# Patient Record
Sex: FEMALE | Race: WHITE | NOT HISPANIC OR LATINO | Employment: OTHER | URBAN - METROPOLITAN AREA
[De-identification: names, ages, dates, MRNs, and addresses within clinical notes are randomized per-mention and may not be internally consistent; named-entity substitution may affect disease eponyms.]

---

## 2017-04-18 ENCOUNTER — ALLSCRIPTS OFFICE VISIT (OUTPATIENT)
Dept: OTHER | Facility: OTHER | Age: 82
End: 2017-04-18

## 2017-04-18 DIAGNOSIS — E03.9 HYPOTHYROIDISM: ICD-10-CM

## 2017-04-18 DIAGNOSIS — E78.00 PURE HYPERCHOLESTEROLEMIA: ICD-10-CM

## 2017-04-18 DIAGNOSIS — R53.83 OTHER FATIGUE: ICD-10-CM

## 2017-04-18 DIAGNOSIS — Z12.11 ENCOUNTER FOR SCREENING FOR MALIGNANT NEOPLASM OF COLON: ICD-10-CM

## 2017-05-12 ENCOUNTER — TRANSCRIBE ORDERS (OUTPATIENT)
Dept: ADMINISTRATIVE | Facility: HOSPITAL | Age: 82
End: 2017-05-12

## 2017-05-12 DIAGNOSIS — R14.0 ABDOMINAL DISTENTION: Primary | ICD-10-CM

## 2017-05-16 ENCOUNTER — HOSPITAL ENCOUNTER (OUTPATIENT)
Dept: RADIOLOGY | Age: 82
Discharge: HOME | End: 2017-05-16
Payer: MEDICARE

## 2017-05-16 ENCOUNTER — TRANSCRIBE ORDERS (OUTPATIENT)
Dept: ADMINISTRATIVE | Age: 82
End: 2017-05-16

## 2017-05-16 ENCOUNTER — APPOINTMENT (OUTPATIENT)
Dept: LAB | Age: 82
End: 2017-05-16
Payer: MEDICARE

## 2017-05-16 DIAGNOSIS — E78.00 PURE HYPERCHOLESTEROLEMIA: ICD-10-CM

## 2017-05-16 DIAGNOSIS — R14.0 ABDOMINAL DISTENTION: ICD-10-CM

## 2017-05-16 DIAGNOSIS — R53.83 OTHER FATIGUE: ICD-10-CM

## 2017-05-16 DIAGNOSIS — E03.9 HYPOTHYROIDISM: ICD-10-CM

## 2017-05-16 LAB
ALBUMIN SERPL BCP-MCNC: 3.5 G/DL (ref 3.5–5)
ALP SERPL-CCNC: 56 U/L (ref 46–116)
ALT SERPL W P-5'-P-CCNC: 20 U/L (ref 12–78)
ANION GAP SERPL CALCULATED.3IONS-SCNC: 7 MMOL/L (ref 4–13)
AST SERPL W P-5'-P-CCNC: 25 U/L (ref 5–45)
BILIRUB SERPL-MCNC: 0.45 MG/DL (ref 0.2–1)
BUN SERPL-MCNC: 20 MG/DL (ref 5–25)
CALCIUM SERPL-MCNC: 8.7 MG/DL (ref 8.3–10.1)
CHLORIDE SERPL-SCNC: 105 MMOL/L (ref 100–108)
CHOLEST SERPL-MCNC: 206 MG/DL (ref 50–200)
CO2 SERPL-SCNC: 26 MMOL/L (ref 21–32)
CREAT SERPL-MCNC: 1.07 MG/DL (ref 0.6–1.3)
ERYTHROCYTE [DISTWIDTH] IN BLOOD BY AUTOMATED COUNT: 13 % (ref 11.6–15.1)
GFR SERPL CREATININE-BSD FRML MDRD: 48.6 ML/MIN/1.73SQ M
GLUCOSE P FAST SERPL-MCNC: 81 MG/DL (ref 65–99)
HCT VFR BLD AUTO: 33.5 % (ref 34.8–46.1)
HDLC SERPL-MCNC: 73 MG/DL (ref 40–60)
HGB BLD-MCNC: 11.2 G/DL (ref 11.5–15.4)
LDLC SERPL CALC-MCNC: 111 MG/DL (ref 0–100)
MCH RBC QN AUTO: 34.1 PG (ref 26.8–34.3)
MCHC RBC AUTO-ENTMCNC: 33.4 G/DL (ref 31.4–37.4)
MCV RBC AUTO: 102 FL (ref 82–98)
PLATELET # BLD AUTO: 258 THOUSANDS/UL (ref 149–390)
PMV BLD AUTO: 10.5 FL (ref 8.9–12.7)
POTASSIUM SERPL-SCNC: 4.4 MMOL/L (ref 3.5–5.3)
PROT SERPL-MCNC: 7 G/DL (ref 6.4–8.2)
RBC # BLD AUTO: 3.28 MILLION/UL (ref 3.81–5.12)
SODIUM SERPL-SCNC: 138 MMOL/L (ref 136–145)
TRIGL SERPL-MCNC: 109 MG/DL
TSH SERPL DL<=0.05 MIU/L-ACNC: 1.99 UIU/ML (ref 0.36–3.74)
WBC # BLD AUTO: 4.5 THOUSAND/UL (ref 4.31–10.16)

## 2017-05-16 PROCEDURE — 36415 COLL VENOUS BLD VENIPUNCTURE: CPT

## 2017-05-16 PROCEDURE — 80061 LIPID PANEL: CPT

## 2017-05-16 PROCEDURE — 76830 TRANSVAGINAL US NON-OB: CPT

## 2017-05-16 PROCEDURE — 85027 COMPLETE CBC AUTOMATED: CPT

## 2017-05-16 PROCEDURE — 84443 ASSAY THYROID STIM HORMONE: CPT

## 2017-05-16 PROCEDURE — 76700 US EXAM ABDOM COMPLETE: CPT

## 2017-05-16 PROCEDURE — 80053 COMPREHEN METABOLIC PANEL: CPT

## 2017-05-16 PROCEDURE — 76856 US EXAM PELVIC COMPLETE: CPT

## 2017-05-18 ENCOUNTER — GENERIC CONVERSION - ENCOUNTER (OUTPATIENT)
Dept: OTHER | Facility: OTHER | Age: 82
End: 2017-05-18

## 2017-06-26 ENCOUNTER — ALLSCRIPTS OFFICE VISIT (OUTPATIENT)
Dept: OTHER | Facility: OTHER | Age: 82
End: 2017-06-26

## 2017-07-03 ENCOUNTER — GENERIC CONVERSION - ENCOUNTER (OUTPATIENT)
Dept: OTHER | Facility: OTHER | Age: 82
End: 2017-07-03

## 2017-07-03 ENCOUNTER — ALLSCRIPTS OFFICE VISIT (OUTPATIENT)
Dept: OTHER | Facility: OTHER | Age: 82
End: 2017-07-03

## 2017-07-03 PROCEDURE — 88305 TISSUE EXAM BY PATHOLOGIST: CPT | Performed by: FAMILY MEDICINE

## 2017-07-05 ENCOUNTER — LAB REQUISITION (OUTPATIENT)
Dept: LAB | Facility: HOSPITAL | Age: 82
End: 2017-07-05
Payer: MEDICARE

## 2017-07-05 DIAGNOSIS — D49.2 NEOPLASM OF UNSPECIFIED BEHAVIOR OF BONE, SOFT TISSUE, AND SKIN: ICD-10-CM

## 2017-07-07 ENCOUNTER — ALLSCRIPTS OFFICE VISIT (OUTPATIENT)
Dept: OTHER | Facility: OTHER | Age: 82
End: 2017-07-07

## 2017-07-11 ENCOUNTER — GENERIC CONVERSION - ENCOUNTER (OUTPATIENT)
Dept: OTHER | Facility: OTHER | Age: 82
End: 2017-07-11

## 2017-07-25 ENCOUNTER — GENERIC CONVERSION - ENCOUNTER (OUTPATIENT)
Dept: OTHER | Facility: OTHER | Age: 82
End: 2017-07-25

## 2017-07-25 ENCOUNTER — LAB REQUISITION (OUTPATIENT)
Dept: LAB | Facility: HOSPITAL | Age: 82
End: 2017-07-25
Payer: MEDICARE

## 2017-07-25 DIAGNOSIS — C43.71 MALIGNANT MELANOMA OF RIGHT LOWER EXTREMITY INCLUDING HIP (HCC): ICD-10-CM

## 2017-07-25 PROCEDURE — 88305 TISSUE EXAM BY PATHOLOGIST: CPT | Performed by: SURGERY

## 2017-07-28 ENCOUNTER — ALLSCRIPTS OFFICE VISIT (OUTPATIENT)
Dept: OTHER | Facility: OTHER | Age: 82
End: 2017-07-28

## 2017-08-03 ENCOUNTER — ALLSCRIPTS OFFICE VISIT (OUTPATIENT)
Dept: OTHER | Facility: OTHER | Age: 82
End: 2017-08-03

## 2017-08-03 DIAGNOSIS — H81.10 BENIGN PAROXYSMAL VERTIGO: ICD-10-CM

## 2017-08-03 DIAGNOSIS — R25.2 CRAMP AND SPASM: ICD-10-CM

## 2017-08-03 DIAGNOSIS — R53.83 OTHER FATIGUE: ICD-10-CM

## 2017-08-03 DIAGNOSIS — M62.81 MUSCLE WEAKNESS (GENERALIZED): ICD-10-CM

## 2017-08-03 DIAGNOSIS — R60.9 EDEMA: ICD-10-CM

## 2017-08-08 ENCOUNTER — GENERIC CONVERSION - ENCOUNTER (OUTPATIENT)
Dept: OTHER | Facility: OTHER | Age: 82
End: 2017-08-08

## 2017-08-16 ENCOUNTER — TRANSCRIBE ORDERS (OUTPATIENT)
Dept: ADMINISTRATIVE | Age: 82
End: 2017-08-16

## 2017-08-16 ENCOUNTER — APPOINTMENT (OUTPATIENT)
Dept: LAB | Age: 82
End: 2017-08-16
Payer: MEDICARE

## 2017-08-16 DIAGNOSIS — R25.2 CRAMP AND SPASM: ICD-10-CM

## 2017-08-16 DIAGNOSIS — R60.9 EDEMA: ICD-10-CM

## 2017-08-16 DIAGNOSIS — M62.81 MUSCLE WEAKNESS (GENERALIZED): ICD-10-CM

## 2017-08-16 DIAGNOSIS — R53.83 OTHER FATIGUE: ICD-10-CM

## 2017-08-16 LAB
ALBUMIN SERPL BCP-MCNC: 3.2 G/DL (ref 3.5–5)
ALP SERPL-CCNC: 67 U/L (ref 46–116)
ALT SERPL W P-5'-P-CCNC: 21 U/L (ref 12–78)
ANION GAP SERPL CALCULATED.3IONS-SCNC: 7 MMOL/L (ref 4–13)
AST SERPL W P-5'-P-CCNC: 18 U/L (ref 5–45)
BILIRUB SERPL-MCNC: 0.34 MG/DL (ref 0.2–1)
BILIRUB UR QL STRIP: NEGATIVE
BUN SERPL-MCNC: 13 MG/DL (ref 5–25)
CALCIUM SERPL-MCNC: 9 MG/DL (ref 8.3–10.1)
CHLORIDE SERPL-SCNC: 103 MMOL/L (ref 100–108)
CLARITY UR: CLEAR
CO2 SERPL-SCNC: 27 MMOL/L (ref 21–32)
COLOR UR: YELLOW
CREAT SERPL-MCNC: 1.16 MG/DL (ref 0.6–1.3)
GFR SERPL CREATININE-BSD FRML MDRD: 43 ML/MIN/1.73SQ M
GLUCOSE SERPL-MCNC: 94 MG/DL (ref 65–140)
GLUCOSE UR STRIP-MCNC: NEGATIVE MG/DL
HGB UR QL STRIP.AUTO: NEGATIVE
KETONES UR STRIP-MCNC: NEGATIVE MG/DL
LEUKOCYTE ESTERASE UR QL STRIP: NEGATIVE
NITRITE UR QL STRIP: NEGATIVE
NT-PROBNP SERPL-MCNC: 348 PG/ML
PH UR STRIP.AUTO: 6.5 [PH] (ref 4.5–8)
POTASSIUM SERPL-SCNC: 4.3 MMOL/L (ref 3.5–5.3)
PROT SERPL-MCNC: 6.8 G/DL (ref 6.4–8.2)
PROT UR STRIP-MCNC: NEGATIVE MG/DL
SODIUM SERPL-SCNC: 137 MMOL/L (ref 136–145)
SP GR UR STRIP.AUTO: 1.01 (ref 1–1.03)
UROBILINOGEN UR QL STRIP.AUTO: 0.2 E.U./DL

## 2017-08-16 PROCEDURE — 83880 ASSAY OF NATRIURETIC PEPTIDE: CPT

## 2017-08-16 PROCEDURE — 80053 COMPREHEN METABOLIC PANEL: CPT

## 2017-08-16 PROCEDURE — 36415 COLL VENOUS BLD VENIPUNCTURE: CPT

## 2017-08-16 PROCEDURE — 81003 URINALYSIS AUTO W/O SCOPE: CPT

## 2017-08-18 ENCOUNTER — ALLSCRIPTS OFFICE VISIT (OUTPATIENT)
Dept: OTHER | Facility: OTHER | Age: 82
End: 2017-08-18

## 2017-08-22 ENCOUNTER — GENERIC CONVERSION - ENCOUNTER (OUTPATIENT)
Dept: OTHER | Facility: OTHER | Age: 82
End: 2017-08-22

## 2017-09-05 ENCOUNTER — APPOINTMENT (OUTPATIENT)
Dept: PHYSICAL THERAPY | Age: 82
End: 2017-09-05
Payer: MEDICARE

## 2017-09-05 ENCOUNTER — GENERIC CONVERSION - ENCOUNTER (OUTPATIENT)
Dept: OTHER | Facility: OTHER | Age: 82
End: 2017-09-05

## 2017-09-05 DIAGNOSIS — H81.10 BENIGN PAROXYSMAL VERTIGO: ICD-10-CM

## 2017-09-05 PROCEDURE — G8991 OTHER PT/OT GOAL STATUS: HCPCS

## 2017-09-05 PROCEDURE — 97162 PT EVAL MOD COMPLEX 30 MIN: CPT

## 2017-09-05 PROCEDURE — G8990 OTHER PT/OT CURRENT STATUS: HCPCS

## 2017-09-06 ENCOUNTER — TRANSCRIBE ORDERS (OUTPATIENT)
Dept: ADMINISTRATIVE | Facility: HOSPITAL | Age: 82
End: 2017-09-06

## 2017-09-06 DIAGNOSIS — Z13.820 SCREENING FOR OSTEOPOROSIS: Primary | ICD-10-CM

## 2017-09-07 ENCOUNTER — GENERIC CONVERSION - ENCOUNTER (OUTPATIENT)
Dept: OTHER | Facility: OTHER | Age: 82
End: 2017-09-07

## 2017-09-11 ENCOUNTER — APPOINTMENT (OUTPATIENT)
Dept: PHYSICAL THERAPY | Age: 82
End: 2017-09-11
Payer: MEDICARE

## 2017-09-11 PROCEDURE — 97530 THERAPEUTIC ACTIVITIES: CPT

## 2017-09-11 PROCEDURE — 97750 PHYSICAL PERFORMANCE TEST: CPT

## 2017-09-11 PROCEDURE — 97110 THERAPEUTIC EXERCISES: CPT

## 2017-09-19 ENCOUNTER — APPOINTMENT (OUTPATIENT)
Dept: PHYSICAL THERAPY | Age: 82
End: 2017-09-19
Payer: MEDICARE

## 2017-09-19 PROCEDURE — 97530 THERAPEUTIC ACTIVITIES: CPT

## 2017-09-19 PROCEDURE — 97110 THERAPEUTIC EXERCISES: CPT

## 2017-09-25 ENCOUNTER — APPOINTMENT (OUTPATIENT)
Dept: PHYSICAL THERAPY | Age: 82
End: 2017-09-25
Payer: MEDICARE

## 2017-09-25 PROCEDURE — 97110 THERAPEUTIC EXERCISES: CPT

## 2017-09-25 PROCEDURE — 97530 THERAPEUTIC ACTIVITIES: CPT

## 2017-10-16 ENCOUNTER — HOSPITAL ENCOUNTER (OUTPATIENT)
Dept: RADIOLOGY | Age: 82
Discharge: HOME | End: 2017-10-16
Payer: MEDICARE

## 2017-10-16 ENCOUNTER — APPOINTMENT (OUTPATIENT)
Dept: LAB | Age: 82
End: 2017-10-16
Payer: MEDICARE

## 2017-10-16 ENCOUNTER — TRANSCRIBE ORDERS (OUTPATIENT)
Dept: ADMINISTRATIVE | Age: 82
End: 2017-10-16

## 2017-10-16 DIAGNOSIS — M81.0 SENILE OSTEOPOROSIS: Primary | ICD-10-CM

## 2017-10-16 DIAGNOSIS — M81.0 SENILE OSTEOPOROSIS: ICD-10-CM

## 2017-10-16 DIAGNOSIS — Z13.820 SCREENING FOR OSTEOPOROSIS: ICD-10-CM

## 2017-10-16 LAB
25(OH)D3 SERPL-MCNC: 53.9 NG/ML (ref 30–100)
ANION GAP SERPL CALCULATED.3IONS-SCNC: 8 MMOL/L (ref 4–13)
BUN SERPL-MCNC: 17 MG/DL (ref 5–25)
CALCIUM SERPL-MCNC: 9.1 MG/DL (ref 8.3–10.1)
CHLORIDE SERPL-SCNC: 104 MMOL/L (ref 100–108)
CO2 SERPL-SCNC: 26 MMOL/L (ref 21–32)
CREAT SERPL-MCNC: 1.14 MG/DL (ref 0.6–1.3)
GFR SERPL CREATININE-BSD FRML MDRD: 44 ML/MIN/1.73SQ M
GLUCOSE SERPL-MCNC: 80 MG/DL (ref 65–140)
POTASSIUM SERPL-SCNC: 4.2 MMOL/L (ref 3.5–5.3)
SODIUM SERPL-SCNC: 138 MMOL/L (ref 136–145)

## 2017-10-16 PROCEDURE — 80048 BASIC METABOLIC PNL TOTAL CA: CPT

## 2017-10-16 PROCEDURE — 82306 VITAMIN D 25 HYDROXY: CPT

## 2017-10-16 PROCEDURE — 77080 DXA BONE DENSITY AXIAL: CPT

## 2017-10-16 PROCEDURE — 36415 COLL VENOUS BLD VENIPUNCTURE: CPT

## 2017-10-17 ENCOUNTER — APPOINTMENT (OUTPATIENT)
Dept: PHYSICAL THERAPY | Age: 82
End: 2017-10-17
Payer: MEDICARE

## 2017-10-17 PROCEDURE — G8990 OTHER PT/OT CURRENT STATUS: HCPCS

## 2017-10-17 PROCEDURE — 97162 PT EVAL MOD COMPLEX 30 MIN: CPT

## 2017-10-17 PROCEDURE — G8991 OTHER PT/OT GOAL STATUS: HCPCS

## 2017-10-24 ENCOUNTER — APPOINTMENT (OUTPATIENT)
Dept: PHYSICAL THERAPY | Age: 82
End: 2017-10-24
Payer: MEDICARE

## 2017-10-24 PROCEDURE — 97112 NEUROMUSCULAR REEDUCATION: CPT

## 2017-10-24 PROCEDURE — 97110 THERAPEUTIC EXERCISES: CPT

## 2017-10-31 ENCOUNTER — APPOINTMENT (OUTPATIENT)
Dept: PHYSICAL THERAPY | Age: 82
End: 2017-10-31
Payer: MEDICARE

## 2017-10-31 PROCEDURE — 97110 THERAPEUTIC EXERCISES: CPT

## 2017-10-31 PROCEDURE — 97112 NEUROMUSCULAR REEDUCATION: CPT

## 2017-11-09 ENCOUNTER — APPOINTMENT (OUTPATIENT)
Dept: PHYSICAL THERAPY | Age: 82
End: 2017-11-09
Payer: MEDICARE

## 2017-11-09 PROCEDURE — 97110 THERAPEUTIC EXERCISES: CPT

## 2017-11-09 PROCEDURE — 97112 NEUROMUSCULAR REEDUCATION: CPT

## 2017-11-14 ENCOUNTER — APPOINTMENT (OUTPATIENT)
Dept: PHYSICAL THERAPY | Age: 82
End: 2017-11-14
Payer: MEDICARE

## 2017-11-14 PROCEDURE — 97110 THERAPEUTIC EXERCISES: CPT

## 2017-11-14 PROCEDURE — 97112 NEUROMUSCULAR REEDUCATION: CPT

## 2017-11-28 ENCOUNTER — APPOINTMENT (OUTPATIENT)
Dept: PHYSICAL THERAPY | Age: 82
End: 2017-11-28
Payer: MEDICARE

## 2017-11-28 PROCEDURE — 97110 THERAPEUTIC EXERCISES: CPT

## 2017-11-28 PROCEDURE — G8991 OTHER PT/OT GOAL STATUS: HCPCS

## 2017-11-28 PROCEDURE — G8992 OTHER PT/OT  D/C STATUS: HCPCS

## 2017-11-28 PROCEDURE — G8990 OTHER PT/OT CURRENT STATUS: HCPCS | Performed by: PHYSICAL THERAPIST

## 2017-11-28 PROCEDURE — 97112 NEUROMUSCULAR REEDUCATION: CPT

## 2017-11-28 PROCEDURE — 97164 PT RE-EVAL EST PLAN CARE: CPT

## 2018-01-09 ENCOUNTER — ALLSCRIPTS OFFICE VISIT (OUTPATIENT)
Dept: OTHER | Facility: OTHER | Age: 83
End: 2018-01-09

## 2018-01-09 DIAGNOSIS — E03.9 HYPOTHYROIDISM: ICD-10-CM

## 2018-01-09 DIAGNOSIS — E78.00 PURE HYPERCHOLESTEROLEMIA: ICD-10-CM

## 2018-01-09 DIAGNOSIS — Z85.038 PERSONAL HISTORY OF OTHER MALIGNANT NEOPLASM OF LARGE INTESTINE (CODE): ICD-10-CM

## 2018-01-09 DIAGNOSIS — R53.83 OTHER FATIGUE: ICD-10-CM

## 2018-01-10 NOTE — PROGRESS NOTES
Assessment   1  Right acute otitis media (382 9) (H66 91)   2  Hypothyroidism (244 9) (E03 9)   3  History of colon cancer (V10 05) (Z85 038)   4  Hypercholesteremia (272 0) (E78 00)   5  Fatigue (780 79) (R53 83)   6  Chronic constipation (564 00) (K59 09)    Plan   Acute otitis media    · Ofloxacin 0 3 % Otic Solution; INSTILL 3 DROP Twice daily  Fatigue    · (1) COMPREHENSIVE METABOLIC PANEL; Status:Active; Requested ENP:94FDS0441; History of colon cancer    · (1) CEA; Status:Active; Requested SCY:62EFW6521;   Hypercholesteremia    · (1) LIPID PANEL FASTING W DIRECT LDL REFLEX; Status:Active; Requested    COT:95ZPD5890;   Hypothyroidism    · (1) TSH; Status:Active; Requested SLK:67GWR4029; Discussion/Summary      If patient is not feeling better in 72 hours, they are to call  I will see the patient back in 4 months unless she has problems sooner  Possible side effects of new medications were reviewed with the patient/guardian today  The treatment plan was reviewed with the patient/guardian  The patient/guardian understands and agrees with the treatment plan      Chief Complaint   4 month follow up      History of Present Illness   The patient presents for follow-up of hypercholesterolemia,chronic constipation and hypothyroidism  All are well controlled at this time  She complains of right ear pain the past 1 weeks time  She also complains of progressively worsening fatigue over the past 1 month  Review of Systems        Constitutional: feeling tired, but-- No fever, no chills, feels well, no tiredness, no recent weight gain or weight loss  Eyes: No complaints of eye pain, no red eyes, no eyesight problems, no discharge, no dry eyes, no itching of eyes  ENT: earache, but-- no complaints of earache, no loss of hearing, no nose bleeds, no nasal discharge, no sore throat, no hoarseness        Cardiovascular: No complaints of slow heart rate, no fast heart rate, no chest pain, no palpitations, no leg claudication, no lower extremity edema  Respiratory: No complaints of shortness of breath, no wheezing, no cough, no SOB on exertion, no orthopnea, no PND  Gastrointestinal: No complaints of abdominal pain, no constipation, no nausea or vomiting, no diarrhea, no bloody stools  Genitourinary: No complaints of dysuria, no incontinence, no pelvic pain, no dysmenorrhea, no vaginal discharge or bleeding  Musculoskeletal: No complaints of arthralgias, no myalgias, no joint swelling or stiffness, no limb pain or swelling  Integumentary: No complaints of skin rash or lesions, no itching, no skin wounds, no breast pain or lump  Neurological: No complaints of headache, no confusion, no convulsions, no numbness, no dizziness or fainting, no tingling, no limb weakness, no difficulty walking  Psychiatric: Not suicidal, no sleep disturbance, no anxiety or depression, no change in personality, no emotional problems  Endocrine: No complaints of proptosis, no hot flashes, no muscle weakness, no deepening of the voice, no feelings of weakness  Hematologic/Lymphatic: No complaints of swollen glands, no swollen glands in the neck, does not bleed easily, does not bruise easily  Preventive Quality 65 and Older: Falls Risk: The patient fell 0 times in the past 12 months  The patient is currently experiencing urinary symptoms  Urinary Incontinence Symptoms includes: urinary frequency       Active Problems   1  Abdominal distention (787 3) (R14 0)   2  Actinic keratoses (702 0) (L57 0)   3  Acute otitis media (382 9) (H66 90)   4  Allergic rhinitis (477 9) (J30 9)   5  Benign positional vertigo (386 11) (H81 10)   6  Chronic constipation (564 00) (K59 09)   7  Colon cancer screening (V76 51) (Z12 11)   8  Dysfunction of both eustachian tubes (381 81) (H69 83)   9  Early satiety (780 94) (R68 81)   10  Edema (782 3) (R60 9)   11  Fatigue (780 79) (R53 83)   12   Gastroparesis (536 3) (K31 84)   13  History of colon cancer (V10 05) (Z85 038)   14  Hypercholesteremia (272 0) (E78 00)   15  Hypothyroidism (244 9) (E03 9)   16  IBS (irritable bowel syndrome) (564 1) (K58 9)   17  Infected insect bite (919 5,E906 4) (W57 XXXA)   18  Insomnia (780 52) (G47 00)   19  Left-sided chest wall pain (786 52) (R07 89)   20  Leg cramps, sleep related (327 52) (G47 62)   21  Medicare annual wellness visit, initial (V70 0) (Z00 00)   22  Muscle cramp (729 82) (R25 2)   23  Muscle weakness (728 87) (M62 81)   24  Osteoarthritis of spine (721 90) (M47 9)   25  Osteoporosis (733 00) (M81 0)   26  Peripheral neuropathy (356 9) (G62 9)   27  Screening for genitourinary condition (V81 6) (Z13 89)   28  Screening for neurological condition (V80 09) (Z13 89)   29  Skin neoplasm (239 2) (D49 2)   30  Squamous cell carcinoma (173 92) (C44 92)   31  Thoracic back pain (724 1) (M54 6)   32  Thoracic compression fracture (805 2) (S22 000A)   33  Urinary frequency (788 41) (R35 0)   34  UTI (urinary tract infection) (599 0) (N39 0)   35  Vestibular dysfunction (386 50) (H83 2X9)   36  Visit for wound check (V58 89) (Z51 89)    Past Medical History   1  History of hypertension (V12 59) (Z86 79)   2  History of malignant neoplasm of colon (V10 05) (Z85 038)   3  History of peripheral neuropathy (V12 49) (Z86 69)     The active problems and past medical history were reviewed and updated today  Surgical History   1  History of Appendectomy   2  History of Cholecystectomy   3  History of Partial Colectomy   4  History of Tonsillectomy     The surgical history was reviewed and updated today  Family History   Mother    1  Family history of cardiac disorder (V17 49) (Z82 49)  Sister    2  Family history of Alzheimer's disease (V17 2) (Z82 0)     The family history was reviewed and updated today  Social History    · Former smoker (T15 65) (B14 660)   · No alcohol use  The social history was reviewed and updated today  The social history was reviewed and is unchanged  Current Meds    1  Allergy Relief CAPS; Therapy: (Recorded:78Kdh4539) to Recorded   2  AmLODIPine Besylate 2 5 MG Oral Tablet; Take 1 tablet daily; Therapy: 08GFN1749 to (Evaluate:64Mmh2758)  Requested for: 82FGG6225; Last     Rx:10Oct2017 Ordered   3  Aspirin Low Dose 81 MG TABS; Therapy: (Recorded:97Okr3490) to Recorded   4  Betamethasone Valerate 0 1 % External Ointment; Therapy: 94Bmz8381 to (Evaluate:23Wes7949) Recorded   5  Calcium-Vitamin D 600-200 MG-UNIT Oral Tablet Recorded   6  Famotidine 40 MG Oral Tablet; TAKE 1 TABLET DAILY AS DIRECTED; Therapy: 44Uwt2494 to (Evaluate:09Iic0811)  Requested for: 65FHB5931; Last     Rx:10Oct2017 Ordered   7  HydroCHLOROthiazide 25 MG Oral Tablet; take 1 tablet daily prn; Therapy: 23NMU3606 to (Anastasia Maine)  Requested for: 26Jun2017; Last     YB:06RYN4847 Ordered   8  Levothyroxine Sodium 25 MCG Oral Tablet; TAKE ONE TABLET BY MOUTH ONCE DAILY; Therapy: 68OZT4477 to (Evaluate:79Gdc4269)  Requested for: 88Nur9730; Last     Rx:22Qqj3472 Ordered   9  Linzess 290 MCG Oral Capsule; take 1 capsule daily; Therapy: 66MXZ7029 to ([facility])  Requested for: 04VJJ8918; Last     Rx:10Oct2017 Ordered   10  Meclizine HCl - 12 5 MG Oral Tablet; TAKE 1 TABLET ORALLY EVERY 8 HOURS AS      NEEDED FOR DIZZINESS *RE-ORDER*;      Therapy: 30NMI2894 to (Evaluate:19Jan2017)  Requested for: 35SBD4939; Last      Rx:38Caf6226 Ordered   11  Multivitamins Oral Capsule; TAKE 1 CAPSULE EVERY 4 TO 6 HOURS Recorded   12  Senna CAPS Recorded     The medication list was reviewed and updated today  Allergies   1   Penicillins    Vitals   Vital Signs    Recorded: 73UHQ4267 02:23PM   Temperature 98 5 F   Heart Rate 62   Systolic 461   Diastolic 72   Height 5 ft 3 in   Weight 150 lb    BMI Calculated 26 57   BSA Calculated 1 71     Physical Exam        Constitutional      General appearance: No acute distress, well appearing and well nourished  Eyes      Conjunctiva and lids: No swelling, erythema or discharge  Pupils and irises: Equal, round and reactive to light  Ears, Nose, Mouth, and Throat      External inspection of ears and nose: Normal        Otoscopic examination: Abnormal  -- right TM is erythematous and dull  Nasal mucosa, septum, and turbinates: Normal without edema or erythema  Oropharynx: Normal with no erythema, edema, exudate or lesions  Pulmonary      Respiratory effort: No increased work of breathing or signs of respiratory distress  Auscultation of lungs: Clear to auscultation  Cardiovascular      Auscultation of heart: Normal rate and rhythm, normal S1 and S2, without murmurs  Abdomen      Abdomen: Non-tender, no masses  Liver and spleen: No hepatomegaly or splenomegaly  Lymphatic      Palpation of lymph nodes in neck: Abnormal  -- Positive anterior cervical lymphadenopathy  Musculoskeletal      Digits and nails: Normal without clubbing or cyanosis  Skin      Skin and subcutaneous tissue: Normal without rashes or lesions  Neurologic      Reflexes: 2+ and symmetric  Psychiatric      Orientation to person, place, and time: Normal        Mood and affect: Normal           Results/Data   PHQ-2 Adult Depression Screening 02CQY1329 02:25PM User, s      Test Name Result Flag Reference   PHQ-2 Adult Depression Score 0     Over the last two weeks, how often have you been bothered by any of the following problems?      Little interest or pleasure in doing things: Not at all - 0     Feeling down, depressed, or hopeless: Not at all - 0   PHQ-2 Adult Depression Screening Negative          Future Appointments      Date/Time Provider Specialty Site   05/09/2018 01:45 PM Shameka Valdovinos DO [address]     Signatures    Electronically signed by : Noel Cid DO; Jan 9 2018 6:46PM EST                       (Author)

## 2018-01-11 NOTE — RESULT NOTES
Verified Results  * US PELVIS COMPLETE (TRANSABDOMINAL AND TRANSVAGINAL) 03OPH9864 08:33AM [facility]     Test Name Result Flag Reference   US PELVIS COMPLETE (TRANSABDOMINAL AND TRANSVAGINAL) (Report)     PELVIC ULTRASOUND, COMPLETE     INDICATION: Abdominal distention          COMPARISON: None  TECHNIQUE:  Transabdominal pelvic ultrasound was performed in sagittal and transverse planes with a curvilinear transducer  Additional transvaginal imaging was performed to better evaluate the endometrium and ovaries  Imaging included volumetric    sweeps as well as traditional still imaging technique  FINDINGS:     UTERUS:   The uterus is anteverted in position, measuring 2 2 x 2 7 x 5 0 cm  Contour and echotexture appear normal      The cervix shows no suspicious abnormality  ENDOMETRIUM:    Normal caliber of 3 mm  Homogenous and normal in appearance  There is mild endometrial fluid  OVARIES/ADNEXA:   The ovaries are not visualized and are surgically absent by report appear     No suspicious adnexal mass or loculated collections  There is no free fluid  IMPRESSION:      Mild endometrial fluid with normal glandular thickness  This may nonspecific though could represent cervical stenosis  Workstation performed: [id number]     Signed by:   Rishi Aguilar MD   5/18/17     * US ABDOMEN COMPLETE [id number] 08:25AM [facility]     Test Name Result Flag Reference   US ABDOMEN COMPLETE (Report)     ABDOMEN ULTRASOUND, COMPLETE     INDICATION: Abdominal distention     COMPARISON: 7/12/2016     TECHNIQUE:  Real-time ultrasound of the abdomen was performed with a curvilinear transducer with both volumetric sweeps and still imaging techniques  FINDINGS:     PANCREAS: Visualized portions of the pancreas are within normal limits  AORTA AND IVC: Visualized portions are normal for patient age  LIVER:   Size: Within normal range   The liver measures 16 4 cm in the midclavicular line    Contour: Surface contour is smooth  Parenchyma: Echogenicity and echotexture are within normal limits  No evidence of suspicious mass  Multiple hepatic cysts are seen, the largest measuring 2 0 x 2 3 x 2 9 cm  The main portal vein is patent and hepatopetal       BILIARY:   The patient is status post cholecystectomy  No intrahepatic biliary dilatation  CBD measures 4 mm  No choledocholithiasis  KIDNEY:    Right kidney measures 10 1 x 4 5 cm  Within normal limits  Left kidney measures 9 1 x 3 6 cm  Within normal limits  SPLEEN:    Measures 10 4 cm  Within normal limits  ASCITES: None  IMPRESSION:     Hepatic cysts  Otherwise unremarkable status post cholecystectomy         Workstation performed: [id number]     Signed by:   Ashely Muhammad MD   5/18/17

## 2018-01-12 VITALS
WEIGHT: 148.38 LBS | HEIGHT: 60 IN | HEART RATE: 78 BPM | SYSTOLIC BLOOD PRESSURE: 124 MMHG | DIASTOLIC BLOOD PRESSURE: 80 MMHG | TEMPERATURE: 98 F | BODY MASS INDEX: 29.13 KG/M2

## 2018-01-13 VITALS
TEMPERATURE: 98.1 F | SYSTOLIC BLOOD PRESSURE: 124 MMHG | RESPIRATION RATE: 16 BRPM | HEART RATE: 82 BPM | BODY MASS INDEX: 25.96 KG/M2 | WEIGHT: 146.5 LBS | DIASTOLIC BLOOD PRESSURE: 72 MMHG | HEIGHT: 63 IN

## 2018-01-13 VITALS
TEMPERATURE: 98.6 F | WEIGHT: 150 LBS | HEART RATE: 86 BPM | RESPIRATION RATE: 14 BRPM | DIASTOLIC BLOOD PRESSURE: 72 MMHG | HEIGHT: 60 IN | SYSTOLIC BLOOD PRESSURE: 122 MMHG | BODY MASS INDEX: 29.45 KG/M2

## 2018-01-13 VITALS
BODY MASS INDEX: 28.76 KG/M2 | TEMPERATURE: 98.3 F | WEIGHT: 146.5 LBS | SYSTOLIC BLOOD PRESSURE: 110 MMHG | HEIGHT: 60 IN | DIASTOLIC BLOOD PRESSURE: 74 MMHG | HEART RATE: 74 BPM

## 2018-01-13 VITALS
BODY MASS INDEX: 29.28 KG/M2 | TEMPERATURE: 97.3 F | HEART RATE: 82 BPM | HEIGHT: 60 IN | WEIGHT: 149.13 LBS | SYSTOLIC BLOOD PRESSURE: 112 MMHG | DIASTOLIC BLOOD PRESSURE: 64 MMHG

## 2018-01-14 VITALS
HEIGHT: 60 IN | SYSTOLIC BLOOD PRESSURE: 124 MMHG | DIASTOLIC BLOOD PRESSURE: 70 MMHG | HEART RATE: 80 BPM | BODY MASS INDEX: 29.13 KG/M2 | TEMPERATURE: 98.9 F | WEIGHT: 148.38 LBS

## 2018-01-15 NOTE — RESULT NOTES
Verified Results  (1) TISSUE EXAM 12AKW6392 03:19PM Talisha Dial     Test Name Result Flag Reference   LAB AP CASE REPORT (Report)     Surgical Pathology Report             Case: [id number]                   Authorizing Provider: Juliocesar Thomas,  Collected:      07/03/2017                       MD                                       Pathologist:      Dillon Montes MD        Received:      07/05/2017 0959        Specimen:  Skin, Other, Right Lower Leg   LAB AP FINAL DIAGNOSIS (Report)     A  Skin, Right Lower Leg, shave biopsy:  - Squamous cell carcinoma, extends to the base of the biopsy  Interpretation performed at [facility], [address] [place] [address]        Electronically signed by Dillon Montes MD on 7/10/2017 at 3:19 PM   LAB AP SURGICAL ADDITIONAL INFORMATION (Report)     These tests were developed and their performance characteristics   determined by [facility] or [facility]  They may not be cleared or approved by the [place]  Food and   Drug Administration  The FDA has determined that such clearance or   approval is not necessary  These tests are used for clinical purposes  They should not be regarded as investigational or for research  This   laboratory has been approved by [facility], designated as a high-complexity   laboratory and is qualified to perform these tests  LAB AP GROSS DESCRIPTION (Report)     A  The specimen is received in formalin, labeled with the patient's name   and hospital number, and is designated right lower leg, is a shave   biopsy of skin measuring 0 7 x 0 5 x 0 1 cm  The epidermal surface is   tan-brown, ulcerated, and crusted  The resection margin is inked green and   the tips are inked red  The specimen is bisected revealing tan cut   surfaces  Entirely submitted  One cassette  The fixation time is unknown      RLR

## 2018-01-15 NOTE — RESULT NOTES
Verified Results  NM GASTRIC EMPTYING 32IJL6327 08:30AM Dillon Leslie Order Number: [id number]     Test Name Result Flag Reference   NM GASTRIC EMPTYING (Report)     GASTRIC EMPTYING STUDY     INDICATION: Abdominal distention      COMPARISON: None available     TECHNIQUE:  The study was performed following the oral administration of 1 0 mCi Tc-99m sulfur colloid combined with scrambled eggs, as part of a standard meal  Following the meal, one minute anterior and posterior images were obtained immediately and    at 0 25 hours, 0 5 hour, 1 hour, 1 5 hour, 2 hour, 3 hour and 4 hour intervals from the time of ingestion  Geometric mean analyses were then performed  FINDINGS:     Gastric emptying at 0 5 hours = 13 %    Gastric emptying at 1 hour = 26 % (N = 30 - 90%)   Gastric emptying at 2 hours = 52 % (N = > 60%)   Gastric emptying at 3 hours = 78 % (N = > 70%)   Gastric emptying at 4 hours = 100 % (N = >90%)     Linear T-1/2 = 115 minutes       IMPRESSION:     There is a mildly delayed rate of gastric emptying of solids through at least the 1st 2 hours of the examination         Workstation performed: [id number]     Signed by:   Jimmie Franklin MD   3/4/16

## 2018-01-16 NOTE — PROGRESS NOTES
Assessment    1  Dysfunction of both Eustachian tubes (381 81) (H69 83)   2  Acute otitis media (382 9) (H66 90)   3  Vestibular dysfunction (386 50) (H83 2X9)   4  Screening for genitourinary condition (V81 6) (Z13 89)    Plan  Acute otitis media, Dysfunction of both Eustachian tubes    · Azithromycin 250 MG Oral Tablet; TAKE 2 TABLETS ON DAY 1 THEN TAKE 1  TABLET A DAY FOR 4 DAYS  Dysfunction of both Eustachian tubes    · PredniSONE 10 MG Oral Tablet; Take 3 po bid for 2 days , then 2 po bid for 2  days, then 1 po bid for 2 days, then 1 qd for 2 days and stop  Screening for genitourinary condition    · *VB-Urinary Incontinence Screen (Dx V81 6 Screen for UI); Status:Complete;   Done:  14RVE9497 12:00AM   · *VB-Urinary Incontinence Screen (Dx V81 6 Screen for UI); Status:Complete;   Done:  32IFS8028 12:00AM  Screening for neurological condition    · *VB - Fall Risk Assessment  (Dx V80 09 Screen for Neurologic Disorder); Status:Active; Requested for:84Hjg0453;    · *VB - Fall Risk Assessment  (Dx V80 09 Screen for Neurologic Disorder);  Status:Complete;   Done: 04PMO0801 12:00AM   · *VB - Fall Risk Assessment  (Dx V80 09 Screen for Neurologic Disorder);  Status:Complete;   Done: 74RAX0598 12:00AM  Vestibular dysfunction    · *1 - [facility] Physical Therapy  Consult  Status: Active  Requested for:  94ZDJ0995  Care Summary provided  : Yes    Discussion/Summary    If the patient is not feeling better in 72 hours, she is to call  Will await her evaluation and vestibular therapy  Possible side effects of new medications were reviewed with the patient/guardian today  The treatment plan was reviewed with the patient/guardian  The patient/guardian understands and agrees with the treatment plan      Chief Complaint    1  Dizziness    History of Present Illness  HPI: 1 week of intermittent dizziness  + triggered with positional changes and a hx of vertigo in past  Meclizine offers a little relief   patient has a is currently experiencing urinary symptoms  Urinary Incontinence Symptoms includes: urinary incontinence       Active Problems    1  Chronic constipation (564 00) (K59 00)   2  History of hypertension (V12 59) (Z86 79)   3  Hypothyroidism (244 9) (E03 9)   4  IBS (irritable bowel syndrome) (564 1) (K58 9)   5  Osteoarthritis of spine (721 90) (M47 9)   6  Osteoporosis (733 00) (M81 0)   7  Thoracic compression fracture (805 2) (S22 000A)    Past Medical History    1  History of malignant neoplasm of colon (V10 05) (W94 668)  Active Problems And Past Medical History Reviewed: The active problems and past medical history were reviewed and updated today  Family History    1  Family history of cardiac disorder (V17 49) (Z82 49)    2  Family history of Alzheimer's disease (V17 2) (Z82 0)  Family History Reviewed: The family history was reviewed and updated today  Social History    · Former smoker (U91 80) (K42 816)   · No alcohol use  The social history was reviewed and updated today  The social history was reviewed and is unchanged  Surgical History    1  History of Appendectomy   2  History of Cholecystectomy   3  History of Partial Colectomy   4  History of Tonsillectomy  Surgical History Reviewed: The surgical history was reviewed and updated today  Current Meds   1  AmLODIPine Besylate 2 5 MG Oral Tablet; 1 TAB QD;   Therapy: 36DMZ8523 to (Renew: 47ZBR0203)  Requested for: 22Rxu9207; Last   Rx:32Huj6353 Ordered   2  Aspirin Low Dose 81 MG Oral Tablet; Therapy: (Recorded:09Dwa2147) to Recorded   3  Calcium-Vitamin D 600-200 MG-UNIT Oral Tablet Recorded   4  Levothyroxine Sodium 25 MCG Oral Tablet; take 1 tablet by mouth every day    Requested for: 66OOE2149; Last FX:29SYO5667 Ordered   5  Linzess 290 MCG Oral Capsule; take 1 capsule daily; Therapy: 97NYE6605 to (Evaluate:19Nov2015); Last Rx:20Oct2015 Ordered   6  Meclizine HCl - 12 5 MG Oral Tablet;  Take 1 tablet 3 times daily as needed Recorded   7  Senna CAPS Recorded    The medication list was reviewed and updated today  Allergies    1  Penicillins    Vitals   Recorded: 02Feb2016 01:52PM   Temperature 98 3 F   Heart Rate 64   Systolic 114   Diastolic 74   Height 5 ft    Weight 145 lb    BMI Calculated 28 32   BSA Calculated 1 62     Physical Exam    Constitutional   General appearance: No acute distress, well appearing and well nourished  Eyes   Conjunctiva and lids: No swelling, erythema or discharge  Pupils and irises: Equal, round and reactive to light  Ears, Nose, Mouth, and Throat   External inspection of ears and nose: Normal     Otoscopic examination: Abnormal   TMs are erythematous and dull bilaterally with effusions bilaterally left greater than right  Nasal mucosa, septum, and turbinates: Normal without edema or erythema  Oropharynx: Normal with no erythema, edema, exudate or lesions  Pulmonary   Respiratory effort: No increased work of breathing or signs of respiratory distress  Auscultation of lungs: Clear to auscultation  Cardiovascular   Auscultation of heart: Normal rate and rhythm, normal S1 and S2, without murmurs  Examination of extremities for edema and/or varicosities: Normal     Abdomen   Abdomen: Non-tender, no masses  Liver and spleen: No hepatomegaly or splenomegaly  Lymphatic   Palpation of lymph nodes in neck: Abnormal   positive anterior cervical lymphadenopathy  Musculoskeletal   Digits and nails: Normal without clubbing or cyanosis  Skin   Skin and subcutaneous tissue: Normal without rashes or lesions  Neurologic   Reflexes: 2+ and symmetric      Psychiatric   Orientation to person, place, and time: Normal     Mood and affect: Normal          Results/Data  PHQ-2 Adult Depression Screening 02Feb2016 01:54PM [facility]     Test Name Result Flag Reference   PHQ-2 Adult Depression Score 0     Q1: 0, Q2: 0   PHQ-2 Adult Depression Screening Negative       *VB - Fall Risk Assessment  (Dx V80 09 Screen for Neurologic Disorder) 88ZBT8885 12:00AM Magali Core     Test Name Result Flag Reference   Fall Risk Assessment 74CNX9884         Future Appointments    Date/Time Provider Specialty Site   02/23/2016 01:00 PM Magali Dash DO Family Medicine [facility]ILY [facility] Nicolasa Glaser     Signatures   Electronically signed by : Arthur Motta DO; Feb  3 2016 12:18PM EST                       (Author)

## 2018-01-16 NOTE — RESULT NOTES
Verified Results  (1) LIPID PANEL, FASTING 89Dnr2151 10:19AM Patrick Paiz Order Number: [id number]  TW Order Number: [id number] Order Number: [id number] Order Number: [id number] Order Number: [id number]     Test Name Result Flag Reference   CHOLESTEROL 195 mg/dL     HDL,DIRECT 72 mg/dL H 40-60   Specimen collection should occur prior to Metamizole administration due to the potential for falsely depressed results  LDL CHOLESTEROL CALCULATED 106 mg/dL H 0-100   Triglyceride:         Normal              <150 mg/dl       Borderline High    150-199 mg/dl       High               200-499 mg/dl       Very High          >499 mg/dl  Cholesterol:         Desirable        <200 mg/dl      Borderline High  200-239 mg/dl      High             >239 mg/dl  HDL Cholesterol:        High    >59 mg/dL      Low     <41 mg/dL  LDL CALCULATED:    This screening LDL is a calculated result  It does not have the accuracy of the Direct Measured LDL in the monitoring of patients with hyperlipidemia and/or statin therapy  Direct Measure LDL (YYO867) must be ordered separately in these patients  TRIGLYCERIDES 86 mg/dL  <=150   Specimen collection should occur prior to N-Acetylcysteine or Metamizole administration due to the potential for falsely depressed results

## 2018-01-22 VITALS
HEART RATE: 80 BPM | WEIGHT: 143.5 LBS | RESPIRATION RATE: 14 BRPM | DIASTOLIC BLOOD PRESSURE: 78 MMHG | SYSTOLIC BLOOD PRESSURE: 126 MMHG | TEMPERATURE: 98.8 F | BODY MASS INDEX: 28.03 KG/M2

## 2018-01-22 VITALS
HEART RATE: 60 BPM | DIASTOLIC BLOOD PRESSURE: 72 MMHG | BODY MASS INDEX: 26.29 KG/M2 | WEIGHT: 148.38 LBS | TEMPERATURE: 98.4 F | HEIGHT: 63 IN | SYSTOLIC BLOOD PRESSURE: 110 MMHG

## 2018-01-23 VITALS
HEART RATE: 62 BPM | HEIGHT: 63 IN | SYSTOLIC BLOOD PRESSURE: 110 MMHG | WEIGHT: 150 LBS | DIASTOLIC BLOOD PRESSURE: 72 MMHG | BODY MASS INDEX: 26.58 KG/M2 | TEMPERATURE: 98.5 F

## 2018-01-23 NOTE — PROGRESS NOTES
Assessment    1  Skin neoplasm (239 2) (D49 2)    Plan  Infected insect bite    · Azithromycin 250 MG Oral Tablet  Skin neoplasm    · Doxycycline Hyclate 100 MG Oral Capsule; 1 tab po bid   · Shave lesion trunk, arms, legs 0 6 - 1 0 cm - POC; Status:Active - Perform Order;  Requested for:52Aas5719;     Discussion/Summary    #1 skin neoplasm - I reviewed with patient  Magnified evaluation suggests possible squamous cell cancer  Because of this, patient underwent a 1 cm shave excision with evocation to the base  Patient was somewhat hyperemic and lesion was slightly friable which would be consistent with a possible malignancy  I reviewed all with patient  We discussed wound care as well as signs of infection  I will see patient back Wednesday or Thursday for wound check  We will continue antibiotics for now  Await pathology  Further recommendations based on pathology  Patient to call for problems or concerns in the interim  The patient was counseled regarding instructions for management, risk factor reductions, prognosis, patient and family education, impressions, risks and benefits of treatment options, importance of compliance with treatment  Possible side effects of new medications were reviewed with the patient/guardian today  The treatment plan was reviewed with the patient/guardian  The patient/guardian understands and agrees with the treatment plan      Chief Complaint  RE CHECK WOUND ON RT LEG      History of Present Illness  As above  - Patient seen June 26 for right lower leg possible insect bite with infection  Patient states that the lesion was removed but no purulent material was found  Patient was started on antibiotics  She is a will finish the course and notes that there is no improvement  There is extremely tender  She denies any worsening pain however  There is no redness as well  Patient thought that the lesion was new but is not 100% sure   It has existed for at least 2 weeks and may be slowly increasing in size  Patient has a history of actinic keratoses But no definite skin cancers  Review of Systems    Constitutional: as noted in HPI  Musculoskeletal: No complaints of arthralgias, no myalgias, no joint swelling or stiffness, no limb pain or swelling  Neurological: No complaints of headache, no confusion, no convulsions, no numbness, no dizziness or fainting, no tingling, no limb weakness, no difficulty walking  Active Problems    1  Abdominal distention (787 3) (R14 0)   2  Actinic keratoses (702 0) (L57 0)   3  Acute otitis media (382 9) (H66 90)   4  Allergic rhinitis (477 9) (J30 9)   5  Chronic constipation (564 00) (K59 09)   6  Colon cancer screening (V76 51) (Z12 11)   7  Dysfunction of both eustachian tubes (381 81) (H69 83)   8  Early satiety (780 94) (R68 81)   9  Edema (782 3) (R60 9)   10  Fatigue (780 79) (R53 83)   11  Gastroparesis (536 3) (K31 84)   12  History of colon cancer (V10 05) (Z85 038)   13  Hypercholesteremia (272 0) (E78 00)   14  Hypothyroidism (244 9) (E03 9)   15  IBS (irritable bowel syndrome) (564 1) (K58 9)   16  Infected insect bite (919 5,E906 4) (W57 XXXA)   17  Insomnia (780 52) (G47 00)   18  Leg cramps, sleep related (327 52) (G47 62)   19  Medicare annual wellness visit, initial (V70 0) (Z00 00)   20  Osteoarthritis of spine (721 90) (M47 9)   21  Osteoporosis (733 00) (M81 0)   22  Peripheral neuropathy (356 9) (G62 9)   23  Screening for genitourinary condition (V81 6) (Z13 89)   24  Screening for neurological condition (V80 09) (Z13 89)   25  Thoracic back pain (724 1) (M54 6)   26  Thoracic compression fracture (805 2) (S22 000A)   27  Urinary frequency (788 41) (R35 0)   28  UTI (urinary tract infection) (599 0) (N39 0)   29  Vestibular dysfunction (386 50) (H83 2X9)    Past Medical History    1  History of hypertension (V12 59) (Z86 79)   2  History of malignant neoplasm of colon (V10 05) (Z85 038)   3   History of peripheral neuropathy (V12 49) (Z86 69)    The active problems and past medical history were reviewed and updated today  Surgical History    1  History of Appendectomy   2  History of Cholecystectomy   3  History of Partial Colectomy   4  History of Tonsillectomy    Family History  Mother    1  Family history of cardiac disorder (V17 49) (Z82 49)  Sister    2  Family history of Alzheimer's disease (V17 2) (Z82 0)    Social History    · Former smoker (B04 35) (N65 741)   · No alcohol use  The social history was reviewed and updated today  Current Meds   1  AmLODIPine Besylate 2 5 MG Oral Tablet; TAKE ONE TABLET BY MOUTH ONCE DAILY; Therapy: 23LNT0463 to (Evaluate:11Jun2017)  Requested for: 95YTD1108; Last   Rx:47Rrt0004 Ordered   2  Aspirin Low Dose 81 MG TABS; Therapy: (Recorded:50Hxv2404) to Recorded   3  Azithromycin 250 MG Oral Tablet; TAKE 2 TABLETS ON DAY 1 THEN TAKE 1 TABLET A   DAY FOR 4 DAYS; Therapy: 71DRY9375 to (Cash Valles)  Requested for: 26Jun2017; Last   MH:[id number] Ordered   4  Betamethasone Valerate 0 1 % External Ointment; Therapy: 09Bvx9229 to (Evaluate:24Gcy7352) Recorded   5  Calcium-Vitamin D 600-200 MG-UNIT Oral Tablet Recorded   6  Famotidine 40 MG Oral Tablet; TAKE ONE TABLET BY MOUTH ONCE DAILY; Therapy: 01Qoj6915 to (Evaluate:17Nov2017)  Requested for: 20Jun2017; Last   Rx:20Jun2017 Ordered   7  HydroCHLOROthiazide 25 MG Oral Tablet; take 1 tablet daily prn; Therapy: 39IZF5112 to (Aditya Carpio)  Requested for: 26Jun2017; Last   UH:13CYG1871 Ordered   8  Levothyroxine Sodium 25 MCG Oral Tablet; take 1 tablet by mouth every day  Requested   for: 38Sew2150; Last Rx:09Qgl1163 Ordered   9  Linzess 290 MCG Oral Capsule; TAKE ONE CAPSULE BY MOUTH ONCE DAILY; Therapy: 75EMY1104 to (Evaluate:13Apr2017)  Requested for: 77YKS8461; Last   Rx:69Bnh4889 Ordered   10   Meclizine HCl - 12 5 MG Oral Tablet; TAKE 1 TABLET ORALLY EVERY 8 HOURS AS    NEEDED FOR DIZZINESS *RE-ORDER*;    Therapy: 34WFF7773 to (Evaluate:27Knf3224)  Requested for: 82Rdy1358; Last    Rx:21Jjg1678 Ordered   11  Multivitamins Oral Capsule; TAKE 1 CAPSULE EVERY 4 TO 6 HOURS Recorded   12  Senna CAPS Recorded    The medication list was reviewed and updated today  Allergies    1  Penicillins    Vitals  Vital Signs    Recorded: 02EOX6311 12:56PM   Temperature 98 8 F   Heart Rate 80   Respiration 14   Systolic 591   Diastolic 78   Weight 287 lb 8 0 oz     Physical Exam    Constitutional   General appearance: No acute distress, well appearing and well nourished  Musculoskeletal   Gait and station: Normal     Digits and nails: Normal without clubbing or cyanosis  Inspection/palpation of joints, bones, and muscles: Abnormal   Right lower leg with approximately 9 mm raised firm lesion is slightly tender palpation and has scattered and ectasias  There is a central area of discoloration but no definite plug or crust    Skin   Skin and subcutaneous tissue: Abnormal   As above  Neurologic   Sensation: No sensory loss  Procedure          Procedure: excision of lesion  Indications for the procedure include the lesion has changed and squamous cell carcinoma suspected  Risks, benefits, alternatives, infection risk, bleeding risk and risk of allergic reaction were discussed with the patient  Written consent was obtained prior to the procedure  Procedure Note:  Anesthesia: 1 5 ml of lidocaine 1%  The lesion was located on the (R lower leg)  The patient was prepped and draped in the usual sterile fashion using betadine  a 10 mm shave biopsy of the lesion was taken  The base of the wound was cauterized with cautery  Dressing: The wound was cleaned and polysporin ointment was applied and a sterile dressing was placed  Specimen: the excised lesion was place in buffered formalin and sent for pathology  Post-Procedure:   Patient Status: the patient tolerated the procedure well  Complications: there were no complications  Follow-up in the office in 2-3 day(s)  Future Appointments    Date/Time Provider Specialty Site   08/22/2017 01:15 PM [facility]   07/07/2017 08:45 AM ELIE Duff   [facility]     Signatures   Electronically signed by : ELIE Ewing ; Jul 4 2017  7:11PM EST                       (Author)

## 2018-02-26 ENCOUNTER — APPOINTMENT (OUTPATIENT)
Dept: LAB | Facility: CLINIC | Age: 83
End: 2018-02-26
Payer: MEDICARE

## 2018-02-26 DIAGNOSIS — E78.00 PURE HYPERCHOLESTEROLEMIA: ICD-10-CM

## 2018-02-26 DIAGNOSIS — E03.9 HYPOTHYROIDISM: ICD-10-CM

## 2018-02-26 DIAGNOSIS — R53.83 OTHER FATIGUE: ICD-10-CM

## 2018-02-26 DIAGNOSIS — Z85.038 PERSONAL HISTORY OF OTHER MALIGNANT NEOPLASM OF LARGE INTESTINE (CODE): ICD-10-CM

## 2018-02-26 LAB
ALBUMIN SERPL BCP-MCNC: 3.8 G/DL (ref 3.5–5)
ALP SERPL-CCNC: 61 U/L (ref 46–116)
ALT SERPL W P-5'-P-CCNC: 25 U/L (ref 12–78)
ANION GAP SERPL CALCULATED.3IONS-SCNC: 6 MMOL/L (ref 4–13)
AST SERPL W P-5'-P-CCNC: 21 U/L (ref 5–45)
BILIRUB SERPL-MCNC: 0.41 MG/DL (ref 0.2–1)
BUN SERPL-MCNC: 25 MG/DL (ref 5–25)
CALCIUM SERPL-MCNC: 9 MG/DL (ref 8.3–10.1)
CEA SERPL-MCNC: 1.8 NG/ML (ref 0–3)
CHLORIDE SERPL-SCNC: 105 MMOL/L (ref 100–108)
CHOLEST SERPL-MCNC: 227 MG/DL (ref 50–200)
CO2 SERPL-SCNC: 27 MMOL/L (ref 21–32)
CREAT SERPL-MCNC: 1.2 MG/DL (ref 0.6–1.3)
GFR SERPL CREATININE-BSD FRML MDRD: 41 ML/MIN/1.73SQ M
GLUCOSE P FAST SERPL-MCNC: 85 MG/DL (ref 65–99)
HDLC SERPL-MCNC: 78 MG/DL (ref 40–60)
LDLC SERPL CALC-MCNC: 128 MG/DL (ref 0–100)
POTASSIUM SERPL-SCNC: 4.3 MMOL/L (ref 3.5–5.3)
PROT SERPL-MCNC: 7.4 G/DL (ref 6.4–8.2)
SODIUM SERPL-SCNC: 138 MMOL/L (ref 136–145)
TRIGL SERPL-MCNC: 104 MG/DL
TSH SERPL DL<=0.05 MIU/L-ACNC: 2.5 UIU/ML (ref 0.36–3.74)

## 2018-02-26 PROCEDURE — 36415 COLL VENOUS BLD VENIPUNCTURE: CPT

## 2018-02-26 PROCEDURE — 80061 LIPID PANEL: CPT

## 2018-02-26 PROCEDURE — 82378 CARCINOEMBRYONIC ANTIGEN: CPT

## 2018-02-26 PROCEDURE — 80053 COMPREHEN METABOLIC PANEL: CPT

## 2018-02-26 PROCEDURE — 84443 ASSAY THYROID STIM HORMONE: CPT

## 2018-03-20 DIAGNOSIS — H81.10 BENIGN PAROXYSMAL POSITIONAL VERTIGO, UNSPECIFIED LATERALITY: Primary | ICD-10-CM

## 2018-03-20 RX ORDER — MECLIZINE HCL 12.5 MG/1
12.5 TABLET ORAL EVERY 8 HOURS SCHEDULED
Qty: 30 TABLET | Refills: 1 | Status: SHIPPED | OUTPATIENT
Start: 2018-03-20 | End: 2019-07-27 | Stop reason: SDUPTHER

## 2018-03-20 RX ORDER — MECLIZINE HCL 12.5 MG/1
TABLET ORAL
COMMUNITY
Start: 2016-12-30 | End: 2018-03-20 | Stop reason: SDUPTHER

## 2018-04-06 ENCOUNTER — TELEPHONE (OUTPATIENT)
Dept: FAMILY MEDICINE CLINIC | Facility: CLINIC | Age: 83
End: 2018-04-06

## 2018-04-06 NOTE — TELEPHONE ENCOUNTER
Please inform the patient that the medication decreases salvia production so it may alternate taste of food

## 2018-04-12 ENCOUNTER — TELEPHONE (OUTPATIENT)
Dept: FAMILY MEDICINE CLINIC | Facility: CLINIC | Age: 83
End: 2018-04-12

## 2018-04-12 DIAGNOSIS — J01.90 ACUTE NON-RECURRENT SINUSITIS, UNSPECIFIED LOCATION: Primary | ICD-10-CM

## 2018-04-12 RX ORDER — AZITHROMYCIN 250 MG/1
TABLET, FILM COATED ORAL
Qty: 6 TABLET | Refills: 0 | Status: SHIPPED | OUTPATIENT
Start: 2018-04-12 | End: 2018-04-16

## 2018-04-12 RX ORDER — PREDNISONE 10 MG/1
TABLET ORAL
Qty: 26 TABLET | Refills: 0 | Status: SHIPPED | OUTPATIENT
Start: 2018-04-12 | End: 2018-04-16

## 2018-04-12 NOTE — TELEPHONE ENCOUNTER
Patient called and claims still has no sense of taste  States spoke to you and Shannon Morton previously in regards to this and was advised to call if no improvement

## 2018-04-12 NOTE — TELEPHONE ENCOUNTER
Pt does not want this medication at this time, would like to schedule an appt with Dr Robin next week

## 2018-04-12 NOTE — TELEPHONE ENCOUNTER
The lack of taste may be coming from inflammation in the sinuses so prescribing an antibiotic (azithromycin) and a short course of steroids to decrease inflammation

## 2018-04-16 ENCOUNTER — OFFICE VISIT (OUTPATIENT)
Dept: FAMILY MEDICINE CLINIC | Facility: CLINIC | Age: 83
End: 2018-04-16
Payer: MEDICARE

## 2018-04-16 VITALS
TEMPERATURE: 97.3 F | DIASTOLIC BLOOD PRESSURE: 78 MMHG | HEART RATE: 70 BPM | BODY MASS INDEX: 29.88 KG/M2 | WEIGHT: 152.2 LBS | SYSTOLIC BLOOD PRESSURE: 140 MMHG | HEIGHT: 60 IN

## 2018-04-16 DIAGNOSIS — H69.83 EUSTACHIAN TUBE DYSFUNCTION, BILATERAL: Primary | ICD-10-CM

## 2018-04-16 PROCEDURE — 99213 OFFICE O/P EST LOW 20 MIN: CPT | Performed by: FAMILY MEDICINE

## 2018-04-16 RX ORDER — FAMOTIDINE 40 MG/1
1 TABLET, FILM COATED ORAL DAILY
COMMUNITY
Start: 2016-08-22 | End: 2018-08-28 | Stop reason: SDUPTHER

## 2018-04-16 RX ORDER — AZELASTINE 1 MG/ML
1 SPRAY, METERED NASAL 2 TIMES DAILY
Qty: 30 ML | Refills: 0 | Status: SHIPPED | OUTPATIENT
Start: 2018-04-16 | End: 2019-03-19

## 2018-04-16 RX ORDER — AZITHROMYCIN 250 MG/1
TABLET, FILM COATED ORAL
Qty: 6 TABLET | Refills: 0 | Status: SHIPPED | OUTPATIENT
Start: 2018-04-16 | End: 2018-04-20

## 2018-04-16 NOTE — PROGRESS NOTES
Patient ID: Ron Salazar is a 80 y o  female  HPI: 80 y  o female presenting with chief complaint of dizziness with positional changes over the past one week  She denies any nasal congestion, ear pain, or nausea at this time  SUBJECTIVE    Family History   Problem Relation Age of Onset    Heart disease Mother      cardiac disorder    Alzheimer's disease Sister      Social History     Social History    Marital status:      Spouse name: N/A    Number of children: N/A    Years of education: N/A     Occupational History    Not on file  Social History Main Topics    Smoking status: Former Smoker    Smokeless tobacco: Not on file    Alcohol use No    Drug use: Unknown    Sexual activity: Not on file     Other Topics Concern    Not on file     Social History Narrative    No narrative on file     Past Medical History:   Diagnosis Date    Hypertension     last assessed: 12/13/2016    Malignant neoplasm of colon ([address] )     last assessed: 10/20/2015    Peripheral neuropathy     last assessed: 02/23/2016     Past Surgical History:   Procedure Laterality Date    APPENDECTOMY      last assessed: 10/20/2015    CHOLECYSTECTOMY      last assessed: 10/20/2015    COLECTOMY      last assessed: 10/20/2015; partial     TONSILLECTOMY      last assessed: 10/20/2015     Allergies   Allergen Reactions    Penicillins Hives       Current Outpatient Prescriptions:     amLODIPine-benazepril (LOTREL 2 5-10) 2 5-10 MG per capsule, Take 1 capsule by mouth daily  , Disp: , Rfl:     aspirin 81 MG tablet, Take 81 mg by mouth daily  , Disp: , Rfl:     Calcium-Vitamin D 600-200 MG-UNIT per tablet, Take by mouth, Disp: , Rfl:     Cholecalciferol 1000 units CHEW, Chew, Disp: , Rfl:     famotidine (PEPCID) 40 MG tablet, Take 1 tablet by mouth daily, Disp: , Rfl:     levothyroxine 25 mcg tablet, Take 25 mcg by mouth daily  , Disp: , Rfl:     Linaclotide (LINZESS) 290 MCG CAPS, Take 1 capsule by mouth daily, Disp: , Rfl:     meclizine (ANTIVERT) 12 5 MG tablet, Take 1 tablet (12 5 mg total) by mouth every 8 (eight) hours, Disp: 30 tablet, Rfl: 1    Multiple Vitamin (MULTIVITAMINS PO), Take 1 capsule by mouth, Disp: , Rfl:     senna-docusate sodium (SENOKOT S) 8 6-50 mg per tablet, Take 1 tablet by mouth daily  , Disp: , Rfl:     aspirin (ASPIRIN LOW DOSE) 81 MG tablet, Take by mouth, Disp: , Rfl:     Review of Systems  Constitutional:     Denies fever, chills ,fatigue ,weakness ,weight loss, weight gain     ENT: Denies earache ,loss of hearing ,nosebleed, nasal discharge,nasal congestion ,sore throat ,hoarseness  Pulmonary: Denies shortness of breath ,cough  ,dyspnea on exertion, orthopnea  ,PND   Cardiovascular:  Denies bradycardia , tachycardia  ,palpations, lower extremity edema leg, claudication  Breast:  Denies new or changing breast lumps ,nipple discharge ,nipple changes  Abdomen:  Denies abdominal pain , anorexia , indigestion, nausea, vomiting, constipation, diarrhea  Musculoskeletal: Denies myalgias, arthralgias, joint swelling, joint stiffness , limb pain, limb swelling  Gu: denies dysuria, polyuria  Skin: Denies skin rash, skin lesion, skin wound, itching, dry skin  Neuro: Denies headache, numbness, tingling, confusion, loss of consciousness, +dizziness,   Psychiatric: Denies feelings of depression, suicidal ideation, anxiety, sleep disturbances    OBJECTIVE    Constitutional:   NAD, well appearing and well nourished      ENT:   Conjunctiva and lids: no injection, edema, or discharge     Pupils and iris: SRIKANTH bilaterally    External inspection of ears and nose: normal without deformities or discharge  Otoscopic exam: Canals patent ; + erythema of tm bilaterally with effusions bilaterally      Nasal mucosa, septum and turbinates: Normal or edema or discharge         Oropharynx:  Moist mucosa, normal tongue and tonsils without lesions   No erythema        Pulmonary:Respiratory effort normal rate and rhythm, no increased work of breathing  Auscultation of lungs:  Clear bilaterally with no adventitious breath sounds     Cardiovascular: regular rate and rhythm, S1 and S2, no murmur, no edema and/or varicosities of LE      Abdomen: Soft and non-distended     Positive bowel sounds      No heptomegaly or splenomegaly      Gu: no suprapubic tenderness or CVA tenderness, no urethral discharge  Lymphatic:  No anterior or posterior cervical lymphadenopathy         Musculoskeletal:  Gait and station: Normal gait      Digits and nails normal without clubbing or cyanosis       Inspection/palpation of joints, bones, and muscles:  No joint tenderness, swelling, full active and passive range of motion       Skin: Normal skin turgor and no rashes      Neuro:       Normal reflexes     Psych:   alert and oriented to person, place and time     normal mood and affect       Assessment/Plan:Diagnoses and all orders for this visit:    Eustachian tube dysfunction, bilateral  -     azelastine (ASTELIN) 0 1 % nasal spray; 1 spray into each nostril 2 (two) times a day Use in each nostril as directed  -     azithromycin (ZITHROMAX) 250 mg tablet; Take 2 tablets today then 1 tablet daily x 4 days    Other orders  -     famotidine (PEPCID) 40 MG tablet; Take 1 tablet by mouth daily  -     aspirin (ASPIRIN LOW DOSE) 81 MG tablet; Take by mouth  -     Cholecalciferol 1000 units CHEW; Chew  -     Calcium-Vitamin D 600-200 MG-UNIT per tablet; Take by mouth  -     Multiple Vitamin (MULTIVITAMINS PO); Take 1 capsule by mouth  -     Linaclotide (LINZESS) 290 MCG CAPS; Take 1 capsule by mouth daily        Reviewed with patient plan to treat with the above and gave her cards for area ENTS to be evaluated for eustachian tube dysfunciton       Patient instructed to call in 72 hours if not feeling better or if symptoms worsen

## 2018-05-22 ENCOUNTER — OFFICE VISIT (OUTPATIENT)
Dept: FAMILY MEDICINE CLINIC | Facility: CLINIC | Age: 83
End: 2018-05-22
Payer: MEDICARE

## 2018-05-22 VITALS
WEIGHT: 148 LBS | TEMPERATURE: 97.6 F | SYSTOLIC BLOOD PRESSURE: 124 MMHG | HEART RATE: 76 BPM | BODY MASS INDEX: 29.06 KG/M2 | DIASTOLIC BLOOD PRESSURE: 82 MMHG | HEIGHT: 60 IN

## 2018-05-22 DIAGNOSIS — Z13.5 SCREENING FOR GLAUCOMA: ICD-10-CM

## 2018-05-22 DIAGNOSIS — I10 ESSENTIAL HYPERTENSION: ICD-10-CM

## 2018-05-22 DIAGNOSIS — J30.9 ALLERGIC RHINITIS, UNSPECIFIED SEASONALITY, UNSPECIFIED TRIGGER: Primary | ICD-10-CM

## 2018-05-22 DIAGNOSIS — E03.9 HYPOTHYROIDISM, UNSPECIFIED TYPE: ICD-10-CM

## 2018-05-22 PROCEDURE — 99214 OFFICE O/P EST MOD 30 MIN: CPT | Performed by: FAMILY MEDICINE

## 2018-05-22 RX ORDER — PREDNISONE 1 MG/1
TABLET ORAL
Qty: 26 TABLET | Refills: 0 | Status: SHIPPED | OUTPATIENT
Start: 2018-05-22 | End: 2018-06-28 | Stop reason: ALTCHOICE

## 2018-05-22 RX ORDER — AMLODIPINE BESYLATE AND BENAZEPRIL HYDROCHLORIDE 2.5; 1 MG/1; MG/1
1 CAPSULE ORAL DAILY
Qty: 90 CAPSULE | Refills: 0 | Status: SHIPPED | OUTPATIENT
Start: 2018-05-22 | End: 2018-06-01 | Stop reason: CLARIF

## 2018-05-23 ENCOUNTER — TELEPHONE (OUTPATIENT)
Dept: FAMILY MEDICINE CLINIC | Facility: CLINIC | Age: 83
End: 2018-05-23

## 2018-05-23 NOTE — PROGRESS NOTES
Patient ID: Abdias Lewis is a 80 y o  female  HPI: 80 y  o female presents for follow up of htn, hypothyroidism and complains of allergy symtpoms of crackling of left ear, pnd, and left sided congesion in her sinuses  SUBJECTIVE    Family History   Problem Relation Age of Onset    Heart disease Mother      cardiac disorder    Alzheimer's disease Sister      Social History     Social History    Marital status:      Spouse name: N/A    Number of children: N/A    Years of education: N/A     Occupational History    Not on file  Social History Main Topics    Smoking status: Former Smoker    Smokeless tobacco: Not on file    Alcohol use No    Drug use: Unknown    Sexual activity: Not on file     Other Topics Concern    Not on file     Social History Narrative    No narrative on file     Past Medical History:   Diagnosis Date    Hypertension     last assessed: 12/13/2016    [address] [address] [address]     last assessed: 10/20/2015    Peripheral neuropathy     last assessed: 02/23/2016     Past Surgical History:   Procedure Laterality Date    APPENDECTOMY      last assessed: 10/20/2015    CHOLECYSTECTOMY      last assessed: 10/20/2015    COLECTOMY      last assessed: 10/20/2015; partial     TONSILLECTOMY      last assessed: 10/20/2015     Allergies   Allergen Reactions    Penicillins Hives       Current Outpatient Prescriptions:     amLODIPine-benazepril (LOTREL 2 5-10) 2 5-10 MG per capsule, Take 1 capsule by mouth daily for 90 days, Disp: 90 capsule, Rfl: 0    aspirin 81 MG tablet, Take 81 mg by mouth daily  , Disp: , Rfl:     azelastine (ASTELIN) 0 1 % nasal spray, 1 spray into each nostril 2 (two) times a day Use in each nostril as directed, Disp: 30 mL, Rfl: 0    Calcium-Vitamin D 600-200 MG-UNIT per tablet, Take by mouth, Disp: , Rfl:     Cholecalciferol 1000 units CHEW, Chew, Disp: , Rfl:     famotidine (PEPCID) 40 MG tablet, Take 1 tablet by mouth daily, Disp: , Rfl:     levothyroxine 25 mcg tablet, Take 25 mcg by mouth daily  , Disp: , Rfl:     Linaclotide (LINZESS) 290 MCG CAPS, Take 1 capsule by mouth daily, Disp: , Rfl:     meclizine (ANTIVERT) 12 5 MG tablet, Take 1 tablet (12 5 mg total) by mouth every 8 (eight) hours, Disp: 30 tablet, Rfl: 1    Multiple Vitamin (MULTIVITAMINS PO), Take 1 capsule by mouth, Disp: , Rfl:     senna-docusate sodium (SENOKOT S) 8 6-50 mg per tablet, Take 1 tablet by mouth daily  , Disp: , Rfl:     predniSONE 5 mg tablet, 8hwrr3hgdh, 2bid x2days,[id number], then 1qd, Disp: 26 tablet, Rfl: 0    Review of Systems  Constitutional:     Denies fever, chills ,fatigue ,weakness ,weight loss, weight gain     ENT: +leftearache ,loss of hearing ,nosebleed, nasal discharge,nasal congestion ,sore throat ,hoarseness; left sided sinus pressure  Pulmonary: Denies shortness of breath ,cough  ,dyspnea on exertion, orthopnea  ,PND   Cardiovascular:  Denies bradycardia , tachycardia  ,palpations, lower extremity edema leg, claudication  Breast:  Denies new or changing breast lumps ,nipple discharge ,nipple changes  Abdomen:  Denies abdominal pain , anorexia , indigestion, nausea, vomiting, constipation, diarrhea  Musculoskeletal: Denies myalgias, arthralgias, joint swelling, joint stiffness , limb pain, limb swelling  Gu: denies dysuria, polyuria  Skin: Denies skin rash, skin lesion, skin wound, itching, dry skin  Neuro: Denies headache, numbness, tingling, confusion, loss of consciousness, dizziness, vertigo  Psychiatric: Denies feelings of depression, suicidal ideation, anxiety, sleep disturbances    OBJECTIVE    Constitutional:   NAD, well appearing and well nourished      ENT:   Conjunctiva and lids: no injection, edema, or discharge     Pupils and iris: SRIKANTH bilaterally    External inspection of ears and nose: normal without deformities or discharge  Otoscopic exam: Canals patent without erythema         Nasal mucosa, septum and turbinates: Normal or edema or discharge         Oropharynx:  Moist mucosa, normal tongue and tonsils without lesions  No erythema        Pulmonary:Respiratory effort normal rate and rhythm, no increased work of breathing  Auscultation of lungs:  Clear bilaterally with no adventitious breath sounds       Cardiovascular: regular rate and rhythm, S1 and S2, no murmur, no edema and/or varicosities of LE      Abdomen: Soft and non-distended   Positive bowel sounds      No heptomegaly or splenomegaly      Gu: no suprapubic tenderness or CVA tenderness, no urethral discharge  Lymphatic:  No anterior or posterior cervical lymphadenopathy         Musculoskeletal:  Gait and station: Normal gait      Digits and nails normal without clubbing or cyanosis       Inspection/palpation of joints, bones, and muscles:  No joint tenderness, swelling, full active and passive range of motion       Skin: Normal skin turgor and no rashes      Neuro:    Normal reflexes     Psych:   alert and oriented to person, place and time     normal mood and affect       Assessment/Plan:Diagnoses and all orders for this visit:    Allergic rhinitis, unspecified seasonality, unspecified trigger  -     predniSONE 5 mg tablet; [id number], 2bid x2days,2piji7imgb, then 1qd    Essential hypertension  -     amLODIPine-benazepril (LOTREL 2 5-10) 2 5-10 MG per capsule; Take 1 capsule by mouth daily for 90 days    Hypothyroidism, unspecified type  -     TSH, 3rd generation; Future    Screening for glaucoma    Other orders  -     Cancel: Ambulatory referral to Ophthalmology; Future        Reviewed with patient plan to treat with above  Patient instructed to call in 72 hours if not feeling better or if symptoms worsen  I will see her back in 4 mos time

## 2018-05-23 NOTE — TELEPHONE ENCOUNTER
Was here yest  On her summary that was given when she left says, change how you take ASA, what does that mean? Pl adv

## 2018-06-01 DIAGNOSIS — I10 ESSENTIAL HYPERTENSION: Primary | ICD-10-CM

## 2018-06-01 RX ORDER — AMLODIPINE BESYLATE 2.5 MG/1
2.5 TABLET ORAL DAILY
Qty: 90 TABLET | Refills: 1 | Status: SHIPPED | OUTPATIENT
Start: 2018-06-01 | End: 2018-12-30 | Stop reason: SDUPTHER

## 2018-06-19 ENCOUNTER — TELEPHONE (OUTPATIENT)
Dept: FAMILY MEDICINE CLINIC | Facility: CLINIC | Age: 83
End: 2018-06-19

## 2018-06-19 NOTE — TELEPHONE ENCOUNTER
Her left leg is swollen by mid and end of day, she gets calf cramps, she wears the support stocking, can she use Homeopathic Gel  Arnicare ? Or do you have any other suggestions  Can she use heat or cold

## 2018-06-19 NOTE — TELEPHONE ENCOUNTER
She can use the topical gel  I'd suggest she try heat, a heating pad to area x 15-20 min  Also, 1 oz tonic water nightly because it has quinine in it that prevents leg cramsp

## 2018-06-28 ENCOUNTER — TELEPHONE (OUTPATIENT)
Dept: FAMILY MEDICINE CLINIC | Facility: CLINIC | Age: 83
End: 2018-06-28

## 2018-06-28 ENCOUNTER — OFFICE VISIT (OUTPATIENT)
Dept: FAMILY MEDICINE CLINIC | Facility: CLINIC | Age: 83
End: 2018-06-28
Payer: MEDICARE

## 2018-06-28 VITALS
RESPIRATION RATE: 16 BRPM | BODY MASS INDEX: 29.7 KG/M2 | SYSTOLIC BLOOD PRESSURE: 120 MMHG | DIASTOLIC BLOOD PRESSURE: 64 MMHG | HEART RATE: 76 BPM | TEMPERATURE: 97.8 F | HEIGHT: 60 IN | WEIGHT: 151.3 LBS

## 2018-06-28 DIAGNOSIS — R60.9 EDEMA, UNSPECIFIED TYPE: Primary | ICD-10-CM

## 2018-06-28 DIAGNOSIS — E03.9 HYPOTHYROIDISM, UNSPECIFIED TYPE: ICD-10-CM

## 2018-06-28 PROCEDURE — A6448 LT COMPRES BAND <3"/YD: HCPCS | Performed by: FAMILY MEDICINE

## 2018-06-28 PROCEDURE — 99214 OFFICE O/P EST MOD 30 MIN: CPT | Performed by: FAMILY MEDICINE

## 2018-06-29 ENCOUNTER — APPOINTMENT (OUTPATIENT)
Dept: LAB | Facility: CLINIC | Age: 83
End: 2018-06-29
Payer: MEDICARE

## 2018-06-29 DIAGNOSIS — E03.9 HYPOTHYROIDISM, UNSPECIFIED TYPE: ICD-10-CM

## 2018-06-29 DIAGNOSIS — R60.9 EDEMA, UNSPECIFIED TYPE: ICD-10-CM

## 2018-06-29 LAB
NT-PROBNP SERPL-MCNC: 346 PG/ML
TSH SERPL DL<=0.05 MIU/L-ACNC: 1.61 UIU/ML (ref 0.36–3.74)

## 2018-06-29 PROCEDURE — 36415 COLL VENOUS BLD VENIPUNCTURE: CPT

## 2018-06-29 PROCEDURE — 83880 ASSAY OF NATRIURETIC PEPTIDE: CPT

## 2018-06-29 PROCEDURE — 84443 ASSAY THYROID STIM HORMONE: CPT

## 2018-06-29 NOTE — PROGRESS NOTES
Patient ID: Mikayla Josue is a 80 y o  female  HPI: 80 y  o female presents complaining of continued swelling of left lower leg  She had a negative venous duplex for a DVT and has not responded well to multiple diuretics that she has been tried on  She is wearing a VIANCA stocking today with little results  There is slight swelling in the other leg, but much less than the left  She has been elevating her legs as well  She denies any wheezing, cough, orthopnea or chest pain/pressure  She is also due to have her tsh checked for hypothyroidism  SUBJECTIVE    Family History   Problem Relation Age of Onset    Heart disease Mother         cardiac disorder    Alzheimer's disease Sister      Social History     Social History    Marital status:      Spouse name: N/A    Number of children: N/A    Years of education: N/A     Occupational History    Not on file  Social History Main Topics    Smoking status: Former Smoker    Smokeless tobacco: Not on file    Alcohol use No    Drug use: Unknown    Sexual activity: Not on file     Other Topics Concern    Not on file     Social History Narrative    No narrative on file     Past Medical History:   Diagnosis Date    Hypertension     last assessed: 12/13/2016    Malignant neoplasm of colon ([facility] [address] )     last assessed: 10/20/2015    Peripheral neuropathy     last assessed: 02/23/2016     Past Surgical History:   Procedure Laterality Date    APPENDECTOMY      last assessed: 10/20/2015    CHOLECYSTECTOMY      last assessed: 10/20/2015    COLECTOMY      last assessed: 10/20/2015; partial     TONSILLECTOMY      last assessed: 10/20/2015     Allergies   Allergen Reactions    Penicillins Hives       Current Outpatient Prescriptions:     amLODIPine (NORVASC) 2 5 mg tablet, Take 1 tablet (2 5 mg total) by mouth daily, Disp: 90 tablet, Rfl: 1    aspirin 81 MG tablet, Take 81 mg by mouth daily  , Disp: , Rfl:     azelastine (ASTELIN) 0 1 % nasal spray, 1 spray into each nostril 2 (two) times a day Use in each nostril as directed, Disp: 30 mL, Rfl: 0    Calcium-Vitamin D 600-200 MG-UNIT per tablet, Take by mouth, Disp: , Rfl:     Cholecalciferol 1000 units CHEW, Chew, Disp: , Rfl:     famotidine (PEPCID) 40 MG tablet, Take 1 tablet by mouth daily, Disp: , Rfl:     levothyroxine 25 mcg tablet, Take 25 mcg by mouth daily  , Disp: , Rfl:     Linaclotide (LINZESS) 290 MCG CAPS, Take 1 capsule by mouth daily, Disp: , Rfl:     meclizine (ANTIVERT) 12 5 MG tablet, Take 1 tablet (12 5 mg total) by mouth every 8 (eight) hours, Disp: 30 tablet, Rfl: 1    Multiple Vitamin (MULTIVITAMINS PO), Take 1 capsule by mouth, Disp: , Rfl:     senna-docusate sodium (SENOKOT S) 8 6-50 mg per tablet, Take 1 tablet by mouth daily  , Disp: , Rfl:     Review of Systems  Constitutional:     Denies fever, chills ,fatigue ,weakness ,weight loss, weight gain     ENT: Denies earache ,loss of hearing ,nosebleed, nasal discharge,nasal congestion ,sore throat ,hoarseness  Pulmonary: Denies shortness of breath ,cough  ,dyspnea on exertion, orthopnea  ,PND   Cardiovascular:  Denies bradycardia , tachycardia  ,palpations, +lower extremity edema legleft>right  Breast:  Denies new or changing breast lumps ,nipple discharge ,nipple changes  Abdomen:  Denies abdominal pain , anorexia , indigestion, nausea, vomiting, constipation, diarrhea  Musculoskeletal: Denies myalgias, arthralgias, joint swelling, joint stiffness , limb pain, limb swelling  Gu: denies dysuria, polyuria  Skin: Denies skin rash, skin lesion, skin wound, itching, dry skin  Neuro: Denies headache, numbness, tingling, confusion, loss of consciousness, dizziness, vertigo  Psychiatric: Denies feelings of depression, suicidal ideation, anxiety, sleep disturbances    OBJECTIVE  /64   Pulse 76   Temp 97 8 °F (36 6 °C)   Resp 16   Ht 5' (1 524 m)   Wt 68 6 kg (151 lb 4 8 oz)   BMI 29 55 kg/m²   Constitutional:   NAD, well appearing and well nourished      ENT:   Conjunctiva and lids: no injection, edema, or discharge     Pupils and iris: SRIKANTH bilaterally    External inspection of ears and nose: normal without deformities or discharge  Otoscopic exam: Canals patent without erythema  Nasal mucosa, septum and turbinates: Normal or edema or discharge         Oropharynx:  Moist mucosa, normal tongue and tonsils without lesions  No erythema        Pulmonary:Respiratory effort normal rate and rhythm, no increased work of breathing  Auscultation of lungs:  Clear bilaterally with no adventitious breath sounds       Cardiovascular: regular rate and rhythm, S1 and S2, no murmur, trace edema of right lower leg, +2 edema of left lower leg with no erythema, tenderness and negative Christiano's sign  Abdomen: Soft and non-distended     Positive bowel sounds      No heptomegaly or splenomegaly      Gu: no suprapubic tenderness or CVA tenderness, no urethral discharge  Lymphatic:  No anterior or posterior cervical lymphadenopathy        Musculoskeletal:  Gait and station: Normal gait      Digits and nails normal without clubbing or cyanosis       Inspection/palpation of joints, bones, and muscles:  No joint tenderness, swelling, full active and passive range of motion       Skin: Normal skin turgor and no rashes      Neuro:    Normal reflexes     Psych:   alert and oriented to person, place and time     normal mood and affect       Assessment/Plan:Diagnoses and all orders for this visit:    Edema, unspecified type  -     NT-BNP PRO; Future    Hypothyroidism, unspecified type  -     TSH, 3rd generation; Future    An ace wrap was applied for compression to the left lower extremity  Will await pending lab results  Pt to call in 3-4 days with response to the wrap

## 2018-07-03 ENCOUNTER — TELEPHONE (OUTPATIENT)
Dept: FAMILY MEDICINE CLINIC | Facility: CLINIC | Age: 83
End: 2018-07-03

## 2018-07-03 NOTE — TELEPHONE ENCOUNTER
Her bnp is normal , therefore swelling is not heart related, its from venous insufficiency  She should continue with same dose of thyroid med

## 2018-07-17 ENCOUNTER — TELEPHONE (OUTPATIENT)
Dept: FAMILY MEDICINE CLINIC | Facility: CLINIC | Age: 83
End: 2018-07-17

## 2018-07-17 NOTE — TELEPHONE ENCOUNTER
Her leg is still swollen from knee down, should she go to a specialist, she already saw her podiatrist and she didn't know what was wrong, needs daniel/prosper area  Pl adv

## 2018-07-18 DIAGNOSIS — M79.89 LEG SWELLING: Primary | ICD-10-CM

## 2018-08-06 ENCOUNTER — TRANSCRIBE ORDERS (OUTPATIENT)
Dept: ADMINISTRATIVE | Facility: HOSPITAL | Age: 83
End: 2018-08-06

## 2018-08-06 DIAGNOSIS — G60.9 IDIOPATHIC PERIPHERAL NEUROPATHY: Primary | ICD-10-CM

## 2018-08-17 ENCOUNTER — HOSPITAL ENCOUNTER (OUTPATIENT)
Dept: NON INVASIVE DIAGNOSTICS | Facility: CLINIC | Age: 83
Discharge: HOME | End: 2018-08-17
Payer: MEDICARE

## 2018-08-17 DIAGNOSIS — G60.9 IDIOPATHIC PERIPHERAL NEUROPATHY: ICD-10-CM

## 2018-08-17 PROCEDURE — 93923 UPR/LXTR ART STDY 3+ LVLS: CPT | Performed by: SURGERY

## 2018-08-17 PROCEDURE — 93923 UPR/LXTR ART STDY 3+ LVLS: CPT

## 2018-08-27 DIAGNOSIS — K52.9 CHRONIC DIARRHEA: Primary | ICD-10-CM

## 2018-08-27 RX ORDER — LINACLOTIDE 290 UG/1
CAPSULE, GELATIN COATED ORAL
Qty: 90 CAPSULE | Refills: 2 | Status: SHIPPED | OUTPATIENT
Start: 2018-08-27 | End: 2019-06-12 | Stop reason: SDUPTHER

## 2018-08-28 DIAGNOSIS — K21.9 GASTROESOPHAGEAL REFLUX DISEASE WITHOUT ESOPHAGITIS: Primary | ICD-10-CM

## 2018-08-29 RX ORDER — FAMOTIDINE 40 MG/1
TABLET, FILM COATED ORAL
Qty: 90 TABLET | Refills: 2 | Status: SHIPPED | OUTPATIENT
Start: 2018-08-29 | End: 2018-09-24 | Stop reason: HOSPADM

## 2018-08-30 ENCOUNTER — OFFICE VISIT (OUTPATIENT)
Dept: FAMILY MEDICINE CLINIC | Facility: CLINIC | Age: 83
End: 2018-08-30
Payer: MEDICARE

## 2018-08-30 VITALS
BODY MASS INDEX: 29.21 KG/M2 | DIASTOLIC BLOOD PRESSURE: 74 MMHG | TEMPERATURE: 98.6 F | HEART RATE: 96 BPM | WEIGHT: 148.8 LBS | SYSTOLIC BLOOD PRESSURE: 124 MMHG | HEIGHT: 60 IN

## 2018-08-30 DIAGNOSIS — I10 ESSENTIAL HYPERTENSION: ICD-10-CM

## 2018-08-30 DIAGNOSIS — H26.9 CATARACT OF BOTH EYES, UNSPECIFIED CATARACT TYPE: ICD-10-CM

## 2018-08-30 DIAGNOSIS — Z01.818 PREOPERATIVE CLEARANCE: Primary | ICD-10-CM

## 2018-08-30 PROCEDURE — 99213 OFFICE O/P EST LOW 20 MIN: CPT | Performed by: FAMILY MEDICINE

## 2018-08-30 NOTE — PROGRESS NOTES
Patient ID: Jaylon Bateman is a 80 y o  female  HPI: 80 y  o female is being seen for a preoperative visit for bilateral cataract extraction  Dr Jessica Garza cataract right  Surgical Risk Assessment:    Prior anesthesia: Adverse Reaction to : Epidural none    General none   Spinal none  Family history of adverse reactions to anesthesia? Pertinent Past Medical History:  None         Exercise Capacity:     Able to walk 4 blocks w/o Sx    yes          Able to walk 2 flights of steps w/o Sx  yes     Lifestyle Factors: Tobacco Use:  none        Pack years  Alcohol Use:  none  Illicit Drug Use:  none  No transfusions : Yazidism    none        Personal history of venous thromboembolic disease:    History of Steroid use for >2 weeks within last year? none    Family History   Problem Relation Age of Onset    Heart disease Mother         cardiac disorder    Alzheimer's disease Sister      Social History     Social History    Marital status:      Spouse name: N/A    Number of children: N/A    Years of education: N/A     Occupational History    Not on file       Social History Main Topics    Smoking status: Former Smoker    Smokeless tobacco: Not on file    Alcohol use No    Drug use: Unknown    Sexual activity: Not on file     Other Topics Concern    Not on file     Social History Narrative    No narrative on file     Past Medical History:   Diagnosis Date    Hypertension     last assessed: 12/13/2016    Malignant neoplasm of colon ([facility] [address] )     last assessed: 10/20/2015    Peripheral neuropathy     last assessed: 02/23/2016     Past Surgical History:   Procedure Laterality Date    APPENDECTOMY      last assessed: 10/20/2015    CHOLECYSTECTOMY      last assessed: 10/20/2015    COLECTOMY      last assessed: 10/20/2015; partial     TONSILLECTOMY      last assessed: 10/20/2015     Allergies   Allergen Reactions    Penicillins Hives       Current Outpatient Prescriptions:     amLODIPine (NORVASC) 2 5 mg tablet, Take 1 tablet (2 5 mg total) by mouth daily, Disp: 90 tablet, Rfl: 1    aspirin 81 MG tablet, Take 81 mg by mouth daily  , Disp: , Rfl:     Calcium-Vitamin D 600-200 MG-UNIT per tablet, Take by mouth, Disp: , Rfl:     levothyroxine 25 mcg tablet, Take 25 mcg by mouth daily  , Disp: , Rfl:     LINZESS 290 MCG CAPS, TAKE ONE CAPSULE BY MOUTH ONCE DAILY, Disp: 90 capsule, Rfl: 2    meclizine (ANTIVERT) 12 5 MG tablet, Take 1 tablet (12 5 mg total) by mouth every 8 (eight) hours, Disp: 30 tablet, Rfl: 1    Multiple Vitamin (MULTIVITAMINS PO), Take 1 capsule by mouth, Disp: , Rfl:     senna-docusate sodium (SENOKOT S) 8 6-50 mg per tablet, Take 1 tablet by mouth daily  , Disp: , Rfl:     azelastine (ASTELIN) 0 1 % nasal spray, 1 spray into each nostril 2 (two) times a day Use in each nostril as directed (Patient not taking: Reported on 8/30/2018 ), Disp: 30 mL, Rfl: 0    Cholecalciferol 1000 units CHEW, Chew, Disp: , Rfl:     famotidine (PEPCID) 40 MG tablet, TAKE ONE TABLET BY MOUTH ONCE DAILY AS DIRECTED (Patient not taking: Reported on 8/30/2018), Disp: 90 tablet, Rfl: 2    Review of Systems   Constitutional:  No fever or chills, feels well, no tiredness, no recent weight gain or loss; vision ois cloudy  Eyes:  No complaints of eye pain, no red eyes, no discharge from eyes, no itchy eyes  ENT:  No complaints of earache, no hearing loss, nose bleeds, no nasal discharge, no sore throat, no hoarseness  Pulmonary:  No complaints of shortness of breath, no wheezing, no cough, no shortness of breath on exertion, no orthopnea or postnasal drip  Cardiovascular:  No complaints of slow heart rate, no fast heart rate, no chest pain, no palpitations, no leg claudication, no lower extremity swelling  Gastrointestinal:  No complaints of abdominal pain, no constipation, no nausea or vomiting, no diarrhea or bloody stools  Gentiourinary:  No complaints of dysuria, no incontinence, no hesitancy, no nocturia, no genital lesions, no genital pain (no dysmenorrhea, no vaginal discharge or bleeding  )  Musculoskeletal:  No complaints of arthralgia, myalgia, no joint swelling or stiffness, no limb pain or swelling  Integumentary:  No complaints of skin rash or skin lesions, no itching, no skin wounds, no dry skin  Neurological:  No complaints of headache, no confusion, no convulsions, no numbness or tingling, no dizziness or fainting, no limb weakness, no difficulty walking  Psychiatric:  Is not suicidal, no sleep disturbances, no anxiety or depression, no change in personality, no emotional problems  Hematologic/Lymphatic:  No complaints of swollen glands, no swollen glands in the neck, does not bleed easily, no easy bruising     Physical Exam:    /74   Pulse 96   Temp 98 6 °F (37 °C)   Ht 5' (1 524 m)   Wt 67 5 kg (148 lb 12 8 oz)   BMI 29 06 kg/m²     Constitutional:   NAD, Pleasant 80year old female well appearing and well nourished in no acute distress     ENT:   Conjunctiva and lids: no injection, edema, or discharge     Pupils and iris: SRIKNATH bilaterally  + bilateral cataracts  External inspection of ears and nose: normal without deformities or discharge  Otoscopic exam: Canals patent without erythema  Nasal mucosa, septum and turbinates: Normal or edema or discharge         Oropharynx:  Moist mucosa, normal tongue and tonsils without lesions  No erythema        Pulmonary:Respiratory effort normal rate and rhythm, no increased work of breathing   Auscultation of lungs:  Clear bilaterally with no adventitious breath sounds       Cardiovascular: regular rate and rhythm, S1 and S2, no murmur, no edema and/or varicosities of LE      Abdomen: Soft and non-distended     Positive bowel sounds      No heptomegaly or splenomegaly      Lymphatic:  No anterior or posterior cervical lymphadenopathy         Musculoskeletal:  Gait and station: Normal gait      Digits and nails normal without clubbing or cyanosis Inspection/palpation of joints, bones, and muscles:  No joint tenderness, swelling, full active and passive range of motion       Skin: Normal skin turgor and no rashes      Neuro:    Normal  CN 2-12     Normal reflexes     Normal sensation    Psych:   alert and oriented to person, place and time     normal mood and affect       DATA:  Laboratory Results:     Lab Results   Component Value Date    ALT 25 02/26/2018    AST 21 02/26/2018    BUN 25 02/26/2018    CALCIUM 9 0 02/26/2018     02/26/2018    CO2 27 02/26/2018    CREATININE 1 20 02/26/2018    HDL 78 (H) 02/26/2018    HCT 33 5 (L) 05/16/2017    HGB 11 2 (L) 05/16/2017    MG 2 8 (H) 07/12/2016     05/16/2017    K 4 3 02/26/2018     02/26/2018    TRIG 104 02/26/2018    WBC 4 50 05/16/2017     ECG:NSR without acute changes      Current medications which may produce withdrawal symptoms if withheld perioperatively:    Pre-op Evaluation Plan  1  Further preoperative work-up as follows: none  2  Medication Management/Recommendations:none  3   Prophylaxis for cardiac events with perioperative beta-blockers: none    Clearance:  Patient is CLEARED for surgery without any additional cardiac testing      Assessment/Plan:  Diagnoses and all orders for this visit:    Preoperative clearance  -     POCT ECG    Cataract of both eyes, unspecified cataract type    Essential hypertension

## 2018-08-31 PROBLEM — I70.209 ATHEROSCLEROSIS OF NATIVE ARTERY OF EXTREMITY (HCC): Status: ACTIVE | Noted: 2018-08-06

## 2018-08-31 PROBLEM — H81.10 BENIGN POSITIONAL VERTIGO: Status: ACTIVE | Noted: 2017-08-03

## 2018-08-31 PROBLEM — M47.817 LUMBOSACRAL SPONDYLOSIS WITHOUT MYELOPATHY: Status: ACTIVE | Noted: 2018-08-31

## 2018-08-31 PROBLEM — G60.9 IDIOPATHIC PERIPHERAL NEUROPATHY: Status: ACTIVE | Noted: 2018-08-06

## 2018-08-31 PROBLEM — S22.009A CLOSED FRACTURE OF THORACIC VERTEBRA (HCC): Status: ACTIVE | Noted: 2018-08-31

## 2018-08-31 PROBLEM — R60.9 EDEMA: Status: ACTIVE | Noted: 2017-06-26

## 2018-08-31 PROBLEM — I10 ESSENTIAL HYPERTENSION: Status: ACTIVE | Noted: 2018-08-31

## 2018-08-31 PROCEDURE — 93000 ELECTROCARDIOGRAM COMPLETE: CPT | Performed by: FAMILY MEDICINE

## 2018-09-19 ENCOUNTER — TRANSCRIBE ORDERS (OUTPATIENT)
Dept: LAB | Facility: CLINIC | Age: 83
End: 2018-09-19

## 2018-09-19 ENCOUNTER — APPOINTMENT (OUTPATIENT)
Dept: LAB | Facility: CLINIC | Age: 83
End: 2018-09-19
Payer: MEDICARE

## 2018-09-19 DIAGNOSIS — E03.9 MYXEDEMA HEART DISEASE: Primary | ICD-10-CM

## 2018-09-19 DIAGNOSIS — I51.9 MYXEDEMA HEART DISEASE: Primary | ICD-10-CM

## 2018-09-19 LAB — TSH SERPL DL<=0.05 MIU/L-ACNC: 0.79 UIU/ML (ref 0.36–3.74)

## 2018-09-19 PROCEDURE — 36415 COLL VENOUS BLD VENIPUNCTURE: CPT

## 2018-09-19 PROCEDURE — 84443 ASSAY THYROID STIM HORMONE: CPT

## 2018-09-24 ENCOUNTER — OFFICE VISIT (OUTPATIENT)
Dept: FAMILY MEDICINE CLINIC | Facility: CLINIC | Age: 83
End: 2018-09-24
Payer: MEDICARE

## 2018-09-24 VITALS
WEIGHT: 148 LBS | HEIGHT: 60 IN | SYSTOLIC BLOOD PRESSURE: 130 MMHG | TEMPERATURE: 98.2 F | HEART RATE: 72 BPM | DIASTOLIC BLOOD PRESSURE: 80 MMHG | BODY MASS INDEX: 29.06 KG/M2

## 2018-09-24 DIAGNOSIS — Z85.038 HISTORY OF COLON CANCER: ICD-10-CM

## 2018-09-24 DIAGNOSIS — R53.83 OTHER FATIGUE: ICD-10-CM

## 2018-09-24 DIAGNOSIS — E78.00 HYPERCHOLESTEROLEMIA: ICD-10-CM

## 2018-09-24 DIAGNOSIS — E03.9 HYPOTHYROIDISM, UNSPECIFIED TYPE: ICD-10-CM

## 2018-09-24 DIAGNOSIS — I10 ESSENTIAL HYPERTENSION: Primary | ICD-10-CM

## 2018-09-24 PROCEDURE — 99214 OFFICE O/P EST MOD 30 MIN: CPT | Performed by: FAMILY MEDICINE

## 2018-09-24 NOTE — PROGRESS NOTES
Patient ID: Alfred Chowdhury is a 80 y o  female  HPI: 80 y  o female presents for follow up of htn, hypothyroidism, hypercholesterolemia and complains of ongoing fatgue  She is refusing a flu shot today  SUBJECTIVE    Family History   Problem Relation Age of Onset    Heart disease Mother         cardiac disorder    Alzheimer's disease Sister      Social History     Social History    Marital status:      Spouse name: N/A    Number of children: N/A    Years of education: N/A     Occupational History    Not on file  Social History Main Topics    Smoking status: Former Smoker    Smokeless tobacco: Not on file    Alcohol use No    Drug use: Unknown    Sexual activity: Not on file     Other Topics Concern    Not on file     Social History Narrative    No narrative on file     Past Medical History:   Diagnosis Date    Hypertension     last assessed: 12/13/2016    Malignant neoplasm of colon ([address] )     last assessed: 10/20/2015    Peripheral neuropathy     last assessed: 02/23/2016     Past Surgical History:   Procedure Laterality Date    APPENDECTOMY      last assessed: 10/20/2015    CHOLECYSTECTOMY      last assessed: 10/20/2015    COLECTOMY      last assessed: 10/20/2015; partial     TONSILLECTOMY      last assessed: 10/20/2015     Allergies   Allergen Reactions    Penicillins Hives     Other reaction(s): Other (See Comments)  hives       Current Outpatient Prescriptions:     amLODIPine (NORVASC) 2 5 mg tablet, Take 1 tablet (2 5 mg total) by mouth daily, Disp: 90 tablet, Rfl: 1    aspirin 81 MG tablet, Take 81 mg by mouth daily  , Disp: , Rfl:     Calcium-Vitamin D 600-200 MG-UNIT per tablet, Take by mouth, Disp: , Rfl:     Cholecalciferol 1000 units CHEW, Chew, Disp: , Rfl:     famotidine (PEPCID) 40 MG tablet, TAKE ONE TABLET BY MOUTH ONCE DAILY AS DIRECTED, Disp: 90 tablet, Rfl: 2    levothyroxine 25 mcg tablet, Take 25 mcg by mouth daily  , Disp: , Rfl:     LINZESS 290 MCG CAPS, TAKE ONE CAPSULE BY MOUTH ONCE DAILY, Disp: 90 capsule, Rfl: 2    meclizine (ANTIVERT) 12 5 MG tablet, Take 1 tablet (12 5 mg total) by mouth every 8 (eight) hours, Disp: 30 tablet, Rfl: 1    senna-docusate sodium (SENOKOT S) 8 6-50 mg per tablet, Take 1 tablet by mouth daily  , Disp: , Rfl:     azelastine (ASTELIN) 0 1 % nasal spray, 1 spray into each nostril 2 (two) times a day Use in each nostril as directed (Patient not taking: Reported on 9/24/2018 ), Disp: 30 mL, Rfl: 0    Multiple Vitamin (MULTIVITAMINS PO), Take 1 capsule by mouth, Disp: , Rfl:     Review of Systems  Constitutional:     Denies fever, chills  ,weakness ,weight loss, weight gain  +fatigue   ENT: Denies earache ,loss of hearing ,nosebleed, nasal discharge,nasal congestion ,sore throat ,hoarseness  Pulmonary: Denies shortness of breath ,cough  ,dyspnea on exertion, orthopnea  ,PND   Cardiovascular:  Denies bradycardia , tachycardia  ,palpations, lower extremity edema leg, claudication  Breast:  Denies new or changing breast lumps ,nipple discharge ,nipple changes  Abdomen:  Denies abdominal pain , anorexia , indigestion, nausea, vomiting, constipation, diarrhea  Musculoskeletal: Denies myalgias, arthralgias, joint swelling, joint stiffness , limb pain, limb swelling  Gu: denies dysuria, polyuria  Skin: Denies skin rash, skin lesion, skin wound, itching, dry skin  Neuro: Denies headache, numbness, tingling, confusion, loss of consciousness, dizziness, vertigo  Psychiatric: Denies feelings of depression, suicidal ideation, anxiety, sleep disturbances    OBJECTIVE  /80   Pulse 72   Temp 98 2 °F (36 8 °C)   Ht 5' (1 524 m)   Wt 67 1 kg (148 lb)   BMI 28 90 kg/m²   Constitutional:   NAD, well appearing and well nourished      ENT:   Conjunctiva and lids: no injection, edema, or discharge     Pupils and iris: SRIKANTH bilaterally    External inspection of ears and nose: normal without deformities or discharge        Otoscopic exam: Canals patent without erythema  Nasal mucosa, septum and turbinates: Normal or edema or discharge         Oropharynx:  Moist mucosa, normal tongue and tonsils without lesions  No erythema      Pulmonary:Respiratory effort normal rate and rhythm, no increased work of breathing  Auscultation of lungs:  Clear bilaterally with no adventitious breath sounds       Cardiovascular: regular rate and rhythm, S1 and S2, no murmur, no edema and/or varicosities of LE      Abdomen: Soft and non-distended     Positive bowel sounds    No heptomegaly or splenomegaly      Gu: no suprapubic tenderness or CVA tenderness, no urethral discharge  Lymphatic:  No anterior or posterior cervical lymphadenopathy         Musculoskeletal:  Gait and station: Normal gait      Digits and nails normal without clubbing or cyanosis       Inspection/palpation of joints, bones, and muscles:  No joint tenderness, swelling, full active and passive range of motion       Skin: Normal skin turgor and no rashes      Neuro:    Normal reflexes     Psych:   alert and oriented to person, place and time     normal mood and affect       Assessment/Plan:Diagnoses and all orders for this visit:    Essential hypertension  -     Comprehensive metabolic panel; Future    Other fatigue  -     CBC and differential; Future    Hypothyroidism, unspecified type  -     TSH, 3rd generation; Future    Hypercholesterolemia  -     Lipid Panel with Direct LDL reflex; Future    History of colon cancer  -     CEA; Future      I will see patient back in 4 mos or sooner prn

## 2018-12-20 DIAGNOSIS — E03.9 HYPOTHYROIDISM, UNSPECIFIED TYPE: Primary | ICD-10-CM

## 2018-12-20 RX ORDER — LEVOTHYROXINE SODIUM 0.03 MG/1
25 TABLET ORAL DAILY
Qty: 90 TABLET | Refills: 1 | Status: SHIPPED | OUTPATIENT
Start: 2018-12-20 | End: 2019-03-25 | Stop reason: SDUPTHER

## 2018-12-30 DIAGNOSIS — I10 ESSENTIAL HYPERTENSION: ICD-10-CM

## 2018-12-31 RX ORDER — AMLODIPINE BESYLATE 2.5 MG/1
TABLET ORAL
Qty: 90 TABLET | Refills: 1 | Status: SHIPPED | OUTPATIENT
Start: 2018-12-31 | End: 2019-06-25 | Stop reason: SDUPTHER

## 2019-01-15 ENCOUNTER — OFFICE VISIT (OUTPATIENT)
Dept: FAMILY MEDICINE CLINIC | Facility: CLINIC | Age: 84
End: 2019-01-15
Payer: MEDICARE

## 2019-01-15 VITALS
TEMPERATURE: 97.3 F | HEIGHT: 60 IN | HEART RATE: 84 BPM | WEIGHT: 152.4 LBS | DIASTOLIC BLOOD PRESSURE: 68 MMHG | SYSTOLIC BLOOD PRESSURE: 140 MMHG | BODY MASS INDEX: 29.92 KG/M2

## 2019-01-15 DIAGNOSIS — J06.9 UPPER RESPIRATORY TRACT INFECTION, UNSPECIFIED TYPE: Primary | ICD-10-CM

## 2019-01-15 PROCEDURE — 99213 OFFICE O/P EST LOW 20 MIN: CPT | Performed by: FAMILY MEDICINE

## 2019-01-15 RX ORDER — AZITHROMYCIN 250 MG/1
TABLET, FILM COATED ORAL
Qty: 6 TABLET | Refills: 0 | Status: SHIPPED | OUTPATIENT
Start: 2019-01-15 | End: 2019-01-19

## 2019-01-16 ENCOUNTER — TELEPHONE (OUTPATIENT)
Dept: FAMILY MEDICINE CLINIC | Facility: CLINIC | Age: 84
End: 2019-01-16

## 2019-01-16 NOTE — PROGRESS NOTES
Patient ID: Rosendo Gil is a 80 y o  female  HPI: 80 y  o female presenting with 7 day history of sore throat, nasal congestion, ear pain,pnd and cough  Pt denies any fever, chills, or body aches   + hoarseness    SUBJECTIVE    Family History   Problem Relation Age of Onset    Heart disease Mother         cardiac disorder    Alzheimer's disease Sister      Social History     Social History    Marital status:      Spouse name: N/A    Number of children: N/A    Years of education: N/A     Occupational History    Not on file  Social History Main Topics    Smoking status: Former Smoker    Smokeless tobacco: Not on file    Alcohol use No    Drug use: Unknown    Sexual activity: Not on file     Other Topics Concern    Not on file     Social History Narrative    No narrative on file     Past Medical History:   Diagnosis Date    Hypertension     last assessed: 12/13/2016    Malignant neoplasm of colon ([facility] [address] )     last assessed: 10/20/2015    Peripheral neuropathy     last assessed: 02/23/2016     Past Surgical History:   Procedure Laterality Date    APPENDECTOMY      last assessed: 10/20/2015    CHOLECYSTECTOMY      last assessed: 10/20/2015    COLECTOMY      last assessed: 10/20/2015; partial     TONSILLECTOMY      last assessed: 10/20/2015     Allergies   Allergen Reactions    Penicillins Hives     Other reaction(s): Other (See Comments)  hives       Current Outpatient Prescriptions:     amLODIPine (NORVASC) 2 5 mg tablet, TAKE 1 TABLET BY MOUTH ONCE DAILY, Disp: 90 tablet, Rfl: 1    aspirin 81 MG tablet, Take 81 mg by mouth daily  , Disp: , Rfl:     Calcium-Vitamin D 600-200 MG-UNIT per tablet, Take by mouth, Disp: , Rfl:     Cholecalciferol 1000 units CHEW, Chew, Disp: , Rfl:     levothyroxine 25 mcg tablet, Take 1 tablet (25 mcg total) by mouth daily, Disp: 90 tablet, Rfl: 1    LINZESS 290 MCG CAPS, TAKE ONE CAPSULE BY MOUTH ONCE DAILY, Disp: 90 capsule, Rfl: 2    meclizine (ANTIVERT) 12 5 MG tablet, Take 1 tablet (12 5 mg total) by mouth every 8 (eight) hours, Disp: 30 tablet, Rfl: 1    azelastine (ASTELIN) 0 1 % nasal spray, 1 spray into each nostril 2 (two) times a day Use in each nostril as directed (Patient not taking: Reported on 1/15/2019 ), Disp: 30 mL, Rfl: 0    azithromycin (ZITHROMAX) 250 mg tablet, Take 2 tablets today then 1 tablet daily x 4 days, Disp: 6 tablet, Rfl: 0    Multiple Vitamin (MULTIVITAMINS PO), Take 1 capsule by mouth, Disp: , Rfl:     Review of Systems  Constitutional:     Denies fever, chills ,fatigue ,weakness ,weight loss, weight gain     ENT: Denies loss of hearing ,nosebleed, nasal discharge ,hoarseness; but admits to nasal congestion , sore throat and ear pain   + hoarseness  Pulmonary: Denies shortness of breath ,dyspnea on exertion, orthopnea ; but admits to cough and pnd  Cardiovascular:  Denies bradycardia , tachycardia  ,palpations, lower extremity edema leg, claudication  Breast:  Denies new or changing breast lumps ,nipple discharge ,nipple changes  Abdomen:  Denies abdominal pain , anorexia , indigestion, nausea, vomiting, constipation, diarrhea  Musculoskeletal: Denies myalgias, arthralgias, joint swelling, joint stiffness , limb pain, limb swelling  Lymph: + swollen glands  Gu: Denies polyuria or dysuria  Skin: Denies skin rash, skin lesion, skin wound, itching, dry skin  Neuro: Denies headache, numbness, tingling, confusion, loss of consciousness, dizziness, vertigo  Psychiatric: Denies feelings of depression, suicidal ideation, anxiety, sleep disturbances    OBJECTIVE  /68   Pulse 84   Temp (!) 97 3 °F (36 3 °C)   Ht 5' (1 524 m)   Wt 69 1 kg (152 lb 6 4 oz)   BMI 29 76 kg/m²   Constitutional:   NAD, well appearing and well nourished     ENT:   Conjunctiva and lids: no injection, edema, or discharge    Pupils and iris: SRIKANTH bilaterally  External inspection of ears and nose: normal without deformities or discharge  Otoscopic exam: Canals patent without erythema, tm dull and erythematous, effusions bilaterally   Nasal mucosa, septum and turbinates: + turbinate injection, nasal discharge         Oropharynx:  Moist mucosa, normal tongue and tonsils without lesions  + erythema and injection of posterior pharynx with pnd    Pulmonary:Respiratory effort normal rate and rhythm, no increased work of breathing  Auscultation of lungs:  Clear bilaterally with no adventitious breath sounds     Cardiovascular: regular rate and rhythm, S1 and S2, no murmur, no edema and/or varicosities of LE     Abdomen: Soft and nontender with + bowel sounds  No heptomegaly or splenomegaly     Gu: no suprapubic tenderness or CVA tenderness  Lymphatic: + anterior  cervical lymphadenopathy      Musculoskeletal:  Gait and station: Normal gait      Digits and nails normal without clubbing or cyanosis      Inspection/palpation of joints, bones, and muscles:  No joint tenderness, swelling, full active and passive range of motion       Skin: Normal skin turgor and no rashes      Neuro:    Normal reflexes  Pych:   alert and oriented to person, place and time     normal mood and affect      Assessment/Plan:Diagnoses and all orders for this visit:    Upper respiratory tract infection, unspecified type  -     azithromycin (ZITHROMAX) 250 mg tablet; Take 2 tablets today then 1 tablet daily x 4 days        Reviewed with patient plan to treat with plan as above      Patient instructed to call in 72 hours if not feeling better or if symptoms worsen

## 2019-01-16 NOTE — TELEPHONE ENCOUNTER
She was seen yest  She is on antib  Woke up today with dizziness, weak, shakey, she doesn't think the antib  Agrees with her  Pl adv

## 2019-01-17 NOTE — TELEPHONE ENCOUNTER
She took the 1/2 tab,  Her legs feel like rubber, she feels blah,  She wants to know if she can stop it altogether , she has no mucus, no coughing,  Ok to leave the message on her machine, she may not be home  Pl adv

## 2019-02-23 ENCOUNTER — OFFICE VISIT (OUTPATIENT)
Dept: URGENT CARE | Age: 84
End: 2019-02-23
Payer: MEDICARE

## 2019-02-23 VITALS
WEIGHT: 145 LBS | OXYGEN SATURATION: 96 % | SYSTOLIC BLOOD PRESSURE: 135 MMHG | HEART RATE: 86 BPM | RESPIRATION RATE: 18 BRPM | HEIGHT: 60 IN | TEMPERATURE: 97.6 F | BODY MASS INDEX: 28.47 KG/M2 | DIASTOLIC BLOOD PRESSURE: 75 MMHG

## 2019-02-23 DIAGNOSIS — G24.3 TORTICOLLIS, SPASMODIC: Primary | ICD-10-CM

## 2019-02-23 PROCEDURE — G0463 HOSPITAL OUTPT CLINIC VISIT: HCPCS | Performed by: FAMILY MEDICINE

## 2019-02-23 PROCEDURE — 99213 OFFICE O/P EST LOW 20 MIN: CPT | Performed by: FAMILY MEDICINE

## 2019-02-23 RX ORDER — AMOXICILLIN 250 MG
2 CAPSULE ORAL DAILY
COMMUNITY

## 2019-02-23 RX ORDER — FAMOTIDINE 20 MG/1
20 TABLET, FILM COATED ORAL 2 TIMES DAILY
COMMUNITY
End: 2019-06-17 | Stop reason: SDUPTHER

## 2019-02-23 NOTE — PATIENT INSTRUCTIONS
Use a long pause patches at night  Heat 20 30 minutes every 3 hours    Do not sleep with heating pad  Follow-up with PCP or if not better or worse in 4-5 days

## 2019-02-23 NOTE — PROGRESS NOTES
Assessment/Plan    Torticollis, spasmodic [G24 3]  1  Torticollis, spasmodic           Subjective:     Patient ID: Shyam Patterson is a 80 y o  female  Reason For Visit / Chief Complaint  Chief Complaint   Patient presents with    Shoulder Pain     Right shoulder pain for the past few days         Patient complains of right posterior shoulder pain and neck pain times 4-5 days  Shoulder Pain    Pertinent negatives include no fever  Past Medical History:   Diagnosis Date    Hypertension     last assessed: 12/13/2016    Malignant neoplasm of colon ([address] )     last assessed: 10/20/2015    Peripheral neuropathy     last assessed: 02/23/2016       Past Surgical History:   Procedure Laterality Date    APPENDECTOMY      last assessed: 10/20/2015    CHOLECYSTECTOMY      last assessed: 10/20/2015    COLECTOMY      last assessed: 10/20/2015; partial     TONSILLECTOMY      last assessed: 10/20/2015       Family History   Problem Relation Age of Onset    Heart disease Mother         cardiac disorder    Alzheimer's disease Sister        Review of Systems   Constitutional: Positive for activity change  Negative for appetite change, chills, diaphoresis, fatigue, fever and unexpected weight change  HENT: Negative  Eyes: Negative  Respiratory: Negative  Cardiovascular: Negative  Gastrointestinal: Negative  Endocrine: Negative  Genitourinary: Negative  Musculoskeletal: Positive for back pain and myalgias  Allergic/Immunologic: Negative  Neurological: Negative  Hematological: Negative  Psychiatric/Behavioral: Negative  Objective:    /75   Pulse 86   Temp 97 6 °F (36 4 °C) (Temporal)   Resp 18   Ht 5' (1 524 m)   Wt 65 8 kg (145 lb)   SpO2 96%   BMI 28 32 kg/m²     Physical Exam   Constitutional: She is oriented to person, place, and time  She appears well-developed and well-nourished  HENT:   Head: Normocephalic and atraumatic     Right Ear: External ear normal    Left Ear: External ear normal    Nose: Nose normal    Mouth/Throat: Oropharynx is clear and moist    Eyes: Pupils are equal, round, and reactive to light  Conjunctivae and EOM are normal    Neck: Normal range of motion  Neck supple  Cardiovascular: Normal rate, regular rhythm, normal heart sounds and intact distal pulses  Pulmonary/Chest: Effort normal and breath sounds normal    Abdominal: Soft  Bowel sounds are normal    Musculoskeletal: She exhibits tenderness  Patient is pain to palpation right parascapular region extending to neck  Range of motion intact however elicits some increased pain   Neurological: She is alert and oriented to person, place, and time  Skin: Skin is warm and dry  Psychiatric: She has a normal mood and affect   Her behavior is normal  Judgment and thought content normal

## 2019-03-19 ENCOUNTER — OFFICE VISIT (OUTPATIENT)
Dept: FAMILY MEDICINE CLINIC | Facility: CLINIC | Age: 84
End: 2019-03-19
Payer: MEDICARE

## 2019-03-19 VITALS
WEIGHT: 148.6 LBS | HEIGHT: 60 IN | BODY MASS INDEX: 29.17 KG/M2 | TEMPERATURE: 98 F | DIASTOLIC BLOOD PRESSURE: 80 MMHG | SYSTOLIC BLOOD PRESSURE: 122 MMHG | HEART RATE: 82 BPM

## 2019-03-19 DIAGNOSIS — J06.9 VIRAL UPPER RESPIRATORY ILLNESS: ICD-10-CM

## 2019-03-19 DIAGNOSIS — H81.10 BENIGN PAROXYSMAL POSITIONAL VERTIGO, UNSPECIFIED LATERALITY: Primary | ICD-10-CM

## 2019-03-19 PROCEDURE — 99213 OFFICE O/P EST LOW 20 MIN: CPT | Performed by: NURSE PRACTITIONER

## 2019-03-19 NOTE — PROGRESS NOTES
Assessment/Plan:     Diagnoses and all orders for this visit:    Benign paroxysmal positional vertigo, unspecified laterality    Discussed with patient plan to continue using meclizine as needed and continue use of daily over the counter allergy medication during heightened allergic seasons  Patient instructed to call if no improvement in 72 hours or symptoms worsen      Subjective:      Patient ID: Sherlyn Fernandez is a 80 y o  female  80 y  o female presenting with dizziness for the past two days  She took her meclzine and her allergy pill this morning but as not had much relief of symptoms  She denies having fever, chills or generalized body aches being associated with her symptoms  She does have a chronic positional vertigo issue but seems worsened over the past two days  Family History   Problem Relation Age of Onset    Heart disease Mother         cardiac disorder    Alzheimer's disease Sister      Social History     Socioeconomic History    Marital status:       Spouse name: Not on file    Number of children: Not on file    Years of education: Not on file    Highest education level: Not on file   Occupational History    Not on file   Social Needs    Financial resource strain: Not on file    Food insecurity:     Worry: Not on file     Inability: Not on file    Transportation needs:     Medical: Not on file     Non-medical: Not on file   Tobacco Use    Smoking status: Former Smoker   Substance and Sexual Activity    Alcohol use: No    Drug use: Not on file    Sexual activity: Not on file   Lifestyle    Physical activity:     Days per week: Not on file     Minutes per session: Not on file    Stress: Not on file   Relationships    Social connections:     Talks on phone: Not on file     Gets together: Not on file     Attends Buddhist service: Not on file     Active member of club or organization: Not on file     Attends meetings of clubs or organizations: Not on file Relationship status: Not on file    Intimate partner violence:     Fear of current or ex partner: Not on file     Emotionally abused: Not on file     Physically abused: Not on file     Forced sexual activity: Not on file   Other Topics Concern    Not on file   Social History Narrative    Not on file     Past Medical History:   Diagnosis Date    Hypertension     last assessed: 12/13/2016    Malignant neoplasm of colon ([facility] [address] )     last assessed: 10/20/2015    Peripheral neuropathy     last assessed: 02/23/2016     Past Surgical History:   Procedure Laterality Date    APPENDECTOMY      last assessed: 10/20/2015    CHOLECYSTECTOMY      last assessed: 10/20/2015    COLECTOMY      last assessed: 10/20/2015; partial     TONSILLECTOMY      last assessed: 10/20/2015     Allergies   Allergen Reactions    Penicillins Hives     Other reaction(s): Other (See Comments)  hives       Current Outpatient Medications:     amLODIPine (NORVASC) 2 5 mg tablet, TAKE 1 TABLET BY MOUTH ONCE DAILY, Disp: 90 tablet, Rfl: 1    aspirin 81 MG tablet, Take 81 mg by mouth daily  , Disp: , Rfl:     Calcium-Vitamin D 600-200 MG-UNIT per tablet, Take by mouth, Disp: , Rfl:     Cholecalciferol 1000 units CHEW, Chew, Disp: , Rfl:     denosumab (PROLIA) 60 mg/mL, 1 mL, Disp: , Rfl:     famotidine (PEPCID) 20 mg tablet, Take 20 mg by mouth 2 (two) times a day, Disp: , Rfl:     levothyroxine 25 mcg tablet, Take 1 tablet (25 mcg total) by mouth daily, Disp: 90 tablet, Rfl: 1    LINZESS 290 MCG CAPS, TAKE ONE CAPSULE BY MOUTH ONCE DAILY, Disp: 90 capsule, Rfl: 2    Multiple Vitamin (MULTIVITAMINS PO), Take 1 capsule by mouth, Disp: , Rfl:     senna-docusate sodium (SENOKOT S) 8 6-50 mg per tablet, Take 2 tablets by mouth daily, Disp: , Rfl:     meclizine (ANTIVERT) 12 5 MG tablet, Take 1 tablet (12 5 mg total) by mouth every 8 (eight) hours, Disp: 30 tablet, Rfl: 1      Review of Systems   Constitutional: Negative      HENT: Positive for congestion  Eyes: Negative  Respiratory: Negative  Cardiovascular: Negative  Gastrointestinal: Negative  Musculoskeletal: Negative  Neurological: Positive for dizziness  Hematological: Negative  Psychiatric/Behavioral: Negative  Objective:    /80 (BP Location: Left arm, Patient Position: Sitting, Cuff Size: Standard)   Pulse 82   Temp 98 °F (36 7 °C)   Ht 5' (1 524 m)   Wt 67 4 kg (148 lb 9 6 oz)   BMI 29 02 kg/m²   (Reviewed)     Physical Exam   Constitutional: She is oriented to person, place, and time  Vital signs are normal  She appears well-developed and well-nourished  HENT:   Head: Normocephalic and atraumatic  Right Ear: Tympanic membrane, external ear and ear canal normal    Left Ear: Tympanic membrane, external ear and ear canal normal    Nose: Mucosal edema and rhinorrhea present  Mouth/Throat: Uvula is midline, oropharynx is clear and moist and mucous membranes are normal    Eyes: Pupils are equal, round, and reactive to light  Conjunctivae, EOM and lids are normal    Neck: Trachea normal and normal range of motion  Cardiovascular: Normal rate, regular rhythm, normal heart sounds and intact distal pulses  Pulmonary/Chest: Effort normal and breath sounds normal    Neurological: She is alert and oriented to person, place, and time  She has normal strength and normal reflexes  No cranial nerve deficit  Skin: Skin is warm and dry  Capillary refill takes less than 2 seconds  Psychiatric: She has a normal mood and affect   Her behavior is normal

## 2019-03-22 ENCOUNTER — OFFICE VISIT (OUTPATIENT)
Dept: URGENT CARE | Age: 84
End: 2019-03-22
Payer: MEDICARE

## 2019-03-22 VITALS
BODY MASS INDEX: 28.83 KG/M2 | TEMPERATURE: 97.9 F | RESPIRATION RATE: 18 BRPM | HEIGHT: 59 IN | SYSTOLIC BLOOD PRESSURE: 145 MMHG | HEART RATE: 110 BPM | DIASTOLIC BLOOD PRESSURE: 88 MMHG | OXYGEN SATURATION: 95 % | WEIGHT: 143 LBS

## 2019-03-22 DIAGNOSIS — R42 VERTIGO: Primary | ICD-10-CM

## 2019-03-22 PROCEDURE — 99213 OFFICE O/P EST LOW 20 MIN: CPT | Performed by: NURSE PRACTITIONER

## 2019-03-22 PROCEDURE — 93005 ELECTROCARDIOGRAM TRACING: CPT | Performed by: NURSE PRACTITIONER

## 2019-03-22 PROCEDURE — G0463 HOSPITAL OUTPT CLINIC VISIT: HCPCS | Performed by: NURSE PRACTITIONER

## 2019-03-22 NOTE — PROGRESS NOTES
[facility]        NAME: Marshal Harris is a 80 y o  female  : 1930    MRN: [id number]  DATE: 2019  TIME: 4:39 PM    Assessment and Plan   Vertigo [R42]  1  Vertigo      Symptoms are the same as previous episodes of vertigo  Offered her a full evaluation in the ER but she could declined this at this time and stated this symptoms are same as previous episodes of vertigo  There are no acute changes on EKG  There is a possible bundle branch block  Recommend she continue her meclizine  Discussed with patient that she should go to the ER if she has any worsening dizziness, chest pain, shortness of breath, nausea, vomiting or any worsening symptoms  Patient verbalized understanding of this  Patient Instructions     Patient Instructions   No acute changes on EKG  Heart rate 84 on EKG  I offered for you to go to ER for full evaluation of symptom you declined and state this is similar to previous vertigo episodes  Recommend you continue meclizine as prescribed  You can do tylenol for ear pain  You can also try OTC Flonase  As we discussed if symptoms become worse, you develop headache, chest pain, sob or any worsening symptoms I would recommend going to ER  Chief Complaint     Chief Complaint   Patient presents with    Earache     b/l ear pain x yesterday         History of Present Illness   Marshal Harris presents to the clinic c/o    This is a 80year old female here today with bilateral ear pain and dizziness x 4 days  She went to PCP and they looked in her ears  They did not start anything new at that time  She has been taking allergy pill  She denies any cough, congestion, fever, chills or SOB  She also used her meclizine this morning which she states she took just prior to coming as unsure of his helping  She states she feels as though she is spinning not things around her  She states that is worse with movement    She states symptoms are all the same as her normal vertigo  She has a history of vertigo  She denies any new symptoms except for her normal vertigo  She has had 2-3 episodes a year meclizine usually helps  She denies any chest pain, shortness of breath, headache, nausea, vomiting or any other symptoms with this  She states she does have some pain in her ears  She is concerned today that her ears may be infected  Review of Systems   Review of Systems   Constitutional: Negative for activity change, chills, fatigue and unexpected weight change  HENT: Positive for ear pain  Negative for congestion, sinus pressure, sinus pain, sore throat and tinnitus  Respiratory: Negative for cough, shortness of breath and wheezing  Cardiovascular: Negative for chest pain  Neurological: Positive for dizziness  Negative for weakness, light-headedness and headaches           Current Medications     Long-Term Medications   Medication Sig Dispense Refill    amLODIPine (NORVASC) 2 5 mg tablet TAKE 1 TABLET BY MOUTH ONCE DAILY 90 tablet 1    Calcium-Vitamin D 600-200 MG-UNIT per tablet Take by mouth      Cholecalciferol 1000 units CHEW Chew      denosumab (PROLIA) 60 mg/mL 1 mL      levothyroxine 25 mcg tablet Take 1 tablet (25 mcg total) by mouth daily 90 tablet 1    LINZESS 290 MCG CAPS TAKE ONE CAPSULE BY MOUTH ONCE DAILY 90 capsule 2    meclizine (ANTIVERT) 12 5 MG tablet Take 1 tablet (12 5 mg total) by mouth every 8 (eight) hours 30 tablet 1       Current Allergies     Allergies as of 03/22/2019 - Reviewed 03/22/2019   Allergen Reaction Noted    Penicillins Hives 11/15/2012            The following portions of the patient's history were reviewed and updated as appropriate: allergies, current medications, past family history, past medical history, past social history, past surgical history and problem list     Objective   /88   Pulse (!) 110   Temp 97 9 °F (36 6 °C) (Temporal)   Resp 18   Ht 4' 11" (1 499 m)   Wt 64 9 kg (143 lb) SpO2 95%   BMI 28 88 kg/m²        Physical Exam     Physical Exam   Constitutional: She is oriented to person, place, and time  She appears well-developed and well-nourished  HENT:   Serous fluid bilateral ears   Neck: Normal range of motion  Neck supple  Cardiovascular: Normal rate and regular rhythm  Heart rate was reading 105-110 EKG obtained  Rate was regular  Rate on EKG was 84  No acute changes on EKG  Neurological: She is alert and oriented to person, place, and time  Skin: Skin is warm and dry  Psychiatric: She has a normal mood and affect  Her behavior is normal    Nursing note reviewed  EKG normal sinus rhythm possible bundle-branch block  No acute changes

## 2019-03-22 NOTE — PATIENT INSTRUCTIONS
No acute changes on EKG  Heart rate 84 on EKG  I offered for you to go to ER for full evaluation of symptom you declined and state this is similar to previous vertigo episodes  Recommend you continue meclizine as prescribed  You can do tylenol for ear pain  You can also try OTC Flonase  As we discussed if symptoms become worse, you develop headache, chest pain, sob or any worsening symptoms I would recommend going to ER

## 2019-03-23 LAB
ATRIAL RATE: 84 BPM
P AXIS: 69 DEGREES
PR INTERVAL: 168 MS
QRS AXIS: -52 DEGREES
QRSD INTERVAL: 86 MS
QT INTERVAL: 376 MS
QTC INTERVAL: 444 MS
T WAVE AXIS: 44 DEGREES
VENTRICULAR RATE: 84 BPM

## 2019-03-23 PROCEDURE — 93010 ELECTROCARDIOGRAM REPORT: CPT | Performed by: INTERNAL MEDICINE

## 2019-03-25 DIAGNOSIS — E03.9 HYPOTHYROIDISM, UNSPECIFIED TYPE: ICD-10-CM

## 2019-03-25 RX ORDER — LEVOTHYROXINE SODIUM 0.03 MG/1
25 TABLET ORAL DAILY
Qty: 90 TABLET | Refills: 1 | Status: SHIPPED | OUTPATIENT
Start: 2019-03-25 | End: 2019-12-06 | Stop reason: SDUPTHER

## 2019-03-26 ENCOUNTER — TRANSCRIBE ORDERS (OUTPATIENT)
Dept: LAB | Facility: CLINIC | Age: 84
End: 2019-03-26

## 2019-03-29 ENCOUNTER — TRANSCRIBE ORDERS (OUTPATIENT)
Dept: LAB | Facility: CLINIC | Age: 84
End: 2019-03-29

## 2019-03-29 ENCOUNTER — APPOINTMENT (OUTPATIENT)
Dept: LAB | Facility: CLINIC | Age: 84
End: 2019-03-29
Payer: MEDICARE

## 2019-03-29 DIAGNOSIS — I10 ESSENTIAL HYPERTENSION: ICD-10-CM

## 2019-03-29 DIAGNOSIS — Z85.038 HISTORY OF COLON CANCER: ICD-10-CM

## 2019-03-29 DIAGNOSIS — R53.83 OTHER FATIGUE: ICD-10-CM

## 2019-03-29 DIAGNOSIS — E03.9 HYPOTHYROIDISM, UNSPECIFIED TYPE: ICD-10-CM

## 2019-03-29 DIAGNOSIS — E78.00 HYPERCHOLESTEROLEMIA: ICD-10-CM

## 2019-03-29 DIAGNOSIS — M81.0 OSTEOPOROSIS, POSTMENOPAUSAL: Primary | ICD-10-CM

## 2019-03-29 LAB
25(OH)D3 SERPL-MCNC: 40.9 NG/ML (ref 30–100)
ALBUMIN SERPL BCP-MCNC: 3.8 G/DL (ref 3.5–5)
ALP SERPL-CCNC: 57 U/L (ref 46–116)
ALT SERPL W P-5'-P-CCNC: 20 U/L (ref 12–78)
ANION GAP SERPL CALCULATED.3IONS-SCNC: 6 MMOL/L (ref 4–13)
AST SERPL W P-5'-P-CCNC: 19 U/L (ref 5–45)
BASOPHILS # BLD AUTO: 0.06 THOUSANDS/ΜL (ref 0–0.1)
BASOPHILS NFR BLD AUTO: 1 % (ref 0–1)
BILIRUB SERPL-MCNC: 0.47 MG/DL (ref 0.2–1)
BUN SERPL-MCNC: 22 MG/DL (ref 5–25)
CALCIUM SERPL-MCNC: 8.7 MG/DL (ref 8.3–10.1)
CEA SERPL-MCNC: 1.6 NG/ML (ref 0–3)
CHLORIDE SERPL-SCNC: 104 MMOL/L (ref 100–108)
CHOLEST SERPL-MCNC: 218 MG/DL (ref 50–200)
CO2 SERPL-SCNC: 25 MMOL/L (ref 21–32)
CREAT SERPL-MCNC: 1.38 MG/DL (ref 0.6–1.3)
EOSINOPHIL # BLD AUTO: 0.12 THOUSAND/ΜL (ref 0–0.61)
EOSINOPHIL NFR BLD AUTO: 2 % (ref 0–6)
ERYTHROCYTE [DISTWIDTH] IN BLOOD BY AUTOMATED COUNT: 12.8 % (ref 11.6–15.1)
GFR SERPL CREATININE-BSD FRML MDRD: 34 ML/MIN/1.73SQ M
GLUCOSE P FAST SERPL-MCNC: 90 MG/DL (ref 65–99)
HCT VFR BLD AUTO: 36.3 % (ref 34.8–46.1)
HDLC SERPL-MCNC: 91 MG/DL (ref 40–60)
HGB BLD-MCNC: 11.6 G/DL (ref 11.5–15.4)
IMM GRANULOCYTES # BLD AUTO: 0.02 THOUSAND/UL (ref 0–0.2)
IMM GRANULOCYTES NFR BLD AUTO: 0 % (ref 0–2)
LDLC SERPL CALC-MCNC: 109 MG/DL (ref 0–100)
LYMPHOCYTES # BLD AUTO: 0.88 THOUSANDS/ΜL (ref 0.6–4.47)
LYMPHOCYTES NFR BLD AUTO: 18 % (ref 14–44)
MCH RBC QN AUTO: 32.2 PG (ref 26.8–34.3)
MCHC RBC AUTO-ENTMCNC: 32 G/DL (ref 31.4–37.4)
MCV RBC AUTO: 101 FL (ref 82–98)
MONOCYTES # BLD AUTO: 0.47 THOUSAND/ΜL (ref 0.17–1.22)
MONOCYTES NFR BLD AUTO: 9 % (ref 4–12)
NEUTROPHILS # BLD AUTO: 3.43 THOUSANDS/ΜL (ref 1.85–7.62)
NEUTS SEG NFR BLD AUTO: 70 % (ref 43–75)
NRBC BLD AUTO-RTO: 0 /100 WBCS
PLATELET # BLD AUTO: 276 THOUSANDS/UL (ref 149–390)
PMV BLD AUTO: 10.1 FL (ref 8.9–12.7)
POTASSIUM SERPL-SCNC: 4.3 MMOL/L (ref 3.5–5.3)
PROT SERPL-MCNC: 7.3 G/DL (ref 6.4–8.2)
RBC # BLD AUTO: 3.6 MILLION/UL (ref 3.81–5.12)
SODIUM SERPL-SCNC: 135 MMOL/L (ref 136–145)
TRIGL SERPL-MCNC: 88 MG/DL
TSH SERPL DL<=0.05 MIU/L-ACNC: 3.05 UIU/ML (ref 0.36–3.74)
WBC # BLD AUTO: 4.98 THOUSAND/UL (ref 4.31–10.16)

## 2019-03-29 PROCEDURE — 80061 LIPID PANEL: CPT

## 2019-03-29 PROCEDURE — 84080 ASSAY ALKALINE PHOSPHATASES: CPT

## 2019-03-29 PROCEDURE — 84443 ASSAY THYROID STIM HORMONE: CPT

## 2019-03-29 PROCEDURE — 85025 COMPLETE CBC W/AUTO DIFF WBC: CPT

## 2019-03-29 PROCEDURE — 80053 COMPREHEN METABOLIC PANEL: CPT

## 2019-03-29 PROCEDURE — 36415 COLL VENOUS BLD VENIPUNCTURE: CPT

## 2019-03-29 PROCEDURE — 82378 CARCINOEMBRYONIC ANTIGEN: CPT

## 2019-03-29 PROCEDURE — 82306 VITAMIN D 25 HYDROXY: CPT

## 2019-03-29 PROCEDURE — 82523 COLLAGEN CROSSLINKS: CPT

## 2019-04-01 LAB
ALP BONE SERPL-MCNC: 4.8 UG/L
COLLAGEN CTX SERPL-MCNC: 107 PG/ML

## 2019-04-11 ENCOUNTER — TELEPHONE (OUTPATIENT)
Dept: PHYSICAL THERAPY | Facility: OTHER | Age: 84
End: 2019-04-11

## 2019-04-11 ENCOUNTER — OFFICE VISIT (OUTPATIENT)
Dept: FAMILY MEDICINE CLINIC | Facility: CLINIC | Age: 84
End: 2019-04-11
Payer: MEDICARE

## 2019-04-11 VITALS
BODY MASS INDEX: 30.24 KG/M2 | DIASTOLIC BLOOD PRESSURE: 70 MMHG | SYSTOLIC BLOOD PRESSURE: 120 MMHG | TEMPERATURE: 98 F | HEART RATE: 82 BPM | WEIGHT: 150 LBS | HEIGHT: 59 IN

## 2019-04-11 DIAGNOSIS — F32.A DEPRESSION, UNSPECIFIED DEPRESSION TYPE: Primary | ICD-10-CM

## 2019-04-11 DIAGNOSIS — I10 ESSENTIAL HYPERTENSION: ICD-10-CM

## 2019-04-11 DIAGNOSIS — E03.9 HYPOTHYROIDISM, UNSPECIFIED TYPE: ICD-10-CM

## 2019-04-11 DIAGNOSIS — M54.6 CHRONIC THORACIC BACK PAIN, UNSPECIFIED BACK PAIN LATERALITY: ICD-10-CM

## 2019-04-11 DIAGNOSIS — G89.29 CHRONIC THORACIC BACK PAIN, UNSPECIFIED BACK PAIN LATERALITY: ICD-10-CM

## 2019-04-11 PROCEDURE — 99214 OFFICE O/P EST MOD 30 MIN: CPT | Performed by: FAMILY MEDICINE

## 2019-04-11 RX ORDER — ESCITALOPRAM OXALATE 10 MG/1
10 TABLET ORAL
Qty: 30 TABLET | Refills: 5 | Status: SHIPPED | OUTPATIENT
Start: 2019-04-11 | End: 2019-08-28 | Stop reason: ALTCHOICE

## 2019-04-24 ENCOUNTER — TRANSCRIBE ORDERS (OUTPATIENT)
Dept: ADMINISTRATIVE | Facility: HOSPITAL | Age: 84
End: 2019-04-24

## 2019-04-24 DIAGNOSIS — M81.0 OSTEOPOROSIS, UNSPECIFIED OSTEOPOROSIS TYPE, UNSPECIFIED PATHOLOGICAL FRACTURE PRESENCE: Primary | ICD-10-CM

## 2019-05-27 DIAGNOSIS — K21.9 GASTROESOPHAGEAL REFLUX DISEASE WITHOUT ESOPHAGITIS: ICD-10-CM

## 2019-05-28 RX ORDER — FAMOTIDINE 40 MG/1
TABLET, FILM COATED ORAL
Qty: 90 TABLET | Refills: 2 | Status: SHIPPED | OUTPATIENT
Start: 2019-05-28 | End: 2019-12-02 | Stop reason: SDUPTHER

## 2019-06-12 ENCOUNTER — TELEPHONE (OUTPATIENT)
Dept: FAMILY MEDICINE CLINIC | Facility: CLINIC | Age: 84
End: 2019-06-12

## 2019-06-12 DIAGNOSIS — K52.9 CHRONIC DIARRHEA: ICD-10-CM

## 2019-06-12 RX ORDER — LINACLOTIDE 290 UG/1
CAPSULE, GELATIN COATED ORAL
Qty: 90 CAPSULE | Refills: 2 | Status: SHIPPED | OUTPATIENT
Start: 2019-06-12 | End: 2019-06-12

## 2019-06-13 DIAGNOSIS — K52.9 CHRONIC DIARRHEA: ICD-10-CM

## 2019-06-17 ENCOUNTER — TELEPHONE (OUTPATIENT)
Dept: FAMILY MEDICINE CLINIC | Facility: CLINIC | Age: 84
End: 2019-06-17

## 2019-06-17 ENCOUNTER — OFFICE VISIT (OUTPATIENT)
Dept: FAMILY MEDICINE CLINIC | Facility: CLINIC | Age: 84
End: 2019-06-17
Payer: MEDICARE

## 2019-06-17 VITALS
SYSTOLIC BLOOD PRESSURE: 120 MMHG | HEIGHT: 59 IN | HEART RATE: 80 BPM | TEMPERATURE: 98.4 F | DIASTOLIC BLOOD PRESSURE: 80 MMHG | BODY MASS INDEX: 29.64 KG/M2 | WEIGHT: 147 LBS

## 2019-06-17 DIAGNOSIS — R53.83 FATIGUE, UNSPECIFIED TYPE: ICD-10-CM

## 2019-06-17 DIAGNOSIS — M47.819 OSTEOARTHRITIS OF SPINE WITHOUT MYELOPATHY OR RADICULOPATHY, UNSPECIFIED SPINAL REGION: Primary | ICD-10-CM

## 2019-06-17 PROCEDURE — 99213 OFFICE O/P EST LOW 20 MIN: CPT | Performed by: FAMILY MEDICINE

## 2019-06-25 DIAGNOSIS — I10 ESSENTIAL HYPERTENSION: ICD-10-CM

## 2019-06-25 RX ORDER — AMLODIPINE BESYLATE 2.5 MG/1
2.5 TABLET ORAL DAILY
Qty: 90 TABLET | Refills: 1 | Status: SHIPPED | OUTPATIENT
Start: 2019-06-25 | End: 2019-12-20 | Stop reason: SDUPTHER

## 2019-07-17 ENCOUNTER — OFFICE VISIT (OUTPATIENT)
Dept: FAMILY MEDICINE CLINIC | Facility: CLINIC | Age: 84
End: 2019-07-17
Payer: MEDICARE

## 2019-07-17 VITALS
DIASTOLIC BLOOD PRESSURE: 70 MMHG | TEMPERATURE: 99.6 F | SYSTOLIC BLOOD PRESSURE: 102 MMHG | HEART RATE: 72 BPM | HEIGHT: 59 IN | WEIGHT: 144 LBS | BODY MASS INDEX: 29.03 KG/M2

## 2019-07-17 DIAGNOSIS — H69.83 DYSFUNCTION OF BOTH EUSTACHIAN TUBES: Primary | ICD-10-CM

## 2019-07-17 DIAGNOSIS — R60.9 EDEMA, UNSPECIFIED TYPE: ICD-10-CM

## 2019-07-17 PROBLEM — C43.71 MALIGNANT MELANOMA OF RIGHT LOWER EXTREMITY INCLUDING HIP (HCC): Status: ACTIVE | Noted: 2019-07-17

## 2019-07-17 PROCEDURE — 99214 OFFICE O/P EST MOD 30 MIN: CPT | Performed by: FAMILY MEDICINE

## 2019-07-17 RX ORDER — PREDNISONE 10 MG/1
TABLET ORAL
Qty: 26 TABLET | Refills: 0 | Status: SHIPPED | OUTPATIENT
Start: 2019-07-17 | End: 2019-07-29 | Stop reason: ALTCHOICE

## 2019-07-17 RX ORDER — SPIRONOLACTONE 25 MG/1
25 TABLET ORAL DAILY
Qty: 30 TABLET | Refills: 1 | Status: SHIPPED | OUTPATIENT
Start: 2019-07-17 | End: 2019-08-28 | Stop reason: ALTCHOICE

## 2019-07-17 RX ORDER — DOXYCYCLINE HYCLATE 100 MG/1
100 CAPSULE ORAL EVERY 12 HOURS SCHEDULED
Qty: 20 CAPSULE | Refills: 0 | Status: SHIPPED | OUTPATIENT
Start: 2019-07-17 | End: 2019-07-27

## 2019-07-17 NOTE — PROGRESS NOTES
Patient ID: Yelena Casey is a 80 y o  female  HPI: 80 y  o female presenting with symptoms of ear pressure, crackling of ears and dizziness associated with positional changes  She also notes some swelling of lower legs for past few weeks since humidity has increased, but denies any orthopnea, wheezing, or chest tightness  SUBJECTIVE    Family History   Problem Relation Age of Onset    Heart disease Mother         cardiac disorder    Alzheimer's disease Sister      Social History     Socioeconomic History    Marital status:       Spouse name: Not on file    Number of children: Not on file    Years of education: Not on file    Highest education level: Not on file   Occupational History    Not on file   Social Needs    Financial resource strain: Not on file    Food insecurity:     Worry: Not on file     Inability: Not on file    Transportation needs:     Medical: Not on file     Non-medical: Not on file   Tobacco Use    Smoking status: Former Smoker    Smokeless tobacco: Never Used   Substance and Sexual Activity    Alcohol use: No    Drug use: Never    Sexual activity: Not on file   Lifestyle    Physical activity:     Days per week: Not on file     Minutes per session: Not on file    Stress: Not on file   Relationships    Social connections:     Talks on phone: Not on file     Gets together: Not on file     Attends Buddhist service: Not on file     Active member of club or organization: Not on file     Attends meetings of clubs or organizations: Not on file     Relationship status: Not on file    Intimate partner violence:     Fear of current or ex partner: Not on file     Emotionally abused: Not on file     Physically abused: Not on file     Forced sexual activity: Not on file   Other Topics Concern    Not on file   Social History Narrative    Not on file     Past Medical History:   Diagnosis Date    Hypertension     last assessed: 12/13/2016    Malignant neoplasm of colon [facility])     last assessed: 10/20/2015    Peripheral neuropathy     last assessed: 02/23/2016     Past Surgical History:   Procedure Laterality Date    APPENDECTOMY      last assessed: 10/20/2015    CHOLECYSTECTOMY      last assessed: 10/20/2015    COLECTOMY      last assessed: 10/20/2015; partial     TONSILLECTOMY      last assessed: 10/20/2015     Allergies   Allergen Reactions    Penicillins Hives     Other reaction(s): Other (See Comments)  hives       Current Outpatient Medications:     amLODIPine (NORVASC) 2 5 mg tablet, Take 1 tablet (2 5 mg total) by mouth daily, Disp: 90 tablet, Rfl: 1    aspirin 81 MG tablet, Take 81 mg by mouth daily  , Disp: , Rfl:     Calcium-Vitamin D 600-200 MG-UNIT per tablet, Take by mouth, Disp: , Rfl:     denosumab (PROLIA) 60 mg/mL, 1 mL, Disp: , Rfl:     escitalopram (LEXAPRO) 10 mg tablet, Take 1 tablet (10 mg total) by mouth daily at bedtime, Disp: 30 tablet, Rfl: 5    famotidine (PEPCID) 40 MG tablet, TAKE 1 TABLET BY MOUTH ONCE DAILY AS DIRECTED, Disp: 90 tablet, Rfl: 2    levothyroxine 25 mcg tablet, Take 1 tablet (25 mcg total) by mouth daily, Disp: 90 tablet, Rfl: 1    linaCLOtide (LINZESS) 290 MCG CAPS, Take 1 capsule by mouth daily, Disp: 90 capsule, Rfl: 2    meclizine (ANTIVERT) 12 5 MG tablet, Take 1 tablet (12 5 mg total) by mouth every 8 (eight) hours, Disp: 30 tablet, Rfl: 1    senna-docusate sodium (SENOKOT S) 8 6-50 mg per tablet, Take 2 tablets by mouth daily, Disp: , Rfl:     doxycycline hyclate (VIBRAMYCIN) 100 mg capsule, Take 1 capsule (100 mg total) by mouth every 12 (twelve) hours for 10 days, Disp: 20 capsule, Rfl: 0    predniSONE 10 mg tablet, 3 tabs po bid x2 days, then 2 tabs po bid x2 days, then 1 tab bid x2 days, then 1 daily until done , Disp: 26 tablet, Rfl: 0    spironolactone (ALDACTONE) 25 mg tablet, Take 1 tablet (25 mg total) by mouth daily, Disp: 30 tablet, Rfl: 1    Review of Systems  Constitutional:     Denies fever, chills, fatigue, weakness ,weight loss, weight gain       ENT: Denies earache, loss of hearing, nosebleed, nasal discharge,nasal congestion, sore throat,hoarseness, but complains of ear pressure,and crackling    Pulmonary: Denies shortness of breath ,cough , dyspnea on exertionon, orthopnea , PND   Cardiovascular:  Denies bradycardia , tachycardia ,palpations, +lower extremity edema   Breast:  Denies new or changing breast lumps,  nipple discharge, nipple changes,  Abdomen:  Denies abdominal pain , anorexia ,indigestion, nausea ,vomiting, constipation , diarrhea  Musculoskeletal: Denies myalgias, arthralgias, joint swelling, joint stiffness ,limb pain, limb swelling  Lymph:denies swollen glands  Gu: no dysuria or urinary frequency  Skin: Denies skin rash, skin lesion, skin wound, itching,dry skin  Neuro: Denies headache, numbness, tingling, confusion, loss of consciousness, but complains of postitional vertigo  Psychiatric: Denies feelings of depression, suicidal ideation, anxiety, sleep disturbances    OBJECTIVE  /70   Pulse 72   Temp 99 6 °F (37 6 °C)   Ht 4' 11" (1 499 m)   Wt 65 3 kg (144 lb)   BMI 29 08 kg/m²   Constitutional:   NAD, well appearing and well nourished      ENT:   Conjunctiva and lids: no injection, edema, or discharge     Pupils and iris: SRIKANTH bilaterally    External inspection of ears and nose: normal without deformities or discharge  Otoscopic exam: Canals patent with tm dull, no erythema,but large effusions noted bilaterally  ENasal mucosa, septum and turbinates: Turbinae injection with discharge   Oropharynx:  Moist mucosa, normal tongue and tonsils without lesions  Erythema and injection  of post pharynx with pnd      Pulmonary:Respiratory effort normal rate and rhythm, no increased work of breathing   Auscultation of lungs:  Clear bilaterally with no adventitious breath sounds       Cardiovascular: regular rate and rhythm, S1 and S2, no murmur, +2 pitting edema of  LE bilaterally Abdomen: Soft and non-distended     Positive bowel sounds      No heptomegaly or splenomegaly      Lymphatic: Anterior and posterior cervical lymphadenopathy         Muscskeletal:  Gait and station: Normal gait      Digits and nails normal without clubbing or cyanosis       Inspection/palpation of joints, bones, and muscles:  No joint tenderness, swelling, full active and passive range of motion       Gu: no suprabubic tenderness, CVA tenderness or urethral discharge  Skin: Normal skin turgor and no rashes      Neuro:     Normal reflexes      Psych:   alert and oriented to person, place and time     normal mood and affect       Assessment/Plan:Diagnoses and all orders for this visit:    Dysfunction of both eustachian tubes  -     predniSONE 10 mg tablet; 3 tabs po bid x2 days, then 2 tabs po bid x2 days, then 1 tab bid x2 days, then 1 daily until done   -     doxycycline hyclate (VIBRAMYCIN) 100 mg capsule; Take 1 capsule (100 mg total) by mouth every 12 (twelve) hours for 10 days    Edema, unspecified type  -     spironolactone (ALDACTONE) 25 mg tablet; Take 1 tablet (25 mg total) by mouth daily        Reviewed with patient plan to treat with above plan      Patient instructed to call in 72 hours if not feeling better or if symptoms worsen

## 2019-07-22 ENCOUNTER — TELEPHONE (OUTPATIENT)
Dept: FAMILY MEDICINE CLINIC | Facility: CLINIC | Age: 84
End: 2019-07-22

## 2019-07-22 NOTE — TELEPHONE ENCOUNTER
She was put on medications last week for ear infections, was to call back today with follow up  She has been taking the medications for five days, no ear pain, or clogged feeling but she feels shaky and very blah since starting medications     Does she need to continue these medications for another five days  Also you put her on water pill for edema when she was here, she does not see any difference, does she need to continue this medication ? Leave message if shes not home

## 2019-07-22 NOTE — TELEPHONE ENCOUNTER
Continue the abx for 2 more days then stop  Continue the diuretic a couple more days as well   Pt to call Thurs or Fri if not improving

## 2019-07-23 ENCOUNTER — OFFICE VISIT (OUTPATIENT)
Dept: FAMILY MEDICINE CLINIC | Facility: CLINIC | Age: 84
End: 2019-07-23
Payer: MEDICARE

## 2019-07-23 VITALS
HEART RATE: 74 BPM | BODY MASS INDEX: 29.23 KG/M2 | SYSTOLIC BLOOD PRESSURE: 122 MMHG | HEIGHT: 59 IN | WEIGHT: 145 LBS | TEMPERATURE: 97.9 F | DIASTOLIC BLOOD PRESSURE: 74 MMHG

## 2019-07-23 DIAGNOSIS — H69.83 DYSFUNCTION OF BOTH EUSTACHIAN TUBES: Primary | ICD-10-CM

## 2019-07-23 PROCEDURE — 99213 OFFICE O/P EST LOW 20 MIN: CPT | Performed by: FAMILY MEDICINE

## 2019-07-23 RX ORDER — FLUTICASONE PROPIONATE 50 MCG
2 SPRAY, SUSPENSION (ML) NASAL DAILY
Qty: 1 BOTTLE | Refills: 2 | Status: SHIPPED | OUTPATIENT
Start: 2019-07-23 | End: 2019-08-28 | Stop reason: ALTCHOICE

## 2019-07-23 NOTE — PROGRESS NOTES
Assessment/Plan:     Diagnoses and all orders for this visit:    Dysfunction of both eustachian tubes  -     fluticasone (FLONASE) 50 mcg/act nasal spray; 2 sprays into each nostril daily 2 sprays bilat qd        Discussion: I reviewed with pt  Symptoms seem to be related to side effects of meds  Will d/c doxy and pred  Start fluticasone 2 puff bid  Recheck 1 week if not improved - earlier if worse      Subjective:      Patient ID: Dionicio Walker is a 80 y o  female  79 yo female seen 7/17 for OM by Dr TYLER and started on prednisone and doxycycline  Pt notes feeling weak, and shaky since starting meds  She also notes issues with insomnia  Ears feels better but have not resolved  No pain or vertigo noted  Pt here for recheck       The following portions of the patient's history were reviewed and updated as appropriate: She  has a past medical history of Hypertension, Malignant neoplasm of colon ([address] ), and Peripheral neuropathy  She   Patient Active Problem List    Diagnosis Date Noted    Malignant melanoma of right lower extremity including hip ([address] ) 07/17/2019    Closed fracture of thoracic vertebra ([address] ) 08/31/2018    Lumbosacral spondylosis without myelopathy 08/31/2018    Essential hypertension 08/31/2018    Atherosclerosis of native artery of extremity ([address] ) 08/06/2018    Idiopathic peripheral neuropathy 08/06/2018    Benign positional vertigo 08/03/2017    Edema 06/26/2017    Allergic rhinitis 09/27/2016    Insomnia 09/27/2016    Gastroparesis 03/07/2016    Vestibular dysfunction 02/02/2016    Chronic constipation 10/20/2015    Hypothyroidism 10/20/2015    IBS (irritable bowel syndrome) 10/20/2015    Osteoarthritis of spine 10/20/2015    Osteoporosis 10/20/2015    Thoracic compression fracture ([address] ) 10/20/2015    Low back pain 10/08/2015     She  has a past surgical history that includes Appendectomy; Cholecystectomy; Colectomy; and Tonsillectomy  She  reports that she has quit smoking  She has never used smokeless tobacco  She reports that she does not drink alcohol or use drugs  Current Outpatient Medications   Medication Sig Dispense Refill    amLODIPine (NORVASC) 2 5 mg tablet Take 1 tablet (2 5 mg total) by mouth daily 90 tablet 1    aspirin 81 MG tablet Take 81 mg by mouth daily   Calcium-Vitamin D 600-200 MG-UNIT per tablet Take by mouth      denosumab (PROLIA) 60 mg/mL 1 mL      doxycycline hyclate (VIBRAMYCIN) 100 mg capsule Take 1 capsule (100 mg total) by mouth every 12 (twelve) hours for 10 days 20 capsule 0    escitalopram (LEXAPRO) 10 mg tablet Take 1 tablet (10 mg total) by mouth daily at bedtime 30 tablet 5    famotidine (PEPCID) 40 MG tablet TAKE 1 TABLET BY MOUTH ONCE DAILY AS DIRECTED 90 tablet 2    fluticasone (FLONASE) 50 mcg/act nasal spray 2 sprays into each nostril daily 2 sprays bilat qd 1 Bottle 2    levothyroxine 25 mcg tablet Take 1 tablet (25 mcg total) by mouth daily 90 tablet 1    linaCLOtide (LINZESS) 290 MCG CAPS Take 1 capsule by mouth daily 90 capsule 2    meclizine (ANTIVERT) 12 5 MG tablet Take 1 tablet (12 5 mg total) by mouth every 8 (eight) hours 30 tablet 1    predniSONE 10 mg tablet 3 tabs po bid x2 days, then 2 tabs po bid x2 days, then 1 tab bid x2 days, then 1 daily until done  26 tablet 0    senna-docusate sodium (SENOKOT S) 8 6-50 mg per tablet Take 2 tablets by mouth daily      spironolactone (ALDACTONE) 25 mg tablet Take 1 tablet (25 mg total) by mouth daily 30 tablet 1     No current facility-administered medications for this visit  She is allergic to penicillins       Review of Systems   Constitutional: Positive for fatigue  Negative for chills and fever  HENT: Positive for congestion  Negative for ear discharge, ear pain, sinus pressure, sinus pain and sore throat  Eyes: Negative  Respiratory: Negative  Cardiovascular: Negative  Neurological: Positive for dizziness, weakness and light-headedness   Negative for numbness  Objective:      /74 (BP Location: Right arm, Patient Position: Sitting, Cuff Size: Standard)   Pulse 74   Temp 97 9 °F (36 6 °C)   Ht 4' 11" (1 499 m)   Wt 65 8 kg (145 lb)   BMI 29 29 kg/m²          Physical Exam   Constitutional: She is oriented to person, place, and time  She appears well-developed and well-nourished  HENT:   Head: Normocephalic and atraumatic  Right Ear: External ear normal    Left Ear: External ear normal    Nose: Nose normal    Mouth/Throat: Oropharynx is clear and moist  No oropharyngeal exudate  Eyes: Pupils are equal, round, and reactive to light  Conjunctivae and EOM are normal    Neck: Normal range of motion  Neck supple  Cardiovascular: Normal rate, regular rhythm and intact distal pulses  Pulmonary/Chest: Effort normal and breath sounds normal    Lymphadenopathy:     She has no cervical adenopathy  Neurological: She is alert and oriented to person, place, and time  No cranial nerve deficit or sensory deficit  She exhibits normal muscle tone  Skin: Skin is warm

## 2019-07-27 DIAGNOSIS — H81.10 BENIGN PAROXYSMAL POSITIONAL VERTIGO, UNSPECIFIED LATERALITY: ICD-10-CM

## 2019-07-27 RX ORDER — MECLIZINE HCL 12.5 MG/1
12.5 TABLET ORAL EVERY 8 HOURS SCHEDULED
Qty: 30 TABLET | Refills: 0 | Status: SHIPPED | OUTPATIENT
Start: 2019-07-27 | End: 2019-07-27 | Stop reason: SDUPTHER

## 2019-07-27 RX ORDER — MECLIZINE HCL 12.5 MG/1
TABLET ORAL
Qty: 30 TABLET | Refills: 1 | Status: SHIPPED | OUTPATIENT
Start: 2019-07-27 | End: 2019-08-28 | Stop reason: ALTCHOICE

## 2019-07-29 ENCOUNTER — OFFICE VISIT (OUTPATIENT)
Dept: FAMILY MEDICINE CLINIC | Facility: CLINIC | Age: 84
End: 2019-07-29
Payer: MEDICARE

## 2019-07-29 VITALS
BODY MASS INDEX: 28.06 KG/M2 | DIASTOLIC BLOOD PRESSURE: 82 MMHG | TEMPERATURE: 99.2 F | SYSTOLIC BLOOD PRESSURE: 140 MMHG | WEIGHT: 139.2 LBS | HEIGHT: 59 IN | HEART RATE: 74 BPM

## 2019-07-29 DIAGNOSIS — J30.9 ALLERGIC RHINITIS, UNSPECIFIED SEASONALITY, UNSPECIFIED TRIGGER: ICD-10-CM

## 2019-07-29 DIAGNOSIS — R09.81 CHRONIC NASAL CONGESTION: Primary | ICD-10-CM

## 2019-07-29 PROCEDURE — 99213 OFFICE O/P EST LOW 20 MIN: CPT | Performed by: FAMILY MEDICINE

## 2019-07-29 RX ORDER — MONTELUKAST SODIUM 10 MG/1
10 TABLET ORAL
Qty: 30 TABLET | Refills: 5 | Status: SHIPPED | OUTPATIENT
Start: 2019-07-29 | End: 2020-11-24 | Stop reason: ALTCHOICE

## 2019-07-29 NOTE — PROGRESS NOTES
Patient ID: Kyree Paniagua is a 80 y o  female  HPI: 80 y  o female presenting with symptoms of ear pressure, crackling of ears and dizziness associated with positional changes  SUBJECTIVE    Family History   Problem Relation Age of Onset    Heart disease Mother         cardiac disorder    Alzheimer's disease Sister      Social History     Socioeconomic History    Marital status:       Spouse name: Not on file    Number of children: Not on file    Years of education: Not on file    Highest education level: Not on file   Occupational History    Not on file   Social Needs    Financial resource strain: Not on file    Food insecurity:     Worry: Not on file     Inability: Not on file    Transportation needs:     Medical: Not on file     Non-medical: Not on file   Tobacco Use    Smoking status: Former Smoker    Smokeless tobacco: Never Used   Substance and Sexual Activity    Alcohol use: No    Drug use: Never    Sexual activity: Not on file   Lifestyle    Physical activity:     Days per week: Not on file     Minutes per session: Not on file    Stress: Not on file   Relationships    Social connections:     Talks on phone: Not on file     Gets together: Not on file     Attends [employer] service: Not on file     Active member of club or organization: Not on file     Attends meetings of clubs or organizations: Not on file     Relationship status: Not on file    Intimate partner violence:     Fear of current or ex partner: Not on file     Emotionally abused: Not on file     Physically abused: Not on file     Forced sexual activity: Not on file   Other Topics Concern    Not on file   Social History Narrative    Not on file     Past Medical History:   Diagnosis Date    Hypertension     last assessed: 12/13/2016    Malignant [facility] of [address] [address]     last assessed: 10/20/2015    Peripheral neuropathy     last assessed: 02/23/2016     Past Surgical History:   Procedure Laterality Date    APPENDECTOMY      last assessed: 10/20/2015    CHOLECYSTECTOMY      last assessed: 10/20/2015    COLECTOMY      last assessed: 10/20/2015; partial     TONSILLECTOMY      last assessed: 10/20/2015     Allergies   Allergen Reactions    Penicillins Hives     Other reaction(s): Other (See Comments)  hives       Current Outpatient Medications:     amLODIPine (NORVASC) 2 5 mg tablet, Take 1 tablet (2 5 mg total) by mouth daily, Disp: 90 tablet, Rfl: 1    aspirin 81 MG tablet, Take 81 mg by mouth daily  , Disp: , Rfl:     Calcium-Vitamin D 600-200 MG-UNIT per tablet, Take by mouth, Disp: , Rfl:     denosumab (PROLIA) 60 mg/mL, 1 mL, Disp: , Rfl:     escitalopram (LEXAPRO) 10 mg tablet, Take 1 tablet (10 mg total) by mouth daily at bedtime, Disp: 30 tablet, Rfl: 5    famotidine (PEPCID) 40 MG tablet, TAKE 1 TABLET BY MOUTH ONCE DAILY AS DIRECTED, Disp: 90 tablet, Rfl: 2    fluticasone (FLONASE) 50 mcg/act nasal spray, 2 sprays into each nostril daily 2 sprays bilat qd, Disp: 1 Bottle, Rfl: 2    levothyroxine 25 mcg tablet, Take 1 tablet (25 mcg total) by mouth daily, Disp: 90 tablet, Rfl: 1    linaCLOtide (LINZESS) 290 MCG CAPS, Take 1 capsule by mouth daily, Disp: 90 capsule, Rfl: 2    meclizine (ANTIVERT) 12 5 MG tablet, TAKE 1 TABLET BY MOUTH EVERY 8 HOURS, Disp: 30 tablet, Rfl: 1    senna-docusate sodium (SENOKOT S) 8 6-50 mg per tablet, Take 2 tablets by mouth daily, Disp: , Rfl:     spironolactone (ALDACTONE) 25 mg tablet, Take 1 tablet (25 mg total) by mouth daily, Disp: 30 tablet, Rfl: 1    montelukast (SINGULAIR) 10 mg tablet, Take 1 tablet (10 mg total) by mouth daily at bedtime, Disp: 30 tablet, Rfl: 5    Review of Systems  Constitutional:     Denies fever, chills, fatigue, weakness ,weight loss, weight gain       ENT: Denies earache, loss of hearing, nosebleed, nasal discharge,nasal congestion, sore throat,hoarseness, but complains of ear pressure,and crackling    Pulmonary: Denies shortness of breath ,cough , dyspnea on exertionon, orthopnea , PND   Cardiovascular:  Denies bradycardia , tachycardia ,palpations, lower extremity, edema leg, claudication  Breast:  Denies new or changing breast lumps,  nipple discharge, nipple changes,  Abdomen:  Denies abdominal pain , anorexia ,indigestion, nausea ,vomiting, constipation , diarrhea  Musculoskeletal: Denies myalgias, arthralgias, joint swelling, joint stiffness ,limb pain, limb swelling  Lymph:denies swollen glands  Gu: no dysuria or urinary frequency  Skin: Denies skin rash, skin lesion, skin wound, itching,dry skin  Neuro: Denies headache, numbness, tingling, confusion, loss of consciousness, but complains of postitional vertigo  Psychiatric: Denies feelings of depression, suicidal ideation, anxiety, sleep disturbances    OBJECTIVE  /82   Pulse 74   Temp 99 2 °F (37 3 °C)   Ht 4' 11" (1 499 m)   Wt 63 1 kg (139 lb 3 2 oz)   BMI 28 11 kg/m²   Constitutional:   NAD, well appearing and well nourished      ENT:   Conjunctiva and lids: no injection, edema, or discharge     Pupils and iris: SRIKANTH bilaterally    External inspection of ears and nose: normal without deformities or discharge  Otoscopic exam: Canals patent with tm dull, no erythema,but large effusions noted bilaterally  ENasal mucosa, septum and turbinates: Turbinae injection with discharge   Oropharynx:  Moist mucosa, normal tongue and tonsils without lesions  Erythema and injection  of post pharynx with pnd      Pulmonary:Respiratory effort normal rate and rhythm, no increased work of breathing   Auscultation of lungs:  Clear bilaterally with no adventitious breath sounds       Cardiovascular: regular rate and rhythm, S1 and S2, no murmur, no edema and/or varicosities of LE      Abdomen: Soft and non-distended     Positive bowel sounds      No heptomegaly or splenomegaly      Lymphatic: Anterior and posterior cervical lymphadenopathy         Muscskeletal:  Gait and station: Normal gait      Digits and nails normal without clubbing or cyanosis       Inspection/palpation of joints, bones, and muscles:  No joint tenderness, swelling, full active and passive range of motion       Gu: no suprabubic tenderness, CVA tenderness or urethral discharge  Skin: Normal skin turgor and no rashes      Neuro:    Normal cranial nerves     Normal reflexes     Normal sensation    Psych:   alert and oriented to person, place and time     normal mood and affect       Assessment/Plan:Diagnoses and all orders for this visit:    Chronic nasal congestion  -     CT head wo contrast; Future    Allergic rhinitis, unspecified seasonality, unspecified trigger  -     montelukast (SINGULAIR) 10 mg tablet;  Take 1 tablet (10 mg total) by mouth daily at bedtime        Reviewed with patient plan to treat with plan as above  Patient instructed to call in 72 hours if not feeling better or if symptoms worsen

## 2019-08-06 ENCOUNTER — OFFICE VISIT (OUTPATIENT)
Dept: URGENT CARE | Age: 84
End: 2019-08-06
Payer: MEDICARE

## 2019-08-06 ENCOUNTER — HOSPITAL ENCOUNTER (OUTPATIENT)
Dept: RADIOLOGY | Age: 84
Discharge: HOME | End: 2019-08-06
Payer: MEDICARE

## 2019-08-06 VITALS
RESPIRATION RATE: 16 BRPM | TEMPERATURE: 97.5 F | DIASTOLIC BLOOD PRESSURE: 80 MMHG | BODY MASS INDEX: 28.47 KG/M2 | OXYGEN SATURATION: 97 % | HEART RATE: 83 BPM | WEIGHT: 145 LBS | HEIGHT: 60 IN | SYSTOLIC BLOOD PRESSURE: 154 MMHG

## 2019-08-06 DIAGNOSIS — R42 VERTIGO: Primary | ICD-10-CM

## 2019-08-06 DIAGNOSIS — R09.81 CHRONIC NASAL CONGESTION: ICD-10-CM

## 2019-08-06 PROCEDURE — G0463 HOSPITAL OUTPT CLINIC VISIT: HCPCS | Performed by: FAMILY MEDICINE

## 2019-08-06 PROCEDURE — 99213 OFFICE O/P EST LOW 20 MIN: CPT | Performed by: FAMILY MEDICINE

## 2019-08-06 PROCEDURE — 70450 CT HEAD/BRAIN W/O DYE: CPT

## 2019-08-06 NOTE — PATIENT INSTRUCTIONS
Continue your medical care as discussed  Recheck/follow-up with your treating physicians for the results of the CT of the head  Please go to the hospital emergency department if needed      Patient given [facility]  #   [phone]

## 2019-08-06 NOTE — PROGRESS NOTES
[facility]        NAME: Mikayla Josue is a 80 y o  female  : 1930    MRN: [id number]  DATE: 2019  TIME: 1:46 PM    Assessment and Plan   Vertigo [R42]  1  Vertigo           Patient Instructions     Patient Instructions   Continue your medical care as discussed  Recheck/follow-up with your treating physicians for the results of the CT of the head  Please go to the hospital emergency department if needed  Patient given INFOLINK  #   [phone]      Follow up with PCP in 3-5 days  Proceed to  ER if symptoms worsen  Chief Complaint     Chief Complaint   Patient presents with    Earache     Pt complaining of b/l earache and vertigo all summer  She takes meclizine for the vertigo and had a CT of her head today for the ongoing vertigo  History of Present Illness       Patient with vertigo, dizziness, earache for the past several months; patient has been seen by her family physician and ENT; patient had a CT of her head today; patient is taking meclizine, Singulair, and using Flonase nasal spray      Review of Systems   Review of Systems   HENT: Positive for ear pain  Neurological: Positive for dizziness  All other systems reviewed and are negative  Current Medications       Current Outpatient Medications:     amLODIPine (NORVASC) 2 5 mg tablet, Take 1 tablet (2 5 mg total) by mouth daily, Disp: 90 tablet, Rfl: 1    aspirin 81 MG tablet, Take 81 mg by mouth daily  , Disp: , Rfl:     Calcium-Vitamin D 600-200 MG-UNIT per tablet, Take by mouth, Disp: , Rfl:     denosumab (PROLIA) 60 mg/mL, 1 mL, Disp: , Rfl:     escitalopram (LEXAPRO) 10 mg tablet, Take 1 tablet (10 mg total) by mouth daily at bedtime, Disp: 30 tablet, Rfl: 5    famotidine (PEPCID) 40 MG tablet, TAKE 1 TABLET BY MOUTH ONCE DAILY AS DIRECTED, Disp: 90 tablet, Rfl: 2    fluticasone (FLONASE) 50 mcg/act nasal spray, 2 sprays into each nostril daily 2 sprays bilat qd, Disp: 1 Bottle, Rfl: 2    levothyroxine 25 mcg tablet, Take 1 tablet (25 mcg total) by mouth daily, Disp: 90 tablet, Rfl: 1    linaCLOtide (LINZESS) 290 MCG CAPS, Take 1 capsule by mouth daily, Disp: 90 capsule, Rfl: 2    meclizine (ANTIVERT) 12 5 MG tablet, TAKE 1 TABLET BY MOUTH EVERY 8 HOURS, Disp: 30 tablet, Rfl: 1    montelukast (SINGULAIR) 10 mg tablet, Take 1 tablet (10 mg total) by mouth daily at bedtime, Disp: 30 tablet, Rfl: 5    senna-docusate sodium (SENOKOT S) 8 6-50 mg per tablet, Take 2 tablets by mouth daily, Disp: , Rfl:     spironolactone (ALDACTONE) 25 mg tablet, Take 1 tablet (25 mg total) by mouth daily, Disp: 30 tablet, Rfl: 1    Current Allergies     Allergies as of 08/06/2019 - Reviewed 08/06/2019   Allergen Reaction Noted    Penicillins Hives 11/15/2012            The following portions of the patient's history were reviewed and updated as appropriate: allergies, current medications, past family history, past medical history, past social history, past surgical history and problem list      Past Medical History:   Diagnosis Date    Hypertension     last assessed: 12/13/2016    Malignant neoplasm of colon ([facility] [address] )     last assessed: 10/20/2015    Peripheral neuropathy     last assessed: 02/23/2016       Past Surgical History:   Procedure Laterality Date    APPENDECTOMY      last assessed: 10/20/2015    CHOLECYSTECTOMY      last assessed: 10/20/2015    COLECTOMY      last assessed: 10/20/2015; partial     TONSILLECTOMY      last assessed: 10/20/2015       Family History   Problem Relation Age of Onset    Heart disease Mother         cardiac disorder    Alzheimer's disease Sister     No Known Problems Father          Medications have been verified          Objective   /80 (BP Location: Left arm, Patient Position: Sitting)   Pulse 83   Temp 97 5 °F (36 4 °C) (Temporal)   Resp 16   Ht 5' (1 524 m)   Wt 65 8 kg (145 lb)   SpO2 97%   BMI 28 32 kg/m²        Physical Exam     Physical Exam Constitutional: She is oriented to person, place, and time  HENT:   Right Ear: External ear normal    Left Ear: External ear normal    Mouth/Throat: Oropharynx is clear and moist    Eyes: Pupils are equal, round, and reactive to light  Conjunctivae and EOM are normal    Neck: Normal range of motion  Neck supple  Cardiovascular: Normal rate, regular rhythm and normal heart sounds  Pulmonary/Chest: Effort normal and breath sounds normal    Abdominal: Soft  Bowel sounds are normal    Neurological: She is alert and oriented to person, place, and time  Skin:   Fair color and turgor   Psychiatric: She has a normal mood and affect  Her behavior is normal    Nursing note and vitals reviewed

## 2019-08-09 DIAGNOSIS — H83.2X3 VESTIBULAR DYSFUNCTION OF BOTH EARS: Primary | ICD-10-CM

## 2019-08-13 ENCOUNTER — TELEPHONE (OUTPATIENT)
Dept: FAMILY MEDICINE CLINIC | Facility: CLINIC | Age: 84
End: 2019-08-13

## 2019-08-13 NOTE — TELEPHONE ENCOUNTER
She was hesitant to schedule with Dr Hector Buster office because he is not available, and he could not treat her vertigo  I called their office and they explained he is not taking vertigo patients right now, focusing on head and neck cancer  I made her an appt with Dr Donavan Hidalgo Monday at 3 pm  Patient agrees to see him

## 2019-08-28 ENCOUNTER — OFFICE VISIT (OUTPATIENT)
Dept: FAMILY MEDICINE CLINIC | Facility: CLINIC | Age: 84
End: 2019-08-28
Payer: MEDICARE

## 2019-08-28 VITALS
BODY MASS INDEX: 28.57 KG/M2 | WEIGHT: 145.5 LBS | DIASTOLIC BLOOD PRESSURE: 78 MMHG | HEART RATE: 84 BPM | HEIGHT: 60 IN | SYSTOLIC BLOOD PRESSURE: 122 MMHG | TEMPERATURE: 98.4 F

## 2019-08-28 DIAGNOSIS — H83.2X3 VESTIBULAR DYSFUNCTION OF BOTH EARS: ICD-10-CM

## 2019-08-28 DIAGNOSIS — Z00.00 MEDICARE ANNUAL WELLNESS VISIT, SUBSEQUENT: Primary | ICD-10-CM

## 2019-08-28 PROCEDURE — 99213 OFFICE O/P EST LOW 20 MIN: CPT | Performed by: FAMILY MEDICINE

## 2019-08-28 PROCEDURE — G0439 PPPS, SUBSEQ VISIT: HCPCS | Performed by: FAMILY MEDICINE

## 2019-08-28 NOTE — PATIENT INSTRUCTIONS
Obesity   AMBULATORY CARE:   Obesity  is when your body mass index (BMI) is greater than 30  Your healthcare provider will use your height and weight to measure your BMI  The risks of obesity include  many health problems, such as injuries or physical disability  You may need tests to check for the following:  · Diabetes     · High blood pressure or high cholesterol     · Heart disease     · Gallbladder or liver disease     · Cancer of the colon, breast, prostate, liver, or kidney     · Sleep apnea     · Arthritis or gout  Seek care immediately if:   · You have a severe headache, confusion, or difficulty speaking  · You have weakness on one side of your body  · You have chest pain, sweating, or shortness of breath  Contact your healthcare provider if:   · You have symptoms of gallbladder or liver disease, such as pain in your upper abdomen  · You have knee or hip pain and discomfort while walking  · You have symptoms of diabetes, such as intense hunger and thirst, and frequent urination  · You have symptoms of sleep apnea, such as snoring or daytime sleepiness  · You have questions or concerns about your condition or care  Treatment for obesity  focuses on helping you lose weight to improve your health  Even a small decrease in BMI can reduce the risk for many health problems  Your healthcare provider will help you set a weight-loss goal   · Lifestyle changes  are the first step in treating obesity  These include making healthy food choices and getting regular physical activity  Your healthcare provider may suggest a weight-loss program that involves coaching, education, and therapy  · Medicine  may help you lose weight when it is used with a healthy diet and physical activity  · Surgery  can help you lose weight if you are very obese and have other health problems  There are several types of weight-loss surgery  Ask your healthcare provider for more information    Be successful losing weight:   · Set small, realistic goals  An example of a small goal is to walk for 20 minutes 5 days a week  Anther goal is to lose 5% of your body weight  · Tell friends, family members, and coworkers about your goals  and ask for their support  Ask a friend to lose weight with you, or join a weight-loss support group  · Identify foods or triggers that may cause you to overeat , and find ways to avoid them  Remove tempting high-calorie foods from your home and workplace  Place a bowl of fresh fruit on your kitchen counter  If stress causes you to eat, then find other ways to cope with stress  · Keep a diary to track what you eat and drink  Also write down how many minutes of physical activity you do each day  Weigh yourself once a week and record it in your diary  Eating changes: You will need to eat 500 to 1,000 fewer calories each day than you currently eat to lose 1 to 2 pounds a week  The following changes will help you cut calories:  · Eat smaller portions  Use small plates, no larger than 9 inches in diameter  Fill your plate half full of fruits and vegetables  Measure your food using measuring cups until you know what a serving size looks like  · Eat 3 meals and 1 or 2 snacks each day  Plan your meals in advance  [facility] and eat at home most of the time  Eat slowly  · Eat fruits and vegetables at every meal   They are low in calories and high in fiber, which makes you feel full  Do not add butter, margarine, or cream sauce to vegetables  Use herbs to season steamed vegetables  · Eat less fat and fewer fried foods  Eat more baked or grilled chicken and fish  These protein sources are lower in calories and fat than red meat  Limit fast food  Dress your salads with olive oil and vinegar instead of bottled dressing  · Limit the amount of sugar you eat  Do not drink sugary beverages  Limit alcohol  Activity changes:  Physical activity is good for your body in many ways   It helps you burn calories and build strong muscles  It decreases stress and depression, and improves your mood  It can also help you sleep better  Talk to your healthcare provider before you begin an exercise program   · Exercise for at least 30 minutes 5 days a week  Start slowly  Set aside time each day for physical activity that you enjoy and that is convenient for you  It is best to do both weight training and an activity that increases your heart rate, such as walking, bicycling, or swimming  · Find ways to be more active  Do yard work and housecleaning  Walk up the stairs instead of using elevators  Spend your leisure time going to events that require walking, such as outdoor festivals or fairs  This extra physical activity can help you lose weight and keep it off  Follow up with your healthcare provider as directed: You may need to meet with a dietitian  Write down your questions so you remember to ask them during your visits  © 2017 [employer] Information is for End User's use only and may not be sold, redistributed or otherwise used for commercial purposes  All illustrations and images included in CareNotes® are the copyrighted property of [employer]  or [employer]  The above information is an  only  It is not intended as medical advice for individual conditions or treatments  Talk to your doctor, nurse or pharmacist before following any medical regimen to see if it is safe and effective for you  Urinary Incontinence   WHAT YOU NEED TO KNOW:   What is urinary incontinence? Urinary incontinence (UI) is when you lose control of your bladder  What causes UI? UI occurs because your bladder cannot store or empty urine properly  The following are the most common types of UI:  · Stress incontinence  is when you leak urine due to increased bladder pressure  This may happen when you cough, sneeze, or exercise       · Urge incontinence  is when you feel the need to urinate right away and leak urine accidentally  · Mixed incontinence  is when you have both stress and urge UI  What are the signs and symptoms of UI?   · You feel like your bladder does not empty completely when you urinate  · You urinate often and need to urinate immediately  · You leak urine when you sleep, or you wake up with the urge to urinate  · You leak urine when you cough, sneeze, exercise, or laugh  How is UI diagnosed? Your healthcare provider will ask how often you leak urine and whether you have stress or urge symptoms  Tell him which medicines you take, how often you urinate, and how much liquid you drink each day  You may need any of the following tests:  · Urine tests  may show infection or kidney function  · A pelvic exam  may be done to check for blockages  A pelvic exam will also show if your bladder, uterus, or other organs have moved out of place  · An x-ray, ultrasound, or CT  may show problems with parts of your urinary system  You may be given contrast liquid to help your organs show up better in the pictures  Tell the healthcare provider if you have ever had an allergic reaction to contrast liquid  Do not enter the MRI room with anything metal  Metal can cause serious injury  Tell the healthcare provider if you have any metal in or on your body  · A bladder scan  will show how much urine is left in your bladder after you urinate  You will be asked to urinate and then healthcare providers will use a small ultrasound machine to check the urine left in your bladder  · Cystometry  is used to check the function of your urinary system  Your healthcare provider checks the pressure in your bladder while filling it with fluid  Your bladder pressure may also be tested when your bladder is full and while you urinate  How is UI treated? · Medicines  can help strengthen your bladder control      · Electrical stimulation  is used to send a small amount of electrical energy to your pelvic floor muscles  This helps control your bladder function  Electrodes may be placed outside your body or in your rectum  For women, the electrodes may be placed in the vagina  · A bulking agent  may be injected into the wall of your urethra to make it thicker  This helps keep your urethra closed and decreases urine leakage  · Devices  such as a clamp, pessary, or tampon may help stop urine leaks  Ask your healthcare provider for more information about these and other devices  · Surgery  may be needed if other treatments do not work  Several types of surgery can help improve your bladder control  Ask your healthcare provider for more information about the surgery you may need  How can I manage my symptoms? · Do pelvic muscle exercises often  Your pelvic muscles help you stop urinating  Squeeze these muscles tight for 5 seconds, then relax for 5 seconds  Gradually work up to squeezing for 10 seconds  Do 3 sets of 15 repetitions a day, or as directed  This will help strengthen your pelvic muscles and improve bladder control  · A catheter  may be used to help empty your bladder  A catheter is a tiny, plastic tube that is put into your bladder to drain your urine  Your healthcare provider may tell you to use a catheter to prevent your bladder from getting too full and leaking urine  · Keep a UI record  Write down how often you leak urine and how much you leak  Make a note of what you were doing when you leaked urine  · Train your bladder  Go to the bathroom at set times, such as every 2 hours, even if you do not feel the urge to go  You can also try to hold your urine when you feel the urge to go  For example, hold your urine for 5 minutes when you feel the urge to go  As that becomes easier, hold your urine for 10 minutes  · Drink liquids as directed  Ask your healthcare provider how much liquid to drink each day and which liquids are best for you   You may need to limit the amount of liquid you drink to help control your urine leakage  Limit or do not have drinks that contain caffeine or alcohol  Do not drink any liquid right before you go to bed  · Prevent constipation  Eat a variety of high-fiber foods  Good examples are high-fiber cereals, beans, vegetables, and whole-grain breads  Prune juice may help make your bowel movement softer  Walking is the best way to trigger your intestines to have a bowel movement  · Exercise regularly and maintain a healthy weight  Ask your healthcare provider how much you should weigh and about the best exercise plan for you  Weight loss and exercise will decrease pressure on your bladder and help you control your leakage  Ask him to help you create a weight loss plan if you are overweight  When should I seek immediate care? · You have severe pain  · You are confused or cannot think clearly  When should I contact my healthcare provider? · You have a fever  · You see blood in your urine  · You have pain when you urinate  · You have new or worse pain, even after treatment  · Your mouth feels dry or you have vision changes  · Your urine is cloudy or smells bad  · You have questions or concerns about your condition or care  CARE AGREEMENT:   You have the right to help plan your care  Learn about your health condition and how it may be treated  Discuss treatment options with your caregivers to decide what care you want to receive  You always have the right to refuse treatment  The above information is an  only  It is not intended as medical advice for individual conditions or treatments  Talk to your doctor, nurse or pharmacist before following any medical regimen to see if it is safe and effective for you  © 2017 [employer] Information is for End User's use only and may not be sold, redistributed or otherwise used for commercial purposes   All illustrations and images included in CareNotes® are the copyrighted property of [employer]  or [employer]  Cigarette Smoking and Your Health   AMBULATORY CARE:   Risks to your health if you smoke:  Nicotine and other chemicals found in tobacco damage every cell in your body  Even if you are a light smoker, you have an increased risk for cancer, heart disease, and lung disease  If you are pregnant or have diabetes, smoking increases your risk for complications  Benefits to your health if you stop smoking:   · You decrease respiratory symptoms such as coughing, wheezing, and shortness of breath  · You reduce your risk for cancers of the lung, mouth, throat, kidney, bladder, pancreas, stomach, and cervix  If you already have cancer, you increase the benefits of chemotherapy  You also reduce your risk for cancer returning or a second cancer from developing  · You reduce your risk for heart disease, blood clots, heart attack, and stroke  · You reduce your risk for lung infections, and diseases such as pneumonia, asthma, chronic bronchitis, and emphysema  · Your circulation improves  More oxygen can be delivered to your body  If you have diabetes, you lower your risk for complications, such as kidney, artery, and eye diseases  You also lower your risk for nerve damage  Nerve damage can lead to amputations, poor vision, and blindness  · You improve your body's ability to heal and to fight infections  Benefits to the health of others if you stop smoking:  Tobacco is harmful to nonsmokers who breathe in your secondhand smoke  The following are ways the health of others around you may improve when you stop smoking:  · You lower the risks for lung cancer and heart disease in nonsmoking adults  · If you are pregnant, you lower the risk for miscarriage, early delivery, low birth weight, and stillbirth  You also lower your baby's risk for SIDS, obesity, developmental delay, and neurobehavioral problems, such as ADHD  · If you have children, you lower their risk for ear infections, colds, pneumonia, bronchitis, and asthma  For more information and support to stop smoking:   · [employer]  Phone: 3- 210 - 758-8173  Web Address: www [employer]  Follow up with your healthcare provider as directed:  Write down your questions so you remember to ask them during your visits  © 2017 [facility] Information is for End User's use only and may not be sold, redistributed or otherwise used for commercial purposes  All illustrations and images included in CareNotes® are the copyrighted property of [employer]  or [employer]  The above information is an  only  It is not intended as medical advice for individual conditions or treatments  Talk to your doctor, nurse or pharmacist before following any medical regimen to see if it is safe and effective for you  Fall Prevention   AMBULATORY CARE:   Fall prevention  includes ways to make your home and other areas safer  It also includes ways you can move more carefully to prevent a fall  Health conditions that cause changes in your blood pressure, vision, or muscle strength and coordination may increase your risk for falls  Medicines may also increase your risk for falls if they make you dizzy, weak, or sleepy  Call 911 or have someone else call if:   · You have fallen and are unconscious  · You have fallen and cannot move part of your body  Contact your healthcare provider if:   · You have fallen and have pain or a headache  · You have questions or concerns about your condition or care  Fall prevention tips:   · Stand or sit up slowly  This may help you keep your balance and prevent falls  · Use assistive devices as directed  Your healthcare provider may suggest that you use a cane or walker to help you keep your balance  You may need to have grab bars put in your bathroom near the toilet or in the shower      · Wear shoes that fit well and have soles that   Wear shoes both inside and outside  Use slippers with good   Do not wear shoes with high heels  · Wear a personal alarm  This is a device that allows you to call 911 if you fall and need help  Ask your healthcare provider for more information  · Stay active  Exercise can help strengthen your muscles and improve your balance  Your healthcare provider may recommend water aerobics or walking  He or she may also recommend physical therapy to improve your coordination  Never start an exercise program without talking to your healthcare provider first      · Manage your medical conditions  Keep all appointments with your healthcare providers  Visit your eye doctor as directed  Home safety tips:   · Add items to prevent falls in the bathroom  Put nonslip strips on your bath or shower floor to prevent you from slipping  Use a bath mat if you do not have carpet in the bathroom  This will prevent you from falling when you step out of the bath or shower  Use a shower seat so you do not need to stand while you shower  Sit on the toilet or a chair in your bathroom to dry yourself and put on clothing  This will prevent you from losing your balance from drying or dressing yourself while you are standing  · Keep paths clear  Remove books, shoes, and other objects from walkways and stairs  Place cords for telephones and lamps out of the way so that you do not need to walk over them  Tape them down if you cannot move them  Remove small rugs  If you cannot remove a rug, secure it with double-sided tape  This will prevent you from tripping  · Install bright lights in your home  Use night lights to help light paths to the bathroom or kitchen  Always turn on the light before you start walking  · Keep items you use often on shelves within reach  Do not use a step stool to help you reach an item  · Paint or place reflective tape on the edges of your stairs    This will help you see the stairs better  Follow up with your healthcare provider as directed:  Write down your questions so you remember to ask them during your visits  © 2017 [address] [employer] Information is for End User's use only and may not be sold, redistributed or otherwise used for commercial purposes  All illustrations and images included in CareNotes® are the copyrighted property of [employer]  or [employer]  The above information is an  only  It is not intended as medical advice for individual conditions or treatments  Talk to your doctor, nurse or pharmacist before following any medical regimen to see if it is safe and effective for you  Advance Directives   WHAT YOU NEED TO KNOW:   What are advance directives? Advance directives are legal documents that state your wishes and plans for medical care  These plans are made ahead of time in case you lose your ability to make decisions for yourself  Advance directives can apply to any medical decision, such as the treatments you want, and if you want to donate organs  What are the types of advance directives? There are many types of advance directives, and each state has rules about how to use them  You may choose a combination of any of the following:  · Living will: This is a written record of the treatment you want  You can also choose which treatments you do not want, which to limit, and which to stop at a certain time  This includes surgery, medicine, IV fluid, and tube feedings  · Durable power of  for healthcare [facility]): This is a written record that states who you want to make healthcare choices for you when you are unable to make them for yourself  This person, called a proxy, is usually a family member or a friend  You may choose more than 1 proxy  · Do not resuscitate (DNR) order:  A DNR order is used in case your heart stops beating or you stop breathing   It is a request not to have certain forms of treatment, such as CPR  A DNR order may be included in other types of advance directives  · Medical directive: This covers the care that you want if you are in a coma, near death, or unable to make decisions for yourself  You can list the treatments you want for each condition  Treatment may include pain medicine, surgery, blood transfusions, dialysis, IV or tube feedings, and a ventilator (breathing machine)  · Values history: This document has questions about your views, beliefs, and how you feel and think about life  This information can help others choose the care that you would choose  Why are advance directives important? An advance directive helps you control your care  Although spoken wishes may be used, it is better to have your wishes written down  Spoken wishes can be misunderstood, or not followed  Treatments may be given even if you do not want them  An advance directive may make it easier for your family to make difficult choices about your care  How do I decide what to put in my advance directives? · Make informed decisions:  Make sure you fully understand treatments or care you may receive  Think about the benefits and problems your decisions could cause for you or your family  Talk to healthcare providers if you have concerns or questions before you write down your wishes  You may also want to talk with your [employer] or , or a   Check your state laws to make sure that what you put in your advance directive is legal      · Sign all forms:  Sign and date your advance directive when you have finished  You may also need 2 witnesses to sign the forms  Witnesses cannot be your doctor or his staff, your spouse, heirs or beneficiaries, people you owe money to, or your chosen proxy  Talk to your family, proxy, and healthcare providers about your advance directive  Give each person a copy, and keep one for yourself in a place you can get to easily   Do not keep it hidden or locked away  · Review and revise your plans: You can revise your advance directive at any time, as long as you are able to make decisions  Review your plan every year, and when there are changes in your life, or your health  When you make changes, let your family, proxy, and healthcare providers know  Give each a new copy  Where can I find more information? · [employer]  [address] , [place]  Phone: 0- 000 - [phone]  Phone: 1- 493 - [phone]  Web Address: http://www  aafp org  · [address] [facility])  [address] [address] [facility], [address]  Phone: [phone]  Phone: [phone]  Web Address: [address]  CARE AGREEMENT:   You have the right to help plan your care  To help with this plan, you must learn about your health condition and treatment options  You must also learn about advance directives and how they are used  Work with your healthcare providers to decide what care will be used to treat you  You always have the right to refuse treatment  The above information is an  only  It is not intended as medical advice for individual conditions or treatments  Talk to your doctor, nurse or pharmacist before following any medical regimen to see if it is safe and effective for you  © 2017 [address] [employer] Information is for End User's use only and may not be sold, redistributed or otherwise used for commercial purposes  All illustrations and images included in CareNotes® are the copyrighted property of [employer]  or [employer]

## 2019-08-28 NOTE — PROGRESS NOTES
Patient ID: Erika Shi is a 80 y o  female  HPI: 80 y  o female presenting with a chief complaint of ongoing vertigo sympotms  MRI of brain was normal and I discussed in detail with patient reguarding vestibular therapy  SUBJECTIVE    Family History   Problem Relation Age of Onset    Heart disease Mother         cardiac disorder    Alzheimer's disease Sister     No Known Problems Father      Social History     Socioeconomic History    Marital status:       Spouse name: Not on file    Number of children: Not on file    Years of education: Not on file    Highest education level: Not on file   Occupational History    Not on file   Social Needs    Financial resource strain: Not on file    Food insecurity:     Worry: Not on file     Inability: Not on file    Transportation needs:     Medical: Not on file     Non-medical: Not on file   Tobacco Use    Smoking status: Former Smoker    Smokeless tobacco: Never Used   Substance and Sexual Activity    Alcohol use: No    Drug use: Never    Sexual activity: Not on file   Lifestyle    Physical activity:     Days per week: Not on file     Minutes per session: Not on file    Stress: Not on file   Relationships    Social connections:     Talks on phone: Not on file     Gets together: Not on file     Attends [employer] service: Not on file     Active member of club or organization: Not on file     Attends meetings of clubs or organizations: Not on file     Relationship status: Not on file    Intimate partner violence:     Fear of current or ex partner: Not on file     Emotionally abused: Not on file     Physically abused: Not on file     Forced sexual activity: Not on file   Other Topics Concern    Not on file   Social History Narrative    Not on file     Past Medical History:   Diagnosis Date    Hypertension     last assessed: 12/13/2016    Malignant neoplasm of colon ([facility] [address] )     last assessed: 10/20/2015    Peripheral neuropathy     last assessed: 02/23/2016     Past Surgical History:   Procedure Laterality Date    APPENDECTOMY      last assessed: 10/20/2015    CHOLECYSTECTOMY      last assessed: 10/20/2015    COLECTOMY      last assessed: 10/20/2015; partial     TONSILLECTOMY      last assessed: 10/20/2015     Allergies   Allergen Reactions    Penicillins Hives     Other reaction(s): Other (See Comments)  hives       Current Outpatient Medications:     amLODIPine (NORVASC) 2 5 mg tablet, Take 1 tablet (2 5 mg total) by mouth daily, Disp: 90 tablet, Rfl: 1    aspirin 81 MG tablet, Take 81 mg by mouth daily  , Disp: , Rfl:     Calcium-Vitamin D 600-200 MG-UNIT per tablet, Take by mouth, Disp: , Rfl:     denosumab (PROLIA) 60 mg/mL, 1 mL, Disp: , Rfl:     famotidine (PEPCID) 40 MG tablet, TAKE 1 TABLET BY MOUTH ONCE DAILY AS DIRECTED, Disp: 90 tablet, Rfl: 2    levothyroxine 25 mcg tablet, Take 1 tablet (25 mcg total) by mouth daily, Disp: 90 tablet, Rfl: 1    linaCLOtide (LINZESS) 290 MCG CAPS, Take 1 capsule by mouth daily, Disp: 90 capsule, Rfl: 2    montelukast (SINGULAIR) 10 mg tablet, Take 1 tablet (10 mg total) by mouth daily at bedtime, Disp: 30 tablet, Rfl: 5    senna-docusate sodium (SENOKOT S) 8 6-50 mg per tablet, Take 2 tablets by mouth daily, Disp: , Rfl:     Review of Systems  Constitutional:     Denies fever, chills ,fatigue ,weakness ,weight loss, weight gain     ENT: Denies earache ,loss of hearing ,nosebleed, nasal discharge,nasal congestion ,sore throat ,hoarseness  Pulmonary: Denies shortness of breath ,cough  ,dyspnea on exertion, orthopnea  ,PND   Cardiovascular:  Denies bradycardia , tachycardia  ,palpations, lower extremity edema leg, claudication  Breast:  Denies new or changing breast lumps ,nipple discharge ,nipple changes  Abdomen:  Denies abdominal pain , anorexia , indigestion, nausea, vomiting, constipation, diarrhea  Musculoskeletal: Denies myalgias, arthralgias, joint swelling, joint stiffness , limb pain, limb swelling  Gu: denies dysuria, polyuria  Skin: Denies skin rash, skin lesion, skin wound, itching, dry skin  Neuro: Denies headache, numbness, tingling, confusion, loss of consciousness, dizziness, vertigo  Psychiatric: Denies feelings of depression, suicidal ideation, anxiety, sleep disturbances    OBJECTIVE  /78   Pulse 84   Temp 98 4 °F (36 9 °C)   Ht 5' (1 524 m)   Wt 66 kg (145 lb 8 oz)   BMI 28 42 kg/m²   Constitutional:   NAD, well appearing and well nourished      ENT:   Conjunctiva and lids: no injection, edema, or discharge     Pupils and iris: SRIKANTH bilaterally    External inspection of ears and nose: normal without deformities or discharge  Otoscopic exam: Canals patent without erythema  Nasal mucosa, septum and turbinates: Normal or edema or discharge         Oropharynx:  Moist mucosa, normal tongue and tonsils without lesions  No erythema        Pulmonary:Respiratory effort normal rate and rhythm, no increased work of breathing   Auscultation of lungs:  Clear bilaterally with no adventitious breath sounds       Cardiovascular: regular rate and rhythm, S1 and S2, no murmur, no edema and/or varicosities of LE      Abdomen: Soft and non-distended     Positive bowel sounds      No heptomegaly or splenomegaly      Gu: no suprapubic tenderness or CVA tenderness, no urethral discharge  Lymphatic:  No anterior or posterior cervical lymphadenopathy         Musculoskeletal:  Gait and station: Normal gait      Digits and nails normal without clubbing or cyanosis       Inspection/palpation of joints, bones, and muscles:  No joint tenderness, swelling, full active and passive range of motion       Skin: Normal skin turgor and no rashes      Neuro:    Normal reflexes     Psych:   alert and oriented to person, place and time     normal mood and affect       Assessment/Plan:Diagnoses and all orders for this visit:    Vestibular dysfunction of both ears  -     Ambulatory referral to Physical Therapy; Future        Reviewed with patient plan to treat with above plan      Patient instructed to call in 72 hours if not feeling better or if symptoms worsen

## 2019-08-28 NOTE — PROGRESS NOTES
Assessment and Plan:     Problem List Items Addressed This Visit     None         History of Present Illness:     Patient presents for Medicare Annual Wellness visit  She is up to date with everything but immunizations and a mammogram and is not interested in either        Patient Care Team:  Arlyn Dueñas DO as PCP - General Paulo Shelton MD     Problem List:     Patient Active Problem List   Diagnosis    Allergic rhinitis    Atherosclerosis of native artery of extremity ([facility] [address]    Benign positional vertigo    Chronic constipation    Closed fracture of thoracic vertebra (HCC)    Edema    Gastroparesis    Hypothyroidism    IBS (irritable bowel syndrome)    Idiopathic peripheral neuropathy    Insomnia    Lumbosacral spondylosis without myelopathy    Osteoarthritis of spine    Osteoporosis    Thoracic compression fracture ([facility] [address] [facility]    Vestibular dysfunction    Essential hypertension    Low back pain    Malignant melanoma of right lower extremity including hip [facility])      Past Medical and Surgical History:     Past Medical History:   Diagnosis Date    Hypertension     last assessed: 12/13/2016    Malignant neoplasm of colon ([facility] [address] )     last assessed: 10/20/2015    Peripheral neuropathy     last assessed: 02/23/2016     Past Surgical History:   Procedure Laterality Date    APPENDECTOMY      last assessed: 10/20/2015    CHOLECYSTECTOMY      last assessed: 10/20/2015    COLECTOMY      last assessed: 10/20/2015; partial     TONSILLECTOMY      last assessed: 10/20/2015      Family History:     Family History   Problem Relation Age of Onset    Heart disease Mother         cardiac disorder    Alzheimer's disease Sister     No Known Problems Father       Social History:     Social History     Tobacco Use   Smoking Status Former Smoker   Smokeless Tobacco Never Used     Social History     Substance and Sexual Activity   Alcohol Use No     Social History     Substance and Sexual Activity   Drug Use Never      Medications and Allergies:     Current Outpatient Medications   Medication Sig Dispense Refill    amLODIPine (NORVASC) 2 5 mg tablet Take 1 tablet (2 5 mg total) by mouth daily 90 tablet 1    aspirin 81 MG tablet Take 81 mg by mouth daily   Calcium-Vitamin D 600-200 MG-UNIT per tablet Take by mouth      denosumab (PROLIA) 60 mg/mL 1 mL      famotidine (PEPCID) 40 MG tablet TAKE 1 TABLET BY MOUTH ONCE DAILY AS DIRECTED 90 tablet 2    levothyroxine 25 mcg tablet Take 1 tablet (25 mcg total) by mouth daily 90 tablet 1    linaCLOtide (LINZESS) 290 MCG CAPS Take 1 capsule by mouth daily 90 capsule 2    montelukast (SINGULAIR) 10 mg tablet Take 1 tablet (10 mg total) by mouth daily at bedtime 30 tablet 5    senna-docusate sodium (SENOKOT S) 8 6-50 mg per tablet Take 2 tablets by mouth daily       No current facility-administered medications for this visit  Allergies   Allergen Reactions    Penicillins Hives     Other reaction(s): Other (See Comments)  hives      Immunizations: There is no immunization history on file for this patient  Medicare Screening Tests and Risk Assessments: Maggie Leiva is here for her Subsequent Wellness visit  Health Risk Assessment:  Patient rates overall health as fair  Patient feels that their physical health rating is Same  Eyesight was rated as Same  Hearing was rated as Same  Patient feels that their emotional and mental health rating is Same  Pain experienced by patient in the last 7 days has been Some  Patient's pain rating has been 5/10  Patient states that she has experienced no weight loss or gain in last 6 months  Emotional/Mental Health:  Patient has been feeling nervous/anxious  PHQ-9 Depression Screening:    Frequency of the following problems over the past two weeks:      1  Little interest or pleasure in doing things: 1 - several days      2   Feeling down, depressed, or hopeless: 1 - several days  PHQ-2 Score: 2          Broken Bones/Falls: Fall Risk Assessment:    In the past year, patient has experienced: No history of falling in past year          Bladder/Bowel:  Patient has leaked urine accidently in the last six months  Patient reports no loss of bowel control  Immunizations:  Patient has not had a flu vaccination within the last year  Patient has not received a pneumonia shot  Patient has not received a shingles shot  Patient has not received tetanus/diphtheria shot  Home Safety:  Patient does not have trouble with stairs inside or outside of their home  Patient currently reports that there are no safety hazards present in home, working smoke alarms, working carbon monoxide detectors  Preventative Screenings:   Breast cancer screening performed, colon cancer screen completed, cholesterol screen completed, glaucoma eye exam completed,     Nutrition:  Current diet: Regular with servings of the following:    Medications:  Patient is currently taking over-the-counter supplements  Patient is able to manage medications  Lifestyle Choices:  Patient reports no tobacco use  Patient has smoked or used tobacco in the past   Patient has stopped her tobacco use  Tobacco use quit date: 1969  Patient reports no alcohol use  Patient drives a vehicle  Patient wears seat belt  Current level of exercise of physical activity described by patient as: low  Activities of Daily Living:  Can get out of bed by his or her self, able to dress self, able to make own meals, able to do own shopping, able to bathe self, can do own laundry/housekeeping, can manage own money, pay bills and track expenses    Previous Hospitalizations:  No hospitalization or ED visit in past 12 months        Advanced Directives:  Patient has decided on a power of   Patient has spoken to designated power of   Patient has completed advanced directive          Preventative Screening/Counseling: Cardiovascular:      General: Screening Current          Diabetes:      General: Screening Current          Colorectal Cancer:      General: Screening Current          Breast Cancer:      General: Patient Declines          Cervical Cancer:      General: Screening Not Indicated          Osteoporosis:      General: Screening Current          AAA:      General: Screening Not Indicated          Glaucoma:      General: Screening Current          HIV:      General: Screening Not Indicated          Hepatitis C:      General: Screening Not Indicated        Advanced Directives:   Patient has living will for healthcare, has durable POA for healthcare, patient has an advanced directive  Information on ACP and/or AD provided  5 wishes given  End of life assessment reviewed with patient  Provider agrees with end of life decisions        Immunizations:      Influenza: Patient Declines      Pneumococcal: Patient Declines      Shingrix: Patient Declines      Hepatitis B (Medium to high risk patients): Patient Declines      Zostavax: Patient Declines      TD: Patient Declines

## 2019-08-29 ENCOUNTER — TELEPHONE (OUTPATIENT)
Dept: FAMILY MEDICINE CLINIC | Facility: CLINIC | Age: 84
End: 2019-08-29

## 2019-08-29 DIAGNOSIS — I10 ESSENTIAL HYPERTENSION: ICD-10-CM

## 2019-08-29 DIAGNOSIS — E78.00 HYPERCHOLESTEROLEMIA: Primary | ICD-10-CM

## 2019-08-29 DIAGNOSIS — E03.9 HYPOTHYROIDISM, UNSPECIFIED TYPE: ICD-10-CM

## 2019-08-29 NOTE — TELEPHONE ENCOUNTER
You had mentioned her getting labs done, there are no orders, please put in if you want her to have done, please mail slip to patient

## 2019-08-30 ENCOUNTER — APPOINTMENT (OUTPATIENT)
Dept: LAB | Facility: CLINIC | Age: 84
End: 2019-08-30
Payer: MEDICARE

## 2019-08-30 ENCOUNTER — TRANSCRIBE ORDERS (OUTPATIENT)
Dept: LAB | Facility: CLINIC | Age: 84
End: 2019-08-30

## 2019-08-30 DIAGNOSIS — R42 VERTIGO: ICD-10-CM

## 2019-08-30 DIAGNOSIS — R42 DIZZINESS: ICD-10-CM

## 2019-08-30 DIAGNOSIS — R42 DIZZINESS: Primary | ICD-10-CM

## 2019-08-30 LAB
FERRITIN SERPL-MCNC: 25 NG/ML (ref 8–388)
IRON SATN MFR SERPL: 17 %
IRON SERPL-MCNC: 59 UG/DL (ref 50–170)
MAGNESIUM SERPL-MCNC: 2.8 MG/DL (ref 1.6–2.6)
TIBC SERPL-MCNC: 348 UG/DL (ref 250–450)
VIT B12 SERPL-MCNC: 244 PG/ML (ref 100–900)

## 2019-08-30 PROCEDURE — 83540 ASSAY OF IRON: CPT

## 2019-08-30 PROCEDURE — 82607 VITAMIN B-12: CPT

## 2019-08-30 PROCEDURE — 82728 ASSAY OF FERRITIN: CPT

## 2019-08-30 PROCEDURE — 83550 IRON BINDING TEST: CPT

## 2019-08-30 PROCEDURE — 36415 COLL VENOUS BLD VENIPUNCTURE: CPT

## 2019-08-30 PROCEDURE — 83735 ASSAY OF MAGNESIUM: CPT

## 2019-09-03 ENCOUNTER — APPOINTMENT (OUTPATIENT)
Dept: LAB | Facility: CLINIC | Age: 84
End: 2019-09-03
Payer: MEDICARE

## 2019-09-03 DIAGNOSIS — E03.9 HYPOTHYROIDISM, UNSPECIFIED TYPE: ICD-10-CM

## 2019-09-03 LAB — TSH SERPL DL<=0.05 MIU/L-ACNC: 1.95 UIU/ML (ref 0.36–3.74)

## 2019-09-03 PROCEDURE — 36415 COLL VENOUS BLD VENIPUNCTURE: CPT

## 2019-09-03 PROCEDURE — 84443 ASSAY THYROID STIM HORMONE: CPT

## 2019-09-05 ENCOUNTER — EVALUATION (OUTPATIENT)
Dept: PHYSICAL THERAPY | Facility: CLINIC | Age: 84
End: 2019-09-05
Payer: MEDICARE

## 2019-09-05 DIAGNOSIS — H83.2X3 VESTIBULAR DYSFUNCTION OF BOTH EARS: Primary | ICD-10-CM

## 2019-09-05 PROCEDURE — 97112 NEUROMUSCULAR REEDUCATION: CPT | Performed by: PHYSICAL THERAPIST

## 2019-09-05 PROCEDURE — 97162 PT EVAL MOD COMPLEX 30 MIN: CPT | Performed by: PHYSICAL THERAPIST

## 2019-09-05 NOTE — PROGRESS NOTES
PT Evaluation     Today's date: 2019  Patient name: Rick Wilcox  : 1930  MRN: [id number]  Referring provider: Tricia Recinos*  Dx:   Encounter Diagnosis     ICD-10-CM    1  Vestibular dysfunction of both ears H83 2X3 Ambulatory referral to Physical Therapy                  Assessment  Assessment details: Rick Wilcox is a 80 y o  female who presents with signs and symptoms consistent with their referring diagnosis of vestibular dysfunction of both ears and imbalance  This patient would benefit from skilled physical therapy to address their listed impairments and functional limitations to maximize functional outcome  Impairments: impaired balance, lacks appropriate home exercise program and safety issue     Prognosis: good    Goals  STG Patient is independent with HEP   STG Timed up and go improved to <14 seconds in 2 weeks  LTG Balance & endurance & gait & locomotion are improved by 25% in 4 weeks  LTG Timed up and go improved to <12 seconds  LTG Sit to stand test x5 improved to <15 seconds      Plan  Patient would benefit from: skilled physical therapy  Planned therapy interventions: balance/weight bearing training, canalith repositioning, patient education, neuromuscular re-education, gait training and home exercise program  Frequency: 2x week  Duration in visits: 8  Duration in weeks: 4  Treatment plan discussed with: patient        Subjective Evaluation    History of Present Illness  Mechanism of injury: Patient reports episodes of vertigo for several years  She has tinnitus bilaterally that is constant  She went to an ENT who did not find any problems per patient  When she gets vertigo she feels dizzy and light headed  Symptoms can occur anytime, even when sitting  She rolls slowly in bed and pauses after sitting up out of bed to make sure she is stable      Patient Goals  Patient goals for therapy: independence with ADLs/IADLs          Objective   Neuro Exam: Positional testing   Juan Alberto-Hallpike   Left posterior canal: WNL  Right posterior canal: WNL  Roll test   Left horizontal canal: WNL  Right horizontal canal: WNL  Positional testing comment: Composite for firm surface 1 42 (mean   72)    Functional outcomes   5x sit to stand: 16 (seconds)  TUG: 15 (seconds)  Functional outcome comment: Rhomberg EC: moderate sway  SLS:  Unable bilaterally      Balance assessments   MCTSIB   Eyes open level surface:  96  Eyes closed level surface: 1 89             Precautions: TL compression fx      Manual                                                                                   Exercise Diary  9/5            Heel/toe raises             marching             Side stepping             rhomberg foam             rhomberg EC             rhomberg head turns/tilts             Stepping over hurldes             Fwd walk over foams             [facility]                                                                                                                                                               Modalities

## 2019-09-10 ENCOUNTER — OFFICE VISIT (OUTPATIENT)
Dept: PHYSICAL THERAPY | Facility: CLINIC | Age: 84
End: 2019-09-10
Payer: MEDICARE

## 2019-09-10 DIAGNOSIS — H83.2X3 VESTIBULAR DYSFUNCTION OF BOTH EARS: Primary | ICD-10-CM

## 2019-09-10 PROCEDURE — 97112 NEUROMUSCULAR REEDUCATION: CPT | Performed by: PHYSICAL THERAPIST

## 2019-09-10 NOTE — PROGRESS NOTES
Daily Note     Today's date: 9/10/2019  Patient name: Erin Malik  : 1930  MRN: [id number]  Referring provider: Tricia Tovar*  Dx:   Encounter Diagnosis     ICD-10-CM    1  Vestibular dysfunction of both ears H83 2X3                   Subjective: patient reports she has been consistent with her exercises at home  She was having an off day today with anxiety, some dizziness and frustration when watching the new  Objective: See treatment diary below      Assessment: Tolerated treatment well  Patient exhibited good technique with therapeutic exercises and would benefit from continued PT   Patient demonstrated very little imbalance with activities performed today and did not require more than CS  Plan: Progress treatment as tolerated         Precautions: TL compression fx      Manual                                                                                   Exercise Diary  9/5 9/10           Heel/toe raises  20ea           marching  20 no UE           Side stepping  Foams 4'           rhomberg foam  1'           rhomberg EC  :20x5           rhomberg head turns/tilts  10ea           Stepping over hurldes  4'           Fwd walk over foams  4'           Bradt Daroff 5xea 10x                                                                                                                                                              Modalities

## 2019-09-12 ENCOUNTER — OFFICE VISIT (OUTPATIENT)
Dept: PHYSICAL THERAPY | Facility: CLINIC | Age: 84
End: 2019-09-12
Payer: MEDICARE

## 2019-09-12 ENCOUNTER — TELEPHONE (OUTPATIENT)
Dept: FAMILY MEDICINE CLINIC | Facility: CLINIC | Age: 84
End: 2019-09-12

## 2019-09-12 DIAGNOSIS — H83.2X3 VESTIBULAR DYSFUNCTION OF BOTH EARS: Primary | ICD-10-CM

## 2019-09-12 PROCEDURE — 97112 NEUROMUSCULAR REEDUCATION: CPT | Performed by: PHYSICAL THERAPIST

## 2019-09-12 NOTE — PROGRESS NOTES
Daily Note     Today's date: 2019  Patient name: Kit Lewis  : 1930  MRN: [id number]  Referring provider: Tricia Grover*  Dx:   Encounter Diagnosis     ICD-10-CM    1  Vestibular dysfunction of both ears H83 2X3                   Subjective: notes she was a little shaky this morning but did not have the dizziness; she was prescribed Xanax but is not sure if she wants to take it    Objective: See treatment diary below      Assessment: Tolerated treatment well  Patient exhibited good technique with therapeutic exercises and would benefit from continued PT   Continued progression of balance activities with up to Abran required to prevent LOB  No dizziness reported with exercises including Kingsley Cruz    Plan: Progress treatment as tolerated         Precautions: TL compression fx      Manual                                                                                   Exercise Diary  9/5 9/10 9/12          Heel/toe raises  20ea biodex foam 20          marching  20 no UE 20x no UE          Side stepping  Foams 4' 4 foams + 1 janeth          rhomberg foam  1'           rhomberg EC  :20x5 :20x5          rhomberg head turns/tilts  10ea 10ea          Stepping over hurldes  4'           Fwd walk over foams  4' + 1 janeth 4'          Bradt Daroff 5xea 10x 10ea                                                                                                                                                             Modalities

## 2019-09-12 NOTE — TELEPHONE ENCOUNTER
Labs are all normal   If she is willing to try a low dose of an antianxiety med like Xanax that she can take as needed, please let Naomy know, as I am leaving office for the day

## 2019-09-12 NOTE — TELEPHONE ENCOUNTER
Please call with her blood work results     She also said she doesn't feel better shes still dizzy and shaky    Today is her last session of PT

## 2019-09-12 NOTE — TELEPHONE ENCOUNTER
Patient said she feels like she is "lacking something", she just feels off  Also says that she didn't do her lipid profile because she cannot fast and go to the lab  Can she go without fasting? Patient said that her ENT said that she does not have vertigo and should not be taking medication for it

## 2019-09-16 ENCOUNTER — OFFICE VISIT (OUTPATIENT)
Dept: FAMILY MEDICINE CLINIC | Facility: CLINIC | Age: 84
End: 2019-09-16
Payer: MEDICARE

## 2019-09-16 VITALS
RESPIRATION RATE: 16 BRPM | DIASTOLIC BLOOD PRESSURE: 74 MMHG | TEMPERATURE: 96.3 F | HEART RATE: 82 BPM | HEIGHT: 60 IN | BODY MASS INDEX: 28.7 KG/M2 | SYSTOLIC BLOOD PRESSURE: 138 MMHG | WEIGHT: 146.2 LBS

## 2019-09-16 DIAGNOSIS — G62.9 NEUROPATHY: Primary | ICD-10-CM

## 2019-09-16 DIAGNOSIS — R60.0 LOWER EXTREMITY EDEMA: ICD-10-CM

## 2019-09-16 PROCEDURE — 99213 OFFICE O/P EST LOW 20 MIN: CPT | Performed by: FAMILY MEDICINE

## 2019-09-16 NOTE — TELEPHONE ENCOUNTER
I do not think that I have missed anything  If she would like a second opinon, Im not opposed to that  She can see another physician either in our practice or at another   Let me know

## 2019-09-16 NOTE — PROGRESS NOTES
Abdias Lewis 12/11/1930 female MRN: [id number]    Acute Visit    Assessment/Plan   Peripheral polyneuropathy  Referred for VT  Patient already has appointment pending with neurology  She would also likely benefit from a more active lifestyle and interpersonal interactions to help her stop ruminating about her symptoms  Wear compression stockings for peripheral edema    Betty Owens was seen today for fatigue  Diagnoses and all orders for this visit:    Neuropathy  -     Ambulatory referral to Podiatry; Future    Lower extremity edema  -     Compression Stocking      Aleyda Vargas MD  [address]  9/16/2019      Please be aware that this note contains text that was dictated and there may be errors pertaining to "sound-alike "words during the dictation process  SUBJECTIVE    CC: Fatigue Aloha Douse and weak)    HPI:  Abdias Lewis is a 80 y o  female who presented for an acute visit complaining of long-term lightheadedness  Takes meclizine  She has lightheadedness and feels like she might faint but hasn't fallen  Feels unsteady on her feet  Lightheadedness lasts for a few minutes then goes away on her own  Doesn't get lightheaded when going from sitting to standing or rolling over  She also feels tingling/numbness in her feet  This is in her feet  She has cramps in her calves when she goes to sleep  No weight loss  PMH migraines   Denies bowel/bladder incontinence  Reports compression fractures hx  Review of Systems   Constitutional: Positive for fatigue  Negative for activity change, appetite change, chills, diaphoresis, fever and unexpected weight change  HENT: Positive for tinnitus  Negative for ear pain, facial swelling and hearing loss  Eyes: Negative for visual disturbance  Respiratory: Negative for cough  Cardiovascular: Positive for leg swelling  Negative for chest pain and palpitations  Gastrointestinal: Negative for diarrhea and nausea  Musculoskeletal: Positive for arthralgias and gait problem  Negative for back pain and myalgias  Skin: Negative for rash  Neurological: Positive for tremors, light-headedness and headaches  Negative for dizziness and weakness  All other systems reviewed and are negative  Medications:   Meds/Allergies   Current Outpatient Medications   Medication Sig Dispense Refill    amLODIPine (NORVASC) 2 5 mg tablet Take 1 tablet (2 5 mg total) by mouth daily 90 tablet 1    aspirin 81 MG tablet Take 81 mg by mouth every other day       Calcium-Vitamin D 600-200 MG-UNIT per tablet Take by mouth      denosumab (PROLIA) 60 mg/mL 1 mL      famotidine (PEPCID) 40 MG tablet TAKE 1 TABLET BY MOUTH ONCE DAILY AS DIRECTED 90 tablet 2    levothyroxine 25 mcg tablet Take 1 tablet (25 mcg total) by mouth daily 90 tablet 1    linaCLOtide (LINZESS) 290 MCG CAPS Take 1 capsule by mouth daily 90 capsule 2    montelukast (SINGULAIR) 10 mg tablet Take 1 tablet (10 mg total) by mouth daily at bedtime 30 tablet 5    senna-docusate sodium (SENOKOT S) 8 6-50 mg per tablet Take 2 tablets by mouth daily       No current facility-administered medications for this visit  Allergies   Allergen Reactions    Penicillins Hives     Other reaction(s): Other (See Comments)  hives       OBJECTIVE    Vitals:   Vitals:    09/16/19 1521   BP: 138/74   Pulse: 82   Resp: 16   Temp: (!) 96 3 °F (35 7 °C)   Weight: 66 3 kg (146 lb 3 2 oz)   Height: 5' (1 524 m)       Physical Exam   Constitutional: Vital signs are normal  She appears well-developed and well-nourished  She does not appear ill  No distress  HENT:   Head: Normocephalic and atraumatic  Right Ear: External ear normal    Left Ear: External ear normal    Nose: Nose normal    Eyes: Conjunctivae, EOM and lids are normal    Neck: No JVD present  No tracheal deviation present  Cardiovascular: Intact distal pulses  Pulmonary/Chest: No accessory muscle usage   No respiratory distress  Abdominal: Normal appearance  Neurological: She is alert  She has normal strength  A sensory deficit is present  She exhibits normal muscle tone  She displays no seizure activity  Gait (antalgic due to left hip pain) abnormal    Normal/negative results: finger-to-nose (negative intention tremor), pronator drift, dysdiadochokinesis, strength testing except where limited by antalgia  Positive/abnormal findings: Absent patellar reflexes bilaterally, diminishing sensation to light touch distal to mid-calf, diminished vibratory sensation to right medial malleolus but left is intact  Of note, patient shows me her "tremor" which is present in bilateral hands when she shows me  However, there is no resting tremor or intention tremor during the neurological assessment as she is focused and distracted by completing tasks  Skin: No rash noted  She is not diaphoretic  Psychiatric: She has a normal mood and affect  Her speech is normal and behavior is normal  She expresses no suicidal ideation  Nursing note and vitals reviewed

## 2019-09-16 NOTE — TELEPHONE ENCOUNTER
You can offer her an appt tomorrow with Naomy or Dr Deborah Medellin; I don't know what else I can offer her  I think she needs a second opinion

## 2019-09-16 NOTE — TELEPHONE ENCOUNTER
Spoke Pt and she is c/o of just not feeing well shaky and weak and she just feels terrible she feels she needs to be checked out  She really wants to be seen, I tried talking to her about the Xanax but she didn't want to hear anything about it

## 2019-09-17 ENCOUNTER — OFFICE VISIT (OUTPATIENT)
Dept: PHYSICAL THERAPY | Facility: CLINIC | Age: 84
End: 2019-09-17
Payer: MEDICARE

## 2019-09-17 ENCOUNTER — TELEPHONE (OUTPATIENT)
Dept: FAMILY MEDICINE CLINIC | Facility: CLINIC | Age: 84
End: 2019-09-17

## 2019-09-17 DIAGNOSIS — H83.2X3 VESTIBULAR DYSFUNCTION OF BOTH EARS: Primary | ICD-10-CM

## 2019-09-17 DIAGNOSIS — R42 VERTIGO: Primary | ICD-10-CM

## 2019-09-17 PROCEDURE — 97112 NEUROMUSCULAR REEDUCATION: CPT | Performed by: PHYSICAL THERAPIST

## 2019-09-17 RX ORDER — MECLIZINE HCL 12.5 MG/1
12.5 TABLET ORAL EVERY 8 HOURS PRN
Qty: 90 TABLET | Refills: 3 | Status: SHIPPED | OUTPATIENT
Start: 2019-09-17 | End: 2021-03-04

## 2019-09-17 NOTE — PROGRESS NOTES
Daily Note     Today's date: 2019  Patient name: Steven Coffman  : 1930  MRN: [id number]  Referring provider: Tricia Armendariz*  Dx:   Encounter Diagnosis     ICD-10-CM    1  Vestibular dysfunction of both ears H83 2X3                   Subjective: notes she had increased dizziness yesterday and was seen by Dr Lal  She is concerned that her symptoms will get worse  She has an appointment with her neurologist in 2020  Objective: See treatment diary below      Assessment: Tolerated treatment well  Patient exhibited good technique with therapeutic exercises and would benefit from continued PT   Loss of balance twice with side stepping on balance foam but otherwise did not require assistance aside from uni UE support  Plan: Progress treatment as tolerated         Precautions: TL compression fx      Manual                                                                                   Exercise Diary  9/5 9/10 9/12 9/17         Heel/toe raises  20ea biodex foam 20 biodex foam 20         marching  20 no UE 20x no UE 20x no UE         Side stepping  Foams 4' 4 foams + 1 janeth balance beam 10x lengths         rhomberg foam  1'  biodex foam turns tilts 10ea         rhomberg EC  :20x5 :20x5 biodex foam :20x5         rhomberg head turns/tilts  10ea 10ea 10ea         Stepping over hurldes  4'           Fwd walk over foams  4' + 1 janeth 4'          Bradt Daroff 5xea 10x 10ea 10ea         Gait training    With [facility] 5'                                                                                                                                               Modalities

## 2019-09-17 NOTE — TELEPHONE ENCOUNTER
Patient came in stating she needed a refill on meclazine?      [id number] to check with pharmacy

## 2019-09-24 ENCOUNTER — OFFICE VISIT (OUTPATIENT)
Dept: PHYSICAL THERAPY | Facility: CLINIC | Age: 84
End: 2019-09-24
Payer: MEDICARE

## 2019-09-24 DIAGNOSIS — H83.2X3 VESTIBULAR DYSFUNCTION OF BOTH EARS: Primary | ICD-10-CM

## 2019-09-24 PROCEDURE — 97112 NEUROMUSCULAR REEDUCATION: CPT | Performed by: PHYSICAL THERAPIST

## 2019-09-24 NOTE — PROGRESS NOTES
Daily Note     Today's date: 2019  Patient name: Shady Cunningham  : 1930  MRN: [id number]  Referring provider: Tricai Correia*  Dx:   Encounter Diagnosis     ICD-10-CM    1  Vestibular dysfunction of both ears H83 2X3        Start Time: 1400  Stop Time: 2795  Total time in clinic (min): 57 minutes    Subjective: patient reports having some dizziness yesterday where she took medication, but no dizziness today  Objective: See treatment diary below      Assessment: Patient tolerated treatment session well  She demonstrates improvements in side stepping on foam as she did not have any instances of LOB  She was challenged with rhomberg stance on foam with head turns and with EC requiring 2-finger assistance on bar  She demonstrates fatigue post treatment and would benefit from continued physical therapy to enable her to maximize her functional ability  Plan: Continue per plan of care  Progress treatment as tolerated         Precautions: TL compression fx      Manual                                                                                   Exercise Diary  9/5 9/10 9/12 9/17 9/24        Heel/toe raises  20ea biodex foam 20 biodex foam 20 biodex foam 30          20 no UE 20x no UE 20x no UE 30x no UE        Side stepping  Foams 4' 4 foams + 1 janeth balance beam 10x lengths balance beam 10x lengths        rhomberg foam  1'  biodex foam turns tilts 10ea biodex foam turns tilts 10ea        rhomberg EC  :20x5 :20x5 biodex foam :20x5 biodex foam :20x5        rhomberg head turns/tilts  10ea 10ea 10ea (see above)        Stepping over hurldes  4'   4'        Fwd walk over foams  4' + 1 janeth 4'          Bradt Daroff 5xea 10x 10ea 10ea 10ea        Gait training    With [facility] 5'                                                                                                                                               Modalities

## 2019-10-01 ENCOUNTER — OFFICE VISIT (OUTPATIENT)
Dept: PHYSICAL THERAPY | Facility: CLINIC | Age: 84
End: 2019-10-01
Payer: MEDICARE

## 2019-10-01 DIAGNOSIS — G62.9 PERIPHERAL POLYNEUROPATHY: ICD-10-CM

## 2019-10-01 DIAGNOSIS — H83.2X3 VESTIBULAR DYSFUNCTION OF BOTH EARS: Primary | ICD-10-CM

## 2019-10-01 PROCEDURE — 97112 NEUROMUSCULAR REEDUCATION: CPT | Performed by: PHYSICAL THERAPIST

## 2019-10-01 NOTE — PROGRESS NOTES
Daily Note     Today's date: 10/1/2019  Patient name: Ameya Moreno  : 1930  MRN: [id number]  Referring provider: Lorelei Wilde  Dx:   Encounter Diagnosis     ICD-10-CM    1  Vestibular dysfunction of both ears H83 2X3    2  Peripheral polyneuropathy G62 9                   Subjective: notes she woke up with dizziness today which resolved with meclizine  No dizziness at the start of treatment though patient reports pain in the hip, possibly due to a lot of walking over the weekend      Objective: See treatment diary below      Assessment: Patient tolerated treatment session well  She demonstrates fatigue post treatment and would benefit from continued physical therapy to enable her to maximize her functional ability  Continues to rely on occasional UE support at the railing  Plan: Continue per plan of care  Progress treatment as tolerated         Precautions: TL compression fx      Manual                                                                                   Exercise Diary  9/5 9/10 9/12 9/17 9/24 10/1       Heel/toe raises  20ea biodex foam 20 biodex foam 20 biodex foam 30 biodex foam 30       marching  20 no UE 20x no UE 20x no UE 30x no UE 30x       Side stepping  Foams 4' 4 foams + 1 janeth balance beam 10x lengths balance beam 10x lengths balance beam 10x lengths       rhomberg foam  1'  biodex foam turns tilts 10ea biodex foam turns tilts 10ea biodex foam turns tilts 10ea       rhomberg EC  :20x5 :20x5 biodex foam :20x5 biodex foam :20x5 biodex foam :20x5       rhomberg head turns/tilts  10ea 10ea 10ea (see above)        Stepping over hurldes  4'   4' 4'       Fwd walk over foams  4' + 1 janeth 4'          Bradt Daroff 5xea 10x 10ea 10ea 10ea        Gait training    With [facility] 5'                                                                                                                                               Modalities

## 2019-10-08 ENCOUNTER — OFFICE VISIT (OUTPATIENT)
Dept: PHYSICAL THERAPY | Facility: CLINIC | Age: 84
End: 2019-10-08
Payer: MEDICARE

## 2019-10-08 DIAGNOSIS — H83.2X3 VESTIBULAR DYSFUNCTION OF BOTH EARS: Primary | ICD-10-CM

## 2019-10-08 DIAGNOSIS — G62.9 PERIPHERAL POLYNEUROPATHY: ICD-10-CM

## 2019-10-08 PROCEDURE — 97112 NEUROMUSCULAR REEDUCATION: CPT

## 2019-10-08 NOTE — PROGRESS NOTES
Daily Note     Today's date: 10/8/2019  Patient name: Nohemy Saleh  : 1930  MRN: [id number]  Referring provider: Tricia Stokes*  Dx:   Encounter Diagnosis     ICD-10-CM    1  Vestibular dysfunction of both ears H83 2X3    2  Peripheral polyneuropathy G62 9                   Subjective: Patient reports she cancelled an appointment yesterday due to being dizzy when she woke up, notes it decreased after a few hours and she has not taken meclizine since last session  Objective: See treatment diary below      Assessment: Tolerated treatment well  Frequently reaches for handrail throughout session  LOB x4 with head tils and turns on biodex foam  Frequent rest breaks required throughout session due to low back pain  Patient demonstrated fatigue post treatment and would benefit from continued PT      Plan: RE NV       Precautions: TL compression fx      Manual                                                                                   Exercise Diary  9/5 9/10 9/12 9/17 9/24 10/1 10/8      Heel/toe raises  20ea biodex foam 20 biodex foam 20 biodex foam 30 biodex foam 30 biodexfoam 30       marching  20 no UE 20x no UE 20x no UE 30x no UE 30x 30      Side stepping  Foams 4' 4 foams + 1 janeth balance beam 10x lengths balance beam 10x lengths balance beam 10x lengths balance beam 10 lengths      rhomberg foam  1'  biodex foam turns tilts 10ea biodex foam turns tilts 10ea biodex foam turns tilts 10ea biodex foam turns tilts 10ea      rhomberg EC  :20x5 :20x5 biodex foam :20x5 biodex foam :20x5 biodex foam :20x5 biodex foam :20x5      rhomberg head turns/tilts  10ea 10ea 10ea (see above)        Stepping over hurldes  4'   4' 4' 4'      Fwd walk over foams  4' + 1 janeth 4'          Bradt Daroff 5xea 10x 10ea 10ea 10ea        Gait training    With [facility] 5' Modalities

## 2019-11-04 ENCOUNTER — TRANSCRIBE ORDERS (OUTPATIENT)
Dept: ADMINISTRATIVE | Age: 84
End: 2019-11-04

## 2019-11-04 ENCOUNTER — APPOINTMENT (OUTPATIENT)
Dept: LAB | Age: 84
End: 2019-11-04
Payer: MEDICARE

## 2019-11-04 ENCOUNTER — HOSPITAL ENCOUNTER (OUTPATIENT)
Dept: RADIOLOGY | Age: 84
Discharge: HOME | End: 2019-11-04
Payer: MEDICARE

## 2019-11-04 DIAGNOSIS — M81.0 SENILE OSTEOPOROSIS: Primary | ICD-10-CM

## 2019-11-04 DIAGNOSIS — M81.0 SENILE OSTEOPOROSIS: ICD-10-CM

## 2019-11-04 DIAGNOSIS — M81.0 OSTEOPOROSIS, UNSPECIFIED OSTEOPOROSIS TYPE, UNSPECIFIED PATHOLOGICAL FRACTURE PRESENCE: ICD-10-CM

## 2019-11-04 LAB
25(OH)D3 SERPL-MCNC: 47.8 NG/ML (ref 30–100)
ANION GAP SERPL CALCULATED.3IONS-SCNC: 8 MMOL/L (ref 4–13)
BUN SERPL-MCNC: 21 MG/DL (ref 5–25)
CALCIUM SERPL-MCNC: 9.3 MG/DL (ref 8.3–10.1)
CHLORIDE SERPL-SCNC: 102 MMOL/L (ref 100–108)
CO2 SERPL-SCNC: 26 MMOL/L (ref 21–32)
CREAT SERPL-MCNC: 1.24 MG/DL (ref 0.6–1.3)
GFR SERPL CREATININE-BSD FRML MDRD: 39 ML/MIN/1.73SQ M
GLUCOSE SERPL-MCNC: 100 MG/DL (ref 65–140)
POTASSIUM SERPL-SCNC: 4.7 MMOL/L (ref 3.5–5.3)
SODIUM SERPL-SCNC: 136 MMOL/L (ref 136–145)

## 2019-11-04 PROCEDURE — 80048 BASIC METABOLIC PNL TOTAL CA: CPT

## 2019-11-04 PROCEDURE — 77080 DXA BONE DENSITY AXIAL: CPT

## 2019-11-04 PROCEDURE — 82306 VITAMIN D 25 HYDROXY: CPT

## 2019-11-04 PROCEDURE — 36415 COLL VENOUS BLD VENIPUNCTURE: CPT

## 2019-11-19 ENCOUNTER — TRANSCRIBE ORDERS (OUTPATIENT)
Dept: ADMINISTRATIVE | Facility: HOSPITAL | Age: 84
End: 2019-11-19

## 2019-11-19 DIAGNOSIS — R60.9 EDEMA, UNSPECIFIED TYPE: Primary | ICD-10-CM

## 2019-11-25 ENCOUNTER — HOSPITAL ENCOUNTER (OUTPATIENT)
Dept: ULTRASOUND IMAGING | Facility: HOSPITAL | Age: 84
Discharge: HOME | End: 2019-11-25
Payer: MEDICARE

## 2019-11-25 DIAGNOSIS — R60.9 EDEMA, UNSPECIFIED TYPE: ICD-10-CM

## 2019-11-25 PROCEDURE — 93970 EXTREMITY STUDY: CPT | Performed by: SURGERY

## 2019-11-25 PROCEDURE — 93970 EXTREMITY STUDY: CPT

## 2019-12-02 DIAGNOSIS — K21.9 GASTROESOPHAGEAL REFLUX DISEASE WITHOUT ESOPHAGITIS: ICD-10-CM

## 2019-12-02 RX ORDER — FAMOTIDINE 40 MG/1
40 TABLET, FILM COATED ORAL DAILY
Qty: 90 TABLET | Refills: 3 | Status: SHIPPED | OUTPATIENT
Start: 2019-12-02 | End: 2020-02-18 | Stop reason: SDUPTHER

## 2019-12-06 ENCOUNTER — TELEPHONE (OUTPATIENT)
Dept: FAMILY MEDICINE CLINIC | Facility: CLINIC | Age: 84
End: 2019-12-06

## 2019-12-06 DIAGNOSIS — E03.9 HYPOTHYROIDISM, UNSPECIFIED TYPE: ICD-10-CM

## 2019-12-06 RX ORDER — LEVOTHYROXINE SODIUM 0.03 MG/1
25 TABLET ORAL DAILY
Qty: 90 TABLET | Refills: 1 | Status: SHIPPED | OUTPATIENT
Start: 2019-12-06 | End: 2020-11-24 | Stop reason: ALTCHOICE

## 2019-12-06 RX ORDER — LEVOTHYROXINE SODIUM 0.03 MG/1
TABLET ORAL
Qty: 90 TABLET | Refills: 1 | Status: SHIPPED | OUTPATIENT
Start: 2019-12-06 | End: 2019-12-06 | Stop reason: SDUPTHER

## 2019-12-20 DIAGNOSIS — I10 ESSENTIAL HYPERTENSION: ICD-10-CM

## 2019-12-20 RX ORDER — AMLODIPINE BESYLATE 2.5 MG/1
TABLET ORAL
Qty: 90 TABLET | Refills: 0 | Status: SHIPPED | OUTPATIENT
Start: 2019-12-20 | End: 2020-03-24

## 2020-02-18 ENCOUNTER — TELEPHONE (OUTPATIENT)
Dept: FAMILY MEDICINE CLINIC | Facility: CLINIC | Age: 85
End: 2020-02-18

## 2020-02-18 DIAGNOSIS — K21.9 GASTROESOPHAGEAL REFLUX DISEASE WITHOUT ESOPHAGITIS: ICD-10-CM

## 2020-02-18 RX ORDER — FAMOTIDINE 20 MG/1
20 TABLET, FILM COATED ORAL 2 TIMES DAILY
Qty: 60 TABLET | Refills: 3 | Status: SHIPPED | OUTPATIENT
Start: 2020-02-18 | End: 2020-11-24 | Stop reason: ALTCHOICE

## 2020-02-18 NOTE — TELEPHONE ENCOUNTER
Call from pharmacy  Famotidine 40 mg is on back order   They do have the 20's if you are with that, please send over new Rx for the 20 mg 2 tablets once daily

## 2020-03-24 DIAGNOSIS — I10 ESSENTIAL HYPERTENSION: ICD-10-CM

## 2020-03-24 RX ORDER — AMLODIPINE BESYLATE 2.5 MG/1
TABLET ORAL
Qty: 90 TABLET | Refills: 0 | Status: SHIPPED | OUTPATIENT
Start: 2020-03-24 | End: 2020-05-05

## 2020-04-08 ENCOUNTER — TELEPHONE (OUTPATIENT)
Dept: FAMILY MEDICINE CLINIC | Facility: CLINIC | Age: 85
End: 2020-04-08

## 2020-04-27 ENCOUNTER — TELEMEDICINE (OUTPATIENT)
Dept: FAMILY MEDICINE CLINIC | Facility: CLINIC | Age: 85
End: 2020-04-27
Payer: COMMERCIAL

## 2020-04-27 DIAGNOSIS — E03.9 HYPOTHYROIDISM, UNSPECIFIED TYPE: Primary | ICD-10-CM

## 2020-04-27 DIAGNOSIS — M25.511 ACUTE PAIN OF RIGHT SHOULDER: ICD-10-CM

## 2020-04-27 DIAGNOSIS — G89.29 CHRONIC LOW BACK PAIN WITHOUT SCIATICA, UNSPECIFIED BACK PAIN LATERALITY: ICD-10-CM

## 2020-04-27 DIAGNOSIS — M25.473 ANKLE EDEMA: ICD-10-CM

## 2020-04-27 DIAGNOSIS — R63.4 ABNORMAL WEIGHT LOSS: ICD-10-CM

## 2020-04-27 DIAGNOSIS — G62.9 NEUROPATHY: ICD-10-CM

## 2020-04-27 DIAGNOSIS — H83.2X9 VESTIBULAR DYSFUNCTION, UNSPECIFIED LATERALITY: ICD-10-CM

## 2020-04-27 DIAGNOSIS — E55.9 VITAMIN D DEFICIENCY: ICD-10-CM

## 2020-04-27 DIAGNOSIS — M54.50 CHRONIC LOW BACK PAIN WITHOUT SCIATICA, UNSPECIFIED BACK PAIN LATERALITY: ICD-10-CM

## 2020-04-27 DIAGNOSIS — I10 ESSENTIAL HYPERTENSION: ICD-10-CM

## 2020-04-27 DIAGNOSIS — C43.71 MALIGNANT MELANOMA OF RIGHT LOWER EXTREMITY INCLUDING HIP (HCC): ICD-10-CM

## 2020-04-27 DIAGNOSIS — D50.9 IRON DEFICIENCY ANEMIA, UNSPECIFIED IRON DEFICIENCY ANEMIA TYPE: ICD-10-CM

## 2020-04-27 PROBLEM — N18.30 CKD (CHRONIC KIDNEY DISEASE) STAGE 3, GFR 30-59 ML/MIN (HCC): Status: ACTIVE | Noted: 2020-04-27

## 2020-04-27 PROBLEM — C18.9 MALIGNANT NEOPLASM OF COLON (HCC): Status: RESOLVED | Noted: 2020-04-27 | Resolved: 2020-04-27

## 2020-04-27 PROCEDURE — 99443 PR PHYS/QHP TELEPHONE EVALUATION 21-30 MIN: CPT | Performed by: FAMILY MEDICINE

## 2020-04-28 ENCOUNTER — APPOINTMENT (OUTPATIENT)
Dept: LAB | Age: 85
End: 2020-04-28
Payer: COMMERCIAL

## 2020-04-28 ENCOUNTER — APPOINTMENT (OUTPATIENT)
Dept: RADIOLOGY | Age: 85
End: 2020-04-28
Payer: COMMERCIAL

## 2020-04-28 DIAGNOSIS — R63.4 ABNORMAL WEIGHT LOSS: ICD-10-CM

## 2020-04-28 DIAGNOSIS — M25.511 ACUTE PAIN OF RIGHT SHOULDER: ICD-10-CM

## 2020-04-28 DIAGNOSIS — M25.473 ANKLE EDEMA: ICD-10-CM

## 2020-04-28 DIAGNOSIS — G62.9 NEUROPATHY: ICD-10-CM

## 2020-04-28 LAB
BASOPHILS # BLD AUTO: 0.06 THOUSANDS/ΜL (ref 0–0.1)
BASOPHILS NFR BLD AUTO: 1 % (ref 0–1)
CRP SERPL QL: <3 MG/L
EOSINOPHIL # BLD AUTO: 0.08 THOUSAND/ΜL (ref 0–0.61)
EOSINOPHIL NFR BLD AUTO: 2 % (ref 0–6)
ERYTHROCYTE [DISTWIDTH] IN BLOOD BY AUTOMATED COUNT: 12.7 % (ref 11.6–15.1)
ERYTHROCYTE [SEDIMENTATION RATE] IN BLOOD: 27 MM/HOUR (ref 0–20)
FERRITIN SERPL-MCNC: 20 NG/ML (ref 8–388)
HCT VFR BLD AUTO: 32.7 % (ref 34.8–46.1)
HGB BLD-MCNC: 10.9 G/DL (ref 11.5–15.4)
IMM GRANULOCYTES # BLD AUTO: 0.02 THOUSAND/UL (ref 0–0.2)
IMM GRANULOCYTES NFR BLD AUTO: 0 % (ref 0–2)
IRON SATN MFR SERPL: 19 %
IRON SERPL-MCNC: 66 UG/DL (ref 50–170)
LYMPHOCYTES # BLD AUTO: 0.94 THOUSANDS/ΜL (ref 0.6–4.47)
LYMPHOCYTES NFR BLD AUTO: 18 % (ref 14–44)
MAGNESIUM SERPL-MCNC: 2.3 MG/DL (ref 1.6–2.6)
MCH RBC QN AUTO: 32.9 PG (ref 26.8–34.3)
MCHC RBC AUTO-ENTMCNC: 33.3 G/DL (ref 31.4–37.4)
MCV RBC AUTO: 99 FL (ref 82–98)
MONOCYTES # BLD AUTO: 0.41 THOUSAND/ΜL (ref 0.17–1.22)
MONOCYTES NFR BLD AUTO: 8 % (ref 4–12)
NEUTROPHILS # BLD AUTO: 3.75 THOUSANDS/ΜL (ref 1.85–7.62)
NEUTS SEG NFR BLD AUTO: 71 % (ref 43–75)
NRBC BLD AUTO-RTO: 0 /100 WBCS
NT-PROBNP SERPL-MCNC: 352 PG/ML
PLATELET # BLD AUTO: 297 THOUSANDS/UL (ref 149–390)
PMV BLD AUTO: 9.8 FL (ref 8.9–12.7)
RBC # BLD AUTO: 3.31 MILLION/UL (ref 3.81–5.12)
TIBC SERPL-MCNC: 339 UG/DL (ref 250–450)
WBC # BLD AUTO: 5.26 THOUSAND/UL (ref 4.31–10.16)

## 2020-04-28 PROCEDURE — 83735 ASSAY OF MAGNESIUM: CPT

## 2020-04-28 PROCEDURE — 36415 COLL VENOUS BLD VENIPUNCTURE: CPT

## 2020-04-28 PROCEDURE — 73030 X-RAY EXAM OF SHOULDER: CPT

## 2020-04-28 PROCEDURE — 71046 X-RAY EXAM CHEST 2 VIEWS: CPT

## 2020-04-28 PROCEDURE — 83550 IRON BINDING TEST: CPT

## 2020-04-28 PROCEDURE — 86038 ANTINUCLEAR ANTIBODIES: CPT

## 2020-04-28 PROCEDURE — 83540 ASSAY OF IRON: CPT

## 2020-04-28 PROCEDURE — 86140 C-REACTIVE PROTEIN: CPT

## 2020-04-28 PROCEDURE — 85025 COMPLETE CBC W/AUTO DIFF WBC: CPT

## 2020-04-28 PROCEDURE — 82728 ASSAY OF FERRITIN: CPT

## 2020-04-28 PROCEDURE — 83880 ASSAY OF NATRIURETIC PEPTIDE: CPT

## 2020-04-28 PROCEDURE — 85652 RBC SED RATE AUTOMATED: CPT

## 2020-04-29 LAB — RYE IGE QN: NEGATIVE

## 2020-05-01 ENCOUNTER — TELEPHONE (OUTPATIENT)
Dept: FAMILY MEDICINE CLINIC | Facility: CLINIC | Age: 85
End: 2020-05-01

## 2020-05-01 DIAGNOSIS — K59.09 CHRONIC CONSTIPATION: Primary | ICD-10-CM

## 2020-05-01 DIAGNOSIS — C43.71 MALIGNANT MELANOMA OF RIGHT LOWER EXTREMITY INCLUDING HIP (HCC): ICD-10-CM

## 2020-05-01 DIAGNOSIS — K58.1 IRRITABLE BOWEL SYNDROME WITH CONSTIPATION: ICD-10-CM

## 2020-05-01 DIAGNOSIS — K31.84 GASTROPARESIS: ICD-10-CM

## 2020-05-02 ENCOUNTER — NURSE TRIAGE (OUTPATIENT)
Dept: OTHER | Facility: OTHER | Age: 85
End: 2020-05-02

## 2020-05-05 ENCOUNTER — OFFICE VISIT (OUTPATIENT)
Dept: FAMILY MEDICINE CLINIC | Facility: CLINIC | Age: 85
End: 2020-05-05
Payer: COMMERCIAL

## 2020-05-05 VITALS
RESPIRATION RATE: 16 BRPM | WEIGHT: 140 LBS | HEIGHT: 60 IN | HEART RATE: 91 BPM | TEMPERATURE: 98.2 F | OXYGEN SATURATION: 98 % | BODY MASS INDEX: 27.48 KG/M2

## 2020-05-05 DIAGNOSIS — E03.9 HYPOTHYROIDISM, UNSPECIFIED TYPE: ICD-10-CM

## 2020-05-05 DIAGNOSIS — I10 ESSENTIAL HYPERTENSION: Primary | ICD-10-CM

## 2020-05-05 DIAGNOSIS — H83.2X9 VESTIBULAR DYSFUNCTION, UNSPECIFIED LATERALITY: ICD-10-CM

## 2020-05-05 DIAGNOSIS — I70.213 ATHEROSCLEROSIS OF NATIVE ARTERY OF BOTH LOWER EXTREMITIES WITH INTERMITTENT CLAUDICATION (HCC): ICD-10-CM

## 2020-05-05 PROCEDURE — 3078F DIAST BP <80 MM HG: CPT | Performed by: FAMILY MEDICINE

## 2020-05-05 PROCEDURE — 1160F RVW MEDS BY RX/DR IN RCRD: CPT | Performed by: FAMILY MEDICINE

## 2020-05-05 PROCEDURE — 3075F SYST BP GE 130 - 139MM HG: CPT | Performed by: FAMILY MEDICINE

## 2020-05-05 PROCEDURE — 1036F TOBACCO NON-USER: CPT | Performed by: FAMILY MEDICINE

## 2020-05-05 PROCEDURE — 3008F BODY MASS INDEX DOCD: CPT | Performed by: FAMILY MEDICINE

## 2020-05-05 PROCEDURE — 99214 OFFICE O/P EST MOD 30 MIN: CPT | Performed by: FAMILY MEDICINE

## 2020-05-05 RX ORDER — LOSARTAN POTASSIUM 25 MG/1
25 TABLET ORAL DAILY
Qty: 90 TABLET | Refills: 1 | Status: SHIPPED | OUTPATIENT
Start: 2020-05-05 | End: 2020-10-29

## 2020-05-06 ENCOUNTER — TELEPHONE (OUTPATIENT)
Dept: FAMILY MEDICINE CLINIC | Facility: CLINIC | Age: 85
End: 2020-05-06

## 2020-05-06 DIAGNOSIS — G60.9 IDIOPATHIC PERIPHERAL NEUROPATHY: Primary | ICD-10-CM

## 2020-05-06 RX ORDER — GABAPENTIN 100 MG/1
CAPSULE ORAL
Qty: 30 CAPSULE | Refills: 0 | Status: SHIPPED | OUTPATIENT
Start: 2020-05-06 | End: 2020-05-12

## 2020-05-08 DIAGNOSIS — K52.9 CHRONIC DIARRHEA: ICD-10-CM

## 2020-05-08 RX ORDER — LINACLOTIDE 290 UG/1
CAPSULE, GELATIN COATED ORAL
Qty: 90 CAPSULE | Refills: 1 | Status: SHIPPED | OUTPATIENT
Start: 2020-05-08 | End: 2020-11-11

## 2020-05-09 ENCOUNTER — NURSE TRIAGE (OUTPATIENT)
Dept: OTHER | Facility: OTHER | Age: 85
End: 2020-05-09

## 2020-05-11 ENCOUNTER — APPOINTMENT (OUTPATIENT)
Dept: LAB | Age: 85
End: 2020-05-11
Payer: COMMERCIAL

## 2020-05-11 DIAGNOSIS — E03.9 HYPOTHYROIDISM, UNSPECIFIED TYPE: ICD-10-CM

## 2020-05-11 DIAGNOSIS — K58.1 IRRITABLE BOWEL SYNDROME WITH CONSTIPATION: ICD-10-CM

## 2020-05-11 DIAGNOSIS — R63.4 ABNORMAL WEIGHT LOSS: ICD-10-CM

## 2020-05-11 DIAGNOSIS — C43.71 MALIGNANT MELANOMA OF RIGHT LOWER EXTREMITY INCLUDING HIP (HCC): ICD-10-CM

## 2020-05-11 DIAGNOSIS — K59.09 CHRONIC CONSTIPATION: ICD-10-CM

## 2020-05-11 DIAGNOSIS — K31.84 GASTROPARESIS: ICD-10-CM

## 2020-05-11 DIAGNOSIS — G62.9 NEUROPATHY: ICD-10-CM

## 2020-05-11 DIAGNOSIS — E55.9 VITAMIN D DEFICIENCY: ICD-10-CM

## 2020-05-11 DIAGNOSIS — M25.473 ANKLE EDEMA: ICD-10-CM

## 2020-05-11 LAB
25(OH)D3 SERPL-MCNC: 43.1 NG/ML (ref 30–100)
ALBUMIN SERPL BCP-MCNC: 3.6 G/DL (ref 3.5–5)
ALP SERPL-CCNC: 57 U/L (ref 46–116)
ALT SERPL W P-5'-P-CCNC: 14 U/L (ref 12–78)
ANION GAP SERPL CALCULATED.3IONS-SCNC: 5 MMOL/L (ref 4–13)
AST SERPL W P-5'-P-CCNC: 19 U/L (ref 5–45)
BACTERIA UR QL AUTO: NORMAL /HPF
BILIRUB SERPL-MCNC: 0.36 MG/DL (ref 0.2–1)
BILIRUB UR QL STRIP: NEGATIVE
BUN SERPL-MCNC: 16 MG/DL (ref 5–25)
CALCIUM SERPL-MCNC: 9.7 MG/DL (ref 8.3–10.1)
CEA SERPL-MCNC: 1 NG/ML (ref 0–3)
CHLORIDE SERPL-SCNC: 98 MMOL/L (ref 100–108)
CHOLEST SERPL-MCNC: 191 MG/DL (ref 50–200)
CLARITY UR: CLEAR
CO2 SERPL-SCNC: 27 MMOL/L (ref 21–32)
COLOR UR: YELLOW
CREAT SERPL-MCNC: 1.07 MG/DL (ref 0.6–1.3)
EST. AVERAGE GLUCOSE BLD GHB EST-MCNC: 105 MG/DL
GFR SERPL CREATININE-BSD FRML MDRD: 46 ML/MIN/1.73SQ M
GLUCOSE P FAST SERPL-MCNC: 84 MG/DL (ref 65–99)
GLUCOSE UR STRIP-MCNC: NEGATIVE MG/DL
HBA1C MFR BLD: 5.3 %
HDLC SERPL-MCNC: 89 MG/DL
HGB UR QL STRIP.AUTO: NEGATIVE
HYALINE CASTS #/AREA URNS LPF: NORMAL /LPF
KETONES UR STRIP-MCNC: NEGATIVE MG/DL
LDLC SERPL CALC-MCNC: 86 MG/DL (ref 0–100)
LEUKOCYTE ESTERASE UR QL STRIP: ABNORMAL
NITRITE UR QL STRIP: NEGATIVE
NON-SQ EPI CELLS URNS QL MICRO: NORMAL /HPF
NONHDLC SERPL-MCNC: 102 MG/DL
PH UR STRIP.AUTO: 6 [PH]
POTASSIUM SERPL-SCNC: 5.1 MMOL/L (ref 3.5–5.3)
PROT SERPL-MCNC: 6.9 G/DL (ref 6.4–8.2)
PROT UR STRIP-MCNC: NEGATIVE MG/DL
RBC #/AREA URNS AUTO: NORMAL /HPF
SODIUM SERPL-SCNC: 130 MMOL/L (ref 136–145)
SP GR UR STRIP.AUTO: 1.01 (ref 1–1.03)
TRIGL SERPL-MCNC: 78 MG/DL
TSH SERPL DL<=0.05 MIU/L-ACNC: 1.76 UIU/ML (ref 0.36–3.74)
UROBILINOGEN UR QL STRIP.AUTO: 0.2 E.U./DL
VIT B12 SERPL-MCNC: 212 PG/ML (ref 100–900)
WBC #/AREA URNS AUTO: NORMAL /HPF

## 2020-05-11 PROCEDURE — 84443 ASSAY THYROID STIM HORMONE: CPT

## 2020-05-11 PROCEDURE — 82306 VITAMIN D 25 HYDROXY: CPT

## 2020-05-11 PROCEDURE — 83036 HEMOGLOBIN GLYCOSYLATED A1C: CPT

## 2020-05-11 PROCEDURE — 80061 LIPID PANEL: CPT

## 2020-05-11 PROCEDURE — 82378 CARCINOEMBRYONIC ANTIGEN: CPT

## 2020-05-11 PROCEDURE — 80053 COMPREHEN METABOLIC PANEL: CPT

## 2020-05-11 PROCEDURE — 82607 VITAMIN B-12: CPT

## 2020-05-11 PROCEDURE — 81001 URINALYSIS AUTO W/SCOPE: CPT

## 2020-05-11 PROCEDURE — 36415 COLL VENOUS BLD VENIPUNCTURE: CPT

## 2020-05-19 ENCOUNTER — TELEMEDICINE (OUTPATIENT)
Dept: FAMILY MEDICINE CLINIC | Facility: CLINIC | Age: 85
End: 2020-05-19
Payer: COMMERCIAL

## 2020-05-19 VITALS — HEIGHT: 60 IN | BODY MASS INDEX: 27.48 KG/M2 | WEIGHT: 140 LBS

## 2020-05-19 DIAGNOSIS — G62.9 PERIPHERAL POLYNEUROPATHY: ICD-10-CM

## 2020-05-19 DIAGNOSIS — I10 ESSENTIAL HYPERTENSION: Primary | ICD-10-CM

## 2020-05-19 PROCEDURE — G2012 BRIEF CHECK IN BY MD/QHP: HCPCS | Performed by: FAMILY MEDICINE

## 2020-06-22 ENCOUNTER — TRANSCRIBE ORDERS (OUTPATIENT)
Dept: ADMINISTRATIVE | Age: 85
End: 2020-06-22

## 2020-06-22 ENCOUNTER — APPOINTMENT (OUTPATIENT)
Dept: LAB | Age: 85
End: 2020-06-22
Payer: MEDICARE

## 2020-06-22 DIAGNOSIS — E87.1 HYPONATREMIA: ICD-10-CM

## 2020-06-22 DIAGNOSIS — E87.1 HYPONATREMIA: Primary | ICD-10-CM

## 2020-06-22 LAB
ANION GAP SERPL CALCULATED.3IONS-SCNC: 5 MMOL/L (ref 4–13)
BUN SERPL-MCNC: 24 MG/DL (ref 5–25)
CALCIUM SERPL-MCNC: 8.9 MG/DL (ref 8.3–10.1)
CHLORIDE SERPL-SCNC: 101 MMOL/L (ref 100–108)
CO2 SERPL-SCNC: 26 MMOL/L (ref 21–32)
CREAT SERPL-MCNC: 0.96 MG/DL (ref 0.6–1.3)
GFR SERPL CREATININE-BSD FRML MDRD: 53 ML/MIN/1.73SQ M
GLUCOSE SERPL-MCNC: 95 MG/DL (ref 65–140)
POTASSIUM SERPL-SCNC: 4.7 MMOL/L (ref 3.5–5.3)
SODIUM SERPL-SCNC: 132 MMOL/L (ref 136–145)

## 2020-06-22 PROCEDURE — 80048 BASIC METABOLIC PNL TOTAL CA: CPT

## 2020-06-22 PROCEDURE — 36415 COLL VENOUS BLD VENIPUNCTURE: CPT

## 2020-06-23 ENCOUNTER — TRANSITIONAL CARE MANAGEMENT (OUTPATIENT)
Dept: FAMILY MEDICINE CLINIC | Facility: CLINIC | Age: 85
End: 2020-06-23

## 2020-06-23 RX ORDER — SODIUM, POTASSIUM,MAG SULFATES 17.5-3.13G
SOLUTION, RECONSTITUTED, ORAL ORAL
COMMUNITY
Start: 2020-05-20 | End: 2020-11-24 | Stop reason: ALTCHOICE

## 2020-07-10 ENCOUNTER — OFFICE VISIT (OUTPATIENT)
Dept: FAMILY MEDICINE CLINIC | Facility: CLINIC | Age: 85
End: 2020-07-10
Payer: MEDICARE

## 2020-07-10 VITALS
OXYGEN SATURATION: 98 % | BODY MASS INDEX: 26.5 KG/M2 | HEART RATE: 88 BPM | HEIGHT: 60 IN | SYSTOLIC BLOOD PRESSURE: 125 MMHG | TEMPERATURE: 97.9 F | WEIGHT: 135 LBS | DIASTOLIC BLOOD PRESSURE: 80 MMHG

## 2020-07-10 DIAGNOSIS — K59.09 CHRONIC CONSTIPATION: Primary | ICD-10-CM

## 2020-07-10 DIAGNOSIS — C43.71 MALIGNANT MELANOMA OF RIGHT LOWER EXTREMITY INCLUDING HIP (HCC): ICD-10-CM

## 2020-07-10 DIAGNOSIS — E03.9 HYPOTHYROIDISM, UNSPECIFIED TYPE: ICD-10-CM

## 2020-07-10 DIAGNOSIS — F41.9 ANXIETY: ICD-10-CM

## 2020-07-10 PROCEDURE — 1111F DSCHRG MED/CURRENT MED MERGE: CPT | Performed by: FAMILY MEDICINE

## 2020-07-10 PROCEDURE — 99495 TRANSJ CARE MGMT MOD F2F 14D: CPT | Performed by: FAMILY MEDICINE

## 2020-07-10 RX ORDER — FLUOXETINE 10 MG/1
10 CAPSULE ORAL DAILY
Qty: 30 CAPSULE | Refills: 1 | Status: SHIPPED | OUTPATIENT
Start: 2020-07-10 | End: 2020-08-18

## 2020-07-10 NOTE — PROGRESS NOTES
Assessment/Plan:    1  Chronic constipation    2  Hypothyroidism, unspecified type  -     TSH, 3rd generation with Free T4 reflex; Future; Expected date: 08/24/2020    3  Malignant melanoma of right lower extremity including hip ([facility] [address] )    4  Anxiety  -     FLUoxetine (PROzac) 10 mg capsule; Take 1 capsule (10 mg total) by mouth daily         Subjective:      Patient ID: Emelyn Flores is a 80 y o  female  HPI   Was in pt for hyponatremia   Has colonoscopy pending with [facility]     Still very anxious and wt loss might be from that     Very sensative to meds and gets SE more than benafit from most meds she tries       -Neurontin 100 mg , take 1 tab three times daily  Diet - Regular as tolerated  Kindly limit fluid intake upto 50-70 oz or 1500 cc per day  she suffers from chronic constipation better on linzess  GERD: controlled on  anemia: mild  leg cramps and paresthesia: worst sx today  ckd 3: GFR 39  Hypothyroidism: 1 9 on 25mcg  HTN: controlled on norvasc 2 5mg  vit D def: takes 2K daily  iron def: low nl     Ankle swelling for months - EKG done 1yr ago   Fatigue weakness   Feels hungry but loosing wt   Did weight 160 months ago - but now 135lbs     Labs with   Emma neg   No chf marker  inflam markers are mostly neg ( sed is 27) not much here  For now   Mag is cool  Iron is low nl but she is 89    She is anemic ( has been for atleast 3 yrs)    can take iron pills  325 bid     The following portions of the patient's history were reviewed and updated as appropriate: allergies, current medications, past family history, past medical history, past social history, past surgical history and problem list     Review of Systems   Constitutional: Positive for activity change, appetite change, fatigue and unexpected weight change  Negative for fever  HENT: Negative for nosebleeds and trouble swallowing  Eyes: Negative for visual disturbance     Respiratory: Negative for chest tightness and shortness of breath  Cardiovascular: Negative for chest pain, palpitations and leg swelling  Gastrointestinal: Negative for abdominal pain, constipation, diarrhea and nausea  Endocrine: Negative for cold intolerance  Genitourinary: Negative for dysuria and urgency  Musculoskeletal: Positive for back pain, gait problem and myalgias  Negative for joint swelling  Skin: Negative for rash  Neurological: Positive for weakness, light-headedness and numbness  Negative for tremors, seizures and syncope  Hematological: Does not bruise/bleed easily  Psychiatric/Behavioral: Negative for hallucinations and suicidal ideas  Objective:      TCM Call (since 6/11/2020)     Date and time call was made  6/23/2020  9:25 AM    Hospital care reviewed  Records reviewed    Patient was hospitialized at  [facility]    Date of Admission  06/18/20    Date of discharge  06/18/20    Diagnosis  dizziness    Disposition  Home    Were the patients medications reviewed and updated  No    Current Symptoms  None      TCM Call (since 6/11/2020)     Post hospital issues  None    Should patient be enrolled in anticoag monitoring? No    Scheduled for follow up? Yes    Did you obtain your prescribed medications  Yes    Do you need help managing your prescriptions or medications  No    Is transportation to your appointment needed  No    I have advised the patient to call PCP with any new or worsening symptoms  ramona sifuentes ma    Support System  Family    Are you recieving any outpatient services  No    Are you recieving home care services  No    Are you using any community resources  No    Current waiver services  No    Have you fallen in the last 12 months  No    Interperter language line needed  No          /80   Pulse 88   Temp 97 9 °F (36 6 °C)   Ht 5' (1 524 m)   Wt 61 2 kg (135 lb)   SpO2 98%   BMI 26 37 kg/m²     No visits with results within 2 Week(s) from this visit     Latest known visit with results is: Appointment on 06/22/2020   Component Date Value    Sodium 06/22/2020 132*    Potassium 06/22/2020 4 7     Chloride 06/22/2020 101     CO2 06/22/2020 26     ANION GAP 06/22/2020 5     BUN 06/22/2020 24     Creatinine 06/22/2020 0 96     Glucose 06/22/2020 95     Calcium 06/22/2020 8 9     eGFR 06/22/2020 53           Physical Exam   Constitutional: She is oriented to person, place, and time  She appears well-developed and well-nourished  Cane    HENT:   Head: Normocephalic and atraumatic  Eyes: EOM are normal    Neck: Normal range of motion  Neck supple  Cardiovascular: Normal rate, regular rhythm, normal heart sounds and intact distal pulses  No murmur heard  Pulmonary/Chest: Effort normal and breath sounds normal  She has no wheezes  She has no rales  Abdominal: Soft  Bowel sounds are normal  She exhibits no distension  There is no tenderness  Musculoskeletal: Normal range of motion  She exhibits no edema or tenderness  Lymphadenopathy:     She has no cervical adenopathy  Neurological: She is alert and oriented to person, place, and time  No cranial nerve deficit or sensory deficit  She exhibits normal muscle tone  Skin: Skin is warm and dry  Capillary refill takes less than 2 seconds  No rash noted  Psychiatric: She has a normal mood and affect  Her behavior is normal  Judgment and thought content normal    Nursing note and vitals reviewed            Jackson Walker MD  [place] [address]

## 2020-07-21 ENCOUNTER — APPOINTMENT (OUTPATIENT)
Dept: LAB | Facility: CLINIC | Age: 85
End: 2020-07-21
Payer: MEDICARE

## 2020-07-21 DIAGNOSIS — E03.9 HYPOTHYROIDISM, UNSPECIFIED TYPE: ICD-10-CM

## 2020-07-21 LAB — TSH SERPL DL<=0.05 MIU/L-ACNC: 2.63 UIU/ML (ref 0.36–3.74)

## 2020-07-21 PROCEDURE — 36415 COLL VENOUS BLD VENIPUNCTURE: CPT

## 2020-07-21 PROCEDURE — 84443 ASSAY THYROID STIM HORMONE: CPT

## 2020-07-22 DIAGNOSIS — E03.9 HYPOTHYROIDISM, UNSPECIFIED TYPE: Primary | ICD-10-CM

## 2020-08-17 RX ORDER — SODIUM CHLORIDE 1000 MG
1 TABLET, SOLUBLE MISCELLANEOUS DAILY
COMMUNITY
Start: 2020-06-24 | End: 2020-11-24 | Stop reason: ALTCHOICE

## 2020-08-18 ENCOUNTER — OFFICE VISIT (OUTPATIENT)
Dept: FAMILY MEDICINE CLINIC | Facility: CLINIC | Age: 85
End: 2020-08-18
Payer: MEDICARE

## 2020-08-18 VITALS
HEIGHT: 60 IN | HEART RATE: 77 BPM | OXYGEN SATURATION: 98 % | RESPIRATION RATE: 18 BRPM | DIASTOLIC BLOOD PRESSURE: 76 MMHG | SYSTOLIC BLOOD PRESSURE: 124 MMHG | TEMPERATURE: 98.2 F | BODY MASS INDEX: 26.5 KG/M2 | WEIGHT: 135 LBS

## 2020-08-18 DIAGNOSIS — H83.2X9 VESTIBULAR DYSFUNCTION, UNSPECIFIED LATERALITY: ICD-10-CM

## 2020-08-18 DIAGNOSIS — I10 ESSENTIAL HYPERTENSION: ICD-10-CM

## 2020-08-18 DIAGNOSIS — G62.9 PERIPHERAL POLYNEUROPATHY: ICD-10-CM

## 2020-08-18 DIAGNOSIS — E03.9 HYPOTHYROIDISM, UNSPECIFIED TYPE: Primary | ICD-10-CM

## 2020-08-18 DIAGNOSIS — F41.9 ANXIETY: ICD-10-CM

## 2020-08-18 PROCEDURE — 1036F TOBACCO NON-USER: CPT | Performed by: FAMILY MEDICINE

## 2020-08-18 PROCEDURE — 3008F BODY MASS INDEX DOCD: CPT | Performed by: FAMILY MEDICINE

## 2020-08-18 PROCEDURE — 99214 OFFICE O/P EST MOD 30 MIN: CPT | Performed by: FAMILY MEDICINE

## 2020-08-18 PROCEDURE — 1160F RVW MEDS BY RX/DR IN RCRD: CPT | Performed by: FAMILY MEDICINE

## 2020-08-18 PROCEDURE — 3078F DIAST BP <80 MM HG: CPT | Performed by: FAMILY MEDICINE

## 2020-08-18 PROCEDURE — 3074F SYST BP LT 130 MM HG: CPT | Performed by: FAMILY MEDICINE

## 2020-08-18 RX ORDER — FLUOXETINE HYDROCHLORIDE 20 MG/1
20 CAPSULE ORAL DAILY
Qty: 90 CAPSULE | Refills: 1 | Status: SHIPPED | OUTPATIENT
Start: 2020-08-18 | End: 2020-08-18

## 2020-08-18 NOTE — PROGRESS NOTES
Assessment/Plan:    1  Hypothyroidism, unspecified type    2  Anxiety    3  Essential hypertension    4  Peripheral polyneuropathy    5  Vestibular dysfunction, unspecified laterality       Stay off the synthroid and recheck in 3 weeks   Subjective:      Patient ID: Billie Camacho is a 80 y o  female  HPI   Was in pt for hyponatremia   Has colonoscopy pending with [facility]     Still very anxious and wt loss might be from that     Very sensative to meds and gets SE more than benafit from most meds she tries     -Neurontin 100 mg , take 1 tab three times daily  Diet - Regular as tolerated  Kindly limit fluid intake upto 50-70 oz or 1500 cc per day  she suffers from chronic constipation better on linzess  GERD: controlled on  anemia: mild  leg cramps and paresthesia: worst sx today  ckd 3: GFR 39  Hypothyroidism: 1 9 on 25mcg   st6ped 5t  And will check again   HTN: controlled on norvasc 2 5mg  vit D def: takes 2K daily  iron def: low nl     Ankle swelling for months - EKG done 1yr ago   Fatigue weakness   Feels hungry but loosing wt   Did weight 160 months ago - but now 135lbs     Labs with   Emma neg   No chf marker  inflam markers are mostly neg ( sed is 27) not much here  For now   Mag is cool  Iron is low nl but she is 89    She is anemic ( has been for atleast 3 yrs)    can take iron pills  325 bid         The following portions of the patient's history were reviewed and updated as appropriate: allergies, current medications, past family history, past medical history, past social history, past surgical history and problem list     Review of Systems   Constitutional: Positive for activity change, appetite change, fatigue and unexpected weight change  Negative for fever  HENT: Negative for nosebleeds and trouble swallowing  Eyes: Negative for visual disturbance  Respiratory: Negative for chest tightness and shortness of breath      Cardiovascular: Negative for chest pain, palpitations and leg swelling  Gastrointestinal: Negative for abdominal pain, constipation, diarrhea and nausea  Endocrine: Negative for cold intolerance  Genitourinary: Negative for dysuria and urgency  Musculoskeletal: Positive for back pain, gait problem and myalgias  Negative for joint swelling  Skin: Negative for rash  Neurological: Positive for weakness, light-headedness and numbness  Negative for tremors, seizures and syncope  Hematological: Does not bruise/bleed easily  Psychiatric/Behavioral: Negative for hallucinations and suicidal ideas  Objective:      TCM Call (since 7/18/2020)     None      TCM Call (since 7/18/2020)     None          /76 (BP Location: Left arm, Patient Position: Sitting, Cuff Size: Standard)   Pulse 77   Temp 98 2 °F (36 8 °C) (Tympanic)   Resp 18   Ht 5' (1 524 m)   Wt 61 2 kg (135 lb)   SpO2 98%   BMI 26 37 kg/m²     No visits with results within 2 Week(s) from this visit  Latest known visit with results is:   Appointment on 07/21/2020   Component Date Value    TSH 3RD [facility] 07/21/2020 2 630           Physical Exam  Vitals signs and nursing note reviewed  Constitutional:       Appearance: She is well-developed  Comments: [place]    HENT:      Head: Normocephalic and atraumatic  Neck:      Musculoskeletal: Normal range of motion and neck supple  Cardiovascular:      Rate and Rhythm: Normal rate and regular rhythm  Heart sounds: Normal heart sounds  No murmur  Pulmonary:      Effort: Pulmonary effort is normal       Breath sounds: Normal breath sounds  No wheezing or rales  Abdominal:      General: Bowel sounds are normal  There is no distension  Palpations: Abdomen is soft  Tenderness: There is no abdominal tenderness  Musculoskeletal: Normal range of motion  General: No tenderness  Lymphadenopathy:      Cervical: No cervical adenopathy  Skin:     General: Skin is warm and dry        Capillary Refill: Capillary refill takes less than 2 seconds  Findings: No rash  Neurological:      Mental Status: She is alert and oriented to person, place, and time  Cranial Nerves: No cranial nerve deficit  Sensory: No sensory deficit  Motor: No abnormal muscle tone  Psychiatric:         Behavior: Behavior normal          Thought Content:  Thought content normal          Judgment: Judgment normal              Jac Rdz MD  [place] [id number]

## 2020-09-02 LAB — TSH SERPL-ACNC: 2.49 MIU/L (ref 0.4–4.5)

## 2020-10-28 DIAGNOSIS — I10 ESSENTIAL HYPERTENSION: ICD-10-CM

## 2020-10-29 RX ORDER — LOSARTAN POTASSIUM 25 MG/1
TABLET ORAL
Qty: 90 TABLET | Refills: 0 | Status: SHIPPED | OUTPATIENT
Start: 2020-10-29 | End: 2021-01-25 | Stop reason: SDUPTHER

## 2020-11-10 DIAGNOSIS — K52.9 CHRONIC DIARRHEA: ICD-10-CM

## 2020-11-11 RX ORDER — LINACLOTIDE 290 UG/1
CAPSULE, GELATIN COATED ORAL
Qty: 90 CAPSULE | Refills: 0 | Status: SHIPPED | OUTPATIENT
Start: 2020-11-11 | End: 2021-03-03

## 2020-11-24 ENCOUNTER — APPOINTMENT (OUTPATIENT)
Dept: RADIOLOGY | Facility: CLINIC | Age: 85
End: 2020-11-24
Payer: MEDICARE

## 2020-11-24 ENCOUNTER — OFFICE VISIT (OUTPATIENT)
Dept: FAMILY MEDICINE CLINIC | Facility: CLINIC | Age: 85
End: 2020-11-24
Payer: MEDICARE

## 2020-11-24 VITALS
RESPIRATION RATE: 16 BRPM | HEIGHT: 60 IN | DIASTOLIC BLOOD PRESSURE: 82 MMHG | OXYGEN SATURATION: 98 % | HEART RATE: 65 BPM | WEIGHT: 140 LBS | SYSTOLIC BLOOD PRESSURE: 148 MMHG | BODY MASS INDEX: 27.48 KG/M2

## 2020-11-24 DIAGNOSIS — Z00.00 WELL ADULT EXAM: ICD-10-CM

## 2020-11-24 DIAGNOSIS — M79.605 LEG PAIN, DIFFUSE, LEFT: ICD-10-CM

## 2020-11-24 DIAGNOSIS — M79.605 LEG PAIN, DIFFUSE, LEFT: Primary | ICD-10-CM

## 2020-11-24 PROCEDURE — 72110 X-RAY EXAM L-2 SPINE 4/>VWS: CPT

## 2020-11-24 PROCEDURE — G0439 PPPS, SUBSEQ VISIT: HCPCS | Performed by: FAMILY MEDICINE

## 2020-11-24 PROCEDURE — 73502 X-RAY EXAM HIP UNI 2-3 VIEWS: CPT

## 2020-11-24 PROCEDURE — 99214 OFFICE O/P EST MOD 30 MIN: CPT | Performed by: FAMILY MEDICINE

## 2020-11-24 RX ORDER — FEXOFENADINE HCL 180 MG/1
180 TABLET ORAL DAILY
COMMUNITY

## 2021-01-08 ENCOUNTER — TELEPHONE (OUTPATIENT)
Dept: FAMILY MEDICINE CLINIC | Facility: CLINIC | Age: 86
End: 2021-01-08

## 2021-01-08 NOTE — TELEPHONE ENCOUNTER
This patient 89 years called very upset with her insurance  Her insurance plan with [employer] changed and she now has a  $ 445 00 deductible that she needs to pay up front  She advised she was unaware of this change in her insurance until she tried to fill her prescription for LInzess  Patient is very upset because she needs her Linzess  Are you aware of any way she could get this cheaper maybe out of pocket?

## 2021-01-08 NOTE — TELEPHONE ENCOUNTER
No, she can charge it and pay it down that way  I dont think the drug company can help with deductibles either   I dont get samples here   Maybe I can get her to  for a consult with [facility] now that theyre [facility] and they have samples?

## 2021-01-12 ENCOUNTER — OFFICE VISIT (OUTPATIENT)
Dept: FAMILY MEDICINE CLINIC | Facility: CLINIC | Age: 86
End: 2021-01-12
Payer: MEDICARE

## 2021-01-12 VITALS
RESPIRATION RATE: 16 BRPM | WEIGHT: 143 LBS | BODY MASS INDEX: 28.07 KG/M2 | DIASTOLIC BLOOD PRESSURE: 80 MMHG | OXYGEN SATURATION: 98 % | SYSTOLIC BLOOD PRESSURE: 140 MMHG | HEART RATE: 67 BPM | HEIGHT: 60 IN

## 2021-01-12 DIAGNOSIS — C43.71 MALIGNANT MELANOMA OF RIGHT LOWER EXTREMITY INCLUDING HIP (HCC): ICD-10-CM

## 2021-01-12 DIAGNOSIS — H83.2X9 VESTIBULAR DYSFUNCTION, UNSPECIFIED LATERALITY: ICD-10-CM

## 2021-01-12 DIAGNOSIS — G62.9 PERIPHERAL POLYNEUROPATHY: Primary | ICD-10-CM

## 2021-01-12 DIAGNOSIS — I70.213 ATHEROSCLEROSIS OF NATIVE ARTERY OF BOTH LOWER EXTREMITIES WITH INTERMITTENT CLAUDICATION (HCC): ICD-10-CM

## 2021-01-12 DIAGNOSIS — F41.9 ANXIETY: ICD-10-CM

## 2021-01-12 PROCEDURE — 99214 OFFICE O/P EST MOD 30 MIN: CPT | Performed by: FAMILY MEDICINE

## 2021-01-12 RX ORDER — GABAPENTIN 100 MG/1
100 CAPSULE ORAL
Qty: 14 CAPSULE | Refills: 0 | Status: SHIPPED | OUTPATIENT
Start: 2021-01-12 | End: 2021-02-04

## 2021-01-12 NOTE — PROGRESS NOTES
Assessment/Plan:    1  Peripheral polyneuropathy  -     gabapentin (NEURONTIN) 100 mg capsule; Take 1 capsule (100 mg total) by mouth daily at bedtime  -     Ambulatory referral to complex care management program; Future    2  Atherosclerosis of native artery of both lower extremities with intermittent claudication ([facility] )  -     Ambulatory referral to complex care management program; Future    3  Malignant melanoma of right lower extremity including hip ([facility] )    4  Anxiety  -     Ambulatory referral to complex care management program; Future    5  Vestibular dysfunction, unspecified laterality  -     Ambulatory referral to complex care management program; Future         Subjective:      Patient ID: Juancho Cleaning is a 80 y o  female  HPI     Kim Weeks is here as dr Ana Loera recommended me  she suffers from chronic constipation better on linzess  GERD: controlled on  anemia: mild  leg cramps and paresthesia: worst sx today  ckd 3: GFR 39  Hypothyroidism: 1 9 on 25mcg  HTN: controlled on norvasc 2 5mg  vit D def: takes 2K daily  iron def: low nl     Right shoulder  About 2 weeks   Ankle swelling for months - EKG done 1yr ago   Fatigue weakness   Feels hungry but loosing wt   Did weight 160 months ago - but now 140's     Labs with   Emma neg   No chf marker  inflam markers are mostly neg ( sed is 27) not much here  For now   Mag is cool  Iron is low nl but she is 89      She is anemic ( has been for atleast 3 yrs)    can take iron pills  325 bid         Interested in [facility] assisted living or other options   Maybe life [facility]   Lives by herself   Gets scared at night       The following portions of the patient's history were reviewed and updated as appropriate: allergies, current medications, past family history, past medical history, past social history, past surgical history and problem list     Review of Systems   Constitutional: Positive for activity change, appetite change, fatigue and unexpected weight change  Negative for fever  HENT: Negative for nosebleeds and trouble swallowing  Eyes: Negative for visual disturbance  Respiratory: Negative for chest tightness and shortness of breath  Cardiovascular: Negative for chest pain, palpitations and leg swelling  Gastrointestinal: Negative for abdominal pain, constipation, diarrhea and nausea  Endocrine: Negative for cold intolerance  Genitourinary: Negative for dysuria and urgency  Musculoskeletal: Positive for back pain, gait problem and myalgias  Negative for joint swelling  Skin: Negative for rash  Neurological: Positive for weakness, light-headedness and numbness  Negative for tremors, seizures and syncope  Hematological: Does not bruise/bleed easily  Psychiatric/Behavioral: Negative for hallucinations and suicidal ideas  Objective:      /80 (BP Location: Left arm, Patient Position: Sitting, Cuff Size: Standard)   Pulse 67   Resp 16   Ht 5' (1 524 m)   Wt 64 9 kg (143 lb)   SpO2 98%   BMI 27 93 kg/m²     No visits with results within 2 Week(s) from this visit  Latest known visit with results is:   Orders Only on 09/01/2020   Component Date Value    TSH W/RFX TO FREE T4 09/01/2020 2 49           Physical Exam  Vitals signs and nursing note reviewed  Constitutional:       Appearance: She is well-developed  Comments: [place]     HENT:      Head: Normocephalic and atraumatic  Neck:      Musculoskeletal: Normal range of motion and neck supple  Cardiovascular:      Rate and Rhythm: Normal rate and regular rhythm  Heart sounds: Normal heart sounds  No murmur  Pulmonary:      Effort: Pulmonary effort is normal       Breath sounds: Normal breath sounds  No wheezing or rales  Abdominal:      General: Bowel sounds are normal  There is no distension  Palpations: Abdomen is soft  Tenderness: There is no abdominal tenderness  Musculoskeletal: Normal range of motion  General: No tenderness  Lymphadenopathy:      Cervical: No cervical adenopathy  Skin:     General: Skin is warm and dry  Capillary Refill: Capillary refill takes less than 2 seconds  Findings: No rash  Neurological:      Mental Status: She is alert and oriented to person, place, and time  Cranial Nerves: No cranial nerve deficit  Sensory: No sensory deficit  Motor: Weakness present  No abnormal muscle tone  Gait: Gait abnormal    Psychiatric:         Behavior: Behavior normal          Thought Content:  Thought content normal          Judgment: Judgment normal               Nova Hernández MD  [place] [id number]

## 2021-01-13 ENCOUNTER — PATIENT OUTREACH (OUTPATIENT)
Dept: FAMILY MEDICINE CLINIC | Facility: CLINIC | Age: 86
End: 2021-01-13

## 2021-01-15 ENCOUNTER — PATIENT OUTREACH (OUTPATIENT)
Dept: FAMILY MEDICINE CLINIC | Facility: CLINIC | Age: 86
End: 2021-01-15

## 2021-01-15 NOTE — PROGRESS NOTES
Pleasant conversation with patient  She is receptive to outreach  Lives in her own home, ranch with basement and attic  Washer Dryer in basement  Presently dryer is "broken" and she drives to local laundromat for her laundry  She drives independently short distances within her neighborhood  Has supportive neighbors  Has a son that lives in [place]  Uses a cane only when walking outside  Able to complete her own personal care  She is interested in in home personal care aide for a "couple hours/day" when she is tired  Provided phone number for [facility] [phone]  She will call for additional information and pricing  Inquired about assisted living facilities and pt prefers at this time to remain in her own home  Makes her own meals  Had Meals on Wheels in the past and declines a referral at present  "I wasted most of the meals and did not like them"  Pt did express that at times feels "isolated"  Discussed [address] program to receive a social telephone call once a week  Pt agrees to same  OPCM placed referral   Will continue to follow  Provided my name and contact information to patient

## 2021-01-23 DIAGNOSIS — I10 ESSENTIAL HYPERTENSION: ICD-10-CM

## 2021-01-25 DIAGNOSIS — I10 ESSENTIAL HYPERTENSION: ICD-10-CM

## 2021-01-25 RX ORDER — LOSARTAN POTASSIUM 25 MG/1
25 TABLET ORAL DAILY
Qty: 90 TABLET | Refills: 1 | Status: SHIPPED | OUTPATIENT
Start: 2021-01-25 | End: 2021-04-05 | Stop reason: ALTCHOICE

## 2021-01-25 RX ORDER — LOSARTAN POTASSIUM 25 MG/1
TABLET ORAL
Qty: 90 TABLET | Refills: 0 | Status: SHIPPED | OUTPATIENT
Start: 2021-01-25 | End: 2021-04-25

## 2021-01-26 ENCOUNTER — IMMUNIZATIONS (OUTPATIENT)
Dept: FAMILY MEDICINE CLINIC | Facility: HOSPITAL | Age: 86
End: 2021-01-26

## 2021-01-26 ENCOUNTER — PATIENT OUTREACH (OUTPATIENT)
Dept: FAMILY MEDICINE CLINIC | Facility: CLINIC | Age: 86
End: 2021-01-26

## 2021-01-26 DIAGNOSIS — Z23 ENCOUNTER FOR IMMUNIZATION: Primary | ICD-10-CM

## 2021-01-26 PROCEDURE — 91301 SARS-COV-2 / COVID-19 MRNA VACCINE (MODERNA) 100 MCG: CPT

## 2021-01-26 PROCEDURE — 0011A SARS-COV-2 / COVID-19 MRNA VACCINE (MODERNA) 100 MCG: CPT

## 2021-01-28 ENCOUNTER — PATIENT OUTREACH (OUTPATIENT)
Dept: FAMILY MEDICINE CLINIC | Facility: CLINIC | Age: 86
End: 2021-01-28

## 2021-01-28 NOTE — PROGRESS NOTES
Spoke with [facility]  services, they spoke with patient about in home personal care  She states patient is interested but did not want to schedule the initial intake visit  Will continue to keep in touch with patient and schedule when she is ready

## 2021-01-28 NOTE — PROGRESS NOTES
Follow up call with patient  Reintroduced self to patient as Outpatient care manager with Dr Gordon Haley office  Pt reports she has had many calls lately and has trouble "keeping track of everyone"  She did verify that she was contacted by the [facility] for weekly phone calls  She declines to participate at this time  She did verify that she spoke with [facility] but is "not ready to schedule them"  She has never had "help in the home before"  Inquired if OPCM should contact her son or daughter in law and she states "I am old enough to make my own decisions"  Pt continues to go out to [place] as her dryer is broken  Suggested possibly she discuss in home care and repair of dryer with her son and she declined  Pt is aware of her PCP appointment on 2/1/21  Declines additional assistance at this time

## 2021-02-04 ENCOUNTER — TELEPHONE (OUTPATIENT)
Dept: FAMILY MEDICINE CLINIC | Facility: CLINIC | Age: 86
End: 2021-02-04

## 2021-02-04 DIAGNOSIS — G62.9 PERIPHERAL POLYNEUROPATHY: ICD-10-CM

## 2021-02-04 RX ORDER — GABAPENTIN 100 MG/1
CAPSULE ORAL
Qty: 30 CAPSULE | Refills: 0 | Status: SHIPPED | OUTPATIENT
Start: 2021-02-04 | End: 2021-03-05

## 2021-02-04 NOTE — TELEPHONE ENCOUNTER
Patient called, did not want to go through refill line, it confuses her    She needs:  Gabapentin 100mg

## 2021-02-19 ENCOUNTER — PATIENT OUTREACH (OUTPATIENT)
Dept: FAMILY MEDICINE CLINIC | Facility: CLINIC | Age: 86
End: 2021-02-19

## 2021-02-19 NOTE — PROGRESS NOTES
Pt reports she has had problems with her "electricity" and had to have the "cable repaired with [employer] yesterday  Pt did not recall who this outpatient care manager was, provided my name and number several times  Offered to contact her son to assist with home repairs and additional assistance  Pt refuses and states he "lives too far away"  She did recall our previous conversation about the [facility] but states she would like to "wait until the weather is nicer"  Will continue to follow next month

## 2021-02-22 ENCOUNTER — IMMUNIZATIONS (OUTPATIENT)
Dept: FAMILY MEDICINE CLINIC | Facility: HOSPITAL | Age: 86
End: 2021-02-22

## 2021-02-22 DIAGNOSIS — Z23 ENCOUNTER FOR IMMUNIZATION: Primary | ICD-10-CM

## 2021-02-22 PROCEDURE — 0012A SARS-COV-2 / COVID-19 MRNA VACCINE (MODERNA) 100 MCG: CPT

## 2021-02-22 PROCEDURE — 91301 SARS-COV-2 / COVID-19 MRNA VACCINE (MODERNA) 100 MCG: CPT

## 2021-02-25 ENCOUNTER — PATIENT OUTREACH (OUTPATIENT)
Dept: FAMILY MEDICINE CLINIC | Facility: CLINIC | Age: 86
End: 2021-02-25

## 2021-02-25 NOTE — PROGRESS NOTES
Received a voicemail from daughter in law  Returned her call, left message on voicemail with request for return call

## 2021-02-27 ENCOUNTER — TELEPHONE (OUTPATIENT)
Dept: OTHER | Facility: OTHER | Age: 86
End: 2021-02-27

## 2021-02-27 NOTE — TELEPHONE ENCOUNTER
Patient called the answering service stating she has an appointment on 3/2/2021 @ 4:20 pm and she is asking for it to either be changed to an earlier time that day or re scheduled to a different day   Thank  you

## 2021-03-01 NOTE — TELEPHONE ENCOUNTER
Spoke w/ patient- rs to 4/5 (patient needs appt between 11a-1p due to  availability) I also put her on the cancellation list for a sooner appointment

## 2021-03-02 DIAGNOSIS — K52.9 CHRONIC DIARRHEA: ICD-10-CM

## 2021-03-03 DIAGNOSIS — K52.9 CHRONIC DIARRHEA: ICD-10-CM

## 2021-03-03 RX ORDER — LINACLOTIDE 290 UG/1
CAPSULE, GELATIN COATED ORAL
Qty: 90 CAPSULE | Refills: 0 | Status: SHIPPED | OUTPATIENT
Start: 2021-03-03 | End: 2021-03-03 | Stop reason: SDUPTHER

## 2021-03-03 RX ORDER — LINACLOTIDE 290 UG/1
1 CAPSULE, GELATIN COATED ORAL DAILY
Qty: 90 CAPSULE | Refills: 1 | Status: SHIPPED | OUTPATIENT
Start: 2021-03-03 | End: 2021-12-21 | Stop reason: SDUPTHER

## 2021-03-04 DIAGNOSIS — R42 VERTIGO: ICD-10-CM

## 2021-03-04 DIAGNOSIS — G62.9 PERIPHERAL POLYNEUROPATHY: ICD-10-CM

## 2021-03-04 RX ORDER — MECLIZINE HCL 12.5 MG/1
TABLET ORAL
Qty: 90 TABLET | Refills: 0 | Status: SHIPPED | OUTPATIENT
Start: 2021-03-04 | End: 2021-06-14 | Stop reason: SDUPTHER

## 2021-03-05 RX ORDER — GABAPENTIN 100 MG/1
CAPSULE ORAL
Qty: 30 CAPSULE | Refills: 2 | Status: SHIPPED | OUTPATIENT
Start: 2021-03-05 | End: 2021-06-02

## 2021-03-15 ENCOUNTER — OFFICE VISIT (OUTPATIENT)
Dept: GASTROENTEROLOGY | Facility: CLINIC | Age: 86
End: 2021-03-15
Payer: MEDICARE

## 2021-03-15 VITALS
HEIGHT: 60 IN | DIASTOLIC BLOOD PRESSURE: 82 MMHG | BODY MASS INDEX: 27.48 KG/M2 | SYSTOLIC BLOOD PRESSURE: 146 MMHG | WEIGHT: 140 LBS | HEART RATE: 88 BPM

## 2021-03-15 DIAGNOSIS — Z85.038 HISTORY OF COLON CANCER: ICD-10-CM

## 2021-03-15 DIAGNOSIS — D50.9 IRON DEFICIENCY ANEMIA, UNSPECIFIED IRON DEFICIENCY ANEMIA TYPE: ICD-10-CM

## 2021-03-15 DIAGNOSIS — K59.09 CHRONIC CONSTIPATION: Primary | ICD-10-CM

## 2021-03-15 PROCEDURE — 99213 OFFICE O/P EST LOW 20 MIN: CPT | Performed by: INTERNAL MEDICINE

## 2021-03-15 NOTE — PROGRESS NOTES
[place] [facility] - Outpatient Follow-up Note  Asif Crane 80 y o  female MRN: [id number]  Encounter: [id number]          ASSESSMENT AND PLAN:      1  Chronic constipation  Stable on Linzess 290mcg daily, Senokot S 2 tablets at night    -Continue current regimen      2  Iron deficiency anemia, unspecified iron deficiency anemia type  Denies any evidence of GI bleeding  Follows with Dr Rabia Camarillo for iron infusions, prolia injections   -Continue management per Dr Rabia Camarillo    3  History of colon cancer  Up to date on colonoscopy screening  Colonoscopy 7-16-20: small colon polyp removed, anastomotic site in left colon appeared normal, left sided diverticulosis, internal hemorrhoid  Biopsy of colon polyp c/w tubular adenoma      ______________________________________________________________________    SUBJECTIVE:  This is a very pleasant 80year old female with history of iron deficiency anemia on iron infusions, hypothyroidism, anxiety, chronic constipation on Linzess, and history of colon cancer s/p resection who presents for follow up  She follows with hematologist Dr Rabia Camarillo for iron infusions, Prolia injections  She is on Linzess 290mcg which works well for her  She takes 2 Senakot S at night daily  Recent labs from Dr Yvon Anglin office reviewed-Hgb 10 5, , Iron 62  Denies any dysphagia, heartburn, reflux, nausea/vomiting, abdominal pain, constipation, diarrhea, melena, hematochezia, weight loss  Last colonoscopy in July 2020  She has gotten her Covid vaccine  Still complaining of fatigue and vertigo  Recent labs reviewed  REVIEW OF SYSTEMS IS OTHERWISE NEGATIVE        Historical Information   Past Medical History:   Diagnosis Date    Allergic rhinitis     Disease of thyroid gland     Dizziness     Hypertension     last assessed: 12/13/2016    Malignant neoplasm of colon ([facility] )     last assessed: 10/20/2015    Migraine     Peripheral neuropathy     last assessed: 02/23/2016  Tinnitus      Past Surgical History:   Procedure Laterality Date    ADENOIDECTOMY      APPENDECTOMY      last assessed: 10/20/2015    CHOLECYSTECTOMY      last assessed: 10/20/2015    COLECTOMY      last assessed: 10/20/2015; partial     TONSILLECTOMY      last assessed: 10/20/2015     Social History   Social History     Substance and Sexual Activity   Alcohol Use No     Social History     Substance and Sexual Activity   Drug Use Never     Social History     Tobacco Use   Smoking Status Former Smoker    Packs/day: 0 25   Smokeless Tobacco Never Used     Family History   Problem Relation Age of Onset    Heart disease Mother         cardiac disorder    Alzheimer's disease Sister     No Known Problems Father     Leukemia Brother        Meds/Allergies       Current Outpatient Medications:     aspirin 81 MG tablet    Calcium-Vitamin D 600-200 MG-UNIT per tablet    denosumab (PROLIA) 60 mg/mL    fexofenadine (ALLEGRA) 180 MG tablet    gabapentin (NEURONTIN) 100 mg capsule    linaCLOtide (Linzess) 290 MCG CAPS    losartan (COZAAR) 25 mg tablet    meclizine (ANTIVERT) 12 5 MG tablet    senna-docusate sodium (SENOKOT S) 8 6-50 mg per tablet    losartan (COZAAR) 25 mg tablet    losartan (COZAAR) 25 mg tablet    Allergies   Allergen Reactions    Penicillins Hives     Other reaction(s): Other (See Comments)  hives           Objective     Blood pressure 146/82, pulse 88, height 5' (1 524 m), weight 63 5 kg (140 lb)  Body mass index is 27 34 kg/m²  PHYSICAL EXAM:      General Appearance:   Alert, cooperative, no distress  Hard of hearing   HEENT:   Normocephalic, atraumatic, anicteric  Neck:  Supple, symmetrical, trachea midline   Lungs:   Clear to auscultation bilaterally; no rales, rhonchi or wheezing; respirations unlabored    Heart[de-identified]   Regular rate and rhythm; no murmur  Abdomen:   Soft, non-tender, non-distended; normal bowel sounds; no masses, no organomegaly   Back brace in place Genitalia:   Deferred    Rectal:   Deferred    Extremities:  No cyanosis, clubbing or edema; uses a cane for ambulation   Skin:  No jaundice, rashes, or lesions    Lymph nodes:  No palpable cervical lymphadenopathy        Lab Results:   No visits with results within 1 Day(s) from this visit  Latest known visit with results is:   Orders Only on 09/01/2020   Component Date Value    TSH W/RFX TO FREE T4 09/01/2020 2 49          Radiology Results:   No results found

## 2021-04-01 ENCOUNTER — PATIENT OUTREACH (OUTPATIENT)
Dept: FAMILY MEDICINE CLINIC | Facility: CLINIC | Age: 86
End: 2021-04-01

## 2021-04-01 NOTE — PROGRESS NOTES
Follow up call with patient  Reintroduced self, she does not recall previous conversations with outpatient care manager  She states she is doing well  She is aware she has an appointment with Dr Kathleen Montoya on 4/5/21  Her friend will transport her to [facility]  She verified that she has all medications in the home  She states her "neuropathy" continues and is "the same"  She rubs her legs "with rubbing alcohol every evening"  She verified that she has groceries and is eating well  Denies further questions at this time

## 2021-04-05 ENCOUNTER — OFFICE VISIT (OUTPATIENT)
Dept: FAMILY MEDICINE CLINIC | Facility: CLINIC | Age: 86
End: 2021-04-05
Payer: MEDICARE

## 2021-04-05 VITALS
RESPIRATION RATE: 16 BRPM | BODY MASS INDEX: 27.48 KG/M2 | DIASTOLIC BLOOD PRESSURE: 78 MMHG | OXYGEN SATURATION: 98 % | WEIGHT: 140 LBS | HEIGHT: 60 IN | SYSTOLIC BLOOD PRESSURE: 124 MMHG | HEART RATE: 85 BPM

## 2021-04-05 DIAGNOSIS — H81.10 BENIGN PAROXYSMAL POSITIONAL VERTIGO, UNSPECIFIED LATERALITY: ICD-10-CM

## 2021-04-05 DIAGNOSIS — G60.9 IDIOPATHIC PERIPHERAL NEUROPATHY: Primary | ICD-10-CM

## 2021-04-05 DIAGNOSIS — I10 ESSENTIAL HYPERTENSION: ICD-10-CM

## 2021-04-05 DIAGNOSIS — D50.9 IRON DEFICIENCY ANEMIA, UNSPECIFIED IRON DEFICIENCY ANEMIA TYPE: ICD-10-CM

## 2021-04-05 PROCEDURE — 99214 OFFICE O/P EST MOD 30 MIN: CPT | Performed by: FAMILY MEDICINE

## 2021-04-05 NOTE — ASSESSMENT & PLAN NOTE
Seeing jong   -stable/controlled, continue same treatment   Will evaluate again next visit   On prolia

## 2021-04-05 NOTE — PROGRESS NOTES
Assessment/Plan:        Consider life alert - call the son to get it started   (pay the bill)     1  Idiopathic peripheral neuropathy  Assessment & Plan:  Mali Adams is not really helping       2  Benign paroxysmal positional vertigo, unspecified laterality  Assessment & Plan:  Meclizine prn   -stable/controlled, continue same treatment  Will evaluate again next visit         3  Essential hypertension  Assessment & Plan:  Losartan 25mg   -stable/controlled, continue same treatment  Will evaluate again next visit         4  Iron deficiency anemia, unspecified iron deficiency anemia type       Subjective:      Patient ID: Christie Reynolds is a 80 y o  female  HPI    she suffers from chronic constipation better on linzess  anemia: mild  leg cramps and paresthesia: worst sx today  ckd 3: GFR 39  Hypothyroidism: 1 9 on 25mcg  - stopped it and TSH is fine   HTN: controlled on losartan 25mg   vit D def: takes 2K daily  iron def: low nl    Fatigue weakness   Feels hungry but loosing wt   Did weight 160 months ago - but now 140's     Labs with   Emma neg   No chf marker  inflam markers are mostly neg ( sed is 27) not much here  For now   Mag is cool  Iron is low nl   She is anemic ( has been for atleast 3 yrs)   Seeing jong     Interested in [facility] assisted living or other options   Maybe life alert   Lives by herself   Gets scared at night         The following portions of the patient's history were reviewed and updated as appropriate: allergies, current medications, past family history, past medical history, past social history, past surgical history and problem list     Review of Systems   Constitutional: Positive for activity change, appetite change, fatigue and unexpected weight change  Negative for fever  HENT: Negative for nosebleeds and trouble swallowing  Eyes: Negative for visual disturbance  Respiratory: Negative for chest tightness and shortness of breath      Cardiovascular: Negative for chest pain, palpitations and leg swelling  Gastrointestinal: Negative for abdominal pain, constipation, diarrhea and nausea  Endocrine: Negative for cold intolerance  Genitourinary: Negative for dysuria and urgency  Musculoskeletal: Positive for back pain, gait problem and myalgias  Negative for joint swelling  Skin: Negative for rash  Neurological: Positive for weakness, light-headedness and numbness  Negative for tremors, seizures and syncope  Hematological: Does not bruise/bleed easily  Psychiatric/Behavioral: Negative for hallucinations and suicidal ideas  Objective:      /78 (BP Location: Left arm, Patient Position: Sitting, Cuff Size: Standard)   Pulse 85   Resp 16   Ht 5' (1 524 m)   Wt 63 5 kg (140 lb)   SpO2 98%   BMI 27 34 kg/m²     No visits with results within 2 Week(s) from this visit  Latest known visit with results is:   Orders Only on 09/01/2020   Component Date Value    TSH W/RFX TO FREE T4 09/01/2020 2 49           Physical Exam  Vitals signs and nursing note reviewed  Constitutional:       Appearance: She is well-developed  Comments: [place]     HENT:      Head: Normocephalic and atraumatic  Neck:      Musculoskeletal: Normal range of motion and neck supple  Cardiovascular:      Rate and Rhythm: Normal rate and regular rhythm  Heart sounds: Normal heart sounds  No murmur  Pulmonary:      Effort: Pulmonary effort is normal       Breath sounds: Normal breath sounds  No wheezing or rales  Abdominal:      General: Bowel sounds are normal  There is no distension  Palpations: Abdomen is soft  Tenderness: There is no abdominal tenderness  Musculoskeletal: Normal range of motion  General: No tenderness  Lymphadenopathy:      Cervical: No cervical adenopathy  Skin:     General: Skin is warm and dry  Capillary Refill: Capillary refill takes less than 2 seconds  Findings: No rash     Neurological:      Mental Status: She is alert and oriented to person, place, and time  Cranial Nerves: No cranial nerve deficit  Sensory: No sensory deficit  Motor: Weakness present  No abnormal muscle tone  Gait: Gait abnormal    Psychiatric:         Behavior: Behavior normal          Thought Content: Thought content normal          Judgment: Judgment normal            BMI Counseling: Body mass index is 27 34 kg/m²  The BMI is above normal  Nutrition recommendations include decreasing portion sizes, encouraging healthy choices of fruits and vegetables and limiting drinks that contain sugar  Exercise recommendations include moderate physical activity 150 minutes/week  No pharmacotherapy was ordered  Falls Plan of Care: balance, strength, and gait training instructions were provided  Medications that increase falls were reviewed         David Kimbrough MD  [place] [id number]

## 2021-04-05 NOTE — ASSESSMENT & PLAN NOTE
Not quite ready for PM or ortho     tyenolol bhavesh rowe does help   But doesn't want to take it once daily

## 2021-04-16 ENCOUNTER — TELEPHONE (OUTPATIENT)
Dept: FAMILY MEDICINE CLINIC | Facility: CLINIC | Age: 86
End: 2021-04-16

## 2021-04-16 NOTE — TELEPHONE ENCOUNTER
Patient called and stated she spoke with you at the last visit about the pain in her leg  She said you told her about the CBD oil  She states she wants to try it but she needs a script

## 2021-04-25 DIAGNOSIS — I10 ESSENTIAL HYPERTENSION: ICD-10-CM

## 2021-04-25 RX ORDER — LOSARTAN POTASSIUM 25 MG/1
TABLET ORAL
Qty: 90 TABLET | Refills: 1 | Status: SHIPPED | OUTPATIENT
Start: 2021-04-25 | End: 2021-10-29

## 2021-05-17 ENCOUNTER — PATIENT OUTREACH (OUTPATIENT)
Dept: FAMILY MEDICINE CLINIC | Facility: CLINIC | Age: 86
End: 2021-05-17

## 2021-05-17 NOTE — PROGRESS NOTES
Final call to patient  She reports she is "doing well" but still has "aches and pains"  She is using CBD oil to her knees  Reports pain was "a little less yesterday"  No further complex needs at this time  Will close to care management

## 2021-06-01 DIAGNOSIS — G62.9 PERIPHERAL POLYNEUROPATHY: ICD-10-CM

## 2021-06-02 DIAGNOSIS — G62.9 PERIPHERAL POLYNEUROPATHY: ICD-10-CM

## 2021-06-02 RX ORDER — GABAPENTIN 100 MG/1
CAPSULE ORAL
Qty: 30 CAPSULE | Refills: 0 | Status: SHIPPED | OUTPATIENT
Start: 2021-06-02 | End: 2021-06-02 | Stop reason: SDUPTHER

## 2021-06-02 RX ORDER — GABAPENTIN 100 MG/1
100 CAPSULE ORAL
Qty: 30 CAPSULE | Refills: 0 | Status: SHIPPED | OUTPATIENT
Start: 2021-06-02 | End: 2021-07-02

## 2021-06-03 ENCOUNTER — HOSPITAL ENCOUNTER (EMERGENCY)
Facility: HOSPITAL | Age: 86
Discharge: HOME | End: 2021-06-03
Attending: EMERGENCY MEDICINE | Admitting: EMERGENCY MEDICINE
Payer: MEDICARE

## 2021-06-03 ENCOUNTER — TELEPHONE (OUTPATIENT)
Dept: FAMILY MEDICINE CLINIC | Facility: CLINIC | Age: 86
End: 2021-06-03

## 2021-06-03 ENCOUNTER — APPOINTMENT (EMERGENCY)
Dept: RADIOLOGY | Facility: HOSPITAL | Age: 86
End: 2021-06-03
Payer: MEDICARE

## 2021-06-03 ENCOUNTER — APPOINTMENT (EMERGENCY)
Dept: CT IMAGING | Facility: HOSPITAL | Age: 86
End: 2021-06-03
Payer: MEDICARE

## 2021-06-03 VITALS
SYSTOLIC BLOOD PRESSURE: 157 MMHG | HEART RATE: 66 BPM | TEMPERATURE: 98.7 F | OXYGEN SATURATION: 97 % | RESPIRATION RATE: 18 BRPM | DIASTOLIC BLOOD PRESSURE: 74 MMHG

## 2021-06-03 DIAGNOSIS — R42 DIZZINESS: Primary | ICD-10-CM

## 2021-06-03 DIAGNOSIS — D64.9 ANEMIA: ICD-10-CM

## 2021-06-03 LAB
ALBUMIN SERPL BCP-MCNC: 3.5 G/DL (ref 3.5–5)
ALP SERPL-CCNC: 51 U/L (ref 46–116)
ALT SERPL W P-5'-P-CCNC: 17 U/L (ref 12–78)
ANION GAP SERPL CALCULATED.3IONS-SCNC: 7 MMOL/L (ref 4–13)
AST SERPL W P-5'-P-CCNC: 13 U/L (ref 5–45)
BACTERIA UR QL AUTO: NORMAL /HPF
BASOPHILS # BLD AUTO: 0.05 THOUSANDS/ΜL (ref 0–0.1)
BASOPHILS NFR BLD AUTO: 1 % (ref 0–1)
BILIRUB SERPL-MCNC: 0.29 MG/DL (ref 0.2–1)
BILIRUB UR QL STRIP: NEGATIVE
BUN SERPL-MCNC: 20 MG/DL (ref 5–25)
CALCIUM SERPL-MCNC: 8.7 MG/DL (ref 8.3–10.1)
CHLORIDE SERPL-SCNC: 104 MMOL/L (ref 100–108)
CLARITY UR: CLEAR
CO2 SERPL-SCNC: 24 MMOL/L (ref 21–32)
COLOR UR: ABNORMAL
CREAT SERPL-MCNC: 1.1 MG/DL (ref 0.6–1.3)
EOSINOPHIL # BLD AUTO: 0.07 THOUSAND/ΜL (ref 0–0.61)
EOSINOPHIL NFR BLD AUTO: 1 % (ref 0–6)
ERYTHROCYTE [DISTWIDTH] IN BLOOD BY AUTOMATED COUNT: 12.2 % (ref 11.6–15.1)
GFR SERPL CREATININE-BSD FRML MDRD: 44 ML/MIN/1.73SQ M
GLUCOSE SERPL-MCNC: 97 MG/DL (ref 65–140)
GLUCOSE UR STRIP-MCNC: NEGATIVE MG/DL
HCT VFR BLD AUTO: 32.9 % (ref 34.8–46.1)
HGB BLD-MCNC: 10.8 G/DL (ref 11.5–15.4)
HGB UR QL STRIP.AUTO: NEGATIVE
IMM GRANULOCYTES # BLD AUTO: 0.03 THOUSAND/UL (ref 0–0.2)
IMM GRANULOCYTES NFR BLD AUTO: 1 % (ref 0–2)
KETONES UR STRIP-MCNC: NEGATIVE MG/DL
LEUKOCYTE ESTERASE UR QL STRIP: ABNORMAL
LYMPHOCYTES # BLD AUTO: 0.57 THOUSANDS/ΜL (ref 0.6–4.47)
LYMPHOCYTES NFR BLD AUTO: 10 % (ref 14–44)
MCH RBC QN AUTO: 33.6 PG (ref 26.8–34.3)
MCHC RBC AUTO-ENTMCNC: 32.8 G/DL (ref 31.4–37.4)
MCV RBC AUTO: 103 FL (ref 82–98)
MONOCYTES # BLD AUTO: 0.48 THOUSAND/ΜL (ref 0.17–1.22)
MONOCYTES NFR BLD AUTO: 8 % (ref 4–12)
NEUTROPHILS # BLD AUTO: 4.5 THOUSANDS/ΜL (ref 1.85–7.62)
NEUTS SEG NFR BLD AUTO: 79 % (ref 43–75)
NITRITE UR QL STRIP: NEGATIVE
NON-SQ EPI CELLS URNS QL MICRO: NORMAL /HPF
NRBC BLD AUTO-RTO: 0 /100 WBCS
PH UR STRIP.AUTO: 5.5 [PH]
PLATELET # BLD AUTO: 262 THOUSANDS/UL (ref 149–390)
PMV BLD AUTO: 9.7 FL (ref 8.9–12.7)
POTASSIUM SERPL-SCNC: 4.4 MMOL/L (ref 3.5–5.3)
PROT SERPL-MCNC: 6.7 G/DL (ref 6.4–8.2)
PROT UR STRIP-MCNC: NEGATIVE MG/DL
RBC # BLD AUTO: 3.21 MILLION/UL (ref 3.81–5.12)
RBC #/AREA URNS AUTO: NORMAL /HPF
SODIUM SERPL-SCNC: 135 MMOL/L (ref 136–145)
SP GR UR STRIP.AUTO: 1.01 (ref 1–1.03)
TROPONIN I SERPL-MCNC: 0.03 NG/ML
TSH SERPL DL<=0.05 MIU/L-ACNC: 2.04 UIU/ML (ref 0.36–3.74)
UROBILINOGEN UR QL STRIP.AUTO: 0.2 E.U./DL
WBC # BLD AUTO: 5.7 THOUSAND/UL (ref 4.31–10.16)
WBC #/AREA URNS AUTO: NORMAL /HPF

## 2021-06-03 PROCEDURE — 99285 EMERGENCY DEPT VISIT HI MDM: CPT

## 2021-06-03 PROCEDURE — 85025 COMPLETE CBC W/AUTO DIFF WBC: CPT | Performed by: PHYSICIAN ASSISTANT

## 2021-06-03 PROCEDURE — 71046 X-RAY EXAM CHEST 2 VIEWS: CPT

## 2021-06-03 PROCEDURE — 84443 ASSAY THYROID STIM HORMONE: CPT | Performed by: PHYSICIAN ASSISTANT

## 2021-06-03 PROCEDURE — 80053 COMPREHEN METABOLIC PANEL: CPT | Performed by: PHYSICIAN ASSISTANT

## 2021-06-03 PROCEDURE — 36415 COLL VENOUS BLD VENIPUNCTURE: CPT | Performed by: PHYSICIAN ASSISTANT

## 2021-06-03 PROCEDURE — 81001 URINALYSIS AUTO W/SCOPE: CPT | Performed by: PHYSICIAN ASSISTANT

## 2021-06-03 PROCEDURE — 93005 ELECTROCARDIOGRAM TRACING: CPT

## 2021-06-03 PROCEDURE — 70450 CT HEAD/BRAIN W/O DYE: CPT

## 2021-06-03 PROCEDURE — 84484 ASSAY OF TROPONIN QUANT: CPT | Performed by: PHYSICIAN ASSISTANT

## 2021-06-03 PROCEDURE — 99285 EMERGENCY DEPT VISIT HI MDM: CPT | Performed by: PHYSICIAN ASSISTANT

## 2021-06-03 RX ORDER — CHOLECALCIFEROL (VITAMIN D3) 1250 MCG
CAPSULE ORAL
COMMUNITY
End: 2022-05-03 | Stop reason: SDUPTHER

## 2021-06-03 RX ORDER — FLUOXETINE 10 MG/1
CAPSULE ORAL
COMMUNITY
End: 2021-06-14

## 2021-06-03 NOTE — TELEPHONE ENCOUNTER
Patient called advising that she has not felt well for 2 days she is very weak and dizzy  Unable to drive  Patient was advised to go to the ER by myself and Dr Chucky Li  Either call the Emergency Squad or have a friend or family member take her

## 2021-06-03 NOTE — ED PROVIDER NOTES
History  Chief Complaint   Patient presents with    Dizziness     pt started with dizziness yesterday  denies cp/sob  able to walk with steady gait using her cane     Neel Eden is a 80 y o  female with a past medical history of hypertension and colon cancer who presents to the ED with complaints of denies weakness and dizziness over the past 2 days  Patient reports that her bilateral legs feel weak and wobbly  Patient describes her dizziness as feeling like her head is spinning and she may fall  Patient has a history of chronic tinnitus  Patient states symptoms are worse with ambulating  Patient reports a history of vertigo but symptoms do not feel similar to previous issues  Patient reports some left hip and knee pain and reports that she has difficulty going from standing to sitting secondary to pain but when she starts walking she is able to ambulate with her cane  Patient is accompanied by her neighbor who drove the patient to the emergency room  Denies cough, congestion, sore throat, shortness of breath, chest pain, palpitations, leg pain, leg swelling, numbness, tingling, weakness, dysarthria, facial droop, neck pain, neck stiffness, headache, ear pain, abdominal pain, nausea, vomiting, diarrhea, constipation, blood in stool, urinary frequency, urinary urgency, hematuria, dysuria, fever, chills  Patient has a previous history of colon cancer  Patient reports she is followed closely by Dr Mauricio Lynn and had a recent colonoscopy which was significant for hemorrhoids         History provided by:  Patient  Dizziness  Quality:  Head spinning and lightheadedness  Duration:  2 days  Progression:  Worsening  Context: standing up    Associated symptoms: tinnitus (Chronic) and weakness    Associated symptoms: no blood in stool, no chest pain, no diarrhea, no headaches, no hearing loss, no nausea, no palpitations, no shortness of breath, no syncope, no vision changes and no vomiting        Prior to Admission Medications   Prescriptions Last Dose Informant Patient Reported? Taking?    Calcium-Vitamin D 600-200 MG-UNIT per tablet Past Week at Unknown time Self Yes Yes   Sig: Take by mouth   aspirin 81 MG tablet 2021 at Unknown time  Yes Yes   Sig: Take 81 mg by mouth   denosumab (PROLIA) 60 mg/mL More than a month at Unknown time Self Yes No   Si mL   fexofenadine (ALLEGRA) 180 MG tablet 2021 at Unknown time  Yes Yes   Sig: Take 180 mg by mouth daily   gabapentin (NEURONTIN) 100 mg capsule 2021 at Unknown time  No Yes   Sig: Take 1 capsule (100 mg total) by mouth daily at bedtime   linaCLOtide (Linzess) 290 MCG CAPS 6/3/2021 at Unknown time  No Yes   Sig: Take 1 capsule by mouth daily   losartan (COZAAR) 25 mg tablet 6/3/2021 at Unknown time  No Yes   Sig: Take 1 tablet by mouth once daily   meclizine (ANTIVERT) 12 5 MG tablet More than a month at Unknown time  No No   Sig: TAKE 1 TABLET BY MOUTH EVERY 8 HOURS AS NEEDED FOR  DIZZINESS   senna-docusate sodium (SENOKOT S) 8 6-50 mg per tablet 6/3/2021 at Unknown time Self Yes Yes   Sig: Take 2 tablets by mouth daily      Facility-Administered Medications: None       Past Medical History:   Diagnosis Date    Allergic rhinitis     Disease of thyroid gland     Dizziness     Hypertension     last assessed: 2016    Malignant neoplasm of colon ([facility] [address] )     last assessed: 10/20/2015    Migraine     Peripheral neuropathy     last assessed: 2016    Tinnitus        Past Surgical History:   Procedure Laterality Date    ADENOIDECTOMY      APPENDECTOMY      last assessed: 10/20/2015    CHOLECYSTECTOMY      last assessed: 10/20/2015    COLECTOMY      last assessed: 10/20/2015; partial     TONSILLECTOMY      last assessed: 10/20/2015       Family History   Problem Relation Age of Onset    Heart disease Mother         cardiac disorder    Alzheimer's disease Sister     No Known Problems Father     Leukemia Brother      I have reviewed and agree with the history as documented  E-Cigarette/Vaping     E-Cigarette/Vaping Substances     Social History     Tobacco Use    Smoking status: Former Smoker     Packs/day: 0 25    Smokeless tobacco: Never Used   Substance Use Topics    Alcohol use: No    Drug use: Never       Review of Systems   Constitutional: Positive for unexpected weight change (20 lb weight  loss (PCP is aware and following))  Negative for appetite change, chills and fever  HENT: Positive for tinnitus (Chronic)  Negative for congestion, drooling, ear pain, hearing loss, rhinorrhea, sore throat, trouble swallowing and voice change  Eyes: Negative for pain, discharge, redness and visual disturbance  Respiratory: Negative for cough, shortness of breath, wheezing and stridor  Cardiovascular: Negative for chest pain, palpitations, leg swelling and syncope  Gastrointestinal: Negative for abdominal pain, blood in stool, constipation, diarrhea, nausea and vomiting  Genitourinary: Negative for dysuria, flank pain, frequency, hematuria and urgency  Musculoskeletal: Negative for gait problem, joint swelling, neck pain and neck stiffness  Skin: Negative for color change and rash  Neurological: Positive for dizziness and weakness  Negative for seizures, syncope, facial asymmetry, light-headedness and headaches  Physical Exam  Physical Exam  Vitals signs and nursing note reviewed  Constitutional:       General: She is not in acute distress  Appearance: She is well-developed  She is not ill-appearing  HENT:      Head: Normocephalic and atraumatic  Nose: Nose normal    Eyes:      Conjunctiva/sclera: Conjunctivae normal       Pupils: Pupils are equal, round, and reactive to light  Cardiovascular:      Rate and Rhythm: Normal rate and regular rhythm  Pulmonary:      Effort: Pulmonary effort is normal       Breath sounds: Normal breath sounds  Abdominal:      General: Abdomen is flat   Bowel sounds are normal  Tenderness: There is no abdominal tenderness  Musculoskeletal: Normal range of motion  Skin:     General: Skin is warm and dry  Capillary Refill: Capillary refill takes less than 2 seconds  Neurological:      Mental Status: She is alert and oriented to person, place, and time  GCS: GCS eye subscore is 4  GCS verbal subscore is 5  GCS motor subscore is 6  Cranial Nerves: Cranial nerves are intact  Sensory: Sensation is intact  Motor: Motor function is intact  Coordination: Coordination is intact  Gait: Gait is intact           Vital Signs  ED Triage Vitals   Temperature Pulse Respirations Blood Pressure SpO2   06/03/21 1059 06/03/21 1058 06/03/21 1058 06/03/21 1058 06/03/21 1058   98 7 °F (37 1 °C) 71 18 (!) 172/77 98 %      Temp Source Heart Rate Source Patient Position - Orthostatic VS BP Location FiO2 (%)   06/03/21 1059 06/03/21 1058 -- -- --   Oral Monitor         Pain Score       --                  Vitals:    06/03/21 1058 06/03/21 1115 06/03/21 1130   BP: (!) 172/77 131/62 157/74   Pulse: 71 70 66         Visual Acuity  Visual Acuity      Most Recent Value   L Pupil Size (mm)  3   R Pupil Size (mm)  3          ED Medications  Medications - No data to display    Diagnostic Studies  Results Reviewed     Procedure Component Value Units Date/Time    Urine Microscopic [[id number]]  (Normal) Collected: 06/03/21 1238    Lab Status: Final result Specimen: Urine, Clean Catch Updated: 06/03/21 1255     RBC, UA None Seen /hpf      WBC, UA 0-1 /hpf      Epithelial Cells Occasional /hpf      Bacteria, UA None Seen /hpf     UA w Reflex to Microscopic w Reflex to Culture [[id number]]  (Abnormal) Collected: 06/03/21 1238    Lab Status: Final result Specimen: Urine, Clean Catch Updated: 06/03/21 1255     Color, UA Light Yellow     Clarity, UA Clear     Specific Gravity, UA 1 015     pH, UA 5 5     Leukocytes, UA Trace     Nitrite, UA Negative     Protein, UA Negative mg/dl Glucose, UA Negative mg/dl      Ketones, UA Negative mg/dl      Urobilinogen, UA 0 2 E U /dl      Bilirubin, UA Negative     Blood, UA Negative    TSH [[id number]]  (Normal) Collected: 06/03/21 1136    Lab Status: Final result Specimen: Blood from Arm, Right Updated: 06/03/21 1214     TSH 3RD GENERATON 2 044 uIU/mL     Narrative:      Patients undergoing fluorescein dye angiography may retain small amounts of fluorescein in the body for 48-72 hours post procedure  Samples containing fluorescein can produce falsely depressed TSH values  If the patient had this procedure,a specimen should be resubmitted post fluorescein clearance        Troponin I [[id number]]  (Normal) Collected: 06/03/21 1136    Lab Status: Final result Specimen: Blood from Arm, Right Updated: 06/03/21 1206     Troponin I 0 03 ng/mL     Comprehensive metabolic panel [[id number]]  (Abnormal) Collected: 06/03/21 1136    Lab Status: Final result Specimen: Blood from Arm, Right Updated: 06/03/21 1204     Sodium 135 mmol/L      Potassium 4 4 mmol/L      Chloride 104 mmol/L      CO2 24 mmol/L      ANION GAP 7 mmol/L      BUN 20 mg/dL      Creatinine 1 10 mg/dL      Glucose 97 mg/dL      Calcium 8 7 mg/dL      AST 13 U/L      ALT 17 U/L      Alkaline Phosphatase 51 U/L      Total Protein 6 7 g/dL      Albumin 3 5 g/dL      Total Bilirubin 0 29 mg/dL      eGFR 44 ml/min/1 73sq m     Narrative:      [place] guidelines for Chronic Kidney Disease (CKD):     Stage 1 with normal or high GFR (GFR > 90 mL/min/1 73 square meters)    Stage 2 Mild CKD (GFR = 60-89 mL/min/1 73 square meters)    Stage 3A Moderate CKD (GFR = 45-59 mL/min/1 73 square meters)    Stage 3B Moderate CKD (GFR = 30-44 mL/min/1 73 square meters)    Stage 4 Severe CKD (GFR = 15-29 mL/min/1 73 square meters)    Stage 5 End Stage CKD (GFR <15 mL/min/1 73 square meters)  Note: GFR calculation is accurate only with a steady state creatinine    CBC and differential [[id number]]  (Abnormal) Collected: 06/03/21 1136    Lab Status: Final result Specimen: Blood from Arm, Right Updated: 06/03/21 1150     WBC 5 70 Thousand/uL      RBC 3 21 Million/uL      Hemoglobin 10 8 g/dL      Hematocrit 32 9 %       fL      MCH 33 6 pg      MCHC 32 8 g/dL      RDW 12 2 %      MPV 9 7 fL      Platelets 722 Thousands/uL      nRBC 0 /100 WBCs      Neutrophils Relative 79 %      Immat GRANS % 1 %      Lymphocytes Relative 10 %      Monocytes Relative 8 %      Eosinophils Relative 1 %      Basophils Relative 1 %      Neutrophils Absolute 4 50 Thousands/µL      Immature Grans Absolute 0 03 Thousand/uL      Lymphocytes Absolute 0 57 Thousands/µL      Monocytes Absolute 0 48 Thousand/µL      Eosinophils Absolute 0 07 Thousand/µL      Basophils Absolute 0 05 Thousands/µL                  XR chest 2 views   ED Interpretation by Valerie iBshop PA-C (06/03 1253)   No acute cardiopulmonary disease      CT head without contrast   Final Result by Naa Gottlieb MD (06/03 1146)      No acute intracranial abnormality  Workstation performed: [id number]                    Procedures  ECG 12 Lead Documentation Only    Date/Time: 6/3/2021 11:13 AM  Performed by: Valerie Bishop PA-C  Authorized by: Abiodun Flynn DO     Indications / Diagnosis:  Dizziness  ECG reviewed by me, the ED Provider: yes    Patient location:  ED  Previous ECG:     Previous ECG:  Compared to current    Comparison ECG info:  3/22/19  Rate:     ECG rate:  70    ECG rate assessment: normal    Rhythm:     Rhythm: sinus rhythm    QRS:     QRS axis:  Normal  ST segments:     ST segments:  Non-specific  T waves:     T waves: flattening      Flattening:  AVL  Comments:      No acute ST or T wave changes  QT/QTc 378/408             ED Course  ED Course as of Jun 03 1442   Thu Jun 03, 2021   1236 Patient ambulated without difficulty    Patient utilized her cane instead for a prolonged period time all speaking to me in the nurse without difficulty  1236 Patient states she has a history of anemia for which she was previously on iron drops an iron infusions  Patient is followed closely by her PCP with biweekly blood work  Patient states she had a colonoscopy last year given her history of colon cancer which was significant for hemorrhoids  0 Spoke with patient's PCPs office (Dr Maryam Quintanilla) who scheduled an appointment for patient to be seen tomorrow at 1400 for follow-up  0 Educated patient regarding diagnosis and management  Advised patient to follow up with PCP  Advised patient to RTER for persistent or worsening symptoms  SBIRT 22yo+      Most Recent Value   SBIRT (22 yo +)   In order to provide better care to our patients, we are screening all of our patients for alcohol and drug use  Would it be okay to ask you these screening questions? No Filed at: 06/03/2021 1122                    MDM  Number of Diagnoses or Management Options  Anemia: new and requires workup  Dizziness: new and requires workup  Diagnosis management comments: EKG unremarkable  Chest x-ray without acute findings  CT head without acute findings  Lab significant for chronic anemia  Patient is followed closely by her colorectal specialist given her history of colon cancer and had a colonoscopy last year which was remarkable for hemorrhoids  Patient states she previously did receive iron infusions for her anemia and is followed closely by her PCP in regards to her blood work every 2 weeks  Patient is able to ambulate with her cane but does require some assistance with initial 1st few steps secondary to left hip discomfort  Patient states she feels safe to be discharged home and will follow-up with her family physician tomorrow at 2pm  I did all PCP's office and did arrange for follow up appointment tomorrow at 2 pm as the patient does live at home alone   Patient reported to me that she may be agreeable to some home health care assistance  I provided patient with strict RTER precautions  I advised patient follow-up with PCP in 24-48 hours  Patient verbalized understanding  Amount and/or Complexity of Data Reviewed  Clinical lab tests: reviewed and ordered  Tests in the radiology section of CPT®: ordered and reviewed  Review and summarize past medical records: yes    Patient Progress  Patient progress: stable      Disposition  Final diagnoses:   Dizziness   Anemia     Time reflects when diagnosis was documented in both MDM as applicable and the Disposition within this note     Time User Action Codes Description Comment    6/3/2021 12:37 PM Jayesh Caballero Add [R42] Dizziness     6/3/2021 12:37 PM Jayesh Caballero Add [D64 9] Anemia       ED Disposition     ED Disposition Condition Date/Time Comment    Discharge Stable Thu Bebeto 3, 2021 12:58 PM Layla Vuong discharge to home/self care              Follow-up Information     Follow up With Specialties Details Why Contact Info Additional [address] Emergency Department Emergency Medicine Go to  If symptoms worsen [address] [facility] [address] [facility] Emergency Department, [address] [place], [place], [address]    Cullen Saenz MD Family Medicine Go in 1 day 2 pm [address] [address]  [address] [facility] [address] [facility]  [phone]             Discharge Medication List as of 6/3/2021 12:59 PM      CONTINUE these medications which have NOT CHANGED    Details   aspirin 81 MG tablet Take 81 mg by mouth, Historical Med      Calcium-Vitamin D 600-200 MG-UNIT per tablet Take by mouth, Historical Med      denosumab (PROLIA) 60 mg/mL 1 mL, Historical Med      fexofenadine (ALLEGRA) 180 MG tablet Take 180 mg by mouth daily, Historical Med      gabapentin (NEURONTIN) 100 mg capsule Take 1 capsule (100 mg total) by mouth daily at bedtime, Starting Wed 6/2/2021, Normal linaCLOtide (Linzess) 290 MCG CAPS Take 1 capsule by mouth daily, Starting Wed 3/3/2021, Normal      losartan (COZAAR) 25 mg tablet Take 1 tablet by mouth once daily, Normal      meclizine (ANTIVERT) 12 5 MG tablet TAKE 1 TABLET BY MOUTH EVERY 8 HOURS AS NEEDED FOR  DIZZINESS, Normal      senna-docusate sodium (SENOKOT S) 8 6-50 mg per tablet Take 2 tablets by mouth daily, Historical Med           No discharge procedures on file      PDMP Review     None          ED Provider  Electronically Signed by           Mary Gregory PA-C  06/03/21 6255

## 2021-06-03 NOTE — DISCHARGE INSTRUCTIONS
Dizziness   WHAT YOU NEED TO KNOW:   Dizziness is a feeling of being off balance or unsteady  Common causes of dizziness are an inner ear fluid imbalance or a lack of oxygen in your blood  Dizziness may be acute (lasts 3 days or less) or chronic (lasts longer than 3 days)  You may have dizzy spells that last from seconds to a few hours  DISCHARGE INSTRUCTIONS:   Return to the emergency department if:   · You have a headache and a stiff neck  · You have shaking chills and a fever  · You vomit over and over with no relief  · Your vomit or bowel movements are red or black  · You have pain in your chest, back, or abdomen  · You have numbness, especially in your face, arms, or legs  · You have trouble moving your arms or legs  · You are confused  Contact your healthcare provider if:   · You have a fever  · Your symptoms do not get better with treatment  · You have questions or concerns about your condition or care  Manage your symptoms:   · Do not drive  or operate heavy machinery when you are dizzy  · Get up slowly  from sitting or lying down  · Drink plenty of liquids  Liquids help prevent dehydration  Ask how much liquid to drink each day and which liquids are best for you  Follow up with your healthcare provider as directed:  Write down your questions so you remember to ask them during your visits  © Copyright [address] Information is for End User's use only and may not be sold, redistributed or otherwise used for commercial purposes  All illustrations and images included in CareNotes® are the copyrighted property of A D A M , Inc  or [facility] Rachel Pereyra   The above information is an  only  It is not intended as medical advice for individual conditions or treatments  Talk to your doctor, nurse or pharmacist before following any medical regimen to see if it is safe and effective for you

## 2021-06-04 LAB
ATRIAL RATE: 70 BPM
P AXIS: 62 DEGREES
PR INTERVAL: 176 MS
QRS AXIS: -20 DEGREES
QRSD INTERVAL: 86 MS
QT INTERVAL: 378 MS
QTC INTERVAL: 408 MS
T WAVE AXIS: 43 DEGREES
VENTRICULAR RATE: 70 BPM

## 2021-06-04 PROCEDURE — 93010 ELECTROCARDIOGRAM REPORT: CPT | Performed by: INTERNAL MEDICINE

## 2021-06-11 ENCOUNTER — TELEPHONE (OUTPATIENT)
Dept: FAMILY MEDICINE CLINIC | Facility: CLINIC | Age: 86
End: 2021-06-11

## 2021-06-11 NOTE — TELEPHONE ENCOUNTER
Patient called regarding not feeling well  Advised she feels nauseous and light headed  Patient advised me she has eaten and drank fluids today  But just does not feel well  Patient mainly wanted to know if she could take a Tylenol  I advised patient a Tylenol should be fine  Maybe eat a bland diet tonight  Patient does have a follow up with Dr Dilcia Salinas on Monday

## 2021-06-14 ENCOUNTER — OFFICE VISIT (OUTPATIENT)
Dept: FAMILY MEDICINE CLINIC | Facility: CLINIC | Age: 86
End: 2021-06-14
Payer: MEDICARE

## 2021-06-14 VITALS
WEIGHT: 136 LBS | SYSTOLIC BLOOD PRESSURE: 122 MMHG | RESPIRATION RATE: 16 BRPM | OXYGEN SATURATION: 97 % | HEIGHT: 60 IN | BODY MASS INDEX: 26.7 KG/M2 | DIASTOLIC BLOOD PRESSURE: 80 MMHG | HEART RATE: 85 BPM

## 2021-06-14 DIAGNOSIS — G62.9 PERIPHERAL POLYNEUROPATHY: ICD-10-CM

## 2021-06-14 DIAGNOSIS — F41.9 ANXIETY: Primary | ICD-10-CM

## 2021-06-14 DIAGNOSIS — R42 VERTIGO: ICD-10-CM

## 2021-06-14 DIAGNOSIS — D50.9 IRON DEFICIENCY ANEMIA, UNSPECIFIED IRON DEFICIENCY ANEMIA TYPE: ICD-10-CM

## 2021-06-14 DIAGNOSIS — N18.31 STAGE 3A CHRONIC KIDNEY DISEASE (HCC): ICD-10-CM

## 2021-06-14 PROCEDURE — 99214 OFFICE O/P EST MOD 30 MIN: CPT | Performed by: FAMILY MEDICINE

## 2021-06-14 RX ORDER — MECLIZINE HCL 12.5 MG/1
12.5 TABLET ORAL EVERY 8 HOURS PRN
Qty: 90 TABLET | Refills: 0 | Status: SHIPPED | OUTPATIENT
Start: 2021-06-14 | End: 2021-11-04 | Stop reason: HOSPADM

## 2021-06-14 RX ORDER — DULOXETIN HYDROCHLORIDE 20 MG/1
20 CAPSULE, DELAYED RELEASE ORAL
Qty: 30 CAPSULE | Refills: 1 | Status: SHIPPED | OUTPATIENT
Start: 2021-06-14 | End: 2021-11-11 | Stop reason: ALTCHOICE

## 2021-06-14 NOTE — PROGRESS NOTES
Assessment/Plan:    1  Anxiety  Comments:  robertonika rgot the prozac she says   we will send over cymbalta noraways   gave number for [facility]     2  Stage 3a chronic kidney disease ([facility] [address] )  Comments:  no nsaids     3  Peripheral polyneuropathy  Comments:  lupe 100 tid   and added cymbalta qhs   Orders:  -     DULoxetine (CYMBALTA) 20 mg capsule; Take 1 capsule (20 mg total) by mouth daily at bedtime    4  Iron deficiency anemia, unspecified iron deficiency anemia type  Comments:  follows with jong - no infusions any more         Subjective:      Patient ID: Shyam Patterson is a 80 y o  female  HPI  Follow up on anxiety and neuropathy and left hip pain and leg pain from knees down to toes     htn controlled    Dizziness on meclizine prn   Was in the ER CT and BW and EKG and CXR neg       The following portions of the patient's history were reviewed and updated as appropriate: allergies, current medications, past family history, past medical history, past social history, past surgical history and problem list     Review of Systems   Constitutional: Positive for activity change, appetite change and fatigue  Negative for fever and unexpected weight change  HENT: Negative for nosebleeds and trouble swallowing  Eyes: Negative for visual disturbance  Respiratory: Negative for chest tightness and shortness of breath  Cardiovascular: Negative for chest pain, palpitations and leg swelling  Gastrointestinal: Negative for abdominal pain, constipation, diarrhea and nausea  Endocrine: Negative for cold intolerance  Genitourinary: Negative for dysuria and urgency  Musculoskeletal: Positive for back pain and gait problem  Negative for joint swelling and myalgias  Skin: Negative for rash  Neurological: Positive for weakness, light-headedness and numbness  Negative for tremors, seizures and syncope  Hematological: Does not bruise/bleed easily     Psychiatric/Behavioral: Negative for hallucinations and suicidal ideas          Objective:      /80 (BP Location: Left arm, Patient Position: Sitting, Cuff Size: Standard)   Pulse 85   Resp 16   Ht 5' (1 524 m)   Wt 61 7 kg (136 lb)   SpO2 97%   BMI 26 56 kg/m²     Admission on 06/03/2021, Discharged on 06/03/2021   Component Date Value    WBC 06/03/2021 5 70     RBC 06/03/2021 3 21*    Hemoglobin 06/03/2021 10 8*    Hematocrit 06/03/2021 32 9*    MCV 06/03/2021 103*    MCH 06/03/2021 33 6     MCHC 06/03/2021 32 8     RDW 06/03/2021 12 2     MPV 06/03/2021 9 7     Platelets 93/67/2604 262     nRBC 06/03/2021 0     Neutrophils Relative 06/03/2021 79*    Immat GRANS % 06/03/2021 1     Lymphocytes Relative 06/03/2021 10*    Monocytes Relative 06/03/2021 8     Eosinophils Relative 06/03/2021 1     Basophils Relative 06/03/2021 1     Neutrophils Absolute 06/03/2021 4 50     Immature Grans Absolute 06/03/2021 0 03     Lymphocytes Absolute 06/03/2021 0 57*    Monocytes Absolute 06/03/2021 0 48     Eosinophils Absolute 06/03/2021 0 07     Basophils Absolute 06/03/2021 0 05     Sodium 06/03/2021 135*    Potassium 06/03/2021 4 4     Chloride 06/03/2021 104     CO2 06/03/2021 24     ANION GAP 06/03/2021 7     BUN 06/03/2021 20     Creatinine 06/03/2021 1 10     Glucose 06/03/2021 97     Calcium 06/03/2021 8 7     AST 06/03/2021 13     ALT 06/03/2021 17     Alkaline Phosphatase 06/03/2021 51     Total Protein 06/03/2021 6 7     Albumin 06/03/2021 3 5     Total Bilirubin 06/03/2021 0 29     eGFR 06/03/2021 44     Troponin I 06/03/2021 0 03     TSH 3RD GENERATON 06/03/2021 2 044     Color, UA 06/03/2021 Light Yellow     Clarity, UA 06/03/2021 Clear     Specific Gravity, UA 06/03/2021 1 015     pH, UA 06/03/2021 5 5     Leukocytes, UA 06/03/2021 Trace*    Nitrite, UA 06/03/2021 Negative     Protein, UA 06/03/2021 Negative     Glucose, UA 06/03/2021 Negative     Ketones, UA 06/03/2021 Negative     Urobilinogen, UA 06/03/2021 0 2     Bilirubin, UA 06/03/2021 Negative     Blood, UA 06/03/2021 Negative     RBC, UA 06/03/2021 None Seen     WBC, UA 06/03/2021 0-1     Epithelial Cells 06/03/2021 Occasional     Bacteria, UA 06/03/2021 None Seen     Ventricular Rate 06/03/2021 70     Atrial Rate 06/03/2021 70     AL Interval 06/03/2021 176     QRSD Interval 06/03/2021 86     QT Interval 06/03/2021 378     QTC Interval 06/03/2021 408     P Axis 06/03/2021 62     QRS Axis 06/03/2021 -20     T Wave Axis 06/03/2021 43           Physical Exam  Vitals and nursing note reviewed  Constitutional:       Appearance: She is well-developed  HENT:      Head: Normocephalic and atraumatic  Cardiovascular:      Rate and Rhythm: Normal rate and regular rhythm  Heart sounds: Normal heart sounds  No murmur heard  Pulmonary:      Effort: Pulmonary effort is normal       Breath sounds: Normal breath sounds  No wheezing or rales  Abdominal:      General: Bowel sounds are normal  There is no distension  Palpations: Abdomen is soft  Tenderness: There is no abdominal tenderness  Musculoskeletal:         General: No tenderness  Normal range of motion  Cervical back: Normal range of motion and neck supple  Lymphadenopathy:      Cervical: No cervical adenopathy  Skin:     General: Skin is warm and dry  Capillary Refill: Capillary refill takes less than 2 seconds  Findings: No rash  Neurological:      Mental Status: She is alert and oriented to person, place, and time  Cranial Nerves: No cranial nerve deficit  Sensory: No sensory deficit  Motor: No abnormal muscle tone  Psychiatric:         Behavior: Behavior normal          Thought Content:  Thought content normal          Judgment: Judgment normal              Naeem Hull MD  [place] [address]

## 2021-06-21 ENCOUNTER — OFFICE VISIT (OUTPATIENT)
Dept: GASTROENTEROLOGY | Facility: CLINIC | Age: 86
End: 2021-06-21
Payer: MEDICARE

## 2021-06-21 VITALS
HEIGHT: 60 IN | HEART RATE: 101 BPM | SYSTOLIC BLOOD PRESSURE: 136 MMHG | BODY MASS INDEX: 26.5 KG/M2 | WEIGHT: 135 LBS | DIASTOLIC BLOOD PRESSURE: 76 MMHG

## 2021-06-21 DIAGNOSIS — D50.9 IRON DEFICIENCY ANEMIA, UNSPECIFIED IRON DEFICIENCY ANEMIA TYPE: ICD-10-CM

## 2021-06-21 DIAGNOSIS — R14.0 BLOATING: ICD-10-CM

## 2021-06-21 DIAGNOSIS — K59.09 CHRONIC CONSTIPATION: Primary | ICD-10-CM

## 2021-06-21 DIAGNOSIS — Z85.038 HISTORY OF COLON CANCER: ICD-10-CM

## 2021-06-21 PROCEDURE — 99214 OFFICE O/P EST MOD 30 MIN: CPT | Performed by: INTERNAL MEDICINE

## 2021-06-21 NOTE — PROGRESS NOTES
[facility] - Outpatient Follow-up Note  Donavan Moe 80 y o  female MRN: [id number]  Encounter: [id number]          ASSESSMENT AND PLAN:      1  Chronic constipation  Has a hx of chronic constipation  However currently reports good BMs  No longer constipated  Has BMs regularly almost every day  No diarrhea or any other complaints  No melena, BRBPR, no abdominal pain  Plan:  -- Continue linzess 290 mcg daily  -- encouraged constipation friendly/high fiber diet  -- can fup on prn basis, around 6 months    2  Bloating  C/o significant bloating  Reports that it has been on going for a long time  Denies any brayan abdominal pain, nausea, vomiting or any other upper GI s/s  Last colonoscopy 07/20 showed only 1 polyp, some hemorrhoids, no other findings  Reports that she has not been eating as healthy as she should, and has been eating from out a lot  Plan:  -- will advise good healthy high fiber diet  -- continue linzess for constipation    3  History of colon cancer  Hx of colon cancer s/p resection in 1986,  Last colonoscopy 07/20 showed only 1 polyp, some hemorrhoids, no other findings  1 polyp was tubular adenoma  Plan:  -- considering patients age, she will not need repeat screening colonoscopy unless required clinically    4  Iron deficiency anemia, unspecified iron deficiency anemia type  Hx of iron def anemia, was getting transfusions in past  Follows with Dr Emre Hunter; next apt  In 2 weeks  Hb from June 2021 around 10  No reported melena, BRBPR  Plan:  -- will continue to monitor  -- will recommend to discuss with Dr Ashley Dixon office regarding iron supplements (po vs  Iv)    5  Chronic pain   Will recommend outpt fup to pain meds     ______________________________________________________________________    SUBJECTIVE:  Pt with a hx of complex medical problems including but not limited to HTN, depression, significant peripheral neuropathy, vertigo presents for fup of chronic constipation and hx of colon cancer  C/o significant neuropathy, vertigo and bloating  Also complaints of feeling depressed  From GI standpoint, only complaints of bloating  Denies any more constipation, diarrhea, upper GI symptoms  Has been having BMs daily and is relieved  Using linzess regularly  Colonoscopy is upto  date  Denies any other complaints  REVIEW OF SYSTEMS IS OTHERWISE NEGATIVE        Historical Information   Past Medical History:   Diagnosis Date    Allergic rhinitis     Disease of thyroid gland     Dizziness     Hypertension     last assessed: 12/13/2016    Malignant neoplasm of colon ([facility] [address] )     last assessed: 10/20/2015    Migraine     Peripheral neuropathy     last assessed: 02/23/2016    Tinnitus      Past Surgical History:   Procedure Laterality Date    ADENOIDECTOMY      APPENDECTOMY      last assessed: 10/20/2015    CHOLECYSTECTOMY      last assessed: 10/20/2015    COLECTOMY      last assessed: 10/20/2015; partial     TONSILLECTOMY      last assessed: 10/20/2015     Social History   Social History     Substance and Sexual Activity   Alcohol Use No     Social History     Substance and Sexual Activity   Drug Use Never     Social History     Tobacco Use   Smoking Status Former Smoker    Packs/day: 0 25   Smokeless Tobacco Never Used     Family History   Problem Relation Age of Onset    Heart disease Mother         cardiac disorder    Alzheimer's disease Sister     No Known Problems Father     Leukemia Brother        Meds/Allergies       Current Outpatient Medications:     aspirin 81 MG tablet    Calcium-Vitamin D 600-200 MG-UNIT per tablet    Cholecalciferol (Vitamin D3) 1 25 MG (45442 UT) CAPS    denosumab (PROLIA) 60 mg/mL    DULoxetine (CYMBALTA) 20 mg capsule    fexofenadine (ALLEGRA) 180 MG tablet    gabapentin (NEURONTIN) 100 mg capsule    linaCLOtide (Linzess) 290 MCG CAPS    losartan (COZAAR) 25 mg tablet    meclizine (ANTIVERT) 12 5 MG tablet   senna-docusate sodium (SENOKOT S) 8 6-50 mg per tablet    Allergies   Allergen Reactions    Penicillins Hives     Other reaction(s): Other (See Comments)  hives           Objective     Blood pressure 136/76, pulse 101, height 5' (1 524 m), weight 61 2 kg (135 lb)  Body mass index is 26 37 kg/m²  PHYSICAL EXAM:      General Appearance:   Alert, cooperative, no distress   HEENT:   Normocephalic, atraumatic, anicteric      Neck:  Supple, symmetrical, trachea midline   Lungs:   Clear to auscultation bilaterally; no rales, rhonchi or wheezing; respirations unlabored    Heart[de-identified]   Regular rate and rhythm; no murmur, rub, or gallop  Abdomen:   Soft, non-tender, non-distended; normal bowel sounds; no masses, no organomegaly    Genitalia:   Deferred    Rectal:   Deferred    Extremities:  No cyanosis, clubbing or edema    Pulses:  2+ and symmetric    Skin:  No jaundice, rashes, or lesions    Lymph nodes:  No palpable cervical lymphadenopathy        Lab Results:   No visits with results within 1 Day(s) from this visit     Latest known visit with results is:   Admission on 06/03/2021, Discharged on 06/03/2021   Component Date Value    WBC 06/03/2021 5 70     RBC 06/03/2021 3 21*    Hemoglobin 06/03/2021 10 8*    Hematocrit 06/03/2021 32 9*    MCV 06/03/2021 103*    MCH 06/03/2021 33 6     MCHC 06/03/2021 32 8     RDW 06/03/2021 12 2     MPV 06/03/2021 9 7     Platelets 41/69/8990 262     nRBC 06/03/2021 0     Neutrophils Relative 06/03/2021 79*    Immat GRANS % 06/03/2021 1     Lymphocytes Relative 06/03/2021 10*    Monocytes Relative 06/03/2021 8     Eosinophils Relative 06/03/2021 1     Basophils Relative 06/03/2021 1     Neutrophils Absolute 06/03/2021 4 50     Immature Grans Absolute 06/03/2021 0 03     Lymphocytes Absolute 06/03/2021 0 57*    Monocytes Absolute 06/03/2021 0 48     Eosinophils Absolute 06/03/2021 0 07     Basophils Absolute 06/03/2021 0 05     Sodium 06/03/2021 135*    Potassium 06/03/2021 4 4     Chloride 06/03/2021 104     CO2 06/03/2021 24     ANION GAP 06/03/2021 7     BUN 06/03/2021 20     Creatinine 06/03/2021 1 10     Glucose 06/03/2021 97     Calcium 06/03/2021 8 7     AST 06/03/2021 13     ALT 06/03/2021 17     Alkaline Phosphatase 06/03/2021 51     Total Protein 06/03/2021 6 7     Albumin 06/03/2021 3 5     Total Bilirubin 06/03/2021 0 29     eGFR 06/03/2021 44     Troponin I 06/03/2021 0 03     TSH 3RD GENERATON 06/03/2021 2 044     Color, UA 06/03/2021 Light Yellow     Clarity, UA 06/03/2021 Clear     Specific Gravity, UA 06/03/2021 1 015     pH, UA 06/03/2021 5 5     Leukocytes, UA 06/03/2021 Trace*    Nitrite, UA 06/03/2021 Negative     Protein, UA 06/03/2021 Negative     Glucose, UA 06/03/2021 Negative     Ketones, UA 06/03/2021 Negative     Urobilinogen, UA 06/03/2021 0 2     Bilirubin, UA 06/03/2021 Negative     Blood, UA 06/03/2021 Negative     RBC, UA 06/03/2021 None Seen     WBC, UA 06/03/2021 0-1     Epithelial Cells 06/03/2021 Occasional     Bacteria, UA 06/03/2021 None Seen     Ventricular Rate 06/03/2021 70     Atrial Rate 06/03/2021 70     [place] Interval 06/03/2021 176     QRSD Interval 06/03/2021 86     QT Interval 06/03/2021 378     QTC Interval 06/03/2021 408     P Axis 06/03/2021 62     QRS Axis 06/03/2021 -20     T Wave Axis 06/03/2021 43          Radiology Results:   XR chest 2 views    Result Date: 6/4/2021  Narrative: CHEST INDICATION:   DIzziness  COMPARISON:  4/28/2020 EXAM PERFORMED/VIEWS:  XR CHEST PA & LATERAL Images: 2 FINDINGS: Cardiomediastinal silhouette appears unremarkable  Hyperinflated lung fields with increased AP diameter of the chest and enlargement of the retrosternal airspace  Mild flattening of the hemidiaphragms  No pneumonia  No congestive failure  No pneumothorax  Exaggerated thoracic kyphosis with diffuse osteopenia    2 previously treated lower thoracic compression fractures are noted, unchanged  Impression: Hyperinflation consistent with underlying COPD  No pneumonia or congestive failure  Workstation performed: [id number]     CT head without contrast    Result Date: 6/3/2021  Narrative: CT BRAIN - WITHOUT CONTRAST INDICATION:   Dizziness  COMPARISON:  8/6/2019 TECHNIQUE:  CT examination of the brain was performed  In addition to axial images, sagittal and coronal 2D reformatted images were created and submitted for interpretation  Radiation dose length product (DLP) for this visit:  861 mGy-cm   This examination, like all CT scans performed in the [facility], was performed utilizing techniques to minimize radiation dose exposure, including the use of iterative reconstruction and automated exposure control  IMAGE QUALITY:  Diagnostic  FINDINGS: PARENCHYMA: Decreased attenuation is noted in periventricular and subcortical white matter demonstrating an appearance that is statistically most likely to represent mild microangiopathic change  No CT signs of acute infarction  No intracranial mass, mass effect or midline shift  No acute parenchymal hemorrhage  VENTRICLES AND EXTRA-AXIAL SPACES:  Normal for the patient's age  VISUALIZED ORBITS AND PARANASAL SINUSES:  Unremarkable  CALVARIUM AND EXTRACRANIAL SOFT TISSUES:  Normal      Impression: No acute intracranial abnormality   Workstation performed: [id number]

## 2021-08-02 ENCOUNTER — TELEPHONE (OUTPATIENT)
Dept: GASTROENTEROLOGY | Facility: CLINIC | Age: 86
End: 2021-08-02

## 2021-08-05 NOTE — TELEPHONE ENCOUNTER
Spoke with pt  She states she is feeling well today  She had a good bowel movement this morning and is no longer bloated

## 2021-08-10 ENCOUNTER — OFFICE VISIT (OUTPATIENT)
Dept: FAMILY MEDICINE CLINIC | Facility: CLINIC | Age: 86
End: 2021-08-10
Payer: MEDICARE

## 2021-08-10 VITALS
WEIGHT: 134 LBS | HEART RATE: 90 BPM | OXYGEN SATURATION: 98 % | HEIGHT: 60 IN | DIASTOLIC BLOOD PRESSURE: 78 MMHG | SYSTOLIC BLOOD PRESSURE: 124 MMHG | RESPIRATION RATE: 16 BRPM | BODY MASS INDEX: 26.31 KG/M2

## 2021-08-10 DIAGNOSIS — D50.9 IRON DEFICIENCY ANEMIA, UNSPECIFIED IRON DEFICIENCY ANEMIA TYPE: ICD-10-CM

## 2021-08-10 DIAGNOSIS — I10 ESSENTIAL HYPERTENSION: Primary | ICD-10-CM

## 2021-08-10 DIAGNOSIS — G60.9 IDIOPATHIC PERIPHERAL NEUROPATHY: ICD-10-CM

## 2021-08-10 DIAGNOSIS — K59.09 CHRONIC CONSTIPATION: ICD-10-CM

## 2021-08-10 DIAGNOSIS — M81.0 OSTEOPOROSIS, UNSPECIFIED OSTEOPOROSIS TYPE, UNSPECIFIED PATHOLOGICAL FRACTURE PRESENCE: ICD-10-CM

## 2021-08-10 DIAGNOSIS — F41.9 ANXIETY: ICD-10-CM

## 2021-08-10 PROCEDURE — 1036F TOBACCO NON-USER: CPT | Performed by: FAMILY MEDICINE

## 2021-08-10 PROCEDURE — 99214 OFFICE O/P EST MOD 30 MIN: CPT | Performed by: FAMILY MEDICINE

## 2021-08-10 NOTE — PROGRESS NOTES
Assessment/Plan:    1  Essential hypertension  Assessment & Plan:  -stable/controlled, continue same medication  Will evaluate again next visit    losartan 25      2  Chronic constipation    3  Idiopathic peripheral neuropathy  Assessment & Plan:  Ninfa 200 to 300mg qhs       4  Anxiety  Assessment & Plan:  cymbalta 20mg       5  Iron deficiency anemia, unspecified iron deficiency anemia type  Assessment & Plan:  Follows with sailb   -stable/controlled, continue same medication  Will evaluate again next visit         6  Osteoporosis, unspecified osteoporosis type, unspecified pathological fracture presence  Assessment & Plan: On prolia with jong   -stable/controlled, continue same medication  Will evaluate again next visit            Subjective:      Patient ID: Shady Cunningham is a 80 y o  female  HPI     Last week with abdominal pain and bloating   Couldn't get GI on the phone   gingerale helped     Not sleeping well   But 6am wakes up   Goes to bed at 9pm    Doesn't nap during the day     Anemia - foloows with jong    OP on  prolia   GI for chronic constpation   PM Werst - for left hip pain  With severe OA seen       The following portions of the patient's history were reviewed and updated as appropriate: allergies, current medications, past family history, past medical history, past social history, past surgical history and problem list     Review of Systems   Constitutional: Positive for activity change, appetite change and fatigue  Negative for fever and unexpected weight change  HENT: Negative for nosebleeds and trouble swallowing  Eyes: Negative for visual disturbance  Respiratory: Negative for chest tightness and shortness of breath  Cardiovascular: Negative for chest pain, palpitations and leg swelling  Gastrointestinal: Negative for abdominal pain, constipation, diarrhea and nausea  Endocrine: Negative for cold intolerance  Genitourinary: Negative for dysuria and urgency  Musculoskeletal: Positive for back pain and gait problem  Negative for joint swelling and myalgias  Skin: Negative for rash  Neurological: Positive for weakness, light-headedness and numbness  Negative for tremors, seizures and syncope  Hematological: Does not bruise/bleed easily  Psychiatric/Behavioral: Negative for hallucinations and suicidal ideas  Objective:      /78 (BP Location: Left arm, Patient Position: Sitting, Cuff Size: Standard)   Pulse 90   Resp 16   Ht 5' (1 524 m)   Wt 60 8 kg (134 lb)   SpO2 98%   BMI 26 17 kg/m²     No visits with results within 2 Week(s) from this visit     Latest known visit with results is:   Admission on 06/03/2021, Discharged on 06/03/2021   Component Date Value    WBC 06/03/2021 5 70     RBC 06/03/2021 3 21*    Hemoglobin 06/03/2021 10 8*    Hematocrit 06/03/2021 32 9*    MCV 06/03/2021 103*    MCH 06/03/2021 33 6     MCHC 06/03/2021 32 8     RDW 06/03/2021 12 2     MPV 06/03/2021 9 7     Platelets 89/98/9018 262     nRBC 06/03/2021 0     Neutrophils Relative 06/03/2021 79*    Immat GRANS % 06/03/2021 1     Lymphocytes Relative 06/03/2021 10*    Monocytes Relative 06/03/2021 8     Eosinophils Relative 06/03/2021 1     Basophils Relative 06/03/2021 1     Neutrophils Absolute 06/03/2021 4 50     Immature Grans Absolute 06/03/2021 0 03     Lymphocytes Absolute 06/03/2021 0 57*    Monocytes Absolute 06/03/2021 0 48     Eosinophils Absolute 06/03/2021 0 07     Basophils Absolute 06/03/2021 0 05     Sodium 06/03/2021 135*    Potassium 06/03/2021 4 4     Chloride 06/03/2021 104     CO2 06/03/2021 24     ANION GAP 06/03/2021 7     BUN 06/03/2021 20     Creatinine 06/03/2021 1 10     Glucose 06/03/2021 97     Calcium 06/03/2021 8 7     AST 06/03/2021 13     ALT 06/03/2021 17     Alkaline Phosphatase 06/03/2021 51     Total Protein 06/03/2021 6 7     Albumin 06/03/2021 3 5     Total Bilirubin 06/03/2021 0 29     eGFR 06/03/2021 44     Troponin I 06/03/2021 0 03     TSH 3RD GENERATON 06/03/2021 2 044     Color, UA 06/03/2021 Light Yellow     Clarity, UA 06/03/2021 Clear     Specific Gravity, UA 06/03/2021 1 015     pH, UA 06/03/2021 5 5     Leukocytes, UA 06/03/2021 Trace*    Nitrite, UA 06/03/2021 Negative     Protein, UA 06/03/2021 Negative     Glucose, UA 06/03/2021 Negative     Ketones, UA 06/03/2021 Negative     Urobilinogen, UA 06/03/2021 0 2     Bilirubin, UA 06/03/2021 Negative     Blood, UA 06/03/2021 Negative     RBC, UA 06/03/2021 None Seen     WBC, UA 06/03/2021 0-1     Epithelial Cells 06/03/2021 Occasional     Bacteria, UA 06/03/2021 None Seen     Ventricular Rate 06/03/2021 70     Atrial Rate 06/03/2021 70     [place] Interval 06/03/2021 176     QRSD Interval 06/03/2021 86     QT Interval 06/03/2021 378     QTC Interval 06/03/2021 408     P Axis 06/03/2021 62     QRS Axis 06/03/2021 -20     T Wave Axis 06/03/2021 43           Physical Exam  Vitals and nursing note reviewed  Constitutional:       Appearance: She is well-developed  HENT:      Head: Normocephalic and atraumatic  Cardiovascular:      Rate and Rhythm: Normal rate and regular rhythm  Heart sounds: Normal heart sounds  No murmur heard  Pulmonary:      Effort: Pulmonary effort is normal       Breath sounds: Normal breath sounds  No wheezing or rales  Abdominal:      General: Bowel sounds are normal  There is no distension  Palpations: Abdomen is soft  Tenderness: There is no abdominal tenderness  Musculoskeletal:         General: No tenderness  Normal range of motion  Cervical back: Normal range of motion and neck supple  Lymphadenopathy:      Cervical: No cervical adenopathy  Skin:     General: Skin is warm and dry  Capillary Refill: Capillary refill takes less than 2 seconds  Findings: No rash  Neurological:      Mental Status: She is alert and oriented to person, place, and time  Cranial Nerves: No cranial nerve deficit  Sensory: No sensory deficit  Motor: No abnormal muscle tone  Psychiatric:         Behavior: Behavior normal          Thought Content:  Thought content normal          Judgment: Judgment normal              Joe Pagan MD  [place] [id number]

## 2021-08-11 PROBLEM — C43.71 MALIGNANT MELANOMA OF RIGHT LOWER EXTREMITY INCLUDING HIP (HCC): Status: RESOLVED | Noted: 2019-07-17 | Resolved: 2021-08-11

## 2021-08-11 NOTE — ASSESSMENT & PLAN NOTE
Follows with sailb   -stable/controlled, continue same medication   Will evaluate again next visit [facility]

## 2021-08-23 NOTE — TELEPHONE ENCOUNTER
Pt feeling badly, discomfort, bloating  Asking for Dr uAstin Lemos to call her with advice  Maybe she needs an EGD? Pt on Linzess and taking something to help her go and she did but she is still uncomfortable and request a call  I advised her I would have someone clinical call    Thank you

## 2021-08-23 NOTE — TELEPHONE ENCOUNTER
Spoke to patient, she reports Annalise Salazar hasn't been working as well recently  She took Miralax last night and this helped her move her bowels this morning  She continues with abdominal bloating  Denies any reflux, heartburn, nausea/vomiting  Instructed pt to continue Linzess and continue Miralax 1 scoop daily, she can titrate the Miralax to effect  Offered trial of Pepcid to see if this would help with abdominal bloating from gastritis, however pt uninterested in more medication  She has follow up apt  with Dr Lorena Wooten at the end of September  She will call in the interim if symptoms don't improve

## 2021-08-25 NOTE — TELEPHONE ENCOUNTER
I spoke with pt she states she continues to have abdominal bloating  She is asking if you have any recommendations for her?

## 2021-08-26 ENCOUNTER — TELEPHONE (OUTPATIENT)
Dept: FAMILY MEDICINE CLINIC | Facility: CLINIC | Age: 86
End: 2021-08-26

## 2021-08-26 NOTE — TELEPHONE ENCOUNTER
Patient called and stated that she is having bowel issues and is bloated  She's uncomfortable  She called Dr Josue alston and they cannot see her before Sept 20  They also told her to call her PCP to see what she can do  She wanted to let Dr Ocie Kocher know and see if there's anything he can suggest   She is taking mirilax    Please advise

## 2021-08-27 NOTE — TELEPHONE ENCOUNTER
Patient stated she is having more of an issue with diarrhea, loose stools so she will be stopping the Miralax this weekend just to see if it makes a difference  She is already taking Linzess and Senna as well

## 2021-08-28 ENCOUNTER — NURSE TRIAGE (OUTPATIENT)
Dept: OTHER | Facility: OTHER | Age: 86
End: 2021-08-28

## 2021-08-28 NOTE — TELEPHONE ENCOUNTER
Reason for Disposition   [1] MILD-MODERATE pain AND [2] constant AND [3] present > 2 hours    Answer Assessment - Initial Assessment Questions  1  LOCATION: "Where does it hurt?"       Lower abdomen  2  RADIATION: "Does the pain shoot anywhere else?" (e g , chest, back)      no  3  ONSET: "When did the pain begin?" (e g , minutes, hours or days ago)       Its been going for one week  4  SUDDEN: "Gradual or sudden onset?"      gradual  5  PATTERN "Does the pain come and go, or is it constant?"     -it is constant    6  SEVERITY: "How bad is the pain?"  (e g , Scale 1-10; mild, moderate, or severe)    - MILD (1-3): doesn't interfere with normal activities, abdomen soft and not tender to touch     - MODERATE (4-7): interferes with normal activities or awakens from sleep, tender to touch     - SEVERE (8-10): excruciating pain, doubled over, unable to do any normal activities       Its not painful its bloating  Is passing gas without a problem  7  RECURRENT SYMPTOM: "Have you ever had this type of stomach pain before?" If Yes, ask: "When was the last time?" and "What happened that time?"       yes  8  CAUSE: "What do you think is causing the stomach pain?"      ibs   9  RELIEVING/AGGRAVATING FACTORS: "What makes it better or worse?" (e g , movement, antacids, bowel movement)      Nothing is making it better  Mirilax and Diana Coffin is not helping  10   OTHER SYMPTOMS: "Has there been any vomiting, diarrhea, constipation, or urine problems?"        Last bm was this morning    Protocols used: ABDOMINAL PAIN - [facility]

## 2021-08-30 ENCOUNTER — HOSPITAL ENCOUNTER (INPATIENT)
Facility: HOSPITAL | Age: 86
LOS: 2 days | Discharge: NURSING, REHAB OR LONG-TERM CARE | DRG: 149 | End: 2021-09-02
Attending: EMERGENCY MEDICINE | Admitting: INTERNAL MEDICINE
Payer: MEDICARE

## 2021-08-30 ENCOUNTER — APPOINTMENT (EMERGENCY)
Dept: CT IMAGING | Facility: HOSPITAL | Age: 86
DRG: 149 | End: 2021-08-30
Payer: MEDICARE

## 2021-08-30 DIAGNOSIS — R42 VERTIGO: ICD-10-CM

## 2021-08-30 DIAGNOSIS — D50.9 IRON DEFICIENCY ANEMIA, UNSPECIFIED IRON DEFICIENCY ANEMIA TYPE: Primary | ICD-10-CM

## 2021-08-30 PROBLEM — R26.2 AMBULATORY DYSFUNCTION: Status: ACTIVE | Noted: 2021-08-30

## 2021-08-30 PROBLEM — E87.1 HYPONATREMIA: Status: ACTIVE | Noted: 2021-08-30

## 2021-08-30 LAB
ALBUMIN SERPL BCP-MCNC: 4 G/DL (ref 3.4–4.8)
ALP SERPL-CCNC: 46 U/L (ref 35–140)
ALT SERPL W P-5'-P-CCNC: 12 U/L (ref 5–54)
ANION GAP SERPL CALCULATED.3IONS-SCNC: 7 MMOL/L (ref 4–13)
APTT PPP: 23 SECONDS (ref 23–31)
AST SERPL W P-5'-P-CCNC: 16 U/L (ref 15–41)
ATRIAL RATE: 63 BPM
BASOPHILS # BLD AUTO: 0.02 THOUSANDS/ΜL (ref 0–0.1)
BASOPHILS NFR BLD AUTO: 0 % (ref 0–1)
BILIRUB SERPL-MCNC: 0.4 MG/DL (ref 0.3–1.2)
BILIRUB UR QL STRIP: NEGATIVE
BUN SERPL-MCNC: 19 MG/DL (ref 6–20)
CALCIUM SERPL-MCNC: 8.9 MG/DL (ref 8.4–10.2)
CHLORIDE SERPL-SCNC: 100 MMOL/L (ref 96–108)
CLARITY UR: CLEAR
CO2 SERPL-SCNC: 25 MMOL/L (ref 22–33)
COLOR UR: ABNORMAL
CREAT SERPL-MCNC: 1.04 MG/DL (ref 0.4–1.1)
EOSINOPHIL # BLD AUTO: 0.01 THOUSAND/ΜL (ref 0–0.61)
EOSINOPHIL NFR BLD AUTO: 0 % (ref 0–6)
ERYTHROCYTE [DISTWIDTH] IN BLOOD BY AUTOMATED COUNT: 11.9 % (ref 11.6–15.1)
GFR SERPL CREATININE-BSD FRML MDRD: 47 ML/MIN/1.73SQ M
GLUCOSE SERPL-MCNC: 120 MG/DL (ref 65–140)
GLUCOSE UR STRIP-MCNC: NEGATIVE MG/DL
HCT VFR BLD AUTO: 33 % (ref 34.8–46.1)
HGB BLD-MCNC: 11.3 G/DL (ref 11.5–15.4)
HGB UR QL STRIP.AUTO: NEGATIVE
IMM GRANULOCYTES # BLD AUTO: 0.03 THOUSAND/UL (ref 0–0.2)
IMM GRANULOCYTES NFR BLD AUTO: 0 % (ref 0–2)
INR PPP: 0.92 (ref 0.9–1.1)
KETONES UR STRIP-MCNC: NEGATIVE MG/DL
LEUKOCYTE ESTERASE UR QL STRIP: NEGATIVE
LYMPHOCYTES # BLD AUTO: 0.42 THOUSANDS/ΜL (ref 0.6–4.47)
LYMPHOCYTES NFR BLD AUTO: 6 % (ref 14–44)
MCH RBC QN AUTO: 33.7 PG (ref 26.8–34.3)
MCHC RBC AUTO-ENTMCNC: 34.2 G/DL (ref 31.4–37.4)
MCV RBC AUTO: 99 FL (ref 82–98)
MONOCYTES # BLD AUTO: 0.41 THOUSAND/ΜL (ref 0.17–1.22)
MONOCYTES NFR BLD AUTO: 6 % (ref 4–12)
NEUTROPHILS # BLD AUTO: 6.52 THOUSANDS/ΜL (ref 1.85–7.62)
NEUTS SEG NFR BLD AUTO: 88 % (ref 43–75)
NITRITE UR QL STRIP: NEGATIVE
P AXIS: 70 DEGREES
PH UR STRIP.AUTO: 6.5 [PH]
PLATELET # BLD AUTO: 249 THOUSANDS/UL (ref 149–390)
PMV BLD AUTO: 9.4 FL (ref 8.9–12.7)
POTASSIUM SERPL-SCNC: 4.4 MMOL/L (ref 3.5–5)
PR INTERVAL: 180 MS
PROT SERPL-MCNC: 6.4 G/DL (ref 6.4–8.3)
PROT UR STRIP-MCNC: NEGATIVE MG/DL
PROTHROMBIN TIME: 10.4 SECONDS (ref 9.5–12.1)
QRS AXIS: -47 DEGREES
QRSD INTERVAL: 96 MS
QT INTERVAL: 420 MS
QTC INTERVAL: 430 MS
RBC # BLD AUTO: 3.35 MILLION/UL (ref 3.81–5.12)
SODIUM SERPL-SCNC: 132 MMOL/L (ref 133–145)
SP GR UR STRIP.AUTO: <=1.005 (ref 1–1.03)
T WAVE AXIS: 60 DEGREES
UROBILINOGEN UR QL STRIP.AUTO: 0.2 E.U./DL
VENTRICULAR RATE: 63 BPM
WBC # BLD AUTO: 7.41 THOUSAND/UL (ref 4.31–10.16)

## 2021-08-30 PROCEDURE — 1123F ACP DISCUSS/DSCN MKR DOCD: CPT | Performed by: INTERNAL MEDICINE

## 2021-08-30 PROCEDURE — 93010 ELECTROCARDIOGRAM REPORT: CPT | Performed by: INTERNAL MEDICINE

## 2021-08-30 PROCEDURE — 85610 PROTHROMBIN TIME: CPT | Performed by: EMERGENCY MEDICINE

## 2021-08-30 PROCEDURE — 70496 CT ANGIOGRAPHY HEAD: CPT

## 2021-08-30 PROCEDURE — 85730 THROMBOPLASTIN TIME PARTIAL: CPT | Performed by: EMERGENCY MEDICINE

## 2021-08-30 PROCEDURE — 80053 COMPREHEN METABOLIC PANEL: CPT | Performed by: EMERGENCY MEDICINE

## 2021-08-30 PROCEDURE — 36415 COLL VENOUS BLD VENIPUNCTURE: CPT | Performed by: EMERGENCY MEDICINE

## 2021-08-30 PROCEDURE — 81003 URINALYSIS AUTO W/O SCOPE: CPT | Performed by: EMERGENCY MEDICINE

## 2021-08-30 PROCEDURE — 70498 CT ANGIOGRAPHY NECK: CPT

## 2021-08-30 PROCEDURE — 93005 ELECTROCARDIOGRAM TRACING: CPT

## 2021-08-30 PROCEDURE — 99220 PR INITIAL OBSERVATION CARE/DAY 70 MINUTES: CPT | Performed by: INTERNAL MEDICINE

## 2021-08-30 PROCEDURE — 99285 EMERGENCY DEPT VISIT HI MDM: CPT

## 2021-08-30 PROCEDURE — 96360 HYDRATION IV INFUSION INIT: CPT

## 2021-08-30 PROCEDURE — 99285 EMERGENCY DEPT VISIT HI MDM: CPT | Performed by: EMERGENCY MEDICINE

## 2021-08-30 PROCEDURE — 85025 COMPLETE CBC W/AUTO DIFF WBC: CPT | Performed by: EMERGENCY MEDICINE

## 2021-08-30 RX ORDER — ASPIRIN 81 MG/1
81 TABLET, CHEWABLE ORAL ONCE
Status: DISCONTINUED | OUTPATIENT
Start: 2021-08-30 | End: 2021-09-02 | Stop reason: HOSPADM

## 2021-08-30 RX ORDER — LOSARTAN POTASSIUM 25 MG/1
25 TABLET ORAL DAILY
Status: DISCONTINUED | OUTPATIENT
Start: 2021-08-31 | End: 2021-09-02 | Stop reason: HOSPADM

## 2021-08-30 RX ORDER — MECLIZINE HCL 12.5 MG/1
12.5 TABLET ORAL EVERY 12 HOURS PRN
Status: DISCONTINUED | OUTPATIENT
Start: 2021-08-30 | End: 2021-08-30

## 2021-08-30 RX ORDER — AMOXICILLIN 250 MG
2 CAPSULE ORAL
Status: DISCONTINUED | OUTPATIENT
Start: 2021-08-31 | End: 2021-09-02 | Stop reason: HOSPADM

## 2021-08-30 RX ORDER — POLYETHYLENE GLYCOL 3350 17 G/17G
17 POWDER, FOR SOLUTION ORAL DAILY
Status: DISCONTINUED | OUTPATIENT
Start: 2021-08-30 | End: 2021-09-02 | Stop reason: HOSPADM

## 2021-08-30 RX ORDER — MECLIZINE HYDROCHLORIDE 25 MG/1
25 TABLET ORAL EVERY 12 HOURS PRN
Status: DISCONTINUED | OUTPATIENT
Start: 2021-08-30 | End: 2021-08-30

## 2021-08-30 RX ORDER — GABAPENTIN 100 MG/1
100 CAPSULE ORAL DAILY
Status: DISCONTINUED | OUTPATIENT
Start: 2021-08-30 | End: 2021-09-02 | Stop reason: HOSPADM

## 2021-08-30 RX ORDER — MECLIZINE HYDROCHLORIDE 25 MG/1
25 TABLET ORAL ONCE
Status: COMPLETED | OUTPATIENT
Start: 2021-08-30 | End: 2021-08-30

## 2021-08-30 RX ORDER — AMOXICILLIN 250 MG
1 CAPSULE ORAL
Status: DISCONTINUED | OUTPATIENT
Start: 2021-08-30 | End: 2021-08-30

## 2021-08-30 RX ORDER — MECLIZINE HCL 12.5 MG/1
12.5 TABLET ORAL EVERY 8 HOURS PRN
Status: DISCONTINUED | OUTPATIENT
Start: 2021-08-30 | End: 2021-09-02 | Stop reason: HOSPADM

## 2021-08-30 RX ORDER — SODIUM CHLORIDE 9 MG/ML
50 INJECTION, SOLUTION INTRAVENOUS CONTINUOUS
Status: DISCONTINUED | OUTPATIENT
Start: 2021-08-30 | End: 2021-09-02

## 2021-08-30 RX ORDER — SODIUM CHLORIDE, SODIUM LACTATE, POTASSIUM CHLORIDE, CALCIUM CHLORIDE 600; 310; 30; 20 MG/100ML; MG/100ML; MG/100ML; MG/100ML
50 INJECTION, SOLUTION INTRAVENOUS CONTINUOUS
Status: DISCONTINUED | OUTPATIENT
Start: 2021-08-30 | End: 2021-08-30

## 2021-08-30 RX ORDER — GABAPENTIN 100 MG/1
100 CAPSULE ORAL
Status: CANCELLED | OUTPATIENT
Start: 2021-08-30

## 2021-08-30 RX ADMIN — IOHEXOL 85 ML: 350 INJECTION, SOLUTION INTRAVENOUS at 14:42

## 2021-08-30 RX ADMIN — SODIUM CHLORIDE 50 ML/HR: 0.9 INJECTION, SOLUTION INTRAVENOUS at 18:38

## 2021-08-30 RX ADMIN — MECLIZINE HYDROCHLORIDE 25 MG: 25 TABLET ORAL at 11:37

## 2021-08-30 RX ADMIN — DOCUSATE SODIUM AND SENNOSIDES 1 TABLET: 8.6; 5 TABLET, FILM COATED ORAL at 19:40

## 2021-08-30 RX ADMIN — GABAPENTIN 100 MG: 100 CAPSULE ORAL at 19:39

## 2021-08-30 RX ADMIN — SODIUM CHLORIDE 500 ML: 0.9 INJECTION, SOLUTION INTRAVENOUS at 11:52

## 2021-08-30 NOTE — ED PROVIDER NOTES
History  Chief Complaint   Patient presents with    Dizziness     pt presents to ed via walk in with complaint of being dizzy for the past couple days but got worse last night and this moring pt has a hx of the same but usually isn't this bad      To er with vertigo like sensation and balance disturbances  She states she has long hx of vertigo and balances issues, today sx are consistent with her typical sx but more intense she states  She has not vomited or fallen  She states sx onset was about 3 days ago, worse last night and into today  She took meclizine wo resolution of her sx  She denies fever, chills, uti or uri sx, focal weakness, fall, abd pain, cp, sob, unusal sx for her vertigo  Prior to Admission Medications   Prescriptions Last Dose Informant Patient Reported? Taking?    Calcium-Vitamin D 600-200 MG-UNIT per tablet 2021 at Unknown time Self Yes Yes   Sig: Take by mouth   Cholecalciferol (Vitamin D3) 1 25 MG (16805 UT) CAPS 2021 at Unknown time Self Yes Yes   Sig: Vitamin D3   DULoxetine (CYMBALTA) 20 mg capsule 2021 at Unknown time Self No Yes   Sig: Take 1 capsule (20 mg total) by mouth daily at bedtime   aspirin 81 MG tablet 2021 at Unknown time Self Yes Yes   Sig: Take 81 mg by mouth   denosumab (PROLIA) 60 mg/mL 2021 at Unknown time Self Yes Yes   Si mL   fexofenadine (ALLEGRA) 180 MG tablet 2021 at Unknown time Self Yes Yes   Sig: Take 180 mg by mouth daily   gabapentin (NEURONTIN) 100 mg capsule 2021 at Unknown time  No Yes   Sig: TAKE 1 CAPSULE BY MOUTH EVERY DAY AT BEDTIME   linaCLOtide (Linzess) 290 MCG CAPS 2021 at Unknown time Self No Yes   Sig: Take 1 capsule by mouth daily   losartan (COZAAR) 25 mg tablet 2021 at Unknown time Self No Yes   Sig: Take 1 tablet by mouth once daily   meclizine (ANTIVERT) 12 5 MG tablet 2021 at Unknown time Self No Yes   Sig: Take 1 tablet (12 5 mg total) by mouth every 8 (eight) hours as needed for dizziness   senna-docusate sodium (SENOKOT S) 8 6-50 mg per tablet 8/30/2021 at Unknown time Self Yes Yes   Sig: Take 2 tablets by mouth daily      Facility-Administered Medications: None       Past Medical History:   Diagnosis Date    Allergic rhinitis     Disease of thyroid gland     Dizziness     Malignant neoplasm of colon ([address] )     last assessed: 10/20/2015    Tinnitus        Past Surgical History:   Procedure Laterality Date    ADENOIDECTOMY      APPENDECTOMY      last assessed: 10/20/2015    CHOLECYSTECTOMY      last assessed: 10/20/2015    COLECTOMY      last assessed: 10/20/2015; partial     TONSILLECTOMY      last assessed: 10/20/2015       Family History   Problem Relation Age of Onset    Heart disease Mother         cardiac disorder    Alzheimer's disease Sister     No Known Problems Father     Leukemia Brother      I have reviewed and agree with the history as documented  E-Cigarette/Vaping    E-Cigarette Use Never User      E-Cigarette/Vaping Substances     Social History     Tobacco Use    Smoking status: Former Smoker     Packs/day: 0 25    Smokeless tobacco: Never Used   Vaping Use    Vaping Use: Never used   Substance Use Topics    Alcohol use: No    Drug use: Never       Review of Systems   All other systems reviewed and are negative  Physical Exam  Physical Exam  Constitutional:       General: She is not in acute distress  Appearance: She is well-developed  She is not ill-appearing, toxic-appearing or diaphoretic  HENT:      Head: Normocephalic and atraumatic  Right Ear: External ear normal       Left Ear: External ear normal       Nose: Nose normal       Mouth/Throat:      Mouth: Mucous membranes are moist    Eyes:      General:         Right eye: No discharge  Left eye: No discharge  Pupils: Pupils are equal, round, and reactive to light  Neck:      Vascular: No JVD  Cardiovascular:      Rate and Rhythm: Normal rate and regular rhythm  Pulses: Normal pulses  Heart sounds: Normal heart sounds  No murmur heard  No friction rub  No gallop  Pulmonary:      Effort: Pulmonary effort is normal  No respiratory distress  Breath sounds: Normal breath sounds  No stridor  No wheezing, rhonchi or rales  Chest:      Chest wall: No tenderness  Abdominal:      General: Abdomen is flat  Bowel sounds are normal  There is no distension  Palpations: Abdomen is soft  There is no mass  Tenderness: There is no abdominal tenderness  There is no guarding or rebound  Hernia: No hernia is present  Musculoskeletal:         General: No swelling, tenderness, deformity or signs of injury  Normal range of motion  Cervical back: Normal range of motion and neck supple  Right lower leg: No edema  Left lower leg: No edema  Skin:     General: Skin is warm  Capillary Refill: Capillary refill takes less than 2 seconds  Coloration: Skin is not jaundiced or pale  Findings: No bruising, erythema, lesion or rash  Neurological:      Mental Status: She is alert and oriented to person, place, and time  Mental status is at baseline  Cranial Nerves: No cranial nerve deficit  Sensory: No sensory deficit  Motor: No weakness or abnormal muscle tone  Coordination: Coordination normal       Gait: Gait normal       Deep Tendon Reflexes: Reflexes normal       Comments: Very mild horizontal nystagmus      Psychiatric:         Mood and Affect: Mood normal          Vital Signs  ED Triage Vitals   Temperature Pulse Respirations Blood Pressure SpO2   08/30/21 0920 08/30/21 0920 08/30/21 0920 08/30/21 0920 08/30/21 0920   98 °F (36 7 °C) 63 17 131/61 98 %      Temp Source Heart Rate Source Patient Position - Orthostatic VS BP Location FiO2 (%)   08/30/21 0920 08/30/21 0920 08/30/21 0920 08/30/21 0920 --   Oral Monitor Sitting Right arm       Pain Score       08/30/21 1645       No Pain           Vitals:    08/30/21 1450 08/30/21 1645 08/30/21 2221 08/31/21 0741   BP: 143/69 145/75 129/69 149/73   Pulse: 83 71 68 61   Patient Position - Orthostatic VS: Sitting Lying Lying Lying         Visual Acuity      ED Medications  Medications   losartan (COZAAR) tablet 25 mg (25 mg Oral Given 8/31/21 0854)   aspirin chewable tablet 81 mg (81 mg Oral Refused 8/30/21 1836)   enoxaparin (LOVENOX) subcutaneous injection 40 mg (40 mg Subcutaneous Given 8/31/21 0854)   sodium chloride 0 9 % infusion (50 mL/hr Intravenous New Bag 8/30/21 1838)   gabapentin (NEURONTIN) capsule 100 mg (100 mg Oral Given 8/30/21 1939)   meclizine (ANTIVERT) tablet 12 5 mg (has no administration in time range)   senna-docusate sodium (SENOKOT S) 8 6-50 mg per tablet 2 tablet (has no administration in time range)   polyethylene glycol (MIRALAX) packet 17 g (17 g Oral Refused 8/31/21 0858)   aspirin (ECOTRIN LOW STRENGTH) EC tablet 81 mg (81 mg Oral Given 8/31/21 0857)   meclizine (ANTIVERT) tablet 25 mg (25 mg Oral Given 8/30/21 1137)   sodium chloride 0 9 % bolus 500 mL (0 mL Intravenous Stopped 8/30/21 1311)   iohexol (OMNIPAQUE) 350 MG/ML injection (MULTI-DOSE) 85 mL (85 mL Intravenous Given 8/30/21 1442)       Diagnostic Studies  Results Reviewed     Procedure Component Value Units Date/Time    UA w Reflex to Microscopic w Reflex to Culture [[id number]]  (Abnormal) Collected: 08/30/21 1311    Lab Status: Final result Specimen: Urine, Clean Catch Updated: 08/30/21 1331     Color, UA Straw     Clarity, UA Clear     Specific Gravity, UA <=1 005     pH, UA 6 5     Leukocytes, UA Negative     Nitrite, UA Negative     Protein, UA Negative mg/dl      Glucose, UA Negative mg/dl      Ketones, UA Negative mg/dl      Urobilinogen, UA 0 2 E U /dl      Bilirubin, UA Negative     Blood, UA Negative    Comprehensive metabolic panel [[id number]]  (Abnormal) Collected: 08/30/21 1145    Lab Status: Final result Specimen: Blood from Arm, Right Updated: 08/30/21 1316     Sodium 132 mmol/L      Potassium 4 4 mmol/L      Chloride 100 mmol/L      CO2 25 mmol/L      ANION GAP 7 mmol/L      BUN 19 mg/dL      Creatinine 1 04 mg/dL      Glucose 120 mg/dL      Calcium 8 9 mg/dL      AST 16 U/L      ALT 12 U/L      Alkaline Phosphatase 46 0 U/L      Total Protein 6 4 g/dL      Albumin 4 0 g/dL      Total Bilirubin 0 40 mg/dL      eGFR 47 ml/min/1 73sq m     Narrative:      National Kidney Disease Foundation guidelines for Chronic Kidney Disease (CKD):     Stage 1 with normal or high GFR (GFR > 90 mL/min/1 73 square meters)    Stage 2 Mild CKD (GFR = 60-89 mL/min/1 73 square meters)    Stage 3A Moderate CKD (GFR = 45-59 mL/min/1 73 square meters)    Stage 3B Moderate CKD (GFR = 30-44 mL/min/1 73 square meters)    Stage 4 Severe CKD (GFR = 15-29 mL/min/1 73 square meters)    Stage 5 End Stage CKD (GFR <15 mL/min/1 73 square meters)  Note: GFR calculation is accurate only with a steady state creatinine    Protime-INR [[id number]]  (Normal) Collected: 08/30/21 1145    Lab Status: Final result Specimen: Blood from Arm, Right Updated: 08/30/21 1204     Protime 10 4 seconds      INR 0 92    Narrative:      INR Reference Ranges:  No Anticoagulant, Normal:           0 9-1 1  Standard Dose, Oral Anticoagulant:  2 0-3 0  High Dose, Oral Anticoagulant:      2 5-3 5    APTT [[id number]]  (Normal) Collected: 08/30/21 1145    Lab Status: Final result Specimen: Blood from Arm, Right Updated: 08/30/21 1204     PTT 23 seconds     CBC and differential [[id number]]  (Abnormal) Collected: 08/30/21 1145    Lab Status: Final result Specimen: Blood from Arm, Right Updated: 08/30/21 1154     WBC 7 41 Thousand/uL      RBC 3 35 Million/uL      Hemoglobin 11 3 g/dL      Hematocrit 33 0 %      MCV 99 fL      MCH 33 7 pg      MCHC 34 2 g/dL      RDW 11 9 %      MPV 9 4 fL      Platelets 637 Thousands/uL      Neutrophils Relative 88 %      Immat GRANS % 0 %      Lymphocytes Relative 6 %      Monocytes Relative 6 % Eosinophils Relative 0 %      Basophils Relative 0 %      Neutrophils Absolute 6 52 Thousands/µL      Immature Grans Absolute 0 03 Thousand/uL      Lymphocytes Absolute 0 42 Thousands/µL      Monocytes Absolute 0 41 Thousand/µL      Eosinophils Absolute 0 01 Thousand/µL      Basophils Absolute 0 02 Thousands/µL                  CTA head and neck with and without contrast   Final Result by Sidney Broussard DO (08/30 1520)      CT brain: Stable white matter change consistent with chronic microangiopathy  CT angiography: No stenosis, occlusion or thrombosis of the cervical or intracranial vasculature  Workstation performed: [id number]                    Procedures  Procedures         ED Course                             SBIRT 22yo+      Most Recent Value   SBIRT (24 yo +)   In order to provide better care to our patients, we are screening all of our patients for alcohol and drug use  Would it be okay to ask you these screening questions? Unable to answer at this time Filed at: 08/30/2021 1156                    [facility]  Number of Diagnoses or Management Options  Vertigo  Diagnosis management comments: Toe r with vertigo sx, she states she has had vertigo for years  todays event are worse than they have been in the past, she states she does not feel safe at home along in her current states  Nursing have ambulated pt in the er, they do not feel she is safe on her feet or safe at home by herself in the current states  Imaging reassuring  Neuro is intact on my exam  Will plan to admit for obs stay  Discussed case with Dr Ofelia Kowalski who accepted         Disposition  Final diagnoses:   Vertigo     Time reflects when diagnosis was documented in both MDM as applicable and the Disposition within this note     Time User Action Codes Description Comment    8/30/2021  3:36 PM Natalia Hidalgo Add [R42] Vertigo       ED Disposition     ED Disposition Condition Date/Time Comment    Admit Stable Mon Aug 30, 2021  3:36 PM Case was discussed with dr Chadd Farrell and the patient's admission status was agreed to be Admission Status: observation status to the service of Dr Chadd Farrell   Follow-up Information    None         Current Discharge Medication List      CONTINUE these medications which have NOT CHANGED    Details   aspirin 81 MG tablet Take 81 mg by mouth      Calcium-Vitamin D 600-200 MG-UNIT per tablet Take by mouth      Cholecalciferol (Vitamin D3) 1 25 MG (96428 UT) CAPS Vitamin D3      denosumab (PROLIA) 60 mg/mL 1 mL      DULoxetine (CYMBALTA) 20 mg capsule Take 1 capsule (20 mg total) by mouth daily at bedtime  Qty: 30 capsule, Refills: 1    Associated Diagnoses: Peripheral polyneuropathy      fexofenadine (ALLEGRA) 180 MG tablet Take 180 mg by mouth daily      gabapentin (NEURONTIN) 100 mg capsule TAKE 1 CAPSULE BY MOUTH EVERY DAY AT BEDTIME  Qty: 90 capsule, Refills: 1    Associated Diagnoses: Peripheral polyneuropathy      linaCLOtide (Linzess) 290 MCG CAPS Take 1 capsule by mouth daily  Qty: 90 capsule, Refills: 1    Associated Diagnoses: Chronic diarrhea      losartan (COZAAR) 25 mg tablet Take 1 tablet by mouth once daily  Qty: 90 tablet, Refills: 1    Associated Diagnoses: Essential hypertension      meclizine (ANTIVERT) 12 5 MG tablet Take 1 tablet (12 5 mg total) by mouth every 8 (eight) hours as needed for dizziness  Qty: 90 tablet, Refills: 0    Associated Diagnoses: Vertigo      senna-docusate sodium (SENOKOT S) 8 6-50 mg per tablet Take 2 tablets by mouth daily           No discharge procedures on file      PDMP Review       Value Time User    PDMP Reviewed  Yes 8/30/2021  3:32 PM Salvador Resendiz MD          ED Provider  Electronically Signed by           Christi Koyanagi, MD  08/31/21 9562

## 2021-08-30 NOTE — ASSESSMENT & PLAN NOTE
· Patient did have dizziness and unsteadiness in gait  · When she was being attempted to being discharged from the ER, she could not stand up and walk steadily  · This is likely secondary to peripheral vertigo from her vestibular dysfunction    Patient also has some tinnitus  · Will give meclizine for the patient  · Get physical and occupational therapy

## 2021-08-30 NOTE — H&P
Abbi U  66   H&P- Abdias Lewis 12/11/1930, 80 y o  female MRN: [id number]  Unit/Bed#: -01 Encounter: [id number]  Primary Care Provider: Noe Santiago MD   Date and time admitted to hospital: 8/30/2021  9:10 AM    * Dizziness  Assessment & Plan  · Patient did have dizziness and unsteadiness in gait  · When she was being attempted to being discharged from the ER, she could not stand up and walk steadily  · This is likely secondary to peripheral vertigo from her vestibular dysfunction  Patient also has some tinnitus  · Will give meclizine for the patient  · Get physical and occupational therapy    Hyponatremia  Assessment & Plan  · Admission sodium 132  · This is unlikely the cause of her symptoms  · Will give normal saline   · Monitor BMP in a m  Ambulatory dysfunction  Assessment & Plan  · PT and OT    Hypertension  Assessment & Plan  Losartan with holding parameters    Idiopathic peripheral neuropathy  Assessment & Plan  · Patient on gabapentin 100 mg HS    Hypothyroidism  Assessment & Plan  · Levothyroxine    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)  Assessment & Plan  · Continue aspirin        VTE Prophylaxis: Enoxaparin (Lovenox)  Code Status: Level 1 - Full Code  Anticipated Length of Stay:  Patient will be admitted on an Observation basis with an anticipated length of stay of  less than 2 midnights  Justification for Hospital Stay: Dizziness      Chief Complaint:     Chief Complaint   Patient presents with    Dizziness     pt presents to ed via walk in with complaint of being dizzy for the past couple days but got worse last night and this moring pt has a hx of the same but usually isn't this bad      History of Present Illness: Abdias Lewis is a 80 y o  female who presents with dizziness  The patient has been having chronic dizziness due to vestibular dysfunction but states that the symptoms increased    Patient also has some tinnitus and ringing in the ear  No nausea or vomiting  Patient states that she has not taken any new medications recently  Patient was given meclizine and when she was being tried to be discharged from the ER she still felt unsteady  Patient is being hospitalized in observation status  Patient does not have any chest pain, palpitations or diaphoresis  No high fever, chills or rigors  No cough or shortness of breath    Patient states that she has a little depressed because all her family has  and she just has a son and daughter-in-law who live 2 hours away  She has no support at home according to her  She states she gets anxious  No suicidal ideation  Patient does not want to take any medications for anxiety or depression  Review of Systems:  Negative except as above  History obtained from the patient  General ROS: positive for  - fatigue  Psychological ROS: positive for - anxiety  Ophthalmic ROS: negative  Respiratory ROS: no cough, shortness of breath, or wheezing  Cardiovascular ROS: no chest pain or dyspnea on exertion  Gastrointestinal ROS: no abdominal pain, change in bowel habits, or black or bloody stools  Genito-Urinary ROS: no dysuria, trouble voiding, or hematuria  Musculoskeletal ROS: positive for - muscular weakness  Neurological ROS: positive for - dizziness  Hematological ROS- No active bleeding  Otherwise, all other 12 point review of systems normal         Past Medical and Surgical History:   Past Medical History:   Diagnosis Date    Allergic rhinitis     Disease of thyroid gland     Dizziness     Malignant neoplasm of colon ([address] )     last assessed: 10/20/2015    Tinnitus      Past Surgical History:   Procedure Laterality Date    ADENOIDECTOMY      APPENDECTOMY      last assessed: 10/20/2015    CHOLECYSTECTOMY      last assessed: 10/20/2015    COLECTOMY      last assessed: 10/20/2015; partial     TONSILLECTOMY      last assessed: 10/20/2015     Meds/Allergies: Allergies:    Allergies Allergen Reactions    Penicillins Hives     Other reaction(s): Other (See Comments)  hives     Prior to Admission Medications   Prescriptions Last Dose Informant Patient Reported? Taking? Calcium-Vitamin D 600-200 MG-UNIT per tablet 2021 at Unknown time Self Yes Yes   Sig: Take by mouth   Cholecalciferol (Vitamin D3) 1 25 MG (75828 UT) CAPS 2021 at Unknown time Self Yes Yes   Sig: Vitamin D3   DULoxetine (CYMBALTA) 20 mg capsule 2021 at Unknown time Self No Yes   Sig: Take 1 capsule (20 mg total) by mouth daily at bedtime   aspirin 81 MG tablet 2021 at Unknown time Self Yes Yes   Sig: Take 81 mg by mouth   denosumab (PROLIA) 60 mg/mL 2021 at Unknown time Self Yes Yes   Si mL   fexofenadine (ALLEGRA) 180 MG tablet 2021 at Unknown time Self Yes Yes   Sig: Take 180 mg by mouth daily   gabapentin (NEURONTIN) 100 mg capsule 2021 at Unknown time  No Yes   Sig: TAKE 1 CAPSULE BY MOUTH EVERY DAY AT BEDTIME   linaCLOtide (Linzess) 290 MCG CAPS 2021 at Unknown time Self No Yes   Sig: Take 1 capsule by mouth daily   losartan (COZAAR) 25 mg tablet 2021 at Unknown time Self No Yes   Sig: Take 1 tablet by mouth once daily   meclizine (ANTIVERT) 12 5 MG tablet 2021 at Unknown time Self No Yes   Sig: Take 1 tablet (12 5 mg total) by mouth every 8 (eight) hours as needed for dizziness   senna-docusate sodium (SENOKOT S) 8 6-50 mg per tablet 2021 at Unknown time Self Yes Yes   Sig: Take 2 tablets by mouth daily      Facility-Administered Medications: None     Social History:     Social History     Socioeconomic History    Marital status:       Spouse name: Not on file    Number of children: Not on file    Years of education: 15    Highest education level: Not on file   Occupational History    Occupation: Retired - [employer]    Tobacco Use    Smoking status: Former Smoker     Packs/day: 0 25    Smokeless tobacco: Never Used   Vaping Use    Vaping Use: Never used   Substance and Sexual Activity    Alcohol use: No    Drug use: Never    Sexual activity: Not on file   Other Topics Concern    Not on file   Social History Narrative    · E-cigarette/vape status:   Never used electronic cigarettes      · Do you currently or have you served in the [employer]:   No      · Were you activated, into active duty, as a member of the [employer] or as a Reservist:   No      · Exercise level:   Occasional walks daily - when weather is nice     · Diet:   Regular      · General stress level:   High      · Caffeine intake: Moderate      · Seat belts used routinely:   Yes      · Sunscreen used routinely:   Yes      · Smoke alarm in home: Yes      · Advance directive: Yes      · Salt Intake:   minimal      Social Determinants of Health     Financial Resource Strain:     Difficulty of Paying Living Expenses:    Food Insecurity:     Worried About Running Out of Food in the Last Year:     [address] in the Last Year:    Transportation Needs:     Lack of Transportation (Medical):      Lack of Transportation (Non-Medical):    Physical Activity:     Days of Exercise per Week:     Minutes of Exercise per Session:    Stress:     Feeling of Stress :    Social Connections:     Frequency of Communication with Friends and Family:     Frequency of Social Gatherings with Friends and Family:     Attends [employer] Services:     Active Member of Clubs or Organizations:     Attends Club or Organization Meetings:     Marital Status:    Intimate Partner Violence:     Fear of Current or Ex-Partner:     Emotionally Abused:     Physically Abused:     Sexually Abused:      Patient Pre-hospital Living Situation:  Lives at home alone  Patient Pre-hospital Level of Mobility:  Reasonable mobility  Patient Pre-hospital Diet Restrictions:  No restrictions    Family History:  Family History   Problem Relation Age of Onset    Heart disease Mother         cardiac disorder    Alzheimer's disease Sister     No Known Problems Father     Leukemia Brother      Physical Exam:   Vitals:   Blood Pressure: 145/75 (08/30/21 1645)  Pulse: 71 (08/30/21 1645)  Temperature: (!) 97 1 °F (36 2 °C) (08/30/21 1645)  Temp Source: Tympanic (08/30/21 1645)  Respirations: 17 (08/30/21 1645)  Height: 5' (152 4 cm) (08/30/21 1645)  Weight - Scale: 60 kg (132 lb 4 4 oz) (08/30/21 1645)  SpO2: 98 % (08/30/21 1645)    General appearance: alert, appears stated age and cooperative  Constitutional- looks a little weak  HEENT - atraumatic and normocephalic  Neck- supple  Skin - no fresh rash  Extremities no fresh focal deformities  Cardiovascular- S1-S2 heard  Respiratory- bilateral air entry present, no crackles or rhonchi  Skin - no fresh rash  Abdomen - normal bowel sounds present, no rebound tenderness  CNS- No fresh focal deficits  Psych- no acute psychosis     Lab Results: I have personally reviewed pertinent reports  Results from last 7 days   Lab Units 08/30/21  1145   WBC Thousand/uL 7 41   HEMOGLOBIN g/dL 11 3*   HEMATOCRIT % 33 0*   PLATELETS Thousands/uL 249   NEUTROS PCT % 88*   LYMPHS PCT % 6*   MONOS PCT % 6   EOS PCT % 0     Results from last 7 days   Lab Units 08/30/21  1145   SODIUM mmol/L 132*   POTASSIUM mmol/L 4 4   CHLORIDE mmol/L 100   CO2 mmol/L 25   ANION GAP mmol/L 7   BUN mg/dL 19   CREATININE mg/dL 1 04   CALCIUM mg/dL 8 9   ALBUMIN g/dL 4 0   TOTAL BILIRUBIN mg/dL 0 40   ALK PHOS U/L 46 0   ALT U/L 12   AST U/L 16   EGFR ml/min/1 73sq m 47   GLUCOSE RANDOM mg/dL 120     Results from last 7 days   Lab Units 08/30/21  1145   INR  0 92                      Results from last 7 days   Lab Units 08/30/21  1311   COLOR UA  Straw*   CLARITY UA  Clear   SPEC GRAV UA  <=1 005   PH UA  6 5   LEUKOCYTES UA  Negative   NITRITE UA  Negative   GLUCOSE UA mg/dl Negative   KETONES UA mg/dl Negative   UROBILINOGEN UA E U /dl 0 2   BILIRUBIN UA  Negative   BLOOD UA  Negative               Imaging:  I have personally reviewed pertinent reports  CTA head and neck with and without contrast    Result Date: 8/30/2021  Impression: CT brain: Stable white matter change consistent with chronic microangiopathy  CT angiography: No stenosis, occlusion or thrombosis of the cervical or intracranial vasculature  Workstation performed: [id number]       EKG, Pathology, and Other Studies Reviewed on Admission:   EKG  Result Date: 08/30/21  Personally reviewed strips with impression of:  No ST or T-wave changes    Epic Records Reviewed: Yes    Isaak Lugo MD  [facility] Internal Medicine    ** Please Note: This note has been constructed using a voice recognition system   **

## 2021-08-31 LAB
ANION GAP SERPL CALCULATED.3IONS-SCNC: 5 MMOL/L (ref 4–13)
BUN SERPL-MCNC: 12 MG/DL (ref 6–20)
CALCIUM SERPL-MCNC: 8.7 MG/DL (ref 8.4–10.2)
CHLORIDE SERPL-SCNC: 103 MMOL/L (ref 96–108)
CO2 SERPL-SCNC: 26 MMOL/L (ref 22–33)
CREAT SERPL-MCNC: 0.96 MG/DL (ref 0.4–1.1)
ERYTHROCYTE [DISTWIDTH] IN BLOOD BY AUTOMATED COUNT: 12.1 % (ref 11.6–15.1)
GFR SERPL CREATININE-BSD FRML MDRD: 52 ML/MIN/1.73SQ M
GLUCOSE P FAST SERPL-MCNC: 78 MG/DL (ref 70–105)
GLUCOSE SERPL-MCNC: 78 MG/DL (ref 65–140)
HCT VFR BLD AUTO: 33.1 % (ref 34.8–46.1)
HGB BLD-MCNC: 11.1 G/DL (ref 11.5–15.4)
MCH RBC QN AUTO: 33.4 PG (ref 26.8–34.3)
MCHC RBC AUTO-ENTMCNC: 33.5 G/DL (ref 31.4–37.4)
MCV RBC AUTO: 100 FL (ref 82–98)
PLATELET # BLD AUTO: 265 THOUSANDS/UL (ref 149–390)
PMV BLD AUTO: 9.7 FL (ref 8.9–12.7)
POTASSIUM SERPL-SCNC: 4 MMOL/L (ref 3.5–5)
RBC # BLD AUTO: 3.32 MILLION/UL (ref 3.81–5.12)
SODIUM SERPL-SCNC: 134 MMOL/L (ref 133–145)
WBC # BLD AUTO: 4.96 THOUSAND/UL (ref 4.31–10.16)

## 2021-08-31 PROCEDURE — 97163 PT EVAL HIGH COMPLEX 45 MIN: CPT

## 2021-08-31 PROCEDURE — 97116 GAIT TRAINING THERAPY: CPT

## 2021-08-31 PROCEDURE — 97530 THERAPEUTIC ACTIVITIES: CPT

## 2021-08-31 PROCEDURE — 80048 BASIC METABOLIC PNL TOTAL CA: CPT | Performed by: INTERNAL MEDICINE

## 2021-08-31 PROCEDURE — 99232 SBSQ HOSP IP/OBS MODERATE 35: CPT | Performed by: INTERNAL MEDICINE

## 2021-08-31 PROCEDURE — 85027 COMPLETE CBC AUTOMATED: CPT | Performed by: INTERNAL MEDICINE

## 2021-08-31 RX ORDER — B-COMPLEX WITH VITAMIN C
1 TABLET ORAL
Status: DISCONTINUED | OUTPATIENT
Start: 2021-09-01 | End: 2021-09-02 | Stop reason: HOSPADM

## 2021-08-31 RX ORDER — ASPIRIN 81 MG/1
81 TABLET ORAL DAILY
Status: DISCONTINUED | OUTPATIENT
Start: 2021-08-31 | End: 2021-09-02 | Stop reason: HOSPADM

## 2021-08-31 RX ADMIN — ASPIRIN 81 MG: 81 TABLET, COATED ORAL at 08:57

## 2021-08-31 RX ADMIN — GABAPENTIN 100 MG: 100 CAPSULE ORAL at 19:34

## 2021-08-31 RX ADMIN — ENOXAPARIN SODIUM 40 MG: 40 INJECTION SUBCUTANEOUS at 08:54

## 2021-08-31 RX ADMIN — LOSARTAN POTASSIUM 25 MG: 25 TABLET, FILM COATED ORAL at 08:54

## 2021-08-31 RX ADMIN — DOCUSATE SODIUM AND SENNOSIDES 2 TABLET: 8.6; 5 TABLET, FILM COATED ORAL at 19:35

## 2021-08-31 RX ADMIN — SODIUM CHLORIDE 50 ML/HR: 0.9 INJECTION, SOLUTION INTRAVENOUS at 21:22

## 2021-08-31 NOTE — PHYSICAL THERAPY NOTE
PHYSICAL THERAPY EVALUATION & TREATMENT  TIME: 3029-9730    NAME:  Robert Payne  DATE: 08/31/21    AGE:   80 y o  Mrn:   [id number]  Length Of Stay: 0    ADMIT DX:  Vertigo [R42]  Dizzy [R42]    Past Medical History:   Diagnosis Date    Allergic rhinitis     Disease of thyroid gland     Dizziness     Malignant neoplasm of colon ([address] )     last assessed: 10/20/2015    Tinnitus      Past Surgical History:   Procedure Laterality Date    ADENOIDECTOMY      APPENDECTOMY      last assessed: 10/20/2015    CHOLECYSTECTOMY      last assessed: 10/20/2015    COLECTOMY      last assessed: 10/20/2015; partial     TONSILLECTOMY      last assessed: 10/20/2015       Performed at least 2 patient identifiers during session: Name, Camilo Akbar and ID bracelet        08/31/21 1217   PT Last Visit   PT Visit Date 08/31/21   Note Type   Note type Evaluation  (& treatment)   Pain Assessment   Pain Assessment Tool 0-10   Pain Score No Pain   Hospital Pain Intervention(s) Ambulation/increased activity;Repositioned   Multiple Pain Sites No   Home Living   Type of [address] [place] [address] One level;Stairs to enter with rails  (3 REJI, then [employer], +basement)   [address] [facility]; Other (Comment)  (rollator walker)   Prior Function   Level of [place] Independent with ADLs and functional mobility; Needs assistance with IADLs   Lives With Alone   Receives Help From Neighbor  (pt reports having no one to assist, neighbor does prn)   ADL Assistance Independent   IADLs Needs assistance   Falls in the last 6 months 0  (pt denies, questionable accuracy of report)   Vocational Retired   Comments Pt reports living at home ALONE in a [address] [facility] with +REJI  Pt reports ambulating household distances with [facility], owns rollator walker but does not want to use it  Pt is mostly indep with ADLs however reports recently all ADLs and mobility have become increasingly diffiicult  Pt relys on neighbor or her 1 friend to assist in IADLs and transportation  Restrictions/Precautions   Weight Bearing Precautions Per Order No   Other Precautions Chair Alarm; Bed Alarm;Multiple lines; Fall Risk;Pain; Impulsive   General   Additional Pertinent History Pt admitted under observation 8/30/21 w/ c/o dizziness/vertigo x a couple days  Family/Caregiver Present No   Cognition   Overall Cognitive Status WFL   Arousal/Participation Cooperative   Orientation Level Oriented X4   Memory Within functional limits   Following Commands Follows multistep commands with increased time or repetition   Comments Hyperverbal and difficult to redirect towards therapy tasks at times, very pleasant and cooperative  RUE Assessment   RUE Assessment WFL   LUE Assessment   LUE Assessment WFL   RLE Assessment   RLE Assessment WFL   LLE Assessment   LLE Assessment WFL   Coordination   Movements are Fluid and Coordinated 1   Sensation WFL   Light Touch   RLE Light Touch Grossly intact   LLE Light Touch Grossly intact   Proprioception   RLE Proprioception Grossly intact   LLE Proprioception Grossly Intact   Bed Mobility   Supine to Sit 4  Minimal assistance   Additional items Assist x 1;HOB elevated; Bedrails; Increased time required   Sit to Supine Unable to assess   Transfers   Sit to Stand 4  Minimal assistance   Additional items Assist x 1   Stand to Sit 4  Minimal assistance   Additional items Assist x 1   Ambulation/Elevation   Gait pattern Narrow TRENA; Forward Flexion;Decreased foot clearance; Short stride   Gait Assistance 3  Moderate assist   Additional items Assist x 1;Verbal cues; Tactile cues   Assistive Device Straight cane  (pt uses at baseline)   Distance 25ft x1, further unable due to significant instability and gait deviations  see treatment section details for additional gait training post initial evaluation     Stair Management Assistance Not tested   Balance   Static Sitting Fair +   Dynamic Sitting Fair   Static Standing Fair   Dynamic Standing Poor   Ambulatory Poor   Endurance Deficit Endurance Deficit Yes   Activity Tolerance   Activity Tolerance Patient limited by fatigue   Medical Staff Made Aware Spoke with SLIM and CM   Nurse Made Aware Spoke with JAEL Quinones and Corina Eisenmenger   Assessment   Prognosis Good   Problem List Decreased strength;Decreased endurance; Impaired balance;Decreased mobility; Decreased safety awareness   Assessment Pt seen for PT evaluation, cleared by RN Corina Eisenmenger  Pt admitted 8/30/2021 with c/o dizziness x a couple days, dx Dizziness  Comorbidities affecting pt's fnxl performance include: hypertension, hypothyroid, neuropathy and vertigo, colon cancer  Personal factors affecting pt at time of PT IE include: ambulating with assistive device, stairs to enter home, inability to navigate level surfaces without external assistance, limited home support, limited insight into impairments, inability to perform ADLS, inability to perform IADLS  and inability to live alone  PTA, pt was independent with functional mobility with SPC, independent with ADLS, requiring assist for IADLS and living alone in 1 story home with 3 steps to enter  Pt scores 16/24 on AM-PAC objective tool w/ a standardized score of 38 32, indicating need for extensive rehab services upon d/c from the acute care setting  The Barthel Index outcome tool was used & pt scores 50 indicating severe limitations of fnxl mobility/ADLS  Pt is at risk of falls d/t impulsivity, impaired balance, impaired insight/safety awareness, use of ambulatory aid, advanced age and acuity of medical illness  Pt's clinical presentation is currently unstable/unpredictable seen in pt's presentation of vital sign response, increased fall risk, new onset of impairment of functional mobility, decreased endurance and new onset of weakness   This PT IE requires high complexity clinical decision making, based on multiple medical/physiological/fnxl/social factors which affect pt's performance w/ evaluation & therapist judgement for disposition recommendations  Pt will benefit from continued PT treatment in order to address impairments, decrease risk of falls, maximize independence w/ fnxl mobility, & ensure safety w/ mobility for transition to next level of care  Based on pt presentation & impairments, pt would most appropriately benefit from post acute STR upon d/c     Barriers to Discharge Decreased caregiver support   Goals   Patient Goals "to walk better and do things for myself again "   STG Expiration Date 09/10/21   Short Term Goal #1 Patient PT goals established in order to address patient self reported goal of "to walk better and do things for myself again "  Pt will complete all bed mobility in flat bed at [facility] level, in order to promote increased OOB functional mobility to improve overall activity tolerance  Pt will complete all transfers with RW at [facility] level, in order to increase safety with functional mobility  Pt will ambulate >150ft with LRAD and S in order to increase safety with household and short community distance functional mobility  Pt will negotiate 3 stairs with HR assist and KORIN in order to facilitate safety to enter/exit her home  Pt will improve B LE strength to >/= 4+/5 MMT throughout, in order to increase safety with functional mobility and decrease risk of falls  Pt will improve AM-PAC score to >/= 21/24 in order to increase independence with mobility and decrease burden of care  Pt will improve Barthel Index score to >/= 60/100 in order to increase independence and decrease risk of falls  Pt will improve ambulatory balance to >/= FAIR+ grade with LRAD in order to promote safety and increased independence with mobility  PT Treatment Day 1   Plan   Treatment/Interventions Functional transfer training;LE strengthening/ROM; Elevations; Therapeutic exercise; Endurance training;Patient/family training;Equipment eval/education; Bed mobility;Gait training;Spoke to MD;Spoke to nursing;Spoke to case management;OT   PT Frequency Other (Comment)  (3-5x/wk)   Recommendation   PT Discharge Recommendation Post acute rehabilitation services   Equipment Recommended [facility] [facility] Recommended Wheeled walker   Change/add to [place]? Yes, Change Size   Walker Size Aldo (Ht <5'1")   Additional Comments PHYSICAL THERAPY TREATMENT NOTE:     TIME IN: 6883   TIME OUT: 1217   TOTAL TREATMENT TIME: 39 minutes  Pt is agreeable to additional ambulation training post IE  Pt continues to require MIN-MODA to achieve stability upon upright standing position  Therapist recommended pt trial use of RW due to significant instability during amb with SPC  Pt hesitant but agreeable  Pt then ambulates 50ft with RW and MIN-MODA, x4 episodes of instability requiring increased physical assistance from therapist to regain stability and prevent fall  With fatigue w/ inc amb distance, pt's B LE noted with instability and shakiness  Upon return to room pt was left seated in bedside recliner chair with all needs in place and lines intact, care returned to RN, PCA aware of pt status  Regarding functional mobility, pt presents significantly below her baseline level of mobility and is not safe for return to home alone at current level  Recommend post acute STR once medically cleared for d/c from the acute care setting  Will continue skilled PT POC as able and appropriate to address functional impairments and progress towards therapy goals     AM-PAC Basic Mobility Inpatient   Turning in Bed Without Bedrails 4   Lying on Back to Sitting on Edge of Flat Bed 3   Moving Bed to Chair 2   Standing Up From Chair 3   Walk in Room 2   Climb 3-5 Stairs 2   Basic Mobility Inpatient Raw Score 16   Basic Mobility Standardized Score 38 32   Modified Zelienople Scale   Modified Zelienople Scale 4   Barthel Index   Feeding 10   Bathing 0   Grooming Score 0   Dressing Score 5   Bladder Score 10   Bowels Score 10   Toilet Use Score 5   Transfers (Bed/Chair) Score 10   Mobility (Level Surface) Score 0   Stairs Score 0   Barthel Index Score 50       The patient's AM-PAC Basic Mobility Inpatient Short Form Raw Score is 16, Standardized Score is 38 32  A standardized score less than 42 9 suggests the patient may benefit from discharge to post-acute rehabilitation services  Please also refer to the recommendation of the Physical Therapist for safe discharge planning          Nestor Marcano PT, DPT   Available via [employer]  [facility] # [id number]  [place] License - [id number]  7/54/5234

## 2021-08-31 NOTE — PROGRESS NOTES
Abbi U  66   Progress Note - Ron Letters 12/11/1930, 80 y o  female MRN: [id number]  Unit/Bed#: -01 Encounter: [id number]  Primary Care Provider: Andrez Bower MD   Date and time admitted to hospital: 8/30/2021  9:10 AM    * Dizziness  Assessment & Plan  · Patient shows improvement in dizziness and but still shows unsteadiness in gait  · This is likely secondary to peripheral vertigo from her vestibular dysfunction  Patient also has some tinnitus  · Will give meclizine for the patient  · PT and OT recommended post acute rehabilitation services  · Discontinue IV fluids  · Orthostatic vital signs were negative    Hyponatremia  Assessment & Plan  · Sodium is corrected, now 134    Ambulatory dysfunction  Assessment & Plan  PT and OT recommended Post acute rehabilitation services      Hypertension  Assessment & Plan  Losartan with holding parameters    Idiopathic peripheral neuropathy  Assessment & Plan  · Continue gabapentin 100 mg HS    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)  Assessment & Plan  · Continue aspirin    Hypothyroidism-resolved as of 8/31/2021  Assessment & Plan  · Levothyroxine      VTE Pharmacologic Prophylaxis:     Moderate Risk (Score 3-4) - Pharmacological DVT Prophylaxis Ordered: Enoxaparin (Lovenox)  Mechanical VTE Prophylaxis in Place: Yes    Patient Centered Rounds: I have performed bedside rounds with nursing staff today  Discussions with Specialists or Other Care Team Provider:   PT and OT consult    Education and Discussions with Family / Patient: Updated  (daughter in law) via phone  Current Length of Stay: 0 day(s)    Current Patient Status: Inpatient     Discharge Plan / Estimated Discharge Date: Anticipate discharge tomorrow to rehab facility  Code Status: Level 1 - Full Code    Patient still feels unsteady and will benefit from short-term rehab given the fact the patient lives alone at home    She requires inpatient admission to finalize disposition setting and medical optimization    Subjective:   Patient seen and examined by me  patient feels better,denies any fevers or chills  And patient still states she has dizziness and lightheadedness  Orthostatic vital signs done were negative  PT/OT reviewed patient recommended disposition to short-term rehab  Patient agreeable to plan    Objective:     Vitals:   Temp (24hrs), Av 4 °F (36 3 °C), Min:97 °F (36 1 °C), Max:98 3 °F (36 8 °C)    Temp:  [97 °F (36 1 °C)-98 3 °F (36 8 °C)] 98 3 °F (36 8 °C)  HR:  [61-80] 78  Resp:  [16-17] 17  BP: (117-149)/(67-79) 117/79  SpO2:  [95 %-98 %] 96 %  Body mass index is 25 83 kg/m²  Input and Output Summary (last 24 hours):     No intake or output data in the 24 hours ending 21 1622    Physical Exam:     Physical Exam  Constitutional:       Appearance: She is well-developed  HENT:      Head: Normocephalic and atraumatic  Nose: Nose normal    Eyes:      Pupils: Pupils are equal, round, and reactive to light  Neck:      Vascular: No JVD  Cardiovascular:      Rate and Rhythm: Normal rate and regular rhythm  Heart sounds: No murmur heard  Pulmonary:      Effort: No respiratory distress  Breath sounds: Normal breath sounds  No wheezing or rales  Chest:      Chest wall: No tenderness  Abdominal:      General: There is no distension  Tenderness: There is no abdominal tenderness  There is no guarding or rebound  Musculoskeletal:         General: Normal range of motion  Cervical back: Neck supple  Skin:     General: Skin is warm and dry  Capillary Refill: Capillary refill takes less than 2 seconds  Neurological:      Mental Status: She is alert and oriented to person, place, and time  Cranial Nerves: No cranial nerve deficit  Sensory: No sensory deficit  Coordination: Coordination normal       Gait: Gait abnormal (unsteady)        Deep Tendon Reflexes: Reflexes normal  Comments: Dizziness improving   Psychiatric:         Behavior: Behavior normal          Additional Data:     Labs:  Results from last 7 days   Lab Units 08/31/21  0504 08/30/21  1145   WBC Thousand/uL 4 96 7 41   HEMOGLOBIN g/dL 11 1* 11 3*   HEMATOCRIT % 33 1* 33 0*   PLATELETS Thousands/uL 265 249   NEUTROS PCT %  --  88*   LYMPHS PCT %  --  6*   MONOS PCT %  --  6   EOS PCT %  --  0     Results from last 7 days   Lab Units 08/31/21  0504 08/30/21  1145   SODIUM mmol/L 134 132*   POTASSIUM mmol/L 4 0 4 4   CHLORIDE mmol/L 103 100   CO2 mmol/L 26 25   BUN mg/dL 12 19   CREATININE mg/dL 0 96 1 04   ANION GAP mmol/L 5 7   CALCIUM mg/dL 8 7 8 9   ALBUMIN g/dL  --  4 0   TOTAL BILIRUBIN mg/dL  --  0 40   ALK PHOS U/L  --  46 0   ALT U/L  --  12   AST U/L  --  16   GLUCOSE RANDOM mg/dL 78 120     Results from last 7 days   Lab Units 08/30/21  1145   INR  0 92                   Imaging: Reviewed radiology reports from this admission including: CT head    Recent Cultures (last 7 days):           Lines/Drains:  Invasive Devices     Peripheral Intravenous Line            Peripheral IV 08/30/21 Right Antecubital 1 day                Telemetry:        Last 24 Hours Medication List:   Current Facility-Administered Medications   Medication Dose Route Frequency Provider Last Rate    aspirin  81 mg Oral Daily Katerina Lopez MD      aspirin  81 mg Oral Once Chava Keating MD      enoxaparin  40 mg Subcutaneous Daily Chava Keating MD      gabapentin  100 mg Oral Daily Melchor Bueno MD      losartan  25 mg Oral Daily Chava Keating MD      meclizine  12 5 mg Oral Q8H PRN Melchor Bueno MD      polyethylene glycol  17 g Oral Daily Moise Pickering PA-C      senna-docusate sodium  2 tablet Oral HS Chava Keating MD      sodium chloride  50 mL/hr Intravenous Continuous Melchor Bueno MD 50 mL/hr (08/30/21 1838)        Today, Patient Was Seen By: Chava Keating MD    ** Please Note: This note has been constructed using a voice recognition system   **

## 2021-08-31 NOTE — ASSESSMENT & PLAN NOTE
· Patient shows improvement in dizziness and but still shows unsteadiness in gait  · This is likely secondary to peripheral vertigo from her vestibular dysfunction    Patient also has some tinnitus  · Will give meclizine for the patient  · PT and OT recommended post acute rehabilitation services  · Discontinue IV fluids  · Orthostatic vital signs were negative

## 2021-08-31 NOTE — PLAN OF CARE
Problem: PHYSICAL THERAPY ADULT  Goal: Performs mobility at highest level of function for planned discharge setting  See evaluation for individualized goals  Description: Treatment/Interventions: Functional transfer training, LE strengthening/ROM, Elevations, Therapeutic exercise, Endurance training, Patient/family training, Equipment eval/education, Bed mobility, Gait training, Spoke to MD, Spoke to nursing, Spoke to case management, OT  Equipment Recommended: Brock Soto     See flowsheet documentation for full assessment, interventions and recommendations  Outcome: Progressing  Note: Prognosis: Good  Problem List: Decreased strength, Decreased endurance, Impaired balance, Decreased mobility, Decreased safety awareness  Assessment: Pt seen for PT evaluation, cleared by RN Rexann Klinefelter  Pt admitted 8/30/2021 with c/o dizziness x a couple days, dx Dizziness  Comorbidities affecting pt's fnxl performance include: hypertension, hypothyroid, neuropathy and vertigo, colon cancer  Personal factors affecting pt at time of PT IE include: ambulating with assistive device, stairs to enter home, inability to navigate level surfaces without external assistance, limited home support, limited insight into impairments, inability to perform ADLS, inability to perform IADLS  and inability to live alone  PTA, pt was independent with functional mobility with SPC, independent with ADLS, requiring assist for IADLS and living alone in 1 story home with 3 steps to enter  Pt scores 16/24 on AM-PAC objective tool w/ a standardized score of 38 32, indicating need for extensive rehab services upon d/c from the acute care setting  The Barthel Index outcome tool was used & pt scores 50 indicating severe limitations of fnxl mobility/ADLS  Pt is at risk of falls d/t impulsivity, impaired balance, impaired insight/safety awareness, use of ambulatory aid, advanced age and acuity of medical illness   Pt's clinical presentation is currently unstable/unpredictable seen in pt's presentation of vital sign response, increased fall risk, new onset of impairment of functional mobility, decreased endurance and new onset of weakness  This PT IE requires high complexity clinical decision making, based on multiple medical/physiological/fnxl/social factors which affect pt's performance w/ evaluation & therapist judgement for disposition recommendations  Pt will benefit from continued PT treatment in order to address impairments, decrease risk of falls, maximize independence w/ fnxl mobility, & ensure safety w/ mobility for transition to next level of care  Based on pt presentation & impairments, pt would most appropriately benefit from post acute STR upon d/c    Barriers to Discharge: Decreased caregiver support     PT Discharge Recommendation: Post acute rehabilitation services    See flowsheet documentation for full assessment

## 2021-09-01 PROBLEM — D53.1 MEGALOBLASTIC ANEMIA: Status: ACTIVE | Noted: 2021-09-01

## 2021-09-01 PROCEDURE — 99232 SBSQ HOSP IP/OBS MODERATE 35: CPT | Performed by: INTERNAL MEDICINE

## 2021-09-01 RX ORDER — LORATADINE 10 MG/1
10 TABLET ORAL DAILY
Status: DISCONTINUED | OUTPATIENT
Start: 2021-09-01 | End: 2021-09-02 | Stop reason: HOSPADM

## 2021-09-01 RX ORDER — LANOLIN ALCOHOL/MO/W.PET/CERES
400 CREAM (GRAM) TOPICAL DAILY
Status: DISCONTINUED | OUTPATIENT
Start: 2021-09-01 | End: 2021-09-02 | Stop reason: HOSPADM

## 2021-09-01 RX ADMIN — LORATADINE 10 MG: 10 TABLET ORAL at 11:43

## 2021-09-01 RX ADMIN — LOSARTAN POTASSIUM 25 MG: 25 TABLET, FILM COATED ORAL at 08:22

## 2021-09-01 RX ADMIN — Medication 1 TABLET: at 12:41

## 2021-09-01 RX ADMIN — ENOXAPARIN SODIUM 40 MG: 40 INJECTION SUBCUTANEOUS at 08:22

## 2021-09-01 RX ADMIN — DOCUSATE SODIUM AND SENNOSIDES 2 TABLET: 8.6; 5 TABLET, FILM COATED ORAL at 19:39

## 2021-09-01 RX ADMIN — GABAPENTIN 100 MG: 100 CAPSULE ORAL at 19:39

## 2021-09-01 NOTE — PLAN OF CARE
Problem: NEUROSENSORY - ADULT  Goal: Achieves maximal functionality and self care  Description: INTERVENTIONS  -monitor dizziness  - Encourage and assist patient to increase activity and self care     Outcome: Progressing     Problem: Potential for Falls  Goal: Patient will remain free of falls  Description: INTERVENTIONS:  - Educate patient/family on patient safety including physical limitations  - Instruct patient to call for assistance with activity   - Consult OT/PT to assist with strengthening/mobility   - Keep Call bell within reach  - Keep bed low and locked with side rails adjusted as appropriate  - Keep care items and personal belongings within reach  - Initiate and maintain comfort rounds  - Make Fall Risk Sign visible to staff  - Offer Toileting every hourly in advance of need  - Initiate/Maintain bed alarm  - Apply yellow socks and bracelet for high fall risk patients  - Consider moving patient to room near nurses station  Outcome: Progressing

## 2021-09-01 NOTE — ASSESSMENT & PLAN NOTE
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

## 2021-09-01 NOTE — ASSESSMENT & PLAN NOTE
· Patient shows improvement in dizziness and but still shows unsteadiness in gait  · This is likely secondary to peripheral vertigo from her vestibular dysfunction    Patient also has some tinnitus  · Will give meclizine for the patient  · PT and OT recommended post acute rehabilitation services  · Orthostatic vital signs were negative

## 2021-09-01 NOTE — PROGRESS NOTES
Abbi U  66   Progress Note - Abdias Lewis 12/11/1930, 80 y o  female MRN: [id number]  Unit/Bed#: -01 Encounter: [id number]  Primary Care Provider: Noe Santiago MD   Date and time admitted to hospital: 8/30/2021  9:10 AM    * Dizziness  Assessment & Plan  · Patient shows improvement in dizziness and but still shows unsteadiness in gait  · This is likely secondary to peripheral vertigo from her vestibular dysfunction  Patient also has some tinnitus  · Will give meclizine for the patient  · PT and OT recommended post acute rehabilitation services  · Orthostatic vital signs were negative    Megaloblastic anemia  Assessment & Plan  Labs showed KHY=454 and anemia  Likely megaloblastic anemia  Empirically treated with vitamin D07 174SC PO + folic acid 389 mg PO    Hyponatremia  Assessment & Plan  · Sodium is corrected, now 134    Ambulatory dysfunction  Assessment & Plan  PT and OT recommended Post acute rehabilitation services      Hypertension  Assessment & Plan  Losartan with holding parameters    Idiopathic peripheral neuropathy  Assessment & Plan  · Continue gabapentin 100 mg HS    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)  Assessment & Plan  · Continue aspirin        VTE Pharmacologic Prophylaxis:   Moderate Risk (Score 3-4) - Pharmacological DVT Prophylaxis Ordered: enoxaparin (Lovenox)  Patient Centered Rounds: I performed bedside rounds with nursing staff today  Discussions with Specialists or Other Care Team Provider: PT and OT consult    Education and Discussions with Family / Patient: Updated  (daughter in law) via phone  Current Length of Stay: 1 day(s)  Current Patient Status: Inpatient   Certification Statement: Anticipate discharge to rehab today  Discharge Plan: Anticipate discharge later today or tomorrow to rehab facility  Code Status: Level 1 - Full Code    Subjective:   Patient seen and examined at bedside    Pretty conversational   Reports mild dizziness however more concerned about care at home she lives alone  Currently looking for placement  Patient agreeable to plan  Objective:     Vitals:   Temp (24hrs), Av 3 °F (36 8 °C), Min:97 7 °F (36 5 °C), Max:98 9 °F (37 2 °C)    Temp:  [97 7 °F (36 5 °C)-98 9 °F (37 2 °C)] 98 9 °F (37 2 °C)  HR:  [61-82] 82  Resp:  [17-18] 17  BP: (120-143)/(64-78) 120/70  SpO2:  [96 %-98 %] 97 %  Body mass index is 25 83 kg/m²  Input and Output Summary (last 24 hours): Intake/Output Summary (Last 24 hours) at 2021 1630  Last data filed at 2021 1101  Gross per 24 hour   Intake 1696 67 ml   Output --   Net 1696 67 ml       Physical Exam:   Physical Exam  Constitutional:       Appearance: She is well-developed  HENT:      Head: Normocephalic and atraumatic  Nose: Nose normal    Eyes:      Pupils: Pupils are equal, round, and reactive to light  Neck:      Vascular: No JVD  Cardiovascular:      Rate and Rhythm: Normal rate and regular rhythm  Heart sounds: No murmur heard  Pulmonary:      Effort: No respiratory distress  Breath sounds: Normal breath sounds  No wheezing or rales  Chest:      Chest wall: No tenderness  Abdominal:      General: There is no distension  Tenderness: There is no abdominal tenderness  There is no guarding or rebound  Musculoskeletal:         General: Normal range of motion  Cervical back: Neck supple  Skin:     General: Skin is warm and dry  Capillary Refill: Capillary refill takes less than 2 seconds  Neurological:      Mental Status: She is alert and oriented to person, place, and time  Cranial Nerves: No cranial nerve deficit  Sensory: No sensory deficit  Coordination: Coordination normal       Gait: Gait abnormal (gait unsteady)        Deep Tendon Reflexes: Reflexes normal       Comments: Dizziness/lightheadedness reported   Psychiatric:         Behavior: Behavior normal  Additional Data:     Labs:  Results from last 7 days   Lab Units 08/31/21  0504 08/30/21  1145   WBC Thousand/uL 4 96 7 41   HEMOGLOBIN g/dL 11 1* 11 3*   HEMATOCRIT % 33 1* 33 0*   PLATELETS Thousands/uL 265 249   NEUTROS PCT %  --  88*   LYMPHS PCT %  --  6*   MONOS PCT %  --  6   EOS PCT %  --  0     Results from last 7 days   Lab Units 08/31/21  0504 08/30/21  1145   SODIUM mmol/L 134 132*   POTASSIUM mmol/L 4 0 4 4   CHLORIDE mmol/L 103 100   CO2 mmol/L 26 25   BUN mg/dL 12 19   CREATININE mg/dL 0 96 1 04   ANION GAP mmol/L 5 7   CALCIUM mg/dL 8 7 8 9   ALBUMIN g/dL  --  4 0   TOTAL BILIRUBIN mg/dL  --  0 40   ALK PHOS U/L  --  46 0   ALT U/L  --  12   AST U/L  --  16   GLUCOSE RANDOM mg/dL 78 120     Results from last 7 days   Lab Units 08/30/21  1145   INR  0 92                   Lines/Drains:  Invasive Devices     Peripheral Intravenous Line            Peripheral IV 08/30/21 Right Antecubital 2 days                      Imaging: Reviewed radiology reports from this admission including: CT head    Recent Cultures (last 7 days):         Last 24 Hours Medication List:   Current Facility-Administered Medications   Medication Dose Route Frequency Provider Last Rate    aspirin  81 mg Oral Daily Kyle Ching MD      aspirin  81 mg Oral Once Yohan Bowles MD      calcium carbonate-vitamin D  1 tablet Oral Daily With Breakfast Yohan Bowles MD      cyanocobalamin  500 mcg Oral Daily Yohan Bowles MD      enoxaparin  40 mg Subcutaneous Daily Yohan Bowles MD      folic acid  209 mcg Oral Daily Yohan Bowles MD      gabapentin  100 mg Oral Daily Perla Muhammad MD      loratadine  10 mg Oral Daily Yohan Bowles MD      losartan  25 mg Oral Daily Yohan Bowles MD      meclizine  12 5 mg Oral Q8H PRN Perla Muhammad MD      polyethylene glycol  17 g Oral Daily Moise Pickering PA-C      senna-docusate sodium  2 tablet Oral HS Yohan Bowles MD      sodium chloride  50 mL/hr Intravenous Continuous Perla Muhammad MD Stopped (09/01/21 8568)        Today, Patient Was Seen By: Yohan Bowles MD    **Please Note: This note may have been constructed using a voice recognition system  **

## 2021-09-02 ENCOUNTER — TELEPHONE (OUTPATIENT)
Dept: OTHER | Facility: OTHER | Age: 86
End: 2021-09-02

## 2021-09-02 ENCOUNTER — TELEPHONE (OUTPATIENT)
Dept: GASTROENTEROLOGY | Facility: CLINIC | Age: 86
End: 2021-09-02

## 2021-09-02 VITALS
TEMPERATURE: 97.5 F | HEART RATE: 63 BPM | HEIGHT: 60 IN | SYSTOLIC BLOOD PRESSURE: 143 MMHG | WEIGHT: 132.28 LBS | OXYGEN SATURATION: 97 % | RESPIRATION RATE: 18 BRPM | DIASTOLIC BLOOD PRESSURE: 68 MMHG | BODY MASS INDEX: 25.97 KG/M2

## 2021-09-02 LAB — SARS-COV-2 RNA RESP QL NAA+PROBE: NEGATIVE

## 2021-09-02 PROCEDURE — 87635 SARS-COV-2 COVID-19 AMP PRB: CPT | Performed by: INTERNAL MEDICINE

## 2021-09-02 PROCEDURE — 97116 GAIT TRAINING THERAPY: CPT

## 2021-09-02 RX ORDER — LORATADINE 10 MG/1
10 TABLET ORAL DAILY
Qty: 30 TABLET | Refills: 0 | Status: CANCELLED | OUTPATIENT
Start: 2021-09-03

## 2021-09-02 RX ORDER — B-COMPLEX WITH VITAMIN C
1 TABLET ORAL
Qty: 30 TABLET | Refills: 0 | Status: SHIPPED | OUTPATIENT
Start: 2021-09-03

## 2021-09-02 RX ORDER — LANOLIN ALCOHOL/MO/W.PET/CERES
400 CREAM (GRAM) TOPICAL DAILY
Qty: 30 TABLET | Refills: 0 | Status: SHIPPED | OUTPATIENT
Start: 2021-09-03 | End: 2021-10-04 | Stop reason: ALTCHOICE

## 2021-09-02 RX ADMIN — Medication 1 TABLET: at 12:14

## 2021-09-02 RX ADMIN — ASPIRIN 81 MG: 81 TABLET, COATED ORAL at 08:37

## 2021-09-02 RX ADMIN — LORATADINE 10 MG: 10 TABLET ORAL at 11:21

## 2021-09-02 RX ADMIN — CYANOCOBALAMIN TAB 500 MCG 500 MCG: 500 TAB at 12:14

## 2021-09-02 RX ADMIN — SODIUM CHLORIDE 50 ML/HR: 0.9 INJECTION, SOLUTION INTRAVENOUS at 03:52

## 2021-09-02 RX ADMIN — ENOXAPARIN SODIUM 40 MG: 40 INJECTION SUBCUTANEOUS at 08:37

## 2021-09-02 RX ADMIN — DOCUSATE SODIUM AND SENNOSIDES 2 TABLET: 8.6; 5 TABLET, FILM COATED ORAL at 18:12

## 2021-09-02 RX ADMIN — LOSARTAN POTASSIUM 25 MG: 25 TABLET, FILM COATED ORAL at 08:37

## 2021-09-02 RX ADMIN — GABAPENTIN 100 MG: 100 CAPSULE ORAL at 18:15

## 2021-09-02 NOTE — TELEPHONE ENCOUNTER
LMOM for pt to return call if she would like to see Dr Jorge Garcia on 9/7/21 in the pm at [facility]  Please schedule if still available when she calls

## 2021-09-02 NOTE — CASE MANAGEMENT
CM informed by Yuko from [facility] over the phone of empty bed for patient at [facility]  CM discussed Medicare rating of [facility] with patient and daughter in law  Patient stated she would like to go to [facility]  CM notified Yuko from [facility] via phone call and [facility]  CM then sent referral for S transport to [facility] for after 3pm per [facility] request  CM to update patient, treatment team and Yuko with  time  DCP: Post acute rehab services at [facility]

## 2021-09-02 NOTE — PHYSICAL THERAPY NOTE
PHYSICAL THERAPY TREATMENT  TIME: 1474-6915    NAME:  Alfred Chowdhury  DATE: 09/02/21    AGE:   80 y o  Mrn:   [id number]  Length Of Stay: 2    ADMIT DX:  Vertigo [R42]  Dizzy [R42]    Past Medical History:   Diagnosis Date    Allergic rhinitis     Disease of thyroid gland     Dizziness     Hypothyroidism 10/20/2015    Malignant neoplasm of colon ([address] )     last assessed: 10/20/2015    Tinnitus      Past Surgical History:   Procedure Laterality Date    ADENOIDECTOMY      APPENDECTOMY      last assessed: 10/20/2015    CHOLECYSTECTOMY      last assessed: 10/20/2015    COLECTOMY      last assessed: 10/20/2015; partial     TONSILLECTOMY      last assessed: 10/20/2015       Performed at least 2 patient identifiers during session: Name and ID bracelet        09/02/21 1055   PT Last Visit   PT Visit Date 09/02/21   Note Type   Note Type Treatment   Pain Assessment   Pain Assessment Tool 0-10   Pain Score No Pain   Restrictions/Precautions   Weight Bearing Precautions Per Order No   Other Precautions Chair Alarm;Multiple lines; Fall Risk;Impulsive   General   Chart Reviewed Yes   Additional Pertinent History Pt admitted 8/30/21 w/ c/o dizziness/vertigo x a couple days  Response to Previous Treatment Patient with no complaints from previous session  Family/Caregiver Present No   Cognition   Overall Cognitive Status WFL   Arousal/Participation Cooperative   Following Commands Follows all commands and directions without difficulty   Subjective   Subjective "I can't go home by myself like this "   Bed Mobility   Additional Comments Bed mobility not completed on this date as pt presents and was left seated OOB in recliner chair  Transfers   Sit to Stand 5  Supervision   Additional items Assist x 1; Armrests; Impulsive;Verbal cues   Stand to Sit 5  Supervision   Additional items Assist x 1; Armrests; Impulsive;Verbal cues   Ambulation/Elevation   Gait pattern Narrow TRENA; Forward Flexion;Decreased foot clearance; Short stride; Excessively slow   Gait Assistance 4  Minimal assist   Additional items Assist x 1;Verbal cues; Tactile cues   Assistive Device Rolling walker;SPC   Distance 60ft x1 with RW and CS; 60ft x1 with SPC and KORIN (baseline uses SPC)  Stair Management Assistance Not tested   Balance   Static Sitting Good   Dynamic Sitting Fair +   Static Standing Fair  (with RW support)   Dynamic Standing Fair  (with RW support)   Ambulatory Fair  (with RW support)   Endurance Deficit   Endurance Deficit Yes   Activity Tolerance   Activity Tolerance Patient limited by fatigue   Medical Staff Made Aware Spoke with SLIM and CM   Nurse Made Aware Spoke with RN Yosi Grullon   Assessment   Prognosis Good   Problem List Decreased strength;Decreased endurance; Impaired balance;Decreased mobility; Decreased safety awareness   Assessment Pt seen for PT treatment today with focus on gait training  Pt presents seated in bedside recliner chair, denies pain and is agreeable to participate in session  Per physician, pt was second guessing her request for rehab upon d/c, however upon therapist discussion with patient, she states that she can not go back home alone and care for self, is agreeable to and requesting rehab  Discussed with CM  Pt completes transfers with S, x1 episode of instability during unsupported reaching tasks which required KORIN to regain stability  Pt then ambulates 60ft with RW and CS, no significant instability, slowed gait speed, +minor misstepping during turning direction  Functional seated rest break post ambulation trial #1, then pt ambulates another 60ft with SPC and KORIN, more slowed gait speed with SPC, more cautious gait, more instability  Recommend pt use of RW for all ambulation at this time  At end of session pt was left as found, needs within reach, care returned to RN   Pt continues to most appropriately benefit from STR upon d/c in order to maximzie functional outcomes and ensure safety with functional mobility  Pt remains at high risk of falls, and must return to home at independent level  Barriers to Discharge Decreased caregiver support   Goals   Patient Goals "to walk better and do things for myself again "   [facility] Expiration Date 09/10/21   Short Term Goal #1 Patient PT goals established in order to address patient self reported goal of "to walk better and do things for myself again "  Pt will complete all bed mobility in flat bed at [facility] level, in order to promote increased OOB functional mobility to improve overall activity tolerance  Pt will complete all transfers with RW at [facility] level, in order to increase safety with functional mobility  Pt will ambulate >150ft with LRAD and S in order to increase safety with household and short community distance functional mobility  Pt will negotiate 3 stairs with HR assist and KORIN in order to facilitate safety to enter/exit her home  Pt will improve B LE strength to >/= 4+/5 MMT throughout, in order to increase safety with functional mobility and decrease risk of falls  Pt will improve AM-PAC score to >/= 21/24 in order to increase independence with mobility and decrease burden of care  Pt will improve Barthel Index score to >/= 60/100 in order to increase independence and decrease risk of falls  Pt will improve ambulatory balance to >/= FAIR+ grade with LRAD in order to promote safety and increased independence with mobility  PT Treatment Day 2   Plan   Treatment/Interventions Functional transfer training;LE strengthening/ROM; Elevations; Therapeutic exercise; Endurance training;Patient/family training;Equipment eval/education; Bed mobility;Gait training;Spoke to MD;Spoke to nursing;Spoke to case management   Progress Progressing toward goals   PT Frequency Other (Comment)  (3-5x/wk)   Recommendation   PT Discharge Recommendation Post acute rehabilitation services   Equipment Recommended [address] [facility] apartum Recommended Wheeled walker   Change/add to [employer] Camera Package? Yes, Change Size   Walker Size Aldo (Ht <5'1")   AM-PAC Basic Mobility Inpatient   Turning in Bed Without Bedrails 4   Lying on Back to Sitting on Edge of Flat Bed 3   Moving Bed to Chair 3   Standing Up From Chair 3   Walk in Room 3   Climb 3-5 Stairs 2   Basic Mobility Inpatient Raw Score 18   Basic Mobility Standardized Score 41 05       The patient's AM-PAC Basic Mobility Inpatient Short Form Raw Score is 18, Standardized Score is 41 05  A standardized score less than 42 9 suggests the patient may benefit from discharge to post-acute rehabilitation services  Please also refer to the recommendation of the Physical Therapist for safe discharge planning          Lisset Harvey PT, DPT   Available via [employer]  [facility] # [id number]  [place] License - [id number]  9/0/4205

## 2021-09-02 NOTE — PLAN OF CARE
Problem: PHYSICAL THERAPY ADULT  Goal: Performs mobility at highest level of function for planned discharge setting  See evaluation for individualized goals  Description: Treatment/Interventions: Functional transfer training, LE strengthening/ROM, Elevations, Therapeutic exercise, Endurance training, Patient/family training, Equipment eval/education, Bed mobility, Gait training, Spoke to MD, Spoke to nursing, Spoke to case management, OT  Equipment Recommended: Char Crocker     See flowsheet documentation for full assessment, interventions and recommendations  Outcome: Progressing  Note: Prognosis: Good  Problem List: Decreased strength, Decreased endurance, Impaired balance, Decreased mobility, Decreased safety awareness  Assessment: Pt seen for PT treatment today with focus on gait training  Pt presents seated in bedside recliner chair, denies pain and is agreeable to participate in session  Per physician, pt was second guessing her request for rehab upon d/c, however upon therapist discussion with patient, she states that she can not go back home alone and care for self, is agreeable to and requesting rehab  Discussed with CM  Pt completes transfers with S, x1 episode of instability during unsupported reaching tasks which required KORIN to regain stability  Pt then ambulates 60ft with RW and CS, no significant instability, slowed gait speed, +minor misstepping during turning direction  Functional seated rest break post ambulation trial #1, then pt ambulates another 60ft with SPC and KORIN, more slowed gait speed with SPC, more cautious gait, more instability  Recommend pt use of RW for all ambulation at this time  At end of session pt was left as found, needs within reach, care returned to RN  Pt continues to most appropriately benefit from STR upon d/c in order to maximzie functional outcomes and ensure safety with functional mobility  Pt remains at high risk of falls, and must return to home at independent level  Barriers to Discharge: Decreased caregiver support        PT Discharge Recommendation: Post acute rehabilitation services     See flowsheet documentation for full assessment

## 2021-09-02 NOTE — ASSESSMENT & PLAN NOTE
Labs from 08/31 showed GDU=157 and anemia  Likely megaloblastic anemia   Empirically treated with vitamin G71 213FK PO + folic acid 164 mg PO

## 2021-09-02 NOTE — PLAN OF CARE
Problem: MOBILITY - ADULT  Goal: Maintain or return to baseline ADL function  Description: INTERVENTIONS:  -  Assess patient's ability to carry out ADLs; assess patient's baseline for ADL function and identify physical deficits which impact ability to perform ADLs (bathing, care of mouth/teeth, toileting, grooming, dressing, etc )  - Assess/evaluate cause of self-care deficits   - Assess range of motion  - Assess patient's mobility; develop plan if impaired  - Assess patient's need for assistive devices and provide as appropriate  - Encourage maximum independence but intervene and supervise when necessary  - Involve family in performance of ADLs  - Assess for home care needs following discharge   - Consider OT consult to assist with ADL evaluation and planning for discharge  - Provide patient education as appropriate  Outcome: Progressing     Problem: NEUROSENSORY - ADULT  Goal: Achieves maximal functionality and self care  Description: INTERVENTIONS  - Monitor swallowing and airway patency with patient fatigue and changes in neurological status  - Encourage and assist patient to increase activity and self care     - Encourage visually impaired, hearing impaired and aphasic patients to use assistive/communication devices  Outcome: Progressing     Problem: DISCHARGE PLANNING  Goal: Discharge to home or other facility with appropriate resources  Description: INTERVENTIONS:  - Identify barriers to discharge w/patient and caregiver  - Arrange for needed discharge resources and transportation as appropriate  - Identify discharge learning needs (meds, wound care, etc )  - Arrange for interpretive services to assist at discharge as needed  - Refer to Case Management Department for coordinating discharge planning if the patient needs post-hospital services based on physician/advanced practitioner order or complex needs related to functional status, cognitive ability, or social support system  Outcome: Progressing

## 2021-09-02 NOTE — ASSESSMENT & PLAN NOTE
· Patient shows improvement in dizziness and in gait  · This is likely secondary to peripheral vertigo from her vestibular dysfunction    Patient also has some tinnitus  · Will give meclizine for the patient  · PT and OT recommended post acute rehabilitation services  · Orthostatic vital signs were negative  · Currently awaiting placement into rehab facility

## 2021-09-02 NOTE — DISCHARGE SUMMARY
Abbi U  66   Discharge- Melody Arnold Yevgeniy 12/11/1930, 80 y o  female MRN: [id number]  Unit/Bed#: -01 Encounter: [id number]  Primary Care Provider: Tressia Holstein, MD   Date and time admitted to hospital: 8/30/2021  9:10 AM    * Dizziness  Assessment & Plan  · Patient shows improvement in dizziness and in gait  · This is likely secondary to peripheral vertigo from her vestibular dysfunction  Patient also has some tinnitus  · Will give meclizine for the patient  · PT and OT recommended post acute rehabilitation services  · Orthostatic vital signs were negative  · Currently awaiting placement into rehab facility    Megaloblastic anemia  Assessment & Plan  Labs from 08/31 showed TPC=529 and anemia  Likely megaloblastic anemia  Empirically treated with vitamin O33 739MD PO + folic acid 065 mg PO    Hyponatremia  Assessment & Plan  · Sodium is corrected, now 134    Ambulatory dysfunction  Assessment & Plan  PT and OT recommended Post acute rehabilitation services      Hypertension  Assessment & Plan  Losartan with holding parameters    Idiopathic peripheral neuropathy  Assessment & Plan  · Continue gabapentin 100 mg HS    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)  Assessment & Plan  · Continue aspirin              Medical Problems     Resolved Problems  Date Reviewed: 9/1/2021        Resolved    Hypothyroidism 8/31/2021     Resolved by  Salvador Resendiz MD                Admission Date:   Admission Orders (From admission, onward)     Ordered        08/31/21 1621  Inpatient Admission  Once         08/30/21 1539  Place in Observation  Once         08/30/21 1640  Place in Observation  Once                     Admitting Diagnosis: Vertigo [R42]  Dizzy [R42]    HPI: Patient is a 80 y o  female who presented to the Ed with dizziness, vertigo, and loss of balance   She has been experiencing dizziness chronically due to vestibular dysfunction, but states that her symptoms were greatly exacerbated  She also endorses tinnitus  She denied any nausea or vomiting  She was given meclizine to treat vertigo, however, she was unable to achieve relief with medication for this exacerbation  Patient was admitted for observation  She denies and chest pain, palpitations, fevers, chills, or shortness of breath  Procedures Performed: No orders of the defined types were placed in this encounter  Summary of Hospital Course: Patient's symptoms gradually improved over the course of her stay with supportive care  PT has been consulted to assess her gait and evaluate her safety for discharge to a rehab facility or home with PT service visits  Significant Findings, Care, Treatment and Services Provided: PT and OT consult    Complications:  None    Condition at Discharge: good         Discharge instructions/Information to patient and family:   See after visit summary for information provided to patient and family  Provisions for Follow-Up Care:  See after visit summary for information related to follow-up care and any pertinent home health orders  PCP: Asad Rosales MD    Disposition: Short-term rehab at [facility]    Planned Readmission: No    Discharge Statement   I spent 20 minutes discharging the patient  This time was spent on the day of discharge  I had direct contact with the patient on the day of discharge  Additional documentation is required if more than 30 minutes were spent on discharge  Discharge Medications:  See after visit summary for reconciled discharge medications provided to patient and family

## 2021-09-02 NOTE — CASE MANAGEMENT
CM notified by Gloria Ignacio via [address] transport to [facility]  CM informed SLIM and Nurse Héctor and Magaly via TigerText  CM notified Yuko from [facility] via phone call and and patient at bedside  No further CM needs at this time  DCP: Post acute rehab services at [facility]

## 2021-09-03 ENCOUNTER — NURSING HOME VISIT (OUTPATIENT)
Dept: GERIATRICS | Facility: OTHER | Age: 86
End: 2021-09-03
Payer: MEDICARE

## 2021-09-03 VITALS
WEIGHT: 131 LBS | OXYGEN SATURATION: 96 % | RESPIRATION RATE: 18 BRPM | HEART RATE: 78 BPM | BODY MASS INDEX: 25.58 KG/M2 | DIASTOLIC BLOOD PRESSURE: 71 MMHG | SYSTOLIC BLOOD PRESSURE: 124 MMHG | TEMPERATURE: 97.8 F

## 2021-09-03 DIAGNOSIS — H83.2X9 VESTIBULAR DYSFUNCTION, UNSPECIFIED LATERALITY: Primary | ICD-10-CM

## 2021-09-03 DIAGNOSIS — I10 HYPERTENSION, UNSPECIFIED TYPE: ICD-10-CM

## 2021-09-03 PROCEDURE — 99305 1ST NF CARE MODERATE MDM 35: CPT | Performed by: INTERNAL MEDICINE

## 2021-09-03 NOTE — TELEPHONE ENCOUNTER
Barbi calling [facility] regarding new admission orders  Called and Paged Dr Payne [place]  Connected him to Geovanna

## 2021-09-03 NOTE — NURSING NOTE
2045 pt discharged to [facility] and picked by shahaber  All belongings given and discharged instructions

## 2021-09-03 NOTE — PROGRESS NOTES
[facility]  [address] [place] [address], [address] [place], [place] [id number]    Nursing Home Admission    NAME: Kathy Forbes  AGE: 80 y o  SEX: female [id number]      Patient Location     [facility]    Patients care was coordinated with nursing facility staff  Recent vitals, labs and updated medications were reviewed on [employer] of facility  Past Medical, surgical, social, medication and allergy history and patients previous records reviewed  Assessment/Plan:    Vestibular dysfunction  Has known history of vestibular dysfunction causing dizziness and vertigo  She was recently hospitalized for exacerbation of symptoms  CTA was negative  Continue PT OT  Continue meclizine p r n  Hypertension  BP stable on Losartan    Hyponatremia  Recent Na was stable at 134    Ambulatory dysfunction:  Continue PT/OT    Megaloblastic anemia:  Continue Vitamin S51 and Folic acid    Depression:  Stable on Duloxetine    Chief Complaint      Peripheral vertigo, ambulatory dysfunction, anemia    HPI       Patient is a 80 y o  female with past medical history significant for hypertension, idiopathic peripheral neuropathy, hyponatremia  Megaloblastic anemia and ambulatory dysfunction  Patient was recently hospitalized with dizziness, tinnitus vertigo and gait imbalance  Patient has known history of vestibular dysfunction causing dizziness  CT brain revealed stable white matter changes consistent with chronic microangiopathy  CT angiography was negative for stenosis occlusion or thrombosis  Patient was seen by PT OT services and subsequently discharged to [facility] where she is being seen for post hospital admission  At the time of my evaluation patient is doing okay  Complains of intermittent dizziness    Patient appears to be upset about not getting her medications according to her home schedule     Past Medical History:   Diagnosis Date    Allergic rhinitis     Disease of thyroid gland     Dizziness     Hypothyroidism 10/20/2015    Malignant neoplasm of colon ([facility] [address] )     last assessed: 10/20/2015    Tinnitus        Past Surgical History:   Procedure Laterality Date    ADENOIDECTOMY      APPENDECTOMY      last assessed: 10/20/2015    CHOLECYSTECTOMY      last assessed: 10/20/2015    COLECTOMY      last assessed: 10/20/2015; partial     TONSILLECTOMY      last assessed: 10/20/2015       Social History     Tobacco Use   Smoking Status Former Smoker    Packs/day: 0 25   Smokeless Tobacco Never Used          Family History   Problem Relation Age of Onset    Heart disease Mother         cardiac disorder    Alzheimer's disease Sister     No Known Problems Father     Leukemia Brother         Allergies   Allergen Reactions    Penicillins Hives     Other reaction(s):  Other (See Comments)  hives          Current Outpatient Medications:     aspirin 81 MG tablet, Take 81 mg by mouth, Disp: , Rfl:     calcium carbonate-vitamin D (OSCAL-D) 500 mg-200 units per tablet, Take 1 tablet by mouth daily with breakfast, Disp: 30 tablet, Rfl: 0    Calcium-Vitamin D 600-200 MG-UNIT per tablet, Take by mouth, Disp: , Rfl:     Cholecalciferol (Vitamin D3) 1 25 MG (04655 UT) CAPS, Vitamin D3, Disp: , Rfl:     cyanocobalamin (VITAMIN B-12) 500 MCG tablet, Take 1 tablet (500 mcg total) by mouth daily for 30 doses, Disp: 30 tablet, Rfl: 0    denosumab (PROLIA) 60 mg/mL, 1 mL, Disp: , Rfl:     DULoxetine (CYMBALTA) 20 mg capsule, Take 1 capsule (20 mg total) by mouth daily at bedtime, Disp: 30 capsule, Rfl: 1    fexofenadine (ALLEGRA) 180 MG tablet, Take 180 mg by mouth daily, Disp: , Rfl:     folic acid (FOLVITE) 332 mcg tablet, Take 1 tablet (400 mcg total) by mouth daily for 30 doses, Disp: 30 tablet, Rfl: 0    gabapentin (NEURONTIN) 100 mg capsule, TAKE 1 CAPSULE BY MOUTH EVERY DAY AT BEDTIME, Disp: 90 capsule, Rfl: 1    linaCLOtide (Linzess) 290 MCG CAPS, Take 1 capsule by mouth daily, Disp: 90 capsule, Rfl: 1    losartan (COZAAR) 25 mg tablet, Take 1 tablet by mouth once daily, Disp: 90 tablet, Rfl: 1    meclizine (ANTIVERT) 12 5 MG tablet, Take 1 tablet (12 5 mg total) by mouth every 8 (eight) hours as needed for dizziness, Disp: 90 tablet, Rfl: 0    senna-docusate sodium (SENOKOT S) 8 6-50 mg per tablet, Take 2 tablets by mouth daily, Disp: , Rfl:   No current facility-administered medications for this visit  Updated list was reviewed in [address] [place] [address] system of facility  Vitals:    09/03/21 1341   BP: 124/71   Pulse: 78   Resp: 18   Temp: 97 8 °F (36 6 °C)   SpO2: 96%       Vital signs were reviewed in point click care    Review of Systems   Constitutional: Positive for fatigue  Negative for chills and fever  HENT: Negative for nosebleeds and rhinorrhea  Eyes: Negative for discharge and redness  Respiratory: Negative for cough, chest tightness, shortness of breath, wheezing and stridor  Cardiovascular: Negative for chest pain and leg swelling  Gastrointestinal: Positive for constipation (At times  Reports having history of IBS)  Negative for abdominal distention, abdominal pain, diarrhea and vomiting  Genitourinary: Negative for dysuria, flank pain and hematuria  Musculoskeletal: Positive for gait problem  Negative for arthralgias and back pain  Skin: Negative for pallor  Neurological: Positive for dizziness and weakness (Generalized)  Negative for tremors, seizures, syncope and headaches  Psychiatric/Behavioral: Positive for dysphoric mood  Negative for agitation, behavioral problems and confusion  Physical Exam  Constitutional:       General: She is not in acute distress  Appearance: She is well-developed  She is not diaphoretic  HENT:      Head: Normocephalic and atraumatic  Nose: No rhinorrhea  Eyes:      General: No scleral icterus  Right eye: No discharge  Left eye: No discharge     Cardiovascular:      Rate and Rhythm: Normal rate and regular rhythm  Pulmonary:      Effort: No respiratory distress  Breath sounds: No stridor  No wheezing  Abdominal:      General: There is no distension  Palpations: Abdomen is soft  Tenderness: There is no abdominal tenderness  There is no guarding  Musculoskeletal:      Cervical back: Neck supple  Left lower leg: No edema  Skin:     Coloration: Skin is not jaundiced or pale  Neurological:      General: No focal deficit present  Mental Status: She is alert and oriented to person, place, and time  Cranial Nerves: No cranial nerve deficit  Psychiatric:         Mood and Affect: Mood normal          Diagnostic Data       Recent labs and imaging studies were reviewed    Lab Results   Component Value Date    WBC 4 96 08/31/2021    HGB 11 1 (L) 08/31/2021    HCT 33 1 (L) 08/31/2021     (H) 08/31/2021     08/31/2021     Lab Results   Component Value Date    SODIUM 134 08/31/2021    K 4 0 08/31/2021     08/31/2021    CO2 26 08/31/2021    BUN 12 08/31/2021    CREATININE 0 96 08/31/2021    GLUC 78 08/31/2021    CALCIUM 8 7 08/31/2021        Code Status:      Full code          This note was electronically signed by Dr Daniel Patel

## 2021-09-03 NOTE — ASSESSMENT & PLAN NOTE
Has known history of vestibular dysfunction causing dizziness and vertigo  She was recently hospitalized for exacerbation of symptoms  CTA was negative  Continue PT OT  Continue meclizine p r n

## 2021-09-09 ENCOUNTER — NURSING HOME VISIT (OUTPATIENT)
Dept: GERIATRICS | Facility: OTHER | Age: 86
End: 2021-09-09
Payer: MEDICARE

## 2021-09-09 DIAGNOSIS — E87.1 HYPONATREMIA: ICD-10-CM

## 2021-09-09 DIAGNOSIS — R26.2 AMBULATORY DYSFUNCTION: ICD-10-CM

## 2021-09-09 DIAGNOSIS — K59.09 CHRONIC CONSTIPATION: ICD-10-CM

## 2021-09-09 DIAGNOSIS — D53.1 MEGALOBLASTIC ANEMIA: ICD-10-CM

## 2021-09-09 DIAGNOSIS — R42 DIZZINESS: Primary | ICD-10-CM

## 2021-09-09 DIAGNOSIS — G60.9 IDIOPATHIC PERIPHERAL NEUROPATHY: ICD-10-CM

## 2021-09-09 PROCEDURE — 99316 NF DSCHRG MGMT 30 MIN+: CPT | Performed by: NURSE PRACTITIONER

## 2021-09-09 NOTE — PROGRESS NOTES
[facility]  [address]  [phone]  DISCHARGE SUMMARY  POS: 31 (YIK)  Facility: [facility]    NAME: Juan C Frazier  AGE: 80 y o  SEX: female  DATE OF ADMISSION: 9/2/2021 DATE OF DISCHARGE: 9/13/2021 DISCHARGE DISPOSITION: Home    Reason for admission: Patient was admitted from [facility]  for rehabilitation after hospitalization for dizziness, tinnitus vertigo and gait imbalance    Admission Diagnoses:  Peripheral vertigo, ambulatory dysfunction, anemia  Additional Problems:   Past Medical History:   Diagnosis Date    Allergic rhinitis     Disease of thyroid gland     Dizziness     Hypothyroidism 10/20/2015    Malignant neoplasm of colon ([address] )     last assessed: 10/20/2015    Tinnitus      Discharge Diagnoses: See problem list follow up recommendations below  Course of stay: Patient was admitted to [facility] for rehabilitation following hospitalization for dizziness, tinnitus vertigo, and gait imbalance  Patient with known history of vestibular dysfunction causing dizziness  During the resident's stay at [facility], she received skilled nursing care, PT, OT, dietitian support, and medical management  She is ambulating 220 ft with roller walker  A referral was placed to home health services by social service to continue PT/OT at home  Labs and testing performed during stay:  None    Discharge Medications: See discharge medication list which was reviewed in epic and compared to facility orders for accuracy  Status at time of discharge exam: Stable    Today's Visit: 9/9/20214:41 PM    Subjective:  No complaints    Review of systems:  Per review of present illness, all other systems reviewed and negative  Vitals:  Weight: 131 1 lb  BP: 109/50   temp: 97 6°   heart rate: 73   resp: 16   O2 sat:  97% on room air    Exam: Physical Exam  Vitals and nursing note reviewed     Constitutional:       General: She is not in acute distress  Appearance: She is not toxic-appearing or diaphoretic  Comments: Elderly female who appears in no distress  HENT:      Head: Normocephalic  Nose: No congestion or rhinorrhea  Mouth/Throat:      Mouth: Mucous membranes are moist       Pharynx: No oropharyngeal exudate  Eyes:      General: No scleral icterus  Right eye: No discharge  Left eye: No discharge  Extraocular Movements: Extraocular movements intact  Conjunctiva/sclera: Conjunctivae normal       Pupils: Pupils are equal, round, and reactive to light  Cardiovascular:      Rate and Rhythm: Normal rate and regular rhythm  Pulses: Normal pulses  Pulmonary:      Effort: Pulmonary effort is normal  No respiratory distress  Breath sounds: Normal breath sounds  No wheezing, rhonchi or rales  Abdominal:      General: Bowel sounds are normal  There is no distension  Palpations: Abdomen is soft  Tenderness: There is no abdominal tenderness  There is no guarding  Musculoskeletal:      Cervical back: Neck supple  No rigidity  Right lower leg: Edema (Trace ankle edema) present  Left lower leg: No edema (Trace ankle edema)  Comments: Moves all 4 extremities  Lymphadenopathy:      Cervical: No cervical adenopathy  Skin:     General: Skin is warm and dry  Capillary Refill: Capillary refill takes less than 2 seconds  Neurological:      Mental Status: She is alert  Mental status is at baseline  Psychiatric:         Mood and Affect: Mood normal          Behavior: Behavior normal          Thought Content:  Thought content normal        Discussion with patient/family and further instructions:  -Fall precautions  -Aspiration precautions  -Bleeding precautions  -Monitor for signs/symptoms of infection  -Medication list was reviewed     Follow-up Recommendations: Please follow-up with your primary care physician within 7-10 days of discharge to review medication changes and current status  Problem List Follow-up Recommendations:  35-year-old female with:    Dizziness  Dizziness likely secondary to peripheral vertigo from her vestibular dysfunction  CT scan of brain revealed stable white matter changes consistent with chronic microangiopathy  CT angiography was negative for stenosis, occlusion, or thrombosis inpatient  No presyncopal or syncopal episodes during SNF stay  Patient continues with dizziness upon standing  Continue meclizine p r n  Followup with PCP    Hyponatremia  Most recent sodium level 134  Followup with PCP for management    Megaloblastic anemia  Continue vitamin B12 116 mg daily and folic acid 889 mg daily  Most recent hemoglobin 11 1    Idiopathic peripheral neuropathy  Stable on gabapentin 100 mg q h s  Chronic constipation  Patient states constipation has been worse due to decreased mobility  Patient states that she had a BM this morning but have a difficult time with pushing but stool was not hard  Continue Linzess daily  Continue senna S 2 tablets daily  Continue milk of magnesia p r n  patient will be given an extra dose tonight at bedtime    Ambulatory dysfunction  Patient ambulating 220 ft with roller walker  Continue PT/OT with home health services  Fall precautions    I have spent >30 minutes with patient today in which greater than 50% of this time was spent in counseling/coordination of care regarding Intructions for management, Patient and family education and Importance of tx compliance  PCP made aware of discharge summary via epic communications  This note was completed in part utilizing m-[employer] fluency direct voice recognition software  Grammatical errors, random word insertion, spelling mistakes, and incomplete sentences may be an occasional consequence of the system secondary to software limitations, ambient noise and hardware issues  At the time of dictation, efforts were made to edit, clarify and/or correct errors    Please read the chart carefully and recognize, using context, where substitutions have occurred  If you have any questions or concerns about the context, text or information contained within the body of this dictation, please contact myself, the provider, for further clarification      [address], [address]  2/0/90224:94 PM

## 2021-09-09 NOTE — LETTER
September 9, 2021     Vane Martines MD  [address]  [address]  [address] [place] [address]    Patient: Maryjane Peoples   YOB: 1930   Date of Visit: 9/9/2021       Dear Dr Tian Silva: Thank you for referring Glen Vuong to me for evaluation  Below are my notes for this consultation  If you have questions, please do not hesitate to call me  I look forward to following your patient along with you  Sincerely,        LUISA Taylor        CC: No Recipients  Raysa Chapa, [address]  9/9/2021  4:42 PM  Incomplete  [facility]  [address]  [phone]  DISCHARGE SUMMARY  POS: 31 ([facility])  Facility: [facility]    NAME: Maryjane Peoples  AGE: 80 y o  SEX: female  DATE OF ADMISSION: 9/2/2021 DATE OF DISCHARGE: 9/13/2021 DISCHARGE DISPOSITION: Home    Reason for admission: Patient was admitted from [facility]  for rehabilitation after hospitalization for dizziness, tinnitus vertigo and gait imbalance    Admission Diagnoses:  Peripheral vertigo, ambulatory dysfunction, anemia  Additional Problems:   Past Medical History:   Diagnosis Date    Allergic rhinitis     Disease of thyroid gland     Dizziness     Hypothyroidism 10/20/2015    Malignant neoplasm of colon ([address] )     last assessed: 10/20/2015    Tinnitus      Discharge Diagnoses: See problem list follow up recommendations below  Course of stay: Patient was admitted to [facility] for rehabilitation following hospitalization for dizziness, tinnitus vertigo, and gait imbalance  Patient with known history of vestibular dysfunction causing dizziness  During the resident's stay at [facility], she received skilled nursing care, PT, OT, dietitian support, and medical management  She is ambulating 220 ft with roller walker  A referral was placed to home health services by social service to continue PT/OT at home      Labs and testing performed during stay:  None    Discharge Medications: See discharge medication list which was reviewed in [facility] and compared to facility orders for accuracy  Status at time of discharge exam: Stable    Today's Visit: 9/9/20214:41 PM    Subjective:  No complaints    Review of systems:  Per review of present illness, all other systems reviewed and negative  Vitals:  Weight: 131 1 lb  BP: 109/50   temp: 97 6°   heart rate: 73   resp: 16   O2 sat:  97% on room air    Exam: Physical Exam  Vitals and nursing note reviewed  Constitutional:       General: She is not in acute distress  Appearance: She is not toxic-appearing or diaphoretic  Comments: Elderly female who appears in no distress  HENT:      Head: Normocephalic  Nose: No congestion or rhinorrhea  Mouth/Throat:      Mouth: Mucous membranes are moist       Pharynx: No oropharyngeal exudate  Eyes:      General: No scleral icterus  Right eye: No discharge  Left eye: No discharge  Extraocular Movements: Extraocular movements intact  Conjunctiva/sclera: Conjunctivae normal       Pupils: Pupils are equal, round, and reactive to light  Cardiovascular:      Rate and Rhythm: Normal rate and regular rhythm  Pulses: Normal pulses  Pulmonary:      Effort: Pulmonary effort is normal  No respiratory distress  Breath sounds: Normal breath sounds  No wheezing, rhonchi or rales  Abdominal:      General: Bowel sounds are normal  There is no distension  Palpations: Abdomen is soft  Tenderness: There is no abdominal tenderness  There is no guarding  Musculoskeletal:      Cervical back: Neck supple  No rigidity  Right lower leg: Edema (Trace ankle edema) present  Left lower leg: No edema (Trace ankle edema)  Comments: Moves all 4 extremities  Lymphadenopathy:      Cervical: No cervical adenopathy  Skin:     General: Skin is warm and dry  Capillary Refill: Capillary refill takes less than 2 seconds  Neurological:      Mental Status: She is alert  Mental status is at baseline  Psychiatric:         Mood and Affect: Mood normal          Behavior: Behavior normal          Thought Content: Thought content normal        Discussion with patient/family and further instructions:  -Fall precautions  -Aspiration precautions  -Bleeding precautions  -Monitor for signs/symptoms of infection  -Medication list was reviewed     Follow-up Recommendations: Please follow-up with your primary care physician within 7-10 days of discharge to review medication changes and current status  Problem List Follow-up Recommendations:  27-year-old female with:    Dizziness  Dizziness likely secondary to peripheral vertigo from her vestibular dysfunction  CT scan of brain revealed stable white matter changes consistent with chronic microangiopathy  CT angiography was negative for stenosis, occlusion, or thrombosis inpatient  No presyncopal or syncopal episodes during SNF stay  Patient continues with dizziness upon standing  Continue meclizine p r n  Followup with PCP    Hyponatremia  Most recent sodium level 134  Followup with PCP for management    Megaloblastic anemia  Continue vitamin B12 217 mg daily and folic acid 035 mg daily  Most recent hemoglobin 11 1    Idiopathic peripheral neuropathy  Stable on gabapentin 100 mg q h s  Chronic constipation  Patient states constipation has been worse due to decreased mobility    Patient states that she had a BM this morning but have a difficult time with pushing but stool was not hard  Continue Linzess daily  Continue senna S 2 tablets daily  Continue milk of magnesia p r n  patient will be given an extra dose tonight at bedtime    Ambulatory dysfunction  Patient ambulating 220 ft with roller walker  Continue PT/OT with home health services  Fall precautions    I have spent >30 minutes with patient today in which greater than 50% of this time was spent in counseling/coordination of care regarding Intructions for management, Patient and family education and Importance of tx compliance  PCP made aware of discharge summary via epic communications  This note was completed in part utilizing m-modal fluency direct voice recognition software  Grammatical errors, random word insertion, spelling mistakes, and incomplete sentences may be an occasional consequence of the system secondary to software limitations, ambient noise and hardware issues  At the time of dictation, efforts were made to edit, clarify and/or correct errors  Please read the chart carefully and recognize, using context, where substitutions have occurred  If you have any questions or concerns about the context, text or information contained within the body of this dictation, please contact myself, the provider, for further clarification      [address], [address]   2/3/31453:68 PM

## 2021-09-09 NOTE — ASSESSMENT & PLAN NOTE
Dizziness likely secondary to peripheral vertigo from her vestibular dysfunction  CT scan of brain revealed stable white matter changes consistent with chronic microangiopathy  CT angiography was negative for stenosis, occlusion, or thrombosis inpatient  No presyncopal or syncopal episodes during SNF stay  Patient continues with dizziness upon standing  Continue meclizine p r n    Followup with PCP

## 2021-09-09 NOTE — ASSESSMENT & PLAN NOTE
Patient ambulating 220 ft with roller walker  Continue PT/OT with home health services  Fall precautions Problem: Breastfeeding  Goal: Mom maintains milk supply when infant ill/premature  Intervention: Educate mom on pumping 8x per 24 hours for 10-15 minutes each time with hospital grade pump, one 5 hr stretch at night   catrina used to speak to mother, verified mother's understanding of proper pump use and settings including decreasing speed to 60 after milk flowing, mother denies pain with pumping, currently pumping at a suction of 35, mother educated to pump every 3 hours for 15 minutes, mother encouraged to stay until Monday to see whether she can establish with WIC in order to get a breast pump, encouraged to call for assistance as needed.

## 2021-09-09 NOTE — ASSESSMENT & PLAN NOTE
Continue vitamin B12 297 mg daily and folic acid 220 mg daily  Most recent hemoglobin 11 1 continue Finasteride 5mg PO daily    VTE PPx: on Lovenox

## 2021-09-09 NOTE — ASSESSMENT & PLAN NOTE
Patient states constipation has been worse due to decreased mobility    Patient states that she had a BM this morning but have a difficult time with pushing but stool was not hard  Continue Linzess daily  Continue senna S 2 tablets daily  Continue milk of magnesia p r n  patient will be given an extra dose tonight at bedtime

## 2021-10-04 ENCOUNTER — OFFICE VISIT (OUTPATIENT)
Dept: FAMILY MEDICINE CLINIC | Facility: CLINIC | Age: 86
End: 2021-10-04
Payer: MEDICARE

## 2021-10-04 VITALS
RESPIRATION RATE: 16 BRPM | SYSTOLIC BLOOD PRESSURE: 142 MMHG | WEIGHT: 131 LBS | HEIGHT: 60 IN | DIASTOLIC BLOOD PRESSURE: 84 MMHG | BODY MASS INDEX: 25.72 KG/M2 | HEART RATE: 117 BPM | OXYGEN SATURATION: 97 %

## 2021-10-04 DIAGNOSIS — M81.0 OSTEOPOROSIS, UNSPECIFIED OSTEOPOROSIS TYPE, UNSPECIFIED PATHOLOGICAL FRACTURE PRESENCE: ICD-10-CM

## 2021-10-04 DIAGNOSIS — D53.1 MEGALOBLASTIC ANEMIA: ICD-10-CM

## 2021-10-04 DIAGNOSIS — R26.2 AMBULATORY DYSFUNCTION: ICD-10-CM

## 2021-10-04 DIAGNOSIS — R42 DIZZINESS: Primary | ICD-10-CM

## 2021-10-04 DIAGNOSIS — G60.9 IDIOPATHIC PERIPHERAL NEUROPATHY: ICD-10-CM

## 2021-10-04 DIAGNOSIS — F41.9 ANXIETY: ICD-10-CM

## 2021-10-04 PROCEDURE — 99214 OFFICE O/P EST MOD 30 MIN: CPT | Performed by: FAMILY MEDICINE

## 2021-10-18 ENCOUNTER — TELEPHONE (OUTPATIENT)
Dept: FAMILY MEDICINE CLINIC | Facility: CLINIC | Age: 86
End: 2021-10-18

## 2021-10-29 DIAGNOSIS — I10 ESSENTIAL HYPERTENSION: ICD-10-CM

## 2021-10-29 RX ORDER — LOSARTAN POTASSIUM 25 MG/1
TABLET ORAL
Qty: 90 TABLET | Refills: 0 | Status: SHIPPED | OUTPATIENT
Start: 2021-10-29 | End: 2022-01-26

## 2021-11-02 ENCOUNTER — TELEPHONE (OUTPATIENT)
Dept: FAMILY MEDICINE CLINIC | Facility: CLINIC | Age: 86
End: 2021-11-02

## 2021-11-02 ENCOUNTER — APPOINTMENT (EMERGENCY)
Dept: RADIOLOGY | Facility: HOSPITAL | Age: 86
DRG: 149 | End: 2021-11-02
Payer: MEDICARE

## 2021-11-02 ENCOUNTER — HOSPITAL ENCOUNTER (INPATIENT)
Facility: HOSPITAL | Age: 86
LOS: 1 days | Discharge: HOME HEALTH | DRG: 149 | End: 2021-11-04
Attending: EMERGENCY MEDICINE | Admitting: INTERNAL MEDICINE
Payer: MEDICARE

## 2021-11-02 DIAGNOSIS — G60.9 IDIOPATHIC PERIPHERAL NEUROPATHY: ICD-10-CM

## 2021-11-02 DIAGNOSIS — R42 VERTIGO: Primary | ICD-10-CM

## 2021-11-02 DIAGNOSIS — R26.2 AMBULATORY DYSFUNCTION: ICD-10-CM

## 2021-11-02 LAB
ALBUMIN SERPL BCP-MCNC: 3.3 G/DL (ref 3.5–5)
ALP SERPL-CCNC: 54 U/L (ref 46–116)
ALT SERPL W P-5'-P-CCNC: 20 U/L (ref 12–78)
ANION GAP SERPL CALCULATED.3IONS-SCNC: 10 MMOL/L (ref 4–13)
AST SERPL W P-5'-P-CCNC: 16 U/L (ref 5–45)
ATRIAL RATE: 55 BPM
BASOPHILS # BLD AUTO: 0.03 THOUSANDS/ΜL (ref 0–0.1)
BASOPHILS NFR BLD AUTO: 1 % (ref 0–1)
BILIRUB SERPL-MCNC: 0.27 MG/DL (ref 0.2–1)
BUN SERPL-MCNC: 16 MG/DL (ref 5–25)
CALCIUM ALBUM COR SERPL-MCNC: 9.2 MG/DL (ref 8.3–10.1)
CALCIUM SERPL-MCNC: 8.6 MG/DL (ref 8.3–10.1)
CHLORIDE SERPL-SCNC: 98 MMOL/L (ref 100–108)
CO2 SERPL-SCNC: 26 MMOL/L (ref 21–32)
CREAT SERPL-MCNC: 1 MG/DL (ref 0.6–1.3)
EOSINOPHIL # BLD AUTO: 0.07 THOUSAND/ΜL (ref 0–0.61)
EOSINOPHIL NFR BLD AUTO: 2 % (ref 0–6)
ERYTHROCYTE [DISTWIDTH] IN BLOOD BY AUTOMATED COUNT: 12 % (ref 11.6–15.1)
GFR SERPL CREATININE-BSD FRML MDRD: 50 ML/MIN/1.73SQ M
GLUCOSE SERPL-MCNC: 107 MG/DL (ref 65–140)
HCT VFR BLD AUTO: 30.1 % (ref 34.8–46.1)
HGB BLD-MCNC: 10 G/DL (ref 11.5–15.4)
IMM GRANULOCYTES # BLD AUTO: 0.03 THOUSAND/UL (ref 0–0.2)
IMM GRANULOCYTES NFR BLD AUTO: 1 % (ref 0–2)
LYMPHOCYTES # BLD AUTO: 0.44 THOUSANDS/ΜL (ref 0.6–4.47)
LYMPHOCYTES NFR BLD AUTO: 9 % (ref 14–44)
MCH RBC QN AUTO: 33.6 PG (ref 26.8–34.3)
MCHC RBC AUTO-ENTMCNC: 33.2 G/DL (ref 31.4–37.4)
MCV RBC AUTO: 101 FL (ref 82–98)
MONOCYTES # BLD AUTO: 0.31 THOUSAND/ΜL (ref 0.17–1.22)
MONOCYTES NFR BLD AUTO: 7 % (ref 4–12)
NEUTROPHILS # BLD AUTO: 3.78 THOUSANDS/ΜL (ref 1.85–7.62)
NEUTS SEG NFR BLD AUTO: 80 % (ref 43–75)
NRBC BLD AUTO-RTO: 0 /100 WBCS
P AXIS: 84 DEGREES
PLATELET # BLD AUTO: 217 THOUSANDS/UL (ref 149–390)
PLATELET # BLD AUTO: 240 THOUSANDS/UL (ref 149–390)
PMV BLD AUTO: 9.6 FL (ref 8.9–12.7)
PMV BLD AUTO: 9.7 FL (ref 8.9–12.7)
POTASSIUM SERPL-SCNC: 4.2 MMOL/L (ref 3.5–5.3)
PR INTERVAL: 192 MS
PROT SERPL-MCNC: 6.4 G/DL (ref 6.4–8.2)
QRS AXIS: -32 DEGREES
QRSD INTERVAL: 88 MS
QT INTERVAL: 440 MS
QTC INTERVAL: 420 MS
RBC # BLD AUTO: 2.98 MILLION/UL (ref 3.81–5.12)
SODIUM SERPL-SCNC: 134 MMOL/L (ref 136–145)
T WAVE AXIS: 56 DEGREES
TROPONIN I SERPL-MCNC: 0.03 NG/ML
VENTRICULAR RATE: 55 BPM
WBC # BLD AUTO: 4.66 THOUSAND/UL (ref 4.31–10.16)

## 2021-11-02 PROCEDURE — 93005 ELECTROCARDIOGRAM TRACING: CPT

## 2021-11-02 PROCEDURE — 36415 COLL VENOUS BLD VENIPUNCTURE: CPT | Performed by: EMERGENCY MEDICINE

## 2021-11-02 PROCEDURE — 71045 X-RAY EXAM CHEST 1 VIEW: CPT

## 2021-11-02 PROCEDURE — 84484 ASSAY OF TROPONIN QUANT: CPT | Performed by: EMERGENCY MEDICINE

## 2021-11-02 PROCEDURE — 96374 THER/PROPH/DIAG INJ IV PUSH: CPT

## 2021-11-02 PROCEDURE — 99285 EMERGENCY DEPT VISIT HI MDM: CPT

## 2021-11-02 PROCEDURE — 99284 EMERGENCY DEPT VISIT MOD MDM: CPT | Performed by: EMERGENCY MEDICINE

## 2021-11-02 PROCEDURE — 93010 ELECTROCARDIOGRAM REPORT: CPT | Performed by: INTERNAL MEDICINE

## 2021-11-02 PROCEDURE — 85049 AUTOMATED PLATELET COUNT: CPT | Performed by: INTERNAL MEDICINE

## 2021-11-02 PROCEDURE — 99220 PR INITIAL OBSERVATION CARE/DAY 70 MINUTES: CPT | Performed by: INTERNAL MEDICINE

## 2021-11-02 PROCEDURE — 80053 COMPREHEN METABOLIC PANEL: CPT | Performed by: EMERGENCY MEDICINE

## 2021-11-02 PROCEDURE — 85025 COMPLETE CBC W/AUTO DIFF WBC: CPT | Performed by: EMERGENCY MEDICINE

## 2021-11-02 RX ORDER — LOSARTAN POTASSIUM 25 MG/1
25 TABLET ORAL DAILY
Status: DISCONTINUED | OUTPATIENT
Start: 2021-11-03 | End: 2021-11-04 | Stop reason: HOSPADM

## 2021-11-02 RX ORDER — MECLIZINE HYDROCHLORIDE 25 MG/1
25 TABLET ORAL EVERY 8 HOURS SCHEDULED
Status: DISCONTINUED | OUTPATIENT
Start: 2021-11-02 | End: 2021-11-04 | Stop reason: HOSPADM

## 2021-11-02 RX ORDER — ASPIRIN 81 MG/1
81 TABLET, CHEWABLE ORAL DAILY
Status: DISCONTINUED | OUTPATIENT
Start: 2021-11-03 | End: 2021-11-04 | Stop reason: HOSPADM

## 2021-11-02 RX ORDER — LUBIPROSTONE 8 UG/1
8 CAPSULE, GELATIN COATED ORAL 2 TIMES DAILY WITH MEALS
Status: DISCONTINUED | OUTPATIENT
Start: 2021-11-02 | End: 2021-11-02

## 2021-11-02 RX ORDER — AMOXICILLIN 250 MG
2 CAPSULE ORAL DAILY
Status: DISCONTINUED | OUTPATIENT
Start: 2021-11-02 | End: 2021-11-04 | Stop reason: HOSPADM

## 2021-11-02 RX ORDER — GABAPENTIN 100 MG/1
100 CAPSULE ORAL
Status: DISCONTINUED | OUTPATIENT
Start: 2021-11-02 | End: 2021-11-03

## 2021-11-02 RX ORDER — DIAZEPAM 5 MG/ML
2.5 INJECTION, SOLUTION INTRAMUSCULAR; INTRAVENOUS ONCE
Status: COMPLETED | OUTPATIENT
Start: 2021-11-02 | End: 2021-11-02

## 2021-11-02 RX ORDER — HEPARIN SODIUM 5000 [USP'U]/ML
5000 INJECTION, SOLUTION INTRAVENOUS; SUBCUTANEOUS EVERY 8 HOURS SCHEDULED
Status: DISCONTINUED | OUTPATIENT
Start: 2021-11-02 | End: 2021-11-04 | Stop reason: HOSPADM

## 2021-11-02 RX ORDER — AMOXICILLIN 250 MG
2 CAPSULE ORAL DAILY
Status: DISCONTINUED | OUTPATIENT
Start: 2021-11-03 | End: 2021-11-02

## 2021-11-02 RX ORDER — LUBIPROSTONE 8 UG/1
8 CAPSULE, GELATIN COATED ORAL 2 TIMES DAILY WITH MEALS
Status: DISCONTINUED | OUTPATIENT
Start: 2021-11-02 | End: 2021-11-04 | Stop reason: HOSPADM

## 2021-11-02 RX ORDER — B-COMPLEX WITH VITAMIN C
1 TABLET ORAL
Status: DISCONTINUED | OUTPATIENT
Start: 2021-11-03 | End: 2021-11-04 | Stop reason: HOSPADM

## 2021-11-02 RX ORDER — MECLIZINE HCL 12.5 MG/1
25 TABLET ORAL ONCE
Status: DISCONTINUED | OUTPATIENT
Start: 2021-11-02 | End: 2021-11-02

## 2021-11-02 RX ORDER — DULOXETIN HYDROCHLORIDE 20 MG/1
20 CAPSULE, DELAYED RELEASE ORAL
Status: DISCONTINUED | OUTPATIENT
Start: 2021-11-02 | End: 2021-11-04 | Stop reason: HOSPADM

## 2021-11-02 RX ADMIN — MECLIZINE HYDROCHLORIDE 25 MG: 25 TABLET ORAL at 21:16

## 2021-11-02 RX ADMIN — DIAZEPAM 2.5 MG: 10 INJECTION, SOLUTION INTRAMUSCULAR; INTRAVENOUS at 12:49

## 2021-11-02 RX ADMIN — GABAPENTIN 100 MG: 100 CAPSULE ORAL at 21:16

## 2021-11-02 RX ADMIN — HEPARIN SODIUM 5000 UNITS: 5000 INJECTION INTRAVENOUS; SUBCUTANEOUS at 21:16

## 2021-11-02 RX ADMIN — MECLIZINE HYDROCHLORIDE 25 MG: 25 TABLET ORAL at 17:11

## 2021-11-02 RX ADMIN — DOCUSATE SODIUM AND SENNOSIDES 2 TABLET: 8.6; 5 TABLET ORAL at 21:16

## 2021-11-02 RX ADMIN — HEPARIN SODIUM 5000 UNITS: 5000 INJECTION INTRAVENOUS; SUBCUTANEOUS at 17:10

## 2021-11-03 ENCOUNTER — APPOINTMENT (INPATIENT)
Dept: CT IMAGING | Facility: HOSPITAL | Age: 86
DRG: 149 | End: 2021-11-03
Payer: MEDICARE

## 2021-11-03 LAB
ANION GAP SERPL CALCULATED.3IONS-SCNC: 6 MMOL/L (ref 4–13)
BUN SERPL-MCNC: 13 MG/DL (ref 5–25)
CALCIUM SERPL-MCNC: 8.6 MG/DL (ref 8.3–10.1)
CHLORIDE SERPL-SCNC: 103 MMOL/L (ref 100–108)
CO2 SERPL-SCNC: 28 MMOL/L (ref 21–32)
CREAT SERPL-MCNC: 1.07 MG/DL (ref 0.6–1.3)
ERYTHROCYTE [DISTWIDTH] IN BLOOD BY AUTOMATED COUNT: 12 % (ref 11.6–15.1)
GFR SERPL CREATININE-BSD FRML MDRD: 46 ML/MIN/1.73SQ M
GLUCOSE P FAST SERPL-MCNC: 75 MG/DL (ref 65–99)
GLUCOSE SERPL-MCNC: 75 MG/DL (ref 65–140)
HCT VFR BLD AUTO: 28.7 % (ref 34.8–46.1)
HGB BLD-MCNC: 9.5 G/DL (ref 11.5–15.4)
MCH RBC QN AUTO: 33.9 PG (ref 26.8–34.3)
MCHC RBC AUTO-ENTMCNC: 33.1 G/DL (ref 31.4–37.4)
MCV RBC AUTO: 103 FL (ref 82–98)
PLATELET # BLD AUTO: 214 THOUSANDS/UL (ref 149–390)
PMV BLD AUTO: 9.8 FL (ref 8.9–12.7)
POTASSIUM SERPL-SCNC: 4.1 MMOL/L (ref 3.5–5.3)
RBC # BLD AUTO: 2.8 MILLION/UL (ref 3.81–5.12)
SODIUM SERPL-SCNC: 137 MMOL/L (ref 136–145)
WBC # BLD AUTO: 3.76 THOUSAND/UL (ref 4.31–10.16)

## 2021-11-03 PROCEDURE — 97163 PT EVAL HIGH COMPLEX 45 MIN: CPT

## 2021-11-03 PROCEDURE — G1004 CDSM NDSC: HCPCS

## 2021-11-03 PROCEDURE — 97116 GAIT TRAINING THERAPY: CPT

## 2021-11-03 PROCEDURE — 99232 SBSQ HOSP IP/OBS MODERATE 35: CPT | Performed by: PHYSICIAN ASSISTANT

## 2021-11-03 PROCEDURE — 85027 COMPLETE CBC AUTOMATED: CPT | Performed by: INTERNAL MEDICINE

## 2021-11-03 PROCEDURE — 70498 CT ANGIOGRAPHY NECK: CPT

## 2021-11-03 PROCEDURE — 97110 THERAPEUTIC EXERCISES: CPT

## 2021-11-03 PROCEDURE — 80048 BASIC METABOLIC PNL TOTAL CA: CPT | Performed by: INTERNAL MEDICINE

## 2021-11-03 PROCEDURE — 70496 CT ANGIOGRAPHY HEAD: CPT

## 2021-11-03 RX ORDER — GABAPENTIN 100 MG/1
100 CAPSULE ORAL 2 TIMES DAILY
Status: DISCONTINUED | OUTPATIENT
Start: 2021-11-03 | End: 2021-11-04 | Stop reason: HOSPADM

## 2021-11-03 RX ADMIN — HEPARIN SODIUM 5000 UNITS: 5000 INJECTION INTRAVENOUS; SUBCUTANEOUS at 13:41

## 2021-11-03 RX ADMIN — MECLIZINE HYDROCHLORIDE 25 MG: 25 TABLET ORAL at 21:51

## 2021-11-03 RX ADMIN — LOSARTAN POTASSIUM 25 MG: 25 TABLET, FILM COATED ORAL at 08:37

## 2021-11-03 RX ADMIN — LUBIPROSTONE 8 MCG: 8 CAPSULE, GELATIN COATED ORAL at 17:25

## 2021-11-03 RX ADMIN — DOCUSATE SODIUM AND SENNOSIDES 2 TABLET: 8.6; 5 TABLET ORAL at 18:43

## 2021-11-03 RX ADMIN — GABAPENTIN 100 MG: 100 CAPSULE ORAL at 18:43

## 2021-11-03 RX ADMIN — MECLIZINE HYDROCHLORIDE 25 MG: 25 TABLET ORAL at 05:14

## 2021-11-03 RX ADMIN — ASPIRIN 81 MG: 81 TABLET, CHEWABLE ORAL at 08:37

## 2021-11-03 RX ADMIN — HEPARIN SODIUM 5000 UNITS: 5000 INJECTION INTRAVENOUS; SUBCUTANEOUS at 21:51

## 2021-11-03 RX ADMIN — Medication 1 TABLET: at 08:37

## 2021-11-03 RX ADMIN — HEPARIN SODIUM 5000 UNITS: 5000 INJECTION INTRAVENOUS; SUBCUTANEOUS at 05:14

## 2021-11-03 RX ADMIN — LUBIPROSTONE 8 MCG: 8 CAPSULE, GELATIN COATED ORAL at 08:37

## 2021-11-03 RX ADMIN — MECLIZINE HYDROCHLORIDE 25 MG: 25 TABLET ORAL at 13:41

## 2021-11-03 RX ADMIN — IOHEXOL 85 ML: 350 INJECTION, SOLUTION INTRAVENOUS at 12:13

## 2021-11-04 VITALS
DIASTOLIC BLOOD PRESSURE: 75 MMHG | TEMPERATURE: 98.2 F | SYSTOLIC BLOOD PRESSURE: 162 MMHG | HEIGHT: 60 IN | BODY MASS INDEX: 25.52 KG/M2 | WEIGHT: 130 LBS | OXYGEN SATURATION: 98 % | RESPIRATION RATE: 18 BRPM | HEART RATE: 65 BPM

## 2021-11-04 PROCEDURE — 99239 HOSP IP/OBS DSCHRG MGMT >30: CPT | Performed by: PHYSICIAN ASSISTANT

## 2021-11-04 RX ORDER — GABAPENTIN 100 MG/1
100 CAPSULE ORAL 2 TIMES DAILY
Qty: 60 CAPSULE | Refills: 0 | Status: SHIPPED | OUTPATIENT
Start: 2021-11-04 | End: 2021-12-13 | Stop reason: SDUPTHER

## 2021-11-04 RX ORDER — MECLIZINE HYDROCHLORIDE 25 MG/1
25 TABLET ORAL EVERY 8 HOURS SCHEDULED
Qty: 90 TABLET | Refills: 0 | Status: SHIPPED | OUTPATIENT
Start: 2021-11-04 | End: 2021-12-14 | Stop reason: SDUPTHER

## 2021-11-04 RX ADMIN — ASPIRIN 81 MG: 81 TABLET, CHEWABLE ORAL at 09:12

## 2021-11-04 RX ADMIN — Medication 1 TABLET: at 09:12

## 2021-11-04 RX ADMIN — LUBIPROSTONE 8 MCG: 8 CAPSULE, GELATIN COATED ORAL at 09:12

## 2021-11-04 RX ADMIN — HEPARIN SODIUM 5000 UNITS: 5000 INJECTION INTRAVENOUS; SUBCUTANEOUS at 05:07

## 2021-11-04 RX ADMIN — LOSARTAN POTASSIUM 25 MG: 25 TABLET, FILM COATED ORAL at 09:15

## 2021-11-04 RX ADMIN — MECLIZINE HYDROCHLORIDE 25 MG: 25 TABLET ORAL at 05:06

## 2021-11-04 RX ADMIN — GABAPENTIN 100 MG: 100 CAPSULE ORAL at 09:12

## 2021-11-08 ENCOUNTER — TRANSITIONAL CARE MANAGEMENT (OUTPATIENT)
Dept: FAMILY MEDICINE CLINIC | Facility: CLINIC | Age: 86
End: 2021-11-08

## 2021-11-11 ENCOUNTER — OFFICE VISIT (OUTPATIENT)
Dept: FAMILY MEDICINE CLINIC | Facility: CLINIC | Age: 86
End: 2021-11-11
Payer: MEDICARE

## 2021-11-11 VITALS
DIASTOLIC BLOOD PRESSURE: 82 MMHG | WEIGHT: 130 LBS | OXYGEN SATURATION: 98 % | SYSTOLIC BLOOD PRESSURE: 128 MMHG | BODY MASS INDEX: 25.52 KG/M2 | HEART RATE: 86 BPM | HEIGHT: 60 IN | RESPIRATION RATE: 16 BRPM

## 2021-11-11 DIAGNOSIS — D53.1 MEGALOBLASTIC ANEMIA: ICD-10-CM

## 2021-11-11 DIAGNOSIS — G60.9 IDIOPATHIC PERIPHERAL NEUROPATHY: ICD-10-CM

## 2021-11-11 DIAGNOSIS — N18.31 STAGE 3A CHRONIC KIDNEY DISEASE (HCC): ICD-10-CM

## 2021-11-11 DIAGNOSIS — H83.2X9 VESTIBULAR DYSFUNCTION, UNSPECIFIED LATERALITY: Primary | ICD-10-CM

## 2021-11-11 PROCEDURE — 99495 TRANSJ CARE MGMT MOD F2F 14D: CPT | Performed by: FAMILY MEDICINE

## 2021-11-11 PROCEDURE — 1111F DSCHRG MED/CURRENT MED MERGE: CPT | Performed by: FAMILY MEDICINE

## 2021-12-03 ENCOUNTER — OFFICE VISIT (OUTPATIENT)
Dept: GASTROENTEROLOGY | Facility: CLINIC | Age: 86
End: 2021-12-03
Payer: MEDICARE

## 2021-12-03 VITALS
HEIGHT: 60 IN | HEART RATE: 84 BPM | WEIGHT: 130 LBS | DIASTOLIC BLOOD PRESSURE: 76 MMHG | SYSTOLIC BLOOD PRESSURE: 153 MMHG | BODY MASS INDEX: 25.52 KG/M2

## 2021-12-03 DIAGNOSIS — D50.8 IRON DEFICIENCY ANEMIA SECONDARY TO INADEQUATE DIETARY IRON INTAKE: ICD-10-CM

## 2021-12-03 DIAGNOSIS — G60.9 IDIOPATHIC PERIPHERAL NEUROPATHY: ICD-10-CM

## 2021-12-03 DIAGNOSIS — R42 VERTIGO: ICD-10-CM

## 2021-12-03 DIAGNOSIS — K58.1 IRRITABLE BOWEL SYNDROME WITH CONSTIPATION: Primary | ICD-10-CM

## 2021-12-03 DIAGNOSIS — K64.8 HEMORRHOID PROLAPSE: ICD-10-CM

## 2021-12-03 PROCEDURE — 99214 OFFICE O/P EST MOD 30 MIN: CPT | Performed by: INTERNAL MEDICINE

## 2021-12-13 DIAGNOSIS — G60.9 IDIOPATHIC PERIPHERAL NEUROPATHY: ICD-10-CM

## 2021-12-13 RX ORDER — GABAPENTIN 100 MG/1
100 CAPSULE ORAL 3 TIMES DAILY
Qty: 90 CAPSULE | Refills: 3 | Status: SHIPPED | OUTPATIENT
Start: 2021-12-13 | End: 2022-05-20

## 2021-12-14 DIAGNOSIS — R42 VERTIGO: ICD-10-CM

## 2021-12-14 RX ORDER — MECLIZINE HYDROCHLORIDE 25 MG/1
25 TABLET ORAL EVERY 8 HOURS SCHEDULED
Qty: 90 TABLET | Refills: 0 | Status: SHIPPED | OUTPATIENT
Start: 2021-12-14 | End: 2022-01-31

## 2021-12-14 RX ORDER — MECLIZINE HCL 12.5 MG/1
TABLET ORAL
Qty: 90 TABLET | Refills: 0 | Status: SHIPPED | OUTPATIENT
Start: 2021-12-14 | End: 2022-05-03 | Stop reason: ALTCHOICE

## 2021-12-21 ENCOUNTER — OFFICE VISIT (OUTPATIENT)
Dept: FAMILY MEDICINE CLINIC | Facility: CLINIC | Age: 86
End: 2021-12-21
Payer: MEDICARE

## 2021-12-21 VITALS
SYSTOLIC BLOOD PRESSURE: 122 MMHG | OXYGEN SATURATION: 98 % | BODY MASS INDEX: 26.9 KG/M2 | RESPIRATION RATE: 16 BRPM | WEIGHT: 137 LBS | DIASTOLIC BLOOD PRESSURE: 80 MMHG | HEART RATE: 74 BPM | HEIGHT: 60 IN

## 2021-12-21 DIAGNOSIS — D50.9 IRON DEFICIENCY ANEMIA, UNSPECIFIED IRON DEFICIENCY ANEMIA TYPE: ICD-10-CM

## 2021-12-21 DIAGNOSIS — M81.0 OSTEOPOROSIS, UNSPECIFIED OSTEOPOROSIS TYPE, UNSPECIFIED PATHOLOGICAL FRACTURE PRESENCE: ICD-10-CM

## 2021-12-21 DIAGNOSIS — R42 VERTIGO: ICD-10-CM

## 2021-12-21 DIAGNOSIS — G60.9 IDIOPATHIC PERIPHERAL NEUROPATHY: ICD-10-CM

## 2021-12-21 DIAGNOSIS — K52.9 CHRONIC DIARRHEA: ICD-10-CM

## 2021-12-21 DIAGNOSIS — H83.2X9 VESTIBULAR DYSFUNCTION, UNSPECIFIED LATERALITY: ICD-10-CM

## 2021-12-21 DIAGNOSIS — Z00.00 WELL ADULT EXAM: Primary | ICD-10-CM

## 2021-12-21 DIAGNOSIS — M16.12 PRIMARY OSTEOARTHRITIS OF LEFT HIP: ICD-10-CM

## 2021-12-21 PROCEDURE — G0439 PPPS, SUBSEQ VISIT: HCPCS | Performed by: FAMILY MEDICINE

## 2021-12-21 PROCEDURE — 99214 OFFICE O/P EST MOD 30 MIN: CPT | Performed by: FAMILY MEDICINE

## 2021-12-21 RX ORDER — LINACLOTIDE 290 UG/1
1 CAPSULE, GELATIN COATED ORAL DAILY
Qty: 90 CAPSULE | Refills: 1 | Status: SHIPPED | OUTPATIENT
Start: 2021-12-21 | End: 2022-08-08

## 2022-01-26 DIAGNOSIS — I10 ESSENTIAL HYPERTENSION: ICD-10-CM

## 2022-01-26 RX ORDER — LOSARTAN POTASSIUM 25 MG/1
TABLET ORAL
Qty: 90 TABLET | Refills: 0 | Status: SHIPPED | OUTPATIENT
Start: 2022-01-26 | End: 2022-05-31

## 2022-01-31 DIAGNOSIS — R42 VERTIGO: ICD-10-CM

## 2022-01-31 RX ORDER — MECLIZINE HYDROCHLORIDE 25 MG/1
TABLET ORAL
Qty: 90 TABLET | Refills: 0 | Status: SHIPPED | OUTPATIENT
Start: 2022-01-31 | End: 2022-07-06

## 2022-03-08 ENCOUNTER — OFFICE VISIT (OUTPATIENT)
Dept: URGENT CARE | Age: 87
End: 2022-03-08
Payer: MEDICARE

## 2022-03-08 ENCOUNTER — APPOINTMENT (OUTPATIENT)
Dept: RADIOLOGY | Age: 87
End: 2022-03-08
Payer: MEDICARE

## 2022-03-08 VITALS
TEMPERATURE: 97.2 F | HEIGHT: 60 IN | BODY MASS INDEX: 26.5 KG/M2 | WEIGHT: 135 LBS | OXYGEN SATURATION: 98 % | HEART RATE: 74 BPM | RESPIRATION RATE: 16 BRPM

## 2022-03-08 DIAGNOSIS — R52 PAIN: Primary | ICD-10-CM

## 2022-03-08 DIAGNOSIS — R52 PAIN: ICD-10-CM

## 2022-03-08 DIAGNOSIS — M16.12 OSTEOARTHRITIS OF LEFT HIP, UNSPECIFIED OSTEOARTHRITIS TYPE: ICD-10-CM

## 2022-03-08 PROCEDURE — 99213 OFFICE O/P EST LOW 20 MIN: CPT

## 2022-03-08 PROCEDURE — G0463 HOSPITAL OUTPT CLINIC VISIT: HCPCS

## 2022-03-08 PROCEDURE — 73502 X-RAY EXAM HIP UNI 2-3 VIEWS: CPT

## 2022-03-08 NOTE — PATIENT INSTRUCTIONS
Hip Pain   WHAT YOU NEED TO KNOW:   Hip pain can be caused by a number of conditions, such as bursitis, arthritis, or muscle or tendon strain  You may have swelling in the fluid-filled sacs that protect your muscles and tendons  Hip pain can also be caused by a lower back problem  Hip pain may be caused by trauma, playing sports, or running  Pain may start in your hip and go to your thigh, buttock, or groin  DISCHARGE INSTRUCTIONS:   Medicines:   · NSAIDs , such as ibuprofen, help decrease swelling, pain, and fever  This medicine is available with or without a doctor's order  NSAIDs can cause stomach bleeding or kidney problems in certain people  If you take blood thinner medicine, always ask your healthcare provider if NSAIDs are safe for you  Always read the medicine label and follow directions  · Take your medicine as directed  Contact your healthcare provider if you think your medicine is not helping or if you have side effects  Tell him of her if you are allergic to any medicine  Keep a list of the medicines, vitamins, and herbs you take  Include the amounts, and when and why you take them  Bring the list or the pill bottles to follow-up visits  Carry your medicine list with you in case of an emergency  Return to the emergency department if:   · Your pain gets worse  · You have numbness in your leg or toes  · You cannot put any weight on or move your hip  Contact your healthcare provider if:   · You have a fever  · Your pain does not decrease, even after treatment  · You have questions or concerns about your condition or care  Follow up with your healthcare provider as directed: You may need physical therapy, an injection, or more testing  You may need to see an orthopedic specialist  Write down your questions so you remember to ask them during your visits    Manage your hip pain:   · Rest  your injured hip so that it can heal  You may need to avoid putting any weight on your hip for at least 48 hours  Return to normal activities as directed  · Ice  the injury for 20 minutes every 4 hours, or as directed  Use an ice pack, or put crushed ice in a plastic bag  Cover it with a towel to protect your skin  Ice helps prevent tissue damage and decreases swelling and pain  · Elevate  your injured hip above the level of your heart as often as you can  This will help decrease swelling and pain  If possible, prop your hip and leg on pillows or blankets to keep the area elevated comfortably  · Maintain a healthy weight  Extra body weight can cause pressure and pain in your hip, knee, and ankle joints  Ask your healthcare provider how much you should weigh  Ask him or her to help you create a weight loss plan if you are overweight  · Use assistive devices as directed  You may need to use a cane or crutches  Assistive devices help decrease pain and pressure on your hip when you walk  Ask your healthcare provider for more information about assistive devices and how to use them correctly  © Copyright [employer] 2022 Information is for End User's use only and may not be sold, redistributed or otherwise used for commercial purposes  All illustrations and images included in CareNotes® are the copyrighted property of A D A M , Inc  or [facility] Rachel Pereyra   The above information is an  only  It is not intended as medical advice for individual conditions or treatments  Talk to your doctor, nurse or pharmacist before following any medical regimen to see if it is safe and effective for you

## 2022-03-08 NOTE — PROGRESS NOTES
[facility]        NAME: Maki Yun is a 80 y o  female  : 1930    MRN: [id number]  DATE: 2022  TIME: 2:00 PM    Assessment and Plan   Pain [R52]  1  Pain  XR hip/pelv 2-3 vws left if performed   2  Osteoarthritis of left hip, unspecified osteoarthritis type  Ambulatory Referral to Orthopedic Surgery   Patient suffers chronic left hip pain due to OA  She follows with pain management, but desires an opinion regarding her candidacy for hip arthroplasty  Referral placed to orthopedic surgery  Patient Instructions   Continue OTC and prescribed medications for pain  Keep all follow-up appointments with PCP and specialists  Follow up with PCP in 3-5 days  Proceed to  ER if symptoms worsen  Chief Complaint     Chief Complaint   Patient presents with    Hip Pain     ongoing flarred over the past few days   unable to get into pcp for a week         History of Present Illness       Patient is a 80 y o  female who presents for chief complaint of left hip pain which has been progressively worsening over the past two years  PMH is significant for osteoarthritis affecting the left hip, chronic lower extremity edema  She received a steroid injection in the left hip approximately 9 months ago with little relief, and has followed with physical therapy for the pain as well  She states that she takes Tylenol arthritis as advised by her PCP, as well as gabapentin for chronic neuropathy  She follows with pain management  She uses a cane but notes significant difficulty walking and no longer drives  She lives in a ranch home and does not need to use stairs  She denies recent fall/trauma to hip, fevers, worsening leg swelling  The pain is not worse than her baseline  She is expressing frustration that she has asked multiple doctors about the possibility of a hip replacement, but states that she just gets told to "ask your family doctor"  She is in no acute distress         Review of Systems Review of Systems   Constitutional: Negative for activity change, fatigue and fever  HENT: Negative for congestion, rhinorrhea, sinus pressure, sinus pain and sore throat  Eyes: Negative  Respiratory: Negative for cough, chest tightness and shortness of breath  Cardiovascular: Negative for chest pain and leg swelling  Gastrointestinal: Negative  Negative for constipation, diarrhea, nausea and vomiting  Endocrine: Negative  Genitourinary: Negative  Negative for difficulty urinating, dysuria and flank pain  Musculoskeletal: Positive for arthralgias and gait problem  Negative for back pain, joint swelling, myalgias, neck pain and neck stiffness  Skin: Negative  Allergic/Immunologic: Negative  Neurological: Negative for dizziness, weakness, light-headedness and headaches  Hematological: Negative  Psychiatric/Behavioral: Negative            Current Medications       Current Outpatient Medications:     aspirin 81 MG tablet, Take 81 mg by mouth, Disp: , Rfl:     calcium carbonate-vitamin D (OSCAL-D) 500 mg-200 units per tablet, Take 1 tablet by mouth daily with breakfast, Disp: 30 tablet, Rfl: 0    Calcium-Vitamin D 600-200 MG-UNIT per tablet, Take by mouth, Disp: , Rfl:     Cholecalciferol (Vitamin D3) 1 25 MG (43603 UT) CAPS, Vitamin D3, Disp: , Rfl:     denosumab (PROLIA) 60 mg/mL, 1 mL, Disp: , Rfl:     fexofenadine (ALLEGRA) 180 MG tablet, Take 180 mg by mouth daily, Disp: , Rfl:     gabapentin (NEURONTIN) 100 mg capsule, Take 1 capsule (100 mg total) by mouth 3 (three) times a day, Disp: 90 capsule, Rfl: 3    linaCLOtide (Linzess) 290 MCG CAPS, Take 1 capsule by mouth daily, Disp: 90 capsule, Rfl: 1    losartan (COZAAR) 25 mg tablet, Take 1 tablet by mouth once daily, Disp: 90 tablet, Rfl: 0    meclizine (ANTIVERT) 12 5 MG tablet, TAKE 1 TABLET BY MOUTH EVERY 8 HOURS AS NEEDED FOR DIZZINESS, Disp: 90 tablet, Rfl: 0    meclizine (ANTIVERT) 25 mg tablet, TAKE 1 TABLET BY MOUTH EVERY 8 HOURS, Disp: 90 tablet, Rfl: 0    senna-docusate sodium (SENOKOT S) 8 6-50 mg per tablet, Take 2 tablets by mouth daily, Disp: , Rfl:     Current Allergies     Allergies as of 03/08/2022 - Reviewed 03/08/2022   Allergen Reaction Noted    Penicillins Hives 11/15/2012            The following portions of the patient's history were reviewed and updated as appropriate: allergies, current medications, past family history, past medical history, past social history, past surgical history and problem list      Past Medical History:   Diagnosis Date    Allergic rhinitis     Disease of thyroid gland     Dizziness     Hypothyroidism 10/20/2015    Malignant neoplasm of colon ([address] )     last assessed: 10/20/2015    Tinnitus        Past Surgical History:   Procedure Laterality Date    ADENOIDECTOMY      APPENDECTOMY      last assessed: 10/20/2015    CHOLECYSTECTOMY      last assessed: 10/20/2015    COLECTOMY      last assessed: 10/20/2015; partial     TONSILLECTOMY      last assessed: 10/20/2015       Family History   Problem Relation Age of Onset    Heart disease Mother         cardiac disorder    Alzheimer's disease Sister     No Known Problems Father     Leukemia Brother          Medications have been verified  Objective   Pulse 74   Temp (!) 97 2 °F (36 2 °C)   Resp 16   Ht 5' (1 524 m)   Wt 61 2 kg (135 lb)   SpO2 98%   BMI 26 37 kg/m²        Physical Exam     Physical Exam  Constitutional:       General: She is not in acute distress  Appearance: Normal appearance  She is normal weight  HENT:      Head: Normocephalic and atraumatic  Eyes:      Extraocular Movements: Extraocular movements intact  Pupils: Pupils are equal, round, and reactive to light  Cardiovascular:      Rate and Rhythm: Normal rate and regular rhythm  Pulses: Normal pulses  Heart sounds: Normal heart sounds  Pulmonary:      Effort: Pulmonary effort is normal  No respiratory distress  Breath sounds: Normal breath sounds  Chest:      Chest wall: No tenderness  Abdominal:      General: Abdomen is flat  Musculoskeletal:         General: No tenderness or signs of injury  Cervical back: Normal range of motion and neck supple  No rigidity or tenderness  Left hip: Decreased range of motion  Decreased strength  Right lower leg: Edema present  Left lower leg: Edema present  Comments: Baseline chronic non-pitting b/l lower extremity edema   Skin:     General: Skin is warm and dry  Capillary Refill: Capillary refill takes less than 2 seconds  Neurological:      General: No focal deficit present  Mental Status: She is alert and oriented to person, place, and time     Psychiatric:         Mood and Affect: Mood normal          Behavior: Behavior normal

## 2022-04-04 ENCOUNTER — TELEPHONE (OUTPATIENT)
Dept: GASTROENTEROLOGY | Facility: CLINIC | Age: 87
End: 2022-04-04

## 2022-04-15 ENCOUNTER — OFFICE VISIT (OUTPATIENT)
Dept: OBGYN CLINIC | Facility: MEDICAL CENTER | Age: 87
End: 2022-04-15
Payer: MEDICARE

## 2022-04-15 VITALS
SYSTOLIC BLOOD PRESSURE: 154 MMHG | HEART RATE: 76 BPM | BODY MASS INDEX: 27.88 KG/M2 | WEIGHT: 142 LBS | DIASTOLIC BLOOD PRESSURE: 76 MMHG | HEIGHT: 60 IN

## 2022-04-15 DIAGNOSIS — M16.12 OSTEOARTHRITIS OF LEFT HIP, UNSPECIFIED OSTEOARTHRITIS TYPE: ICD-10-CM

## 2022-04-15 DIAGNOSIS — M70.62 TROCHANTERIC BURSITIS OF LEFT HIP: Primary | ICD-10-CM

## 2022-04-15 PROCEDURE — 99203 OFFICE O/P NEW LOW 30 MIN: CPT | Performed by: ORTHOPAEDIC SURGERY

## 2022-04-15 PROCEDURE — 20610 DRAIN/INJ JOINT/BURSA W/O US: CPT | Performed by: ORTHOPAEDIC SURGERY

## 2022-04-15 RX ORDER — BUPIVACAINE HYDROCHLORIDE 2.5 MG/ML
2 INJECTION, SOLUTION INFILTRATION; PERINEURAL
Status: COMPLETED | OUTPATIENT
Start: 2022-04-15 | End: 2022-04-15

## 2022-04-15 RX ORDER — BETAMETHASONE SODIUM PHOSPHATE AND BETAMETHASONE ACETATE 3; 3 MG/ML; MG/ML
12 INJECTION, SUSPENSION INTRA-ARTICULAR; INTRALESIONAL; INTRAMUSCULAR; SOFT TISSUE
Status: COMPLETED | OUTPATIENT
Start: 2022-04-15 | End: 2022-04-15

## 2022-04-15 RX ORDER — LIDOCAINE HYDROCHLORIDE 10 MG/ML
2 INJECTION, SOLUTION INFILTRATION; PERINEURAL
Status: COMPLETED | OUTPATIENT
Start: 2022-04-15 | End: 2022-04-15

## 2022-04-15 RX ADMIN — LIDOCAINE HYDROCHLORIDE 2 ML: 10 INJECTION, SOLUTION INFILTRATION; PERINEURAL at 14:52

## 2022-04-15 RX ADMIN — BUPIVACAINE HYDROCHLORIDE 2 ML: 2.5 INJECTION, SOLUTION INFILTRATION; PERINEURAL at 14:52

## 2022-04-15 RX ADMIN — BETAMETHASONE SODIUM PHOSPHATE AND BETAMETHASONE ACETATE 12 MG: 3; 3 INJECTION, SUSPENSION INTRA-ARTICULAR; INTRALESIONAL; INTRAMUSCULAR; SOFT TISSUE at 14:52

## 2022-04-15 NOTE — PROGRESS NOTES
Assessment:  1  Trochanteric bursitis of left hip     2  Osteoarthritis of left hip, unspecified osteoarthritis type  Ambulatory Referral to Orthopedic Surgery       Plan:  Left hip osteoarthritis and trochanteric bursitis  Radiograph displays severe osteoarthritis  The patient was provided with left trochanteric bursa steroid injection  The patient tolerated the procedure well  The patient should follow up in 3 months  To do next visit:  Return in about 3 months (around 7/15/2022)  The above stated was discussed in layman's terms and the patient expressed understanding  All questions were answered to the patient's satisfaction  Scribe Attestation    I,:  Sonia Stevens am acting as a scribe while in the presence of the attending physician :       I,:  Debo Humphrey MD personally performed the services described in this documentation    as scribed in my presence :             Subjective: Grafton Leventhal is a 80 y o  female who presents for initial evaluation of left hip  She has had symptoms for about one year with no injury  Today she complains of left anterior groin pain  She rates her pain at 0/10 sitting and 5-10/10 with walking  She does use a cane for ambulation  She does use gabapentin and Tylenol for pain  She had lateral hip injection [facility] with some benefit  She denies past left hip surgery          Review of systems negative unless otherwise specified in HPI    Past Medical History:   Diagnosis Date    Allergic rhinitis     Disease of thyroid gland     Dizziness     Hypothyroidism 10/20/2015    Malignant neoplasm of colon ([address] )     last assessed: 10/20/2015    Tinnitus        Past Surgical History:   Procedure Laterality Date    ADENOIDECTOMY      APPENDECTOMY      last assessed: 10/20/2015    CHOLECYSTECTOMY      last assessed: 10/20/2015    COLECTOMY      last assessed: 10/20/2015; partial     TONSILLECTOMY      last assessed: 10/20/2015       Family History   Problem Relation Age of Onset    Heart disease Mother         cardiac disorder    Alzheimer's disease Sister     No Known Problems Father     Leukemia Brother        Social History     Occupational History    Occupation: Retired - [employer]    Tobacco Use    Smoking status: Former Smoker     Packs/day: 0 25    Smokeless tobacco: Never Used   Vaping Use    Vaping Use: Never used   Substance and Sexual Activity    Alcohol use: No    Drug use: Never    Sexual activity: Not on file         Current Outpatient Medications:     aspirin 81 MG tablet, Take 81 mg by mouth, Disp: , Rfl:     calcium carbonate-vitamin D (OSCAL-D) 500 mg-200 units per tablet, Take 1 tablet by mouth daily with breakfast, Disp: 30 tablet, Rfl: 0    Calcium-Vitamin D 600-200 MG-UNIT per tablet, Take by mouth, Disp: , Rfl:     Cholecalciferol (Vitamin D3) 1 25 MG (51553 UT) CAPS, Vitamin D3, Disp: , Rfl:     denosumab (PROLIA) 60 mg/mL, 1 mL, Disp: , Rfl:     fexofenadine (ALLEGRA) 180 MG tablet, Take 180 mg by mouth daily, Disp: , Rfl:     gabapentin (NEURONTIN) 100 mg capsule, Take 1 capsule (100 mg total) by mouth 3 (three) times a day, Disp: 90 capsule, Rfl: 3    linaCLOtide (Linzess) 290 MCG CAPS, Take 1 capsule by mouth daily, Disp: 90 capsule, Rfl: 1    losartan (COZAAR) 25 mg tablet, Take 1 tablet by mouth once daily, Disp: 90 tablet, Rfl: 0    meclizine (ANTIVERT) 12 5 MG tablet, TAKE 1 TABLET BY MOUTH EVERY 8 HOURS AS NEEDED FOR DIZZINESS, Disp: 90 tablet, Rfl: 0    meclizine (ANTIVERT) 25 mg tablet, TAKE 1 TABLET BY MOUTH EVERY 8 HOURS, Disp: 90 tablet, Rfl: 0    senna-docusate sodium (SENOKOT S) 8 6-50 mg per tablet, Take 2 tablets by mouth daily, Disp: , Rfl:     Allergies   Allergen Reactions    Penicillins Hives     Other reaction(s):  Other (See Comments)  hives            Vitals:    04/15/22 1423   BP: 154/76   Pulse: 76       Objective:  Physical exam  · General: Awake, Alert, Oriented  · Eyes: Pupils equal, round and reactive to light  · Heart: regular rate and rhythm  · Lungs: No audible wheezing  · Abdomen: soft                    Ortho Exam  Right hip:  Good arc of motion without pain    Left hip:  TTP over greater trochanter  Apprehension with ROM  Groin pain with IR  ER with flexion  Calf compartments soft and supple  Sensation intact  Toes are warm sensate and mobile      Diagnostics, reviewed and taken today if performed as documented: The attending physician has personally reviewed the pertinent films in PACS and interpretation is as follows:  Left hip x-rays:  Severe arthritic changes  Procedures, if performed today:    Large joint arthrocentesis: L greater trochanteric bursa  Universal Protocol:  Consent: Verbal consent obtained  Risks and benefits: risks, benefits and alternatives were discussed  Consent given by: patient  Time out: Immediately prior to procedure a "time out" was called to verify the correct patient, procedure, equipment, support staff and site/side marked as required  Timeout called at: 4/15/2022 2:44 PM   Patient understanding: patient states understanding of the procedure being performed  Site marked: the operative site was marked  Patient identity confirmed: verbally with patient    Supporting Documentation  Indications: pain   Procedure Details  Location: hip - L greater trochanteric bursa  Needle size: 22 G  Ultrasound guidance: no  Approach: lateral  Medications administered: 12 mg betamethasone acetate-betamethasone sodium phosphate 6 (3-3) mg/mL; 2 mL bupivacaine 0 25 %; 2 mL lidocaine 1 %    Patient tolerance: patient tolerated the procedure well with no immediate complications  Dressing:  Sterile dressing applied                Portions of the record may have been created with voice recognition software  Occasional wrong word or "sound a like" substitutions may have occurred due to the inherent limitations of voice recognition software    Read the chart carefully and recognize, using context, where substitutions have occurred

## 2022-05-02 ENCOUNTER — ANNUAL EXAM (OUTPATIENT)
Dept: OBGYN CLINIC | Facility: CLINIC | Age: 87
End: 2022-05-02
Payer: MEDICARE

## 2022-05-02 VITALS
HEIGHT: 60 IN | BODY MASS INDEX: 27.17 KG/M2 | SYSTOLIC BLOOD PRESSURE: 126 MMHG | WEIGHT: 138.4 LBS | DIASTOLIC BLOOD PRESSURE: 68 MMHG

## 2022-05-02 DIAGNOSIS — Z01.419 WOMEN'S ANNUAL ROUTINE GYNECOLOGICAL EXAMINATION: Primary | ICD-10-CM

## 2022-05-02 DIAGNOSIS — N90.4 LICHEN SCLEROSUS OF VULVA: ICD-10-CM

## 2022-05-02 PROCEDURE — G0101 CA SCREEN;PELVIC/BREAST EXAM: HCPCS | Performed by: OBSTETRICS & GYNECOLOGY

## 2022-05-02 RX ORDER — SENNOSIDES 8.6 MG/1
TABLET, COATED ORAL
COMMUNITY
Start: 2022-03-15 | End: 2022-05-03 | Stop reason: SDUPTHER

## 2022-05-02 RX ORDER — CALCIUM CARBONATE-CHOLECALCIFEROL TAB 250 MG-125 UNIT 250-125 MG-UNIT
TAB ORAL
COMMUNITY
Start: 2022-03-15 | End: 2022-05-03 | Stop reason: SDUPTHER

## 2022-05-02 RX ORDER — MELATONIN
COMMUNITY
Start: 2022-03-15

## 2022-05-02 RX ORDER — TRAMADOL HYDROCHLORIDE 50 MG/1
TABLET ORAL
COMMUNITY
Start: 2022-03-15 | End: 2022-05-03 | Stop reason: ALTCHOICE

## 2022-05-02 RX ORDER — PSEUDOEPHED/ACETAMINOPH/DIPHEN 30MG-500MG
TABLET ORAL
COMMUNITY
Start: 2022-03-15

## 2022-05-02 RX ORDER — ASPIRIN 81 MG/1
TABLET, COATED ORAL
COMMUNITY
Start: 2022-03-15 | End: 2022-05-03 | Stop reason: SDUPTHER

## 2022-05-03 ENCOUNTER — TELEPHONE (OUTPATIENT)
Dept: FAMILY MEDICINE CLINIC | Facility: CLINIC | Age: 87
End: 2022-05-03

## 2022-05-03 ENCOUNTER — OFFICE VISIT (OUTPATIENT)
Dept: FAMILY MEDICINE CLINIC | Facility: CLINIC | Age: 87
End: 2022-05-03
Payer: MEDICARE

## 2022-05-03 VITALS
SYSTOLIC BLOOD PRESSURE: 124 MMHG | HEART RATE: 61 BPM | HEIGHT: 60 IN | BODY MASS INDEX: 27.29 KG/M2 | DIASTOLIC BLOOD PRESSURE: 82 MMHG | RESPIRATION RATE: 16 BRPM | OXYGEN SATURATION: 98 % | WEIGHT: 139 LBS

## 2022-05-03 DIAGNOSIS — R60.0 LEG EDEMA, RIGHT: Primary | ICD-10-CM

## 2022-05-03 DIAGNOSIS — R26.2 AMBULATORY DYSFUNCTION: ICD-10-CM

## 2022-05-03 DIAGNOSIS — I70.203 ATHEROSCLEROSIS OF NATIVE ARTERY OF BOTH LOWER EXTREMITIES, WITH UNSPECIFIED PRESENCE OF CLINICAL MANIFESTATION (HCC): ICD-10-CM

## 2022-05-03 DIAGNOSIS — R42 VERTIGO: ICD-10-CM

## 2022-05-03 DIAGNOSIS — K58.1 IRRITABLE BOWEL SYNDROME WITH CONSTIPATION: ICD-10-CM

## 2022-05-03 DIAGNOSIS — M16.12 PRIMARY OSTEOARTHRITIS OF LEFT HIP: ICD-10-CM

## 2022-05-03 DIAGNOSIS — G60.9 IDIOPATHIC PERIPHERAL NEUROPATHY: ICD-10-CM

## 2022-05-03 DIAGNOSIS — N18.31 STAGE 3A CHRONIC KIDNEY DISEASE (HCC): ICD-10-CM

## 2022-05-03 PROCEDURE — 99214 OFFICE O/P EST MOD 30 MIN: CPT | Performed by: FAMILY MEDICINE

## 2022-05-03 RX ORDER — HYDROCHLOROTHIAZIDE 12.5 MG/1
12.5 TABLET ORAL DAILY PRN
Qty: 30 TABLET | Refills: 0 | Status: SHIPPED | OUTPATIENT
Start: 2022-05-03 | End: 2022-05-27

## 2022-05-03 NOTE — TELEPHONE ENCOUNTER
----- Message from Cally Piper MD sent at 5/3/2022  4:35 PM EDT -----  Regarding: FW: left hip  Ok well have her call his office to eval for hip replacement   That is the best option he said     I agree with that     Jd House   ----- Message -----  From: Milton Elizabeth MD  Sent: 5/3/2022   1:54 PM EDT  To: Cally Piper MD  Subject: RE: left hip                                     Due to the severity of her left hip condition, I do not think anything but surgery would be real helpful for her symptoms  If the patient is considering hip replacement, I would vascular reach out to the office staff, and then will work on accommodating her request  PJB  ----- Message -----  From: Cally Piper MD  Sent: 5/3/2022   1:50 PM EDT  To: Milton Elizabeth MD  Subject: left hip                                         Her bursitis injection didn't help   She was interested in the next step   Maybe Gel or PrP? Or even THR ? Thoughts?     Jn

## 2022-05-03 NOTE — PROGRESS NOTES
Assessment/Plan:    Ill reach out to ortho to see about the next step  Try the water pill   DVT low on the list as this is chronic and BL but worse on the right   Take the meclizine prn  And linzess daily         1  Leg edema, right  -     hydrochlorothiazide (HYDRODIURIL) 12 5 mg tablet; Take 1 tablet (12 5 mg total) by mouth daily as needed (leg swelling)    2  Atherosclerosis of native artery of both lower extremities, with unspecified presence of clinical manifestation [address] [address] )    3  Stage 3a chronic kidney disease [facility] )    4  Primary osteoarthritis of left hip    5  Vertigo    6  Idiopathic peripheral neuropathy    7  Irritable bowel syndrome with constipation    8  Ambulatory dysfunction       Subjective:      Patient ID: Rc Zelaya is a 80 y o  female  HPI   Here to go over chronic issues and labs / imaging studies if applicable  RLE edema worse than left   With redness and itching to medial maleolus on the right     Left hip injection once again didn't help  Had intraarticular first then trochanteric this time         The following portions of the patient's history were reviewed and updated as appropriate: allergies, current medications, past family history, past medical history, past social history, past surgical history and problem list     Review of Systems   Constitutional: Positive for activity change, appetite change and fatigue  Negative for fever and unexpected weight change  HENT: Negative for nosebleeds and trouble swallowing  Eyes: Negative for visual disturbance  Respiratory: Negative for chest tightness and shortness of breath  Cardiovascular: Negative for chest pain, palpitations and leg swelling  Gastrointestinal: Negative for abdominal pain, constipation, diarrhea and nausea  Endocrine: Negative for cold intolerance  Genitourinary: Negative for dysuria and urgency  Musculoskeletal: Positive for back pain and gait problem  Negative for joint swelling and myalgias  Skin: Negative for rash  Neurological: Positive for weakness, light-headedness and numbness  Negative for tremors, seizures and syncope  Hematological: Does not bruise/bleed easily  Psychiatric/Behavioral: Negative for hallucinations and suicidal ideas  Objective:      /82 (BP Location: Right arm, Patient Position: Sitting, Cuff Size: Standard)   Pulse 61   Resp 16   Ht 5' (1 524 m)   Wt 63 kg (139 lb)   SpO2 98%   BMI 27 15 kg/m²     No visits with results within 2 Week(s) from this visit     Latest known visit with results is:   Admission on 11/02/2021, Discharged on 11/04/2021   Component Date Value    WBC 11/02/2021 4 66     RBC 11/02/2021 2 98*    Hemoglobin 11/02/2021 10 0*    Hematocrit 11/02/2021 30 1*    MCV 11/02/2021 101*    MCH 11/02/2021 33 6     MCHC 11/02/2021 33 2     RDW 11/02/2021 12 0     MPV 11/02/2021 9 7     Platelets 86/78/3010 240     nRBC 11/02/2021 0     Neutrophils Relative 11/02/2021 80*    Immat GRANS % 11/02/2021 1     Lymphocytes Relative 11/02/2021 9*    Monocytes Relative 11/02/2021 7     Eosinophils Relative 11/02/2021 2     Basophils Relative 11/02/2021 1     Neutrophils Absolute 11/02/2021 3 78     Immature Grans Absolute 11/02/2021 0 03     Lymphocytes Absolute 11/02/2021 0 44*    Monocytes Absolute 11/02/2021 0 31     Eosinophils Absolute 11/02/2021 0 07     Basophils Absolute 11/02/2021 0 03     Sodium 11/02/2021 134*    Potassium 11/02/2021 4 2     Chloride 11/02/2021 98*    CO2 11/02/2021 26     ANION GAP 11/02/2021 10     BUN 11/02/2021 16     Creatinine 11/02/2021 1 00     Glucose 11/02/2021 107     Calcium 11/02/2021 8 6     Corrected Calcium 11/02/2021 9 2     AST 11/02/2021 16     ALT 11/02/2021 20     Alkaline Phosphatase 11/02/2021 54     Total Protein 11/02/2021 6 4     Albumin 11/02/2021 3 3*    Total Bilirubin 11/02/2021 0 27     eGFR 11/02/2021 50     Troponin I 11/02/2021 0 03     Platelets 11/02/2021 217     MPV 11/02/2021 9 6     Ventricular Rate 11/02/2021 55     Atrial Rate 11/02/2021 55     [place] Interval 11/02/2021 192     QRSD Interval 11/02/2021 88     QT Interval 11/02/2021 440     QTC Interval 11/02/2021 420     P Axis 11/02/2021 84     QRS Axis 11/02/2021 -32     T Wave Axis 11/02/2021 56     Sodium 11/03/2021 137     Potassium 11/03/2021 4 1     Chloride 11/03/2021 103     CO2 11/03/2021 28     ANION GAP 11/03/2021 6     BUN 11/03/2021 13     Creatinine 11/03/2021 1 07     Glucose 11/03/2021 75     Glucose, Fasting 11/03/2021 75     Calcium 11/03/2021 8 6     eGFR 11/03/2021 46     WBC 11/03/2021 3 76*    RBC 11/03/2021 2 80*    Hemoglobin 11/03/2021 9 5*    Hematocrit 11/03/2021 28 7*    MCV 11/03/2021 103*    MCH 11/03/2021 33 9     MCHC 11/03/2021 33 1     RDW 11/03/2021 12 0     Platelets 53/38/6074 214     MPV 11/03/2021 9 8           Physical Exam  Vitals and nursing note reviewed  Constitutional:       Appearance: She is well-developed  Comments: cane   HENT:      Head: Normocephalic and atraumatic  Cardiovascular:      Rate and Rhythm: Normal rate and regular rhythm  Heart sounds: Normal heart sounds  No murmur heard  Pulmonary:      Effort: Pulmonary effort is normal       Breath sounds: Normal breath sounds  No wheezing or rales  Abdominal:      General: Bowel sounds are normal  There is no distension  Palpations: Abdomen is soft  Tenderness: There is no abdominal tenderness  Musculoskeletal:         General: No tenderness  Normal range of motion  Cervical back: Normal range of motion and neck supple  Right lower leg: Edema (+2) present  Left lower leg: Edema (+1) present  Lymphadenopathy:      Cervical: No cervical adenopathy  Skin:     General: Skin is warm and dry  Capillary Refill: Capillary refill takes less than 2 seconds  Findings: No rash     Neurological:      Mental Status: She is alert and oriented to person, place, and time  Cranial Nerves: No cranial nerve deficit  Sensory: No sensory deficit  Motor: No abnormal muscle tone  Psychiatric:         Behavior: Behavior normal          Thought Content:  Thought content normal          Judgment: Judgment normal               MD Julia Rush

## 2022-05-03 NOTE — PROGRESS NOTES
Subjective      Everton Pinedo is a 80 y o  female who presents for annual well woman      Postmenopausal   Complaining Mild vaginal irritation/rash for a few months  Denies itching and vaginal discharge  Applying Vaseline  Triamcinolone occasionally    History of abnormal Pap smear: no  Family history of uterine or ovarian cancer: no  Regular self breast exam: not applicable  History of abnormal mammogram: no  Family history of breast cancer: no   History of abnormal lipids: yes - normal in   Menstrual History:  OB History        1    Para   1    Term   1            AB        Living   1       SAB        IAB        Ectopic        Multiple        Live Births                    Menarche age: 13  No LMP recorded  Patient is postmenopausal        The following portions of the patient's history were reviewed and updated as appropriate: allergies, current medications, past family history, past medical history, past social history, past surgical history and problem list     Review of Systems  Review of Systems   Constitutional: Negative for appetite change  Respiratory: Negative for shortness of breath  Cardiovascular: Negative for chest pain  Gastrointestinal: Negative for abdominal pain, constipation, diarrhea, nausea and vomiting  Genitourinary: Positive for vaginal pain (irritation)  Negative for difficulty urinating, dysuria, hematuria, vaginal bleeding and vaginal discharge  Musculoskeletal: Positive for arthralgias (Left hip) and joint swelling (ankles)  Neurological: Positive for dizziness (vertigo)  Psychiatric/Behavioral: Positive for dysphoric mood  All other systems reviewed and are negative            Objective      /68 (BP Location: Left arm, Patient Position: Sitting, Cuff Size: Adult)   Ht 5' (1 524 m)   Wt 62 8 kg (138 lb 6 4 oz)   BMI 27 03 kg/m²       Objective      /68 (BP Location: Left arm, Patient Position: Sitting, Cuff Size: Adult)   Ht 5' (1 524 m)   Wt 62 8 kg (138 lb 6 4 oz)   BMI 27 03 kg/m²     Physical Exam  OBGyn Exam     General:   alert and oriented, in no acute distress, alert, appears stated age and cooperative   Heart: regular rate and rhythm, S1, S2 normal, no murmur, click, rub or gallop   Lungs: clear to auscultation bilaterally   Abdomen: soft, non-tender, without masses or organomegaly   Vulva: Lichen sclerosis, leukocyte labia minora fused with the labia majora clitoral murphy is fused   Vagina: Vaginal atrophy   Cervix: no cervical motion tenderness and no lesions   Uterus: normal size   Adnexa:  Breast Exam:  normal adnexa  breasts appear normal, no suspicious masses, no skin or nipple changes or axillary nodes  Assessment      @well woman@   80-year-old female  Annual exam   History of colon cancer   Osteoporosis follow-up with PCP   Lichen sclerosis was taking triamcinolone ointment  Plan   Diet/exercise  Calcium/vitamin-D  Follow-up with PCP DEXA scan management for osteoporosis  Mammogram offered patient declined  Management option for lichen sclerosis discussed with patient to continue triamcinolone appointment and to return to office in 6 months for follow-up   All questions answered  There are no Patient Instructions on file for this visit

## 2022-05-09 ENCOUNTER — OFFICE VISIT (OUTPATIENT)
Dept: GASTROENTEROLOGY | Facility: CLINIC | Age: 87
End: 2022-05-09
Payer: MEDICARE

## 2022-05-09 VITALS
DIASTOLIC BLOOD PRESSURE: 68 MMHG | SYSTOLIC BLOOD PRESSURE: 118 MMHG | WEIGHT: 135 LBS | HEIGHT: 60 IN | HEART RATE: 70 BPM | BODY MASS INDEX: 26.5 KG/M2

## 2022-05-09 DIAGNOSIS — D64.9 ANEMIA, UNSPECIFIED TYPE: ICD-10-CM

## 2022-05-09 DIAGNOSIS — E46 MALNUTRITION DUE TO RENAL DISEASE (HCC): Primary | ICD-10-CM

## 2022-05-09 DIAGNOSIS — N28.9 MALNUTRITION DUE TO RENAL DISEASE (HCC): Primary | ICD-10-CM

## 2022-05-09 DIAGNOSIS — K58.1 IRRITABLE BOWEL SYNDROME WITH CONSTIPATION: ICD-10-CM

## 2022-05-09 PROCEDURE — 99214 OFFICE O/P EST MOD 30 MIN: CPT | Performed by: INTERNAL MEDICINE

## 2022-05-09 RX ORDER — FEEDER CONTAINER WITH PUMP SET
1 EACH MISCELLANEOUS 2 TIMES DAILY
Qty: 237 ML | Refills: 3 | Status: SHIPPED | OUTPATIENT
Start: 2022-05-09

## 2022-05-09 NOTE — PROGRESS NOTES
[facility] - Outpatient Follow-up Note  Nadine Mclaughlin 80 y o  female MRN: [id number]  Encounter: [id number]          ASSESSMENT AND PLAN:      1  Mal[facility] ([facility] )  Blood test result was reviewed, patient is complaining lower extremity edema which could be related to hypoalbuminemia, advised for Ensure supplement  - Nutritional Supplements (Ensure High Protein) LIQD; Take 1 Can by mouth 2 (two) times a day  Dispense: 237 mL; Refill: 3    2  Irritable bowel syndrome with constipation  Continue with Linzess 290 mcg p o  q daily    3  Chronic anemia- multifactorial could be iron deficiency versus related to chronic kidney disease, she is following with Dr Gloria Gray , she is asking multiple question about bone marrow biopsy, I think she might be benefit from iron infusion along with  epogen    4  History of colon cancer, status post surgery long time ago, last colonoscopy shows no recurrence of cancer, in view of her advanced age, will defer any endoscopy or colonoscopy   She denying any melena or rectal bleeding    ______________________________________________________________________    SUBJECTIVE:  Patient seen and examined, she come for follow-up  She denies any abdominal pain, no change in bowel habit, she is taking Linzess every day and which is helping  She has other multiple problems including hip pain from osteoarthritis, lower extremity edema from hypoalbuminemia  Anemia, no sign of GI blood loss, she is up-to-date with endoscopy and colonoscopy, she is following with Dr Gloria Gray who recommended bone marrow biopsy  Her last hemoglobin and hematocrit is slightly trending down      REVIEW OF SYSTEMS IS OTHERWISE NEGATIVE        Historical Information   Past Medical History:   Diagnosis Date    Allergic rhinitis     Disease of thyroid gland     Dizziness     Hypothyroidism 10/20/2015    Malignant neoplasm of colon ([facility] )     last assessed: 10/20/2015    Neuropathy     Tinnitus      Past Surgical History:   Procedure Laterality Date    ADENOIDECTOMY      APPENDECTOMY      last assessed: 10/20/2015    CATARACT EXTRACTION, BILATERAL      CHOLECYSTECTOMY      last assessed: 10/20/2015    COLECTOMY      last assessed: 10/20/2015; partial     TONSILLECTOMY      last assessed: 10/20/2015     Social History   Social History     Substance and Sexual Activity   Alcohol Use No     Social History     Substance and Sexual Activity   Drug Use Never     Social History     Tobacco Use   Smoking Status Former Smoker    Packs/day: 0 25   Smokeless Tobacco Never Used   Tobacco Comment    quit 50 yrs ago     Family History   Problem Relation Age of Onset    Heart disease Mother         cardiac disorder    Alzheimer's disease Sister     No Known Problems Father     Leukemia Brother        Meds/Allergies       Current Outpatient Medications:     Acetaminophen Extra Strength 500 MG tablet    aspirin 81 MG tablet    calcium carbonate-vitamin D (OSCAL-D) 500 mg-200 units per tablet    cholecalciferol (VITAMIN D3) 1,000 units tablet    denosumab (PROLIA) 60 mg/mL    fexofenadine (ALLEGRA) 180 MG tablet    hydrochlorothiazide (HYDRODIURIL) 12 5 mg tablet    linaCLOtide (Linzess) 290 MCG CAPS    losartan (COZAAR) 25 mg tablet    meclizine (ANTIVERT) 25 mg tablet    senna-docusate sodium (SENOKOT S) 8 6-50 mg per tablet    gabapentin (NEURONTIN) 100 mg capsule    Nutritional Supplements (Ensure High Protein) LIQD    Allergies   Allergen Reactions    Penicillins Hives     Other reaction(s): Other (See Comments)  hives           Objective     Blood pressure 118/68, pulse 70, height 5' (1 524 m), weight 61 2 kg (135 lb)  Body mass index is 26 37 kg/m²        PHYSICAL EXAM:      General Appearance:   Alert, cooperative, no distress   HEENT:   Normocephalic, atraumatic, anicteric      Neck:  Supple, symmetrical, trachea midline   Lungs:   Clear to auscultation bilaterally; no rales, rhonchi or wheezing; respirations unlabored    Heart[de-identified]   Regular rate and rhythm; no murmur, rub, or gallop  Abdomen:   Soft, non-tender, non-distended; normal bowel sounds; no masses, no organomegaly    Genitalia:   Deferred    Rectal:   Deferred    Extremities:  No cyanosis, clubbing or,+  edema    Pulses:  2+ and symmetric    Skin:  No jaundice, rashes, or lesions    Lymph nodes:  No palpable cervical lymphadenopathy        Lab Results:   No visits with results within 1 Day(s) from this visit     Latest known visit with results is:   Admission on 11/02/2021, Discharged on 11/04/2021   Component Date Value    WBC 11/02/2021 4 66     RBC 11/02/2021 2 98*    Hemoglobin 11/02/2021 10 0*    Hematocrit 11/02/2021 30 1*    MCV 11/02/2021 101*    MCH 11/02/2021 33 6     MCHC 11/02/2021 33 2     RDW 11/02/2021 12 0     MPV 11/02/2021 9 7     Platelets 75/05/2232 240     nRBC 11/02/2021 0     Neutrophils Relative 11/02/2021 80*    Immat GRANS % 11/02/2021 1     Lymphocytes Relative 11/02/2021 9*    Monocytes Relative 11/02/2021 7     Eosinophils Relative 11/02/2021 2     Basophils Relative 11/02/2021 1     Neutrophils Absolute 11/02/2021 3 78     Immature Grans Absolute 11/02/2021 0 03     Lymphocytes Absolute 11/02/2021 0 44*    Monocytes Absolute 11/02/2021 0 31     Eosinophils Absolute 11/02/2021 0 07     Basophils Absolute 11/02/2021 0 03     Sodium 11/02/2021 134*    Potassium 11/02/2021 4 2     Chloride 11/02/2021 98*    CO2 11/02/2021 26     ANION GAP 11/02/2021 10     BUN 11/02/2021 16     Creatinine 11/02/2021 1 00     Glucose 11/02/2021 107     Calcium 11/02/2021 8 6     Corrected Calcium 11/02/2021 9 2     AST 11/02/2021 16     ALT 11/02/2021 20     Alkaline Phosphatase 11/02/2021 54     Total Protein 11/02/2021 6 4     Albumin 11/02/2021 3 3*    Total Bilirubin 11/02/2021 0 27     eGFR 11/02/2021 50     Troponin I 11/02/2021 0 03     Platelets 77/39/0495 217  MPV 11/02/2021 9 6     Ventricular Rate 11/02/2021 55     Atrial Rate 11/02/2021 55     CA Interval 11/02/2021 192     QRSD Interval 11/02/2021 88     QT Interval 11/02/2021 440     QTC Interval 11/02/2021 420     P Axis 11/02/2021 84     QRS Axis 11/02/2021 -32     T Wave Axis 11/02/2021 56     Sodium 11/03/2021 137     Potassium 11/03/2021 4 1     Chloride 11/03/2021 103     CO2 11/03/2021 28     ANION GAP 11/03/2021 6     BUN 11/03/2021 13     Creatinine 11/03/2021 1 07     Glucose 11/03/2021 75     Glucose, Fasting 11/03/2021 75     Calcium 11/03/2021 8 6     eGFR 11/03/2021 46     WBC 11/03/2021 3 76*    RBC 11/03/2021 2 80*    Hemoglobin 11/03/2021 9 5*    Hematocrit 11/03/2021 28 7*    MCV 11/03/2021 103*    MCH 11/03/2021 33 9     MCHC 11/03/2021 33 1     RDW 11/03/2021 12 0     Platelets 70/50/5749 214     MPV 11/03/2021 9 8          Radiology Results:   No results found

## 2022-05-12 ENCOUNTER — OFFICE VISIT (OUTPATIENT)
Dept: PODIATRY | Facility: CLINIC | Age: 87
End: 2022-05-12
Payer: MEDICARE

## 2022-05-12 VITALS
HEART RATE: 74 BPM | HEIGHT: 60 IN | WEIGHT: 135 LBS | SYSTOLIC BLOOD PRESSURE: 149 MMHG | DIASTOLIC BLOOD PRESSURE: 97 MMHG | BODY MASS INDEX: 26.5 KG/M2

## 2022-05-12 DIAGNOSIS — B35.1 ONYCHOMYCOSIS: Primary | ICD-10-CM

## 2022-05-12 DIAGNOSIS — M20.12 VALGUS DEFORMITY OF BOTH GREAT TOES: ICD-10-CM

## 2022-05-12 DIAGNOSIS — M20.11 VALGUS DEFORMITY OF BOTH GREAT TOES: ICD-10-CM

## 2022-05-12 PROCEDURE — 99202 OFFICE O/P NEW SF 15 MIN: CPT | Performed by: PODIATRIST

## 2022-05-12 NOTE — PROGRESS NOTES
Assessment/Plan:    Trimmed dystrophic toenails  Advised patient that total nail avulsion/avulsion with matrixectomy is needed if the toenails are uncomfortable  No evidence of ingrown nails today  Recommended gold Bond moisturizer for dry skin  No problem-specific Assessment & Plan notes found for this encounter  Diagnoses and all orders for this visit:    Onychomycosis    Valgus deformity of both great toes          Subjective:      Patient ID: Elias Samuels is a 80 y o  female  HPI     Patient, a 72-year-old female presents for palliative foot care  Patient has thick fungal toenails affecting each great toe and the remainder of toenails are long  Patient is dry skin left heel  Patient has swelling right ankle  She states that she elevates and utilizes a diuretic  She states that she has used compression stockings  She feels that the only thing is helpful is soaking in Epson salts  The bunions that are present on both feet are not painful  The following portions of the patient's history were reviewed and updated as appropriate: allergies, current medications, past family history, past medical history, past social history, past surgical history and problem list     Review of Systems   Gastrointestinal:        Irritable bowel syndrome   Genitourinary:        Stage III renal disease   Musculoskeletal: Positive for arthralgias  Objective:      /97   Pulse 74   Ht 5' (1 524 m)   Wt 61 2 kg (135 lb)   BMI 26 37 kg/m²          Physical Exam  Constitutional:       Appearance: Normal appearance  Cardiovascular:      Comments: DP:  1/4 bilateral; PT:  0/4 bilateral  Musculoskeletal:         General: Deformity present  Comments: Hallux valgus bilateral   Skin:     Comments: Mild dry skin left heel  No pedal calluses present  Neurological:      General: No focal deficit present  Mental Status: She is oriented to person, place, and time

## 2022-05-20 DIAGNOSIS — G60.9 IDIOPATHIC PERIPHERAL NEUROPATHY: ICD-10-CM

## 2022-05-20 RX ORDER — GABAPENTIN 100 MG/1
CAPSULE ORAL
Qty: 90 CAPSULE | Refills: 0 | Status: SHIPPED | OUTPATIENT
Start: 2022-05-20

## 2022-05-26 ENCOUNTER — TELEPHONE (OUTPATIENT)
Dept: FAMILY MEDICINE CLINIC | Facility: CLINIC | Age: 87
End: 2022-05-26

## 2022-05-26 ENCOUNTER — APPOINTMENT (EMERGENCY)
Dept: VASCULAR ULTRASOUND | Facility: HOSPITAL | Age: 87
End: 2022-05-26
Payer: MEDICARE

## 2022-05-26 ENCOUNTER — HOSPITAL ENCOUNTER (OUTPATIENT)
Facility: HOSPITAL | Age: 87
Setting detail: OBSERVATION
Discharge: HOME HEALTH | End: 2022-05-27
Attending: EMERGENCY MEDICINE | Admitting: INTERNAL MEDICINE
Payer: MEDICARE

## 2022-05-26 DIAGNOSIS — R26.2 AMBULATORY DYSFUNCTION: ICD-10-CM

## 2022-05-26 DIAGNOSIS — R00.1 BRADYCARDIA: ICD-10-CM

## 2022-05-26 DIAGNOSIS — R42 DIZZINESS: Primary | ICD-10-CM

## 2022-05-26 PROBLEM — R60.0 LOWER EXTREMITY EDEMA: Status: ACTIVE | Noted: 2022-05-26

## 2022-05-26 PROBLEM — D64.9 ANEMIA: Status: ACTIVE | Noted: 2022-05-26

## 2022-05-26 LAB
ANION GAP SERPL CALCULATED.3IONS-SCNC: 6 MMOL/L (ref 4–13)
ATRIAL RATE: 63 BPM
BASOPHILS # BLD AUTO: 0.04 THOUSANDS/ΜL (ref 0–0.1)
BASOPHILS NFR BLD AUTO: 1 % (ref 0–1)
BUN SERPL-MCNC: 18 MG/DL (ref 5–25)
CALCIUM SERPL-MCNC: 9.2 MG/DL (ref 8.4–10.2)
CHLORIDE SERPL-SCNC: 98 MMOL/L (ref 96–108)
CO2 SERPL-SCNC: 28 MMOL/L (ref 21–32)
CREAT SERPL-MCNC: 1.17 MG/DL (ref 0.6–1.3)
EOSINOPHIL # BLD AUTO: 0.22 THOUSAND/ΜL (ref 0–0.61)
EOSINOPHIL NFR BLD AUTO: 5 % (ref 0–6)
ERYTHROCYTE [DISTWIDTH] IN BLOOD BY AUTOMATED COUNT: 11.9 % (ref 11.6–15.1)
GFR SERPL CREATININE-BSD FRML MDRD: 40 ML/MIN/1.73SQ M
GLUCOSE SERPL-MCNC: 82 MG/DL (ref 65–140)
HCT VFR BLD AUTO: 28.8 % (ref 34.8–46.1)
HGB BLD-MCNC: 9.6 G/DL (ref 11.5–15.4)
IMM GRANULOCYTES # BLD AUTO: 0.01 THOUSAND/UL (ref 0–0.2)
IMM GRANULOCYTES NFR BLD AUTO: 0 % (ref 0–2)
LYMPHOCYTES # BLD AUTO: 0.94 THOUSANDS/ΜL (ref 0.6–4.47)
LYMPHOCYTES NFR BLD AUTO: 23 % (ref 14–44)
MCH RBC QN AUTO: 34.3 PG (ref 26.8–34.3)
MCHC RBC AUTO-ENTMCNC: 33.3 G/DL (ref 31.4–37.4)
MCV RBC AUTO: 103 FL (ref 82–98)
MONOCYTES # BLD AUTO: 0.44 THOUSAND/ΜL (ref 0.17–1.22)
MONOCYTES NFR BLD AUTO: 11 % (ref 4–12)
NEUTROPHILS # BLD AUTO: 2.49 THOUSANDS/ΜL (ref 1.85–7.62)
NEUTS SEG NFR BLD AUTO: 60 % (ref 43–75)
NRBC BLD AUTO-RTO: 0 /100 WBCS
P AXIS: 72 DEGREES
PLATELET # BLD AUTO: 206 THOUSANDS/UL (ref 149–390)
PMV BLD AUTO: 10.5 FL (ref 8.9–12.7)
POTASSIUM SERPL-SCNC: 4.4 MMOL/L (ref 3.5–5.3)
PR INTERVAL: 184 MS
QRS AXIS: -25 DEGREES
QRSD INTERVAL: 88 MS
QT INTERVAL: 410 MS
QTC INTERVAL: 413 MS
RBC # BLD AUTO: 2.8 MILLION/UL (ref 3.81–5.12)
SODIUM SERPL-SCNC: 132 MMOL/L (ref 135–147)
T WAVE AXIS: 57 DEGREES
VENTRICULAR RATE: 61 BPM
WBC # BLD AUTO: 4.14 THOUSAND/UL (ref 4.31–10.16)

## 2022-05-26 PROCEDURE — 93010 ELECTROCARDIOGRAM REPORT: CPT | Performed by: INTERNAL MEDICINE

## 2022-05-26 PROCEDURE — 93971 EXTREMITY STUDY: CPT

## 2022-05-26 PROCEDURE — 80048 BASIC METABOLIC PNL TOTAL CA: CPT | Performed by: EMERGENCY MEDICINE

## 2022-05-26 PROCEDURE — 99220 PR INITIAL OBSERVATION CARE/DAY 70 MINUTES: CPT | Performed by: INTERNAL MEDICINE

## 2022-05-26 PROCEDURE — 93005 ELECTROCARDIOGRAM TRACING: CPT

## 2022-05-26 PROCEDURE — 99285 EMERGENCY DEPT VISIT HI MDM: CPT | Performed by: EMERGENCY MEDICINE

## 2022-05-26 PROCEDURE — 85025 COMPLETE CBC W/AUTO DIFF WBC: CPT | Performed by: EMERGENCY MEDICINE

## 2022-05-26 PROCEDURE — 36415 COLL VENOUS BLD VENIPUNCTURE: CPT | Performed by: EMERGENCY MEDICINE

## 2022-05-26 PROCEDURE — 99285 EMERGENCY DEPT VISIT HI MDM: CPT

## 2022-05-26 RX ORDER — ACETAMINOPHEN 325 MG/1
650 TABLET ORAL EVERY 6 HOURS PRN
Status: DISCONTINUED | OUTPATIENT
Start: 2022-05-26 | End: 2022-05-27 | Stop reason: HOSPADM

## 2022-05-26 RX ORDER — FEEDER CONTAINER WITH PUMP SET
1 EACH MISCELLANEOUS 2 TIMES DAILY
Status: DISCONTINUED | OUTPATIENT
Start: 2022-05-26 | End: 2022-05-26

## 2022-05-26 RX ORDER — ENOXAPARIN SODIUM 100 MG/ML
40 INJECTION SUBCUTANEOUS DAILY
Status: DISCONTINUED | OUTPATIENT
Start: 2022-05-27 | End: 2022-05-26

## 2022-05-26 RX ORDER — B-COMPLEX WITH VITAMIN C
1 TABLET ORAL
Status: DISCONTINUED | OUTPATIENT
Start: 2022-05-27 | End: 2022-05-27 | Stop reason: HOSPADM

## 2022-05-26 RX ORDER — ASPIRIN 81 MG/1
81 TABLET, CHEWABLE ORAL DAILY
Status: DISCONTINUED | OUTPATIENT
Start: 2022-05-27 | End: 2022-05-27 | Stop reason: HOSPADM

## 2022-05-26 RX ORDER — AMOXICILLIN 250 MG
2 CAPSULE ORAL DAILY
Status: DISCONTINUED | OUTPATIENT
Start: 2022-05-27 | End: 2022-05-27 | Stop reason: HOSPADM

## 2022-05-26 RX ORDER — LOSARTAN POTASSIUM 25 MG/1
25 TABLET ORAL DAILY
Status: DISCONTINUED | OUTPATIENT
Start: 2022-05-27 | End: 2022-05-27 | Stop reason: HOSPADM

## 2022-05-26 RX ORDER — LUBIPROSTONE 8 UG/1
8 CAPSULE, GELATIN COATED ORAL 2 TIMES DAILY WITH MEALS
Status: DISCONTINUED | OUTPATIENT
Start: 2022-05-27 | End: 2022-05-27 | Stop reason: HOSPADM

## 2022-05-26 RX ORDER — MECLIZINE HYDROCHLORIDE 25 MG/1
25 TABLET ORAL EVERY 8 HOURS
Status: DISCONTINUED | OUTPATIENT
Start: 2022-05-26 | End: 2022-05-27 | Stop reason: HOSPADM

## 2022-05-26 RX ORDER — ENOXAPARIN SODIUM 100 MG/ML
30 INJECTION SUBCUTANEOUS DAILY
Status: DISCONTINUED | OUTPATIENT
Start: 2022-05-27 | End: 2022-05-27 | Stop reason: HOSPADM

## 2022-05-26 RX ADMIN — MECLIZINE HYDROCHLORIDE 25 MG: 25 TABLET ORAL at 20:28

## 2022-05-26 NOTE — H&P
[facility]  H&P- Gerhard West 12/11/1930, 80 y o  female MRN: [id number]  Unit/Bed#: S -Jesus Encounter: [id number]  Primary Care Provider: Isadora Apley, MD   Date and time admitted to hospital: 5/26/2022  2:09 PM    * Bradycardia  Assessment & Plan  -according to patient heart rate noted by visiting nurse in the 50 range  -associated symptoms include significantly dizziness, lightheadedness  -underlying history of vertigo, no known history of heart disease   -In hospital, EKG showed normal sinus rhythm heart rate in the 60's range, previous EKG noted to exhibited sinus bradycardia  -no echocardiogram on file  -patient has not been evaluated with any Holter monitoring in the past  Plan  Given history of bradycardia,  Will place patient on telemetry monitoring overnight, review events in the morning, if persistent bradycardia will obtain cardiology evaluation, otherwise she can follow with Cardiology outpatient, patient will benefit of zio patch  Echocardiogram ordered    Dizziness  Assessment & Plan  -POA sensation of room spinning, lightheadedness  -history of vertigo, previously evaluated by ENT, per patient above symptoms seems worst than her baseline  -has received vestibular therapy in the past,current therapy meclizine 25 mg t i d   -recent medication changes: HCTZ 12 5 mg daily for lower extremity edema, patient has been on this medication in the past, denied notice worsening of symptoms while on therapy  - neurological exam nonfocal, normal glycemic, EKG : NSR HR 60's, prior EKG on file with findings consistent with sinus bradycardia    -possible etiology of dizziness symptomatic bradycardia  plan  -will continue above medications  -orthostatic vitals ordered, monitor on telemetry  -will hold gabapentin for now in the setting of dizziness    Hyponatremia  Assessment & Plan  Sodium levels 132  Review records reveal a pattern of chronicity ,however there is some levels documented within normal limits  Will hold hydrochlorothiazide for now  Recheck BMP a m  Hypertension  Assessment & Plan  Blood pressure reviewed, acceptable  range   Continue losartan    Lower extremity edema  Assessment & Plan  Bilateral lower extremity edema, started approx 2 weeks ago  Initiated on  HCTZ per her pcp, (-) sob, orthopnea, PND  ultrasound lower extremities negative for DVT or thrombophlebitis  Review past labs revealed significant hypoalbuminemia 2 7 which could be contributory  Will order Echocardiogram ( no recent one on file)    Anemia  Assessment & Plan  -macrocytic anemia, Stable  - reviewed labs, levels in the 200 range,  s/p vitamin B12 supplementation  -monitor CBC, consider re initiate vitamin-B 12 upon discharge    Ambulatory dysfunction  Assessment & Plan  Multifactorial in history possible bilateral neuropathy, as well as severe osteoarthritis  Previously evaluated for physical therapy, post acute rehab has been recommended however patient has declined  Pt leaves by herself , significant ambulatory dysfunction and dizzines , will order PT/OT evaluation  VTE Prophylaxis: Enoxaparin (Lovenox)  / sequential compression device   Code Status: Level 1  POLST: POLST form is not discussed and not completed at this time  Anticipated Length of Stay:  Patient will be admitted on an Observation basis with an anticipated length of stay of  <2 midnights  Justification for Hospital Stay:  Observation for possible symptomatic bradycardia    Chief Complaint:   I am dizzy and my heart its  slow    History of Present Illness: Marino Lopze is a 80 y o  female who presents with past medical history of vertigo, hypertension, ambulatory dysfunction, bilateral lower extremity neuropathy, anemia, comes to the hospital for evaluation of bradycardia and dizziness    Patient reports onset of symptoms this morning, she states she was in normal state of health patient states experience significant dizziness described as sensation of room spinning as well as lightheadedness, she consider these symptoms are significantly different compared to her vertigo  Patient receives VNA Services, she was evaluated by a visiting nurse patient noted that her heart rate was in the 40s possible 50s, she was instructed to come to the hospital for a more comprehensive evaluation  During this episode, patient denies any aphasia, dysarthria, numbness paresthesias, chest pain shortness a breath, palpitations, vision changes  She reported some weakness particularly in her lower extremities, however this is not  new given her history of peripheral neuropathy  She has been exhibiting lower extremities edema for the past 2 weeks, recently initiated on hydrochlorothiazide  On admission, patient was found normotensive, heart rate in the 65 range  During my evaluation, patient resting comfortable in bed, no longer symptomatic  Patient will be admitted on observation in the [facility] service for further evaluation and management  Review of Systems:    Review of Systems   Musculoskeletal: Positive for gait problem  Neurological: Positive for dizziness and light-headedness  All other systems reviewed and are negative  Past Medical and Surgical History:     Past Medical History:   Diagnosis Date    Allergic rhinitis     Disease of thyroid gland     Dizziness     Hypothyroidism 10/20/2015    Malignant neoplasm of colon ([facility] [address] )     last assessed: 10/20/2015    Neuropathy     Tinnitus        Past Surgical History:   Procedure Laterality Date    ADENOIDECTOMY      APPENDECTOMY      last assessed: 10/20/2015    CATARACT EXTRACTION, BILATERAL      CHOLECYSTECTOMY      last assessed: 10/20/2015    COLECTOMY      last assessed: 10/20/2015; partial     TONSILLECTOMY      last assessed: 10/20/2015       Meds/Allergies:    Prior to Admission medications    Medication Sig Start Date End Date Taking? Authorizing Provider   Acetaminophen Extra Strength 500 MG tablet  3/15/22  Yes Historical Provider, MD   aspirin 81 MG tablet Take 81 mg by mouth   Yes Historical Provider, MD   calcium carbonate-vitamin D (OSCAL-D) 500 mg-200 units per tablet Take 1 tablet by mouth daily with breakfast 9/3/21  Yes Karthikeyan Beltran MD   cholecalciferol (VITAMIN D3) 1,000 units tablet  3/15/22  Yes Historical Provider, MD   fexofenadine (ALLEGRA) 180 MG tablet Take 180 mg by mouth daily   Yes Historical Provider, MD   gabapentin (NEURONTIN) 100 mg capsule TAKE 1 CAPSULE BY MOUTH THREE TIMES DAILY 5/20/22  Yes Perla Reid MD   linaCLOtide (Linzess) 290 MCG CAPS Take 1 capsule by mouth daily 12/21/21  Yes Perla Reid MD   losartan (COZAAR) 25 mg tablet Take 1 tablet by mouth once daily 1/26/22  Yes Perla Reid MD   meclizine (ANTIVERT) 25 mg tablet TAKE 1 TABLET BY MOUTH EVERY 8 HOURS 1/31/22  Yes Perla Reid MD   senna-docusate sodium (SENOKOT S) 8 6-50 mg per tablet Take 2 tablets by mouth daily   Yes Historical Provider, MD   denosumab (PROLIA) 60 mg/mL 1 mL    Historical Provider, MD   hydrochlorothiazide (HYDRODIURIL) 12 5 mg tablet Take 1 tablet (12 5 mg total) by mouth daily as needed (leg swelling) 5/3/22   Perla Reid MD   Nutritional Supplements (Ensure High Protein) LIQD Take 1 Can by mouth 2 (two) times a day 5/9/22   Jovita Walker MD     I have reviewed home medications with patient personally  Allergies: Allergies   Allergen Reactions    Penicillins Hives     Other reaction(s): Other (See Comments)  hives       Social History:     Marital Status:     Occupation:  Retired  Patient Pre-hospital Living Situation:  Lives at home  Patient Pre-hospital Diet Restrictions:  None  Substance Use History:   Social History     Substance and Sexual Activity   Alcohol Use No     Social History     Tobacco Use   Smoking Status Former Smoker    Packs/day: 0 25   Smokeless Tobacco Never Used   Tobacco Comment    quit 50 yrs ago     Social History     Substance and Sexual Activity   Drug Use Never       Family History:    Family History   Problem Relation Age of Onset    Heart disease Mother         cardiac disorder    Alzheimer's disease Sister     No Known Problems Father     Leukemia Brother        Physical Exam:     Vitals:   Blood Pressure: 147/78 (05/26/22 2006)  Pulse: (!) 132 (05/26/22 2006)  Temperature: 97 8 °F (36 6 °C) (05/26/22 1955)  Temp Source: Oral (05/26/22 1955)  Respirations: 18 (05/26/22 2000)  Height: 5' (152 4 cm) (05/26/22 1818)  Weight - Scale: 61 1 kg (134 lb 11 2 oz) (05/26/22 1818)  SpO2: 96 % (05/26/22 1955)    Physical Exam  Vitals and nursing note reviewed  Constitutional:       General: She is not in acute distress  Appearance: Normal appearance  She is not ill-appearing or toxic-appearing  HENT:      Head: Normocephalic and atraumatic  Right Ear: Tympanic membrane normal  There is no impacted cerumen  Left Ear: Tympanic membrane normal  There is no impacted cerumen  Nose: Nose normal  No congestion or rhinorrhea  Mouth/Throat:      Mouth: Mucous membranes are moist       Pharynx: Oropharynx is clear  No oropharyngeal exudate or posterior oropharyngeal erythema  Eyes:      Extraocular Movements: Extraocular movements intact  Conjunctiva/sclera: Conjunctivae normal       Pupils: Pupils are equal, round, and reactive to light  Cardiovascular:      Rate and Rhythm: Normal rate and regular rhythm  Pulses: Normal pulses  Heart sounds: No murmur heard  No gallop  Pulmonary:      Effort: Pulmonary effort is normal  No respiratory distress  Breath sounds: No wheezing or rales  Chest:      Chest wall: No tenderness  Abdominal:      General: Bowel sounds are normal  There is no distension  Palpations: Abdomen is soft  Tenderness: There is no abdominal tenderness     Musculoskeletal:      Cervical back: Normal range of motion and neck supple  Right lower leg: Edema present  Left lower leg: Edema present  Skin:     General: Skin is warm  Capillary Refill: Capillary refill takes 2 to 3 seconds  Neurological:      General: No focal deficit present  Mental Status: She is alert and oriented to person, place, and time  Mental status is at baseline  Comments: No facial droop, no expressive or receptive aphasia, CN 2-12 intact   Psychiatric:         Mood and Affect: Mood normal          Behavior: Behavior normal          Thought Content: Thought content normal          Judgment: Judgment normal          Additional Data:     Lab Results: I have personally reviewed pertinent reports  Results from last 7 days   Lab Units 05/26/22  1602   WBC Thousand/uL 4 14*   HEMOGLOBIN g/dL 9 6*   HEMATOCRIT % 28 8*   PLATELETS Thousands/uL 206   NEUTROS PCT % 60   LYMPHS PCT % 23   MONOS PCT % 11   EOS PCT % 5     Results from last 7 days   Lab Units 05/26/22  1602   POTASSIUM mmol/L 4 4   CHLORIDE mmol/L 98   CO2 mmol/L 28   BUN mg/dL 18   CREATININE mg/dL 1 17   CALCIUM mg/dL 9 2           Imaging: I have personally reviewed pertinent reports  VAS lower limb venous duplex study, unilateral/limited    Result Date: 5/26/2022  Narrative:  [facility] REPORT [facility]INICAL: Indications: Patient presents with bilateral lower extremity pitting edema (right > left) for 3 months  Operative History: No Cardiovascular Surgeries Risk Factors The patient has history of HTN  She has no history of Diabetes  CONCLUSION: RIGHT LOWER LIMB No evidence of acute or chronic deep vein thrombosis   No evidence of superficial thrombophlebitis noted  Doppler evaluation shows a normal response to augmentation maneuvers  Popliteal, posterior tibial and anterior tibial arterial Doppler waveforms are triphasic/biphasic  LEFT LOWER LIMB LIMITED Evaluation shows no evidence of thrombus in the common femoral vein   Doppler evaluation shows a normal response to augmentation maneuvers  Technical findings were given to Dr Percy Singleton via tiger text  EKG, Pathology, and Other Studies Reviewed on Admission:   · EKG:  Normal sinus rhythm, no ST T wave changes    Epic / Care Everywhere Records Reviewed: Yes     ** Please Note: This note has been constructed using a voice recognition system     Nutrition Assessment and Intervention:     Reviewed food recall journal      Physical Activity Assessment and Intervention:    Activity journal reviewed      Emotional and Mental Well-being, Sleep, Connectedness Assessment and Intervention:    Sleep/stress assessment performed      Tobacco and Toxic Substance Assessment and Intervention:     Tobacco use screening performed

## 2022-05-26 NOTE — ED PROCEDURE NOTE
PROCEDURE  ECG 12 Lead Documentation Only    Date/Time: 5/26/2022 4:23 PM  Performed by: Erma Sanchez MD  Authorized by: Erma Sanchez MD     Indications / Diagnosis:  Dizziness- hx of bradycardia rate of 42 for vn today   ECG reviewed by me, the ED Provider: yes    Patient location:  ED  Previous ECG:     Previous ECG:  Unavailable    Comparison to cardiac monitor: Yes    Interpretation:     Interpretation: non-specific    Rate:     ECG rate:  61    ECG rate assessment: normal    Rhythm:     Rhythm: sinus rhythm    Ectopy:     Ectopy: none    QRS:     QRS axis:  Normal    QRS intervals:  Normal  Conduction:     Conduction: normal    ST segments:     ST segments:  Normal  T waves:     T waves: flattening      Flattening:  AVL  Other findings:     Other findings: U wave    Comments:      No ecg signs of ischemia/ injury / r heart strain / abad/pericarditis - no ecg signs of short or long qtc/ wpw/ brugada  Syndrome/ hcm/ epsilon waves          Erma Sanchez MD  05/26/22 9824

## 2022-05-26 NOTE — ASSESSMENT & PLAN NOTE
Sodium levels 132  Review records reveal a pattern of chronicity ,however there is some levels documented within normal limits  Will hold hydrochlorothiazide for now  Recheck BMP a m , if persistent chronicity may consider reinitiate hctz  Encourage oral hydration

## 2022-05-26 NOTE — ASSESSMENT & PLAN NOTE
Multifactorial in history possible bilateral neuropathy, as well as severe osteoarthritis  Previously evaluated for physical therapy, post acute rehab has been recommended however patient has declined  May consider re-evaluate once clinical  Stability

## 2022-05-26 NOTE — TELEPHONE ENCOUNTER
Darrin Hunter from [facility] called and stated that she is currently with Moises Cesar and stated that her HR is currently 43 which is unusual for her and she also stated  Jocy Montalvo informed her that she is not feeling right with increased dizziness  I advised her that Moises Cesar should evaluated in the ER and Darrin Hunter agreed  She is going to call [phone]

## 2022-05-26 NOTE — ASSESSMENT & PLAN NOTE
-POA sensation of room spinning, lightheadedness  -history of vertigo, previously evaluated by ENT, per patient above symptoms seems worst than her baseline  -has received vestibular therapy in the past,current therapy meclizine 25 mg t i d   -recent medication changes: HCTZ 12 5 mg daily for lower extremity edema, patient has been on this medication in the past, denied notice worsening of symptoms while on therapy  - neurological exam nonfocal, normal glycemic, EKG : NSR HR 60's, prior EKG on file with findings consistent with sinus bradycardia    -possible etiology of dizziness symptomatic bradycardia  plan  -will continue above medications  -orthostatic vitals ordered, monitor on telemetry  -will hold gabapentin for now in the setting of dizziness

## 2022-05-26 NOTE — ASSESSMENT & PLAN NOTE
-POA sensation of room spinning, lightheadedness  -history of vertigo, previously evaluated by ENT, per patient above symptoms seems worst than her baseline  -has received vestibular therapy in the past,current therapy meclizine 25 mg t i d   - neurological exam nonfocal  -will continue above medications  -orthostatic vitals ordered, monitor on telemetry  -will hold gabapentin for now in the setting of dizziness

## 2022-05-26 NOTE — ASSESSMENT & PLAN NOTE
-according to patient heart rate noted by visiting nurse in the 50 range  -associated symptoms include significantly dizziness, lightheadedness  -underlying history of vertigo  -In hospital, EKG showed normal sinus rhythm heart rate in the 60's range, previous EKG noted to exhibited sinus bradycardia  -no echocardiogram on file  -patient has not been evaluated with any Holter monitoring in the past  Plan  Given history of bradycardia,  Will place patient on telemetry monitoring overnight, review events in the morning, if persistent bradycardia will obtain cardiology evaluation, otherwise she can follow with Cardiology outpatient, patient will benefit of zio patch

## 2022-05-26 NOTE — ASSESSMENT & PLAN NOTE
-macrocytic anemia, Stable  - reviewed labs, levels in the 200 range,  s/p vitamin B12 supplementation  -monitor CBC, consider re initiate vitamin-B 12 upon discharge

## 2022-05-26 NOTE — ASSESSMENT & PLAN NOTE
Bilateral lower extremity edema,   Has been receiving HCTZ  ultrasound lower extremities negative for DVT or thrombophlebitis    Review past labs revealed significant hypoalbuminemia 2 7 which could be contributory

## 2022-05-27 ENCOUNTER — APPOINTMENT (OUTPATIENT)
Dept: NON INVASIVE DIAGNOSTICS | Facility: HOSPITAL | Age: 87
End: 2022-05-27
Payer: MEDICARE

## 2022-05-27 VITALS
OXYGEN SATURATION: 98 % | TEMPERATURE: 98.2 F | RESPIRATION RATE: 16 BRPM | WEIGHT: 134 LBS | BODY MASS INDEX: 26.31 KG/M2 | HEART RATE: 50 BPM | SYSTOLIC BLOOD PRESSURE: 147 MMHG | HEIGHT: 60 IN | DIASTOLIC BLOOD PRESSURE: 85 MMHG

## 2022-05-27 LAB
ANION GAP SERPL CALCULATED.3IONS-SCNC: 7 MMOL/L (ref 4–13)
AORTIC ROOT: 3.6 CM
APICAL FOUR CHAMBER EJECTION FRACTION: 58 %
ASCENDING AORTA: 3.8 CM
AV LVOT PEAK GRADIENT: 2 MMHG
AV PEAK GRADIENT: 5 MMHG
AV REGURGITATION PRESSURE HALF TIME: 494 MS
BASOPHILS # BLD AUTO: 0.05 THOUSANDS/ΜL (ref 0–0.1)
BASOPHILS NFR BLD AUTO: 1 % (ref 0–1)
BUN SERPL-MCNC: 21 MG/DL (ref 5–25)
CALCIUM SERPL-MCNC: 8.7 MG/DL (ref 8.4–10.2)
CHLORIDE SERPL-SCNC: 101 MMOL/L (ref 96–108)
CO2 SERPL-SCNC: 25 MMOL/L (ref 21–32)
CREAT SERPL-MCNC: 1.17 MG/DL (ref 0.6–1.3)
DOP CALC RVOT PEAK VEL: 0.69 M/S
E WAVE DECELERATION TIME: 197 MS
EOSINOPHIL # BLD AUTO: 0.27 THOUSAND/ΜL (ref 0–0.61)
EOSINOPHIL NFR BLD AUTO: 6 % (ref 0–6)
ERYTHROCYTE [DISTWIDTH] IN BLOOD BY AUTOMATED COUNT: 11.8 % (ref 11.6–15.1)
FRACTIONAL SHORTENING: 37 (ref 28–44)
GFR SERPL CREATININE-BSD FRML MDRD: 40 ML/MIN/1.73SQ M
GLUCOSE SERPL-MCNC: 74 MG/DL (ref 65–140)
HCT VFR BLD AUTO: 27.8 % (ref 34.8–46.1)
HGB BLD-MCNC: 9.2 G/DL (ref 11.5–15.4)
IMM GRANULOCYTES # BLD AUTO: 0.03 THOUSAND/UL (ref 0–0.2)
IMM GRANULOCYTES NFR BLD AUTO: 1 % (ref 0–2)
INTERVENTRICULAR SEPTUM IN DIASTOLE (PARASTERNAL SHORT AXIS VIEW): 1 CM
INTERVENTRICULAR SEPTUM: 1 CM (ref 0.6–1.1)
LEFT ATRIUM AREA SYSTOLE SINGLE PLANE A4C: 21.7 CM2
LEFT ATRIUM SIZE: 4.4 CM
LEFT INTERNAL DIMENSION IN SYSTOLE: 2.7 CM (ref 2.1–4)
LEFT VENTRICULAR INTERNAL DIMENSION IN DIASTOLE: 4.3 CM (ref 3.5–6)
LEFT VENTRICULAR POSTERIOR WALL IN END DIASTOLE: 1 CM
LEFT VENTRICULAR STROKE VOLUME: 53 ML
LVSV (TEICH): 53 ML
LYMPHOCYTES # BLD AUTO: 1.07 THOUSANDS/ΜL (ref 0.6–4.47)
LYMPHOCYTES NFR BLD AUTO: 25 % (ref 14–44)
MCH RBC QN AUTO: 33.9 PG (ref 26.8–34.3)
MCHC RBC AUTO-ENTMCNC: 33.1 G/DL (ref 31.4–37.4)
MCV RBC AUTO: 103 FL (ref 82–98)
MONOCYTES # BLD AUTO: 0.46 THOUSAND/ΜL (ref 0.17–1.22)
MONOCYTES NFR BLD AUTO: 11 % (ref 4–12)
MV E'TISSUE VEL-SEP: 5 CM/S
MV PEAK A VEL: 0.89 M/S
MV PEAK E VEL: 66 CM/S
MV STENOSIS PRESSURE HALF TIME: 57 MS
MV VALVE AREA P 1/2 METHOD: 3.86
NEUTROPHILS # BLD AUTO: 2.48 THOUSANDS/ΜL (ref 1.85–7.62)
NEUTS SEG NFR BLD AUTO: 56 % (ref 43–75)
NRBC BLD AUTO-RTO: 0 /100 WBCS
PLATELET # BLD AUTO: 213 THOUSANDS/UL (ref 149–390)
PMV BLD AUTO: 10.3 FL (ref 8.9–12.7)
POTASSIUM SERPL-SCNC: 4.5 MMOL/L (ref 3.5–5.3)
PV PEAK GRADIENT: 3 MMHG
RBC # BLD AUTO: 2.71 MILLION/UL (ref 3.81–5.12)
RIGHT ATRIUM AREA SYSTOLE A4C: 14.6 CM2
RIGHT VENTRICLE ID DIMENSION: 3.7 CM
SL CV AV DECELERATION TIME RETROGRADE: 1702 MS
SL CV AV PEAK GRADIENT RETROGRADE: 56 MMHG
SL CV LV EF: 55
SL CV PED ECHO LEFT VENTRICLE DIASTOLIC VOLUME (MOD BIPLANE) 2D: 81 ML
SL CV PED ECHO LEFT VENTRICLE SYSTOLIC VOLUME (MOD BIPLANE) 2D: 28 ML
SL CV RVOT MAX GRADIENT: 2 MMHG
SODIUM SERPL-SCNC: 133 MMOL/L (ref 135–147)
TR MAX PG: 35 MMHG
TR PEAK VELOCITY: 2.9 M/S
TRICUSPID VALVE PEAK REGURGITATION VELOCITY: 2.94 M/S
TSH SERPL DL<=0.05 MIU/L-ACNC: 2.7 UIU/ML (ref 0.45–4.5)
WBC # BLD AUTO: 4.36 THOUSAND/UL (ref 4.31–10.16)

## 2022-05-27 PROCEDURE — 99236 HOSP IP/OBS SAME DATE HI 85: CPT | Performed by: INTERNAL MEDICINE

## 2022-05-27 PROCEDURE — 93306 TTE W/DOPPLER COMPLETE: CPT

## 2022-05-27 PROCEDURE — 97163 PT EVAL HIGH COMPLEX 45 MIN: CPT

## 2022-05-27 PROCEDURE — 93971 EXTREMITY STUDY: CPT | Performed by: SURGERY

## 2022-05-27 PROCEDURE — 85025 COMPLETE CBC W/AUTO DIFF WBC: CPT | Performed by: INTERNAL MEDICINE

## 2022-05-27 PROCEDURE — 80048 BASIC METABOLIC PNL TOTAL CA: CPT | Performed by: INTERNAL MEDICINE

## 2022-05-27 PROCEDURE — 84443 ASSAY THYROID STIM HORMONE: CPT

## 2022-05-27 PROCEDURE — 97166 OT EVAL MOD COMPLEX 45 MIN: CPT

## 2022-05-27 PROCEDURE — 93306 TTE W/DOPPLER COMPLETE: CPT | Performed by: INTERNAL MEDICINE

## 2022-05-27 RX ADMIN — ACETAMINOPHEN 650 MG: 325 TABLET, FILM COATED ORAL at 01:05

## 2022-05-27 RX ADMIN — LUBIPROSTONE 8 MCG: 8 CAPSULE, GELATIN COATED ORAL at 07:48

## 2022-05-27 RX ADMIN — SODIUM CHLORIDE 500 ML: 0.9 INJECTION, SOLUTION INTRAVENOUS at 11:51

## 2022-05-27 RX ADMIN — Medication 1 TABLET: at 07:48

## 2022-05-27 RX ADMIN — ENOXAPARIN SODIUM 30 MG: 30 INJECTION SUBCUTANEOUS at 08:06

## 2022-05-27 RX ADMIN — LOSARTAN POTASSIUM 25 MG: 25 TABLET, FILM COATED ORAL at 08:06

## 2022-05-27 NOTE — PHYSICAL THERAPY NOTE
PHYSICAL THERAPY EVALUATION NOTE    Patient Name: Mayra Yuen  BPOBH'U Date: 5/27/2022  AGE:   80 y o  Mrn:   [id number]  ADMIT DX:  Dizziness [R42]  Bradycardia [R00 1]  Ambulatory dysfunction [R26 2]    Past Medical History:   Diagnosis Date    Allergic rhinitis     Disease of thyroid gland     Dizziness     Hypothyroidism 10/20/2015    Malignant neoplasm of colon ([address] )     last assessed: 10/20/2015    Neuropathy     Tinnitus      Length Of Stay: 0  PHYSICAL THERAPY EVALUATION :    05/27/22 1116   PT Last Visit   PT Visit Date 05/27/22   Pain Assessment   Pain Assessment Tool 0-10   Pain Score No Pain   Restrictions/Precautions   Other Precautions Telemetry; Fall Risk   Home Living   Type of [address] [place] [address] One level; Other (Comment)  (3 REJI  laundry in basement but neighbor assists )   Additional Comments lives alone  ambulates w/ cane or rollator as needed  independent w/ ADLs  receives assist w/ IADLs  no falls in last 6 months  General   Additional Pertinent History 5/26/22 at 20:06, heart rate was 132 BPM, at 20:03 blood pressure was 179/85 and 45 BPM    Family/Caregiver Present No   Cognition   Arousal/Participation Alert   Orientation Level Oriented to person; Other (Comment)  (pt was identified w/ full name, birth date)   Following Commands Follows one step commands without difficulty   Comments resting heart rate 90 BPM    Subjective   Subjective pt seen supine in bed  agreed to PT eval  denied pain  pt states having intermittent dizziness     RUE Assessment   RUE Assessment X  (limited shoulder ROM, otherwise WFL)   LUE Assessment   LUE Assessment WFL   RLE Assessment   RLE Assessment WFL  (3+ to 4-/5)   LLE Assessment   LLE Assessment WFL  (3+ to 4-/5)   Light Touch   RLE Light Touch Grossly intact   LLE Light Touch Grossly intact   Bed Mobility   Supine to Sit 4  Minimal assistance   Additional items Assist x 1;HOB elevated; Increased time required;LE management   Sit to Supine 4  Minimal assistance   Additional items Assist x 1;HOB elevated; Increased time required;LE management   Transfers   Sit to Stand 5  Supervision   Additional items Increased time required   Stand to Sit 5  Supervision   Additional items Increased time required   Ambulation/Elevation   Gait pattern Forward Flexion; Short stride;Decreased foot clearance  (longer stride length noted w/ roller walker use over [facility])   Gait Assistance 5  Supervision   Additional items Verbal cues  (for full step length)   Assistive Device SPC;Rolling walker   Distance 40 feet w/ single point cane  brief standing rest break  90 feet w/ roller walker   (additional not possible due to fatigue )   Stair Management Assistance Not tested  (pt declined trial of stair use )   Balance   Static Sitting Good   Dynamic Sitting Fair   Static Standing Fair  (w/ roller walker)   Ambulatory Fair -  (w/ roller walker)   Activity Tolerance   Activity Tolerance Patient limited by fatigue   Nurse Made Aware spoke to Juan C Trinh OT   Assessment   Prognosis Good   Problem List Decreased strength;Decreased range of motion;Decreased endurance; Impaired balance;Decreased mobility; Decreased safety awareness   Assessment Pt came to the hospital for evaluation of bradycardia and dizziness  Dx: bradycardia, dizziness, hyponatremia, LE edema ,anemia, and ambulatory dysfunction  order placed for PT eval and tx, w/ activity order of up w/ assist  pt presents w/ comorbidities of vertigo, HTN, ambulatory dysfunction, LE neuropathy, and anemia and personal factors of advanced age, mobilizing w/ assistive device, stair(s) to enter home, limited home support and inability to perform IADLs  pt presents w/ weakness, decreased ROM, decreased endurance, impaired balance, gait deviations, decreased safety awareness and fall risk   these impairments are evident in findings from physical examination (weakness and decreased ROM), mobility assessment (need for standby to min assist w/ all phases of mobility when usually mobilizing independently, tolerance to only 90 feet of ambulation and need for cueing for mobility technique), and Barthel Index: 60/100  pt needed input for mobility technique  pt is at risk for falls due to physical and safety awareness deficits  pt's clinical presentation is unstable/unpredictable (evident in tachycardia, bradycardia, poor blood pressure control, tolerance to only 90 feet of ambulation, pain impacting overall mobility status and need for input for mobility technique/safety)  pt needs inpatient PT tx to improve mobility deficits and progress mobility training as appropriate  discharge recommendation is for home PT to reduce fall risk and maximize level of functional independence  Pt would benefit from Gerontology consult to address aging related issues  Goals   Patient Goals go home   STG Expiration Date 06/06/22   Short Term Goal #1 pt will: Increase bilateral LE strength 1/2 grade to facilitate independent mobility, Perform bed mobility independently to increase level of independence, Perform all transfers independently to improve independence, Ambulate 250 ft  with least restrictive assistive device independently w/o LOB to improve functional independence, Navigate 3 stair(s) independently with unilateral handrail to facilitate return to previous living environment, Increase ambulatory balance 1/2 grade to decrease risk for falls, Complete exercise program independently to increase strength and endurance, Tolerate 3 hr OOB to faciliate upright tolerance, Improve Barthel Index score to 80 or greater to facilitate independence and Complete Timed Up and Go or Comfortable Gait Speed to further assess mobility and monitor progress   PT Treatment Day 0   Plan   Treatment/Interventions LE strengthening/ROM; Functional transfer training;Elevations; Therapeutic exercise; Endurance training;Equipment eval/education;Gait training;Bed mobility; Patient/family training   PT Frequency 3-5x/wk   Recommendation   PT Discharge Recommendation Home with home health rehabilitation   Additional Comments recommend roller walker/rollator use w/ mobility   AM-PAC Basic Mobility Inpatient   Turning in Bed Without Bedrails 4   Lying on Back to Sitting on Edge of Flat Bed 3   Moving Bed to Chair 3   Standing Up From Chair 3   Walk in Room 3   Climb 3-5 Stairs 1   Basic Mobility Inpatient Raw Score 17   Basic Mobility Standardized Score 39 67   Highest Level Of Mobility   -[facility] Goal 5: Stand one or more mins   -HL Achieved 7: Walk 25 feet or more   Barthel Index   Feeding 10   Bathing 0   Grooming Score 5   Dressing Score 10   Bladder Score 10   Bowels Score 10   Toilet Use Score 5   Transfers (Bed/Chair) Score 10   Mobility (Level Surface) Score 0   Stairs Score 0   Barthel Index Score 60   End of Consult   Patient Position at End of Consult Supine; All needs within reach     The patient's AM-PAC Basic Mobility Inpatient Short Form Raw Score is 17  A Raw score of greater than 16 suggests the patient may benefit from discharge to home  Please also refer to the recommendation of the Physical Therapist for safe discharge planning  Skilled PT recommended while in hospital and upon DC to progress pt toward treatment goals       Buck Dorsey, PT

## 2022-05-27 NOTE — OCCUPATIONAL THERAPY NOTE
Occupational Therapy Evaluation      Emmanuel Vuong    5/27/2022    Principal Problem:    Bradycardia  Active Problems:    Hypertension    Ambulatory dysfunction    Dizziness    Hyponatremia    Vertigo    Anemia    Lower extremity edema      Past Medical History:   Diagnosis Date    Allergic rhinitis     Disease of thyroid gland     Dizziness     Hypothyroidism 10/20/2015    Malignant neoplasm of colon ([address] )     last assessed: 10/20/2015    Neuropathy     Tinnitus        Past Surgical History:   Procedure Laterality Date    ADENOIDECTOMY      APPENDECTOMY      last assessed: 10/20/2015    CATARACT EXTRACTION, BILATERAL      CHOLECYSTECTOMY      last assessed: 10/20/2015    COLECTOMY      last assessed: 10/20/2015; partial     TONSILLECTOMY      last assessed: 10/20/2015        05/27/22 1200   OT Last Visit   OT Visit Date 05/27/22  (TIME IN: 11:00       TIME OUT: 11:15)   Note Type   Note type Evaluation   Restrictions/Precautions   Other Precautions Telemetry; Fall Risk   Pain Assessment   Pain Assessment Tool 0-10   Pain Score No Pain   Home Living   Type of [address] [place] [address] Av One level;Performs ADLs on one level; Laundry in basement;Stairs to enter with rails  (3STE)   Bathroom Shower/Tub Tub/shower unit   Bathroom Toilet Standard   Bathroom Equipment Grab bars in shower   Prior Function   Level of [place] Independent with ADLs and functional mobility   Lives With [id number]   IADLs Needs assistance   Falls in the last 6 months 0   Vocational 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  "I would still be working if he were alive  I never wanted to retire ")   Lifestyle   Autonomy Pt reports (I) PTA with basic self cares with inc'd time and effort  Pt furniture ambulates in home or uses a SPC  Pt reports receiving therapy services in the home PTA  (-)     Reciprocal Relationships Neighbor helps at times and is able to do laundry  Pt tries to avoid going to the basement to do laundry  Intrinsic Gratification TV, sudoku puzzles, sitting outside on occasion  "I am afraid to go for a walk because I will make a fool of myself and probably fall "   Psychosocial   Patient Behaviors/Mood Irritable  (pt expressed reasons for irritation; staff coming late when pt needed to use restroom- "I am old! I can't hold it!" Pt was initially deferring therapy evaluation but pleasant with this writer and able to be distracted in order to participate)   Subjective   Subjective (S)  "I don't need therapy  I  have therapy in my house " Despite OT explaining purpose of evaluation- pt did continue to try and defer session  Pt not seeming to nderstand purpose of acute care OT evaluation but did participate with encouragement  **"Sometimes I would call 1-800 numbers just to talk to someone and ask them to tell me about their products just becase it is lonely at times "   ADL   Eating Assistance 7  Independent   Grooming Assistance 5  Supervision/Setup   Grooming Deficit Standing with assistive device   UB Bathing Assistance 5  Supervision/Setup   LB Bathing Assistance 5  Supervision/Setup   UB Dressing Assistance 5  Supervision/Setup   LB Dressing Assistance 5  Supervision/Setup   Toileting Assistance  5  Supervision/Setup   Bed Mobility   Supine to Sit 4  Minimal assistance   Additional items Assist x 1   Sit to Supine 4  Minimal assistance   Additional items Assist x 1   Additional Comments Pt sleeps in standard bed at home; notes it to be somewhat lower  Transfers   Sit to Stand 5  Supervision   Additional items Increased time required   Stand to Sit 5  Supervision   Additional items Increased time required   Functional Mobility   Functional Mobility 5  Supervision   Additional Comments use of SPC initially to ambulate t/o room  Used RW and appeared better with this  Refer to PT evaluation for further details re: mobility     Balance   Static Sitting Good   Dynamic Sitting Fair   Static Standing Fair  (with (B) supported on RW)   Ambulatory Fair -  (with SPC; inc'd balance with RW  Pt notes the same )   Activity Tolerance   Activity Tolerance Patient tolerated treatment well;Patient limited by fatigue   Nurse Made Aware RN cleared pt for OT evaluation  Co-Evaluation with PT due to pt's initial resistance to therapies as well as to conserve pt energy due to dec'd activity tolerance  RUE Assessment   RUE Assessment X  (dec'd shldr flexion but overall WFL for basic self cares  Pt notes, "I broke my rotator cuff a long time ago  I was supposed to have surgery but never did  It got better on its own  So it is ok now but starting to get a little weaker again " Distal WFL)   LUE Assessment   LUE Assessment WFL   Hand Function   Gross Motor Coordination Functional   Fine Motor Coordination Functional   Sensation   Light Touch No apparent deficits   Vision-Basic Assessment   Current Vision Wears glasses all the time   Cognition   Overall Cognitive Status Impaired  (mild dec'd safety, OT required to explain role of services and pt not seeming to understand purpose despite explanation  But, WFL for advanced age)   Arousal/Participation Alert   Attention Within functional limits   Orientation Level Disoriented X4   Memory Decreased short term memory   Following Commands Follows one step commands without difficulty   Comments Pt verified ID by stating name and    Assessment   Limitation Decreased UE ROM; Decreased endurance;Decreased self-care trans;Decreased high-level ADLs;Mood limitation;Decreased Safe judgement during ADL;Decreased cognition   Prognosis Good   Assessment Pt is a 80 y o  female seen for OT evaluation s/p admit to [facility] on 2022 w/ Bradycardia  Comorbidities affecting pt's functional performance at time of assessment are listed above   Personal factors affecting pt at time of IE include:steps to enter environment, limited home support, difficulty performing ADLS, difficulty performing IADLS  and environment  Prior to admission, pt was reportedly modified independent with ADLs, light IADLs, and functional mobility  Pt lives alone but reports having supportive neighbor who checks in or pt talks to someone on the phone at least daily  Upon evaluation: Pt requires (S) for all basic self cares and functional mobility  Pt did need minimal assist for bed mobility  Pt presents mildly below baseline and is eager to return home, however, the following deficits impacting occupational performance: decreased ROM, decreased balance, decreased tolerance, impaired memory, impaired problem solving and decreased safety awareness  Pt to benefit from continued skilled OT tx while in the hospital to address deficits as defined above and maximize level of functional independence w ADL's and functional mobility  Occupational Performance areas to address include: bathing/shower, socialization, functional mobility, community mobility, clothing management, social participation and leisure participation  Pt with score of 60/100 on the Barthel Index  Pt's raw score on the AM-PAC Daily activity inpatient short form is 21, standardized score is 44 27, greater than 39 4  Patients at this level are likely to benefit from DC to home  This writer agrees with the latter recommendation but with the addition of home OT services and daily checks (would prefer a face-to face check in from neighbor or family  At minimum- continue to have daily phone check ins  Pt currently at a (S) level)  Furthermore, recommend exploring increased social opportunities as pt admitted to having a history of calling 1-800 numbers "just to talk to someone " Based on OT evaluation, the assessment(s) completed, performance deficits listed, and current level of function, pt identified as a moderate complexity evaluation     Goals   Patient Goals return home today   LTG Time Frame 3-7   Long Term Goal #1 refer below for list of goals   Plan   Treatment Interventions ADL retraining;Functional transfer training;UE strengthening/ROM; Endurance training;Patient/family training; Activityengagement; Energy conservation   Goal Expiration Date 06/03/22   OT Frequency 2-3x/wk   Recommendation   OT Discharge Recommendation (S)  Home with home health rehabilitation  (and daily check in from friend/family/neightbor; phone call daily at minimum )   Equipment Recommended (S)  Shower transfer bench ($$)  (Recommend home OT to assess pt's environment  Pt with difficulty bringing LE's in to shower  )   AM-PAC Daily Activity Inpatient   Lower Body Dressing 3   Bathing 3   Toileting 3   Upper Body Dressing 4   Grooming 4   Eating 4   Daily Activity Raw Score 21   Daily Activity Standardized Score (Calc for Raw Score >=11) 44 27   AM-PAC Applied Cognition Inpatient   Following a Speech/Presentation 3   Understanding Ordinary Conversation 3   Taking Medications 4   Remembering Where Things Are Placed or Put Away 3   Remembering List of 4-5 Errands 3   Taking Care of Complicated Tasks 3   Applied Cognition Raw Score 19   Applied Cognition Standardized Score 39 77   Barthel Index   Feeding 10   Bathing 0   Grooming Score 5   Dressing Score 10   Bladder Score 10   Bowels Score 10   Toilet Use Score 5   Transfers (Bed/Chair) Score 10   Mobility (Level Surface) Score 0   Stairs Score 0   Barthel Index Score 60     GOALS:    Pt will achieve the following within specified time frame:  *Perform ADL transfers with mod (I) for inc'd independence with ADLs/purposeful tasks    *Bed mobility- mod (I) for inc'd independence to manage own comfort and initiate EOB & OOB purposeful tasks    *Perform LB ADL mod (I) using AE prn for inc'd independence with self cares    *Increase static stand balance to F+ and dyn stand balance to F for inc'd safety with standing purposeful tasks (ie: bed making, light meal prep, ADLs)    *Increase stand tolerance x5-7 m for inc'd tolerance with standing purposeful tasks    *Perform clothing management for toileting mod (I)/(I) for inc'd independence with self cares    *Participate in 10m UE therex to increase overall stamina/activity tolerance for purposeful tasks    *Perform sinkside G&H mod (I), with good safety and F+ balance for inc'd independence with self cares    *Pt will verbalize 2-3 fall prevention techniques to utilize in order to complete purposeful/ADL tasks safety, at highest level of performance and safety      Romario Lu, OT

## 2022-05-27 NOTE — ASSESSMENT & PLAN NOTE
Multifactorial in history possible bilateral neuropathy, as well as severe osteoarthritis  Previously evaluated for physical therapy, post acute rehab has been recommended however patient has declined  Pt leaves by herself , significant ambulatory dysfunction and dizzines , will order PT/OT evaluation

## 2022-05-27 NOTE — ASSESSMENT & PLAN NOTE
-POA sensation of room spinning, lightheadedness  -history of vertigo, previously evaluated by ENT, per patient above symptoms seems worst than her baseline  -has received vestibular therapy in the past,current therapy meclizine 25 mg t i d   -recent medication changes: HCTZ 12 5 mg daily for lower extremity edema, patient has been on this medication in the past  - neurological exam nonfocal, normal glycemic, EKG : NSR HR 60's, prior EKG on file with findings consistent with sinus bradycardia    -possible etiology of dizziness symptomatic bradycardia vs medication side effect with new hctz    -will continue above medications  -orthostatic vitals   -will hold gabapentin and HCTZ for now in the setting of dizziness

## 2022-05-27 NOTE — ASSESSMENT & PLAN NOTE
-macrocytic anemia, Stable  - reviewed labs, levels in the 200 range,  s/p vitamin B12 supplementation  -monitor CBC, follow up PCP

## 2022-05-27 NOTE — PLAN OF CARE
Problem: MOBILITY - ADULT  Goal: Maintain or return to baseline ADL function  Description: INTERVENTIONS:  -  Assess patient's ability to carry out ADLs; assess patient's baseline for ADL function and identify physical deficits which impact ability to perform ADLs (bathing, care of mouth/teeth, toileting, grooming, dressing, etc )  - Assess/evaluate cause of self-care deficits   - Assess range of motion  - Assess patient's mobility; develop plan if impaired  - Assess patient's need for assistive devices and provide as appropriate  - Encourage maximum independence but intervene and supervise when necessary  - Involve family in performance of ADLs  - Assess for home care needs following discharge   - Consider OT consult to assist with ADL evaluation and planning for discharge  - Provide patient education as appropriate  5/27/2022 1519 by Virginia Power RN  Outcome: Progressing  5/27/2022 0958 by Virginia Power RN  Outcome: Progressing  Goal: Maintains/Returns to pre admission functional level  Description: INTERVENTIONS:  - Perform BMAT or MOVE assessment daily    - Set and communicate daily mobility goal to care team and patient/family/caregiver     - Collaborate with rehabilitation services on mobility goals if consulted  - Dangle patient   - Stand patient   - Ambulate patient   - Out of bed to chair  - Out of bed for meals   - Out of bed for toileting  - Record patient progress and toleration of activity level   5/27/2022 1519 by Virginia Power RN  Outcome: Progressing  5/27/2022 0958 by Virginia Power RN  Outcome: Progressing     Problem: Potential for Falls  Goal: Patient will remain free of falls  Description: INTERVENTIONS:  - Educate patient/family on patient safety including physical limitations  - Instruct patient to call for assistance with activity   - Consult OT/PT to assist with strengthening/mobility   - Keep Call bell within reach  - Keep bed low and locked with side rails adjusted as appropriate  - Keep care items and personal belongings within reach  - Initiate and maintain comfort rounds  - Make Fall Risk Sign visible to staff  - Offer Toileting every 2 Hours, in advance of need  - Initiate/Maintain bed/chair alarm  - Obtain necessary fall risk management equipment  Problem: MOBILITY - ADULT  Goal: Maintain or return to baseline ADL function  Description: INTERVENTIONS:  -  Assess patient's ability to carry out ADLs; assess patient's baseline for ADL function and identify physical deficits which impact ability to perform ADLs (bathing, care of mouth/teeth, toileting, grooming, dressing, etc )  - Assess/evaluate cause of self-care deficits   - Assess range of motion  - Assess patient's mobility; develop plan if impaired  - Assess patient's need for assistive devices and provide as appropriate  - Encourage maximum independence but intervene and supervise when necessary  - Involve family in performance of ADLs  - Assess for home care needs following discharge   - Consider OT consult to assist with ADL evaluation and planning for discharge  - Provide patient education as appropriate  5/27/2022 1519 by Joi Encarnacion RN  Outcome: Adequate for Discharge  5/27/2022 1519 by Joi Encarnacion RN  Outcome: Progressing  5/27/2022 0958 by Joi Encarnacion RN  Outcome: Progressing  Goal: Maintains/Returns to pre admission functional level  Description: INTERVENTIONS:  - Perform BMAT or MOVE assessment daily    - Set and communicate daily mobility goal to care team and patient/family/caregiver     - Record patient progress and toleration of activity level   5/27/2022 1519 by Joi Encarnacion RN  Outcome: Adequate for Discharge  5/27/2022 1519 by Joi Encarnacion RN  Outcome: Progressing  5/27/2022 0958 by Joi Encarnacion RN  Outcome: Progressing     Problem: Potential for Falls  Goal: Patient will remain free of falls  Description: INTERVENTIONS:  - Educate patient/family on patient safety including physical limitations  - Instruct patient to call for assistance with activity   - Consult OT/PT to assist with strengthening/mobility   - Keep Call bell within reach  - Keep bed low and locked with side rails adjusted as appropriate  - Keep care items and personal belongings within reach  - Initiate and maintain comfort rounds  - Make Fall Risk Sign visible to staff  - Offer Toileting every 2 Hours, in advance of need  - Initiate/Maintain bed/chair alarm  - Obtain necessary fall risk management equipment  - Apply yellow socks and bracelet for high fall risk patients  - Consider moving patient to room near nurses station  5/27/2022 1519 by Nely Levy RN  Outcome: Adequate for Discharge  5/27/2022 1519 by Nely Levy RN  Outcome: Progressing  5/27/2022 0958 by Nely Levy RN  Outcome: Progressing     Problem: Prexisting or High Potential for Compromised Skin Integrity  Goal: Skin integrity is maintained or improved  Description: INTERVENTIONS:  - Identify patients at risk for skin breakdown  - Assess and monitor skin integrity  - Assess and monitor nutrition and hydration status  - Monitor labs   - Assess for incontinence   - Turn and reposition patient  - Assist with mobility/ambulation  - Relieve pressure over bony prominences  - Avoid friction and shearing  - Provide appropriate hygiene as needed including keeping skin clean and dry  - Evaluate need for skin moisturizer/barrier cream  - Collaborate with interdisciplinary team   - Patient/family teaching  - Consider wound care consult   5/27/2022 1519 by Nely Levy RN  Outcome: Adequate for Discharge  5/27/2022 1519 by Nely Levy RN  Outcome: Progressing  5/27/2022 0958 by Nely Levy RN  Outcome: Progressing     - Apply yellow socks and bracelet for high fall risk patients  - Consider moving patient to room near nurses station  5/27/2022 1519 by Nely Levy RN  Outcome: Progressing  5/27/2022 0958 by Jann Mix RN  Outcome: Progressing     Problem: Prexisting or High Potential for Compromised Skin Integrity  Goal: Skin integrity is maintained or improved  Description: INTERVENTIONS:  - Identify patients at risk for skin breakdown  - Assess and monitor skin integrity  - Assess and monitor nutrition and hydration status  - Monitor labs   - Assess for incontinence   - Turn and reposition patient  - Assist with mobility/ambulation  - Relieve pressure over bony prominences  - Avoid friction and shearing  - Provide appropriate hygiene as needed including keeping skin clean and dry  - Evaluate need for skin moisturizer/barrier cream  - Collaborate with interdisciplinary team   - Patient/family teaching  - Consider wound care consult   5/27/2022 1519 by Jann Mix RN  Outcome: Progressing  5/27/2022 0958 by Jann Mix RN  Outcome: Progressing

## 2022-05-27 NOTE — ASSESSMENT & PLAN NOTE
-Multifactorial in history possible bilateral neuropathy, as well as severe osteoarthritis  -Previously evaluated for physical therapy, post acute rehab has been recommended however patient has declined    -PT/OT:  Home with home health

## 2022-05-27 NOTE — DISCHARGE SUMMARY
[facility]  Discharge- Laina Thorne Yevgeniy 12/11/1930, 80 y o  female MRN: [id number]  Unit/Bed#: S -01 Encounter: [id number]  Primary Care Provider: Kimberley Pendleton MD   Date and time admitted to hospital: 5/26/2022  2:09 PM    * Bradycardia  Assessment & Plan  -according to patient heart rate noted by visiting nurse in the 50 range  -associated symptoms include significantly dizziness, lightheadedness  -underlying history of vertigo, no known history of heart disease   -In hospital, EKG showed normal sinus rhythm heart rate in the 60's range, previous EKG noted to exhibited sinus bradycardia  -overnight telemetry with no episodes of bradycardia  -had positive orthostatics with tachycardia on standing, status post 1 L fluid bolus  -echocardiogram:  Preserved EF 55%, mild inferior and inferolateral wall hypokinesis, no significant valvular abnormalities    Plan  · Patient is stable for discharge  · Advised on Cardiology follow-up for possible 0 patch, however patient would prefer to follow-up with her primary care physician 1st  · Epic message sent to Dr Kaylyn Lee to assist patient with possible outpatient heart monitoring if appropriate    Dizziness  Assessment & Plan  -POA sensation of room spinning, lightheadedness  -history of vertigo, previously evaluated by ENT, per patient above symptoms seems worst than her baseline  -has received vestibular therapy in the past,current therapy meclizine 25 mg t i d   -recent medication changes: HCTZ 12 5 mg daily for lower extremity edema, patient has been on this medication in the past  - neurological exam nonfocal, normal glycemic, EKG : NSR HR 60's, prior EKG on file with findings consistent with sinus bradycardia    -positive orthostasis, likely secondary to recent initiation of HCTZ  -status post 1 L IV fluid bolus    Plan  · Hold HCTZ until seen by PCP  · Continue losartan  · Follow-up with primary care physician      Lower extremity edema  Assessment & Plan  -Bilateral lower extremity edema, started approx 2 weeks ago  -Initiated on  HCTZ per her pcp, (-) sob, orthopnea, PND  -ultrasound lower extremities negative for DVT or thrombophlebitis   -likely chronic venous stasis   -echo with preserved EF, grade 1 diastolic dysfunction, patient appears euvolemic    Plan  · Compression stockings  · Advised to hold hydrochlorothiazide in the setting of orthostasis, to be restarted by PCP if appropriate    Anemia  Assessment & Plan  -macrocytic anemia, Stable  -reviewed labs, levels in the 200 range,  s/p vitamin B12 supplementation  -continue outpatient workup  -follow-up with PCP    Vertigo  Assessment & Plan  · Patient with history of vertigo on meclizine    Hyponatremia  Assessment & Plan  Recent Labs     05/26/22  1602 05/27/22  0515   SODIUM 132* 133*     · Improved this morning  · Status post 1 L normal saline bolus  · Continue to hold HCTZ    Ambulatory dysfunction  Assessment & Plan  -Multifactorial in history possible bilateral neuropathy, as well as severe osteoarthritis  -Previously evaluated for physical therapy, post acute rehab has been recommended however patient has declined  -PT/OT:  Home with home health      Hypertension  Assessment & Plan  -Blood pressure reviewed, acceptable  range   -Continue losartan      Medical Problems             Resolved Problems  Date Reviewed: 5/27/2022   None               Discharging Resident: Ar Almazan MD  Discharging Attending: Sravanthi May MD  PCP: Colleen Peña MD  Admission Date:   Admission Orders (From admission, onward)     Ordered        05/26/22 1726  Place in Observation  Once                      Discharge Date: 05/27/22    Consultations During Hospital Stay:  · None    Procedures Performed:   · None    Significant Findings / Test Results:   · None    Incidental Findings:   · None     Test Results Pending at Discharge (will require follow up):   · None     Outpatient Tests Requested:  · None, recommend outpatient Zio patch    Complications:  None    Reason for Admission:  Bradycardia and dizziness    Hospital Course: Israel Cavazos is a 80 y o  female patient with a history of vertigo, hypertension, ambulatory dysfunction, and bilateral lower extremity edema who originally presented to the hospital on 5/26/2022 due to dizziness and bradycardia (40-50) noted by [facility]  On admission, she had stable vital signs, pulse rate was 60s to 80s, with stable blood pressure  EKG showed a heart rate of 60, normal sinus rhythm  Patient was admitted under observation with telemetry  Orthostatics were noted to be positive on admission, she received 1 L fluid bolus  Overnight telemetry showed no bradycardic events  2D echocardiogram showed a preserved ejection fraction with no significant valvular abnormalities  Orthostasis was thought to be likely secondary to recent initiation of hydrochlorothiazide which was held on admission  Given her ambulatory dysfunction, physical therapy and occupational therapy were consulted recommended home with home physical therapy  Patient has been discharged with [facility] services  She was advised to follow-up with Cardiology, however patient requested PCP follow-up 1st and reassess the necessity for Cardiology referral and zio patch  Please see above list of diagnoses and related plan for additional information  Condition at Discharge: stable    Discharge Day Visit / Exam:     Subjective:  No new complaints today  Has generalized weakness, dizziness has improved  Good p o  Intake  No chest pain, palpitations, shortness of breath      Vitals: Blood Pressure: 147/85 (05/27/22 1504)  Pulse: (!) 50 (05/27/22 1504)  Temperature: 98 2 °F (36 8 °C) (05/27/22 0746)  Temp Source: Oral (05/27/22 0746)  Respirations: 16 (05/27/22 0746)  Height: 5' (152 4 cm) (05/27/22 0920)  Weight - Scale: 60 8 kg (134 lb) (05/27/22 0920)  SpO2: 98 % (05/27/22 0746)     Exam: Physical Exam  Vitals and nursing note reviewed  Constitutional:       General: She is not in acute distress  Appearance: Normal appearance  She is not toxic-appearing  HENT:      Head: Normocephalic and atraumatic  Eyes:      Extraocular Movements: Extraocular movements intact  Cardiovascular:      Rate and Rhythm: Normal rate and regular rhythm  Heart sounds: Normal heart sounds  No murmur heard  Pulmonary:      Effort: Pulmonary effort is normal  No respiratory distress  Breath sounds: Normal breath sounds  No wheezing  Abdominal:      General: Bowel sounds are normal  There is no distension  Palpations: Abdomen is soft  Tenderness: There is no abdominal tenderness  Musculoskeletal:      Right lower leg: Edema present  Left lower leg: Edema present  Comments: Mild edema RLE slightly more than LLE   Skin:     General: Skin is warm and dry  Neurological:      General: No focal deficit present  Mental Status: She is alert  Psychiatric:         Mood and Affect: Mood normal          Behavior: Behavior normal           Discussion with Family: Attempted to update  (son and daughter in law) via phone  Left voicemail  Discharge instructions/Information to patient and family:   See after visit summary for information provided to patient and family  Provisions for Follow-Up Care:  See after visit summary for information related to follow-up care and any pertinent home health orders  Disposition:   Home with [facility] Services (Reminder: Complete face to face encounter)    Planned Readmission: none     Discharge Medications:  See after visit summary for reconciled discharge medications provided to patient and/or family        **Please Note: This note may have been constructed using a voice recognition system**

## 2022-05-27 NOTE — ASSESSMENT & PLAN NOTE
-Bilateral lower extremity edema, started approx 2 weeks ago  -Initiated on  HCTZ per her pcp, (-) sob, orthopnea, PND  -ultrasound lower extremities negative for DVT or thrombophlebitis   -likely chronic venous stasis   -echo with preserved EF, grade 1 diastolic dysfunction, patient appears euvolemic    Plan  · Compression stockings  · Advised to hold hydrochlorothiazide in the setting of orthostasis, to be restarted by PCP if appropriate

## 2022-05-27 NOTE — ASSESSMENT & PLAN NOTE
-according to patient heart rate noted by visiting nurse in the 50 range  -associated symptoms include significantly dizziness, lightheadedness  -underlying history of vertigo, no known history of heart disease   -In hospital, EKG showed normal sinus rhythm heart rate in the 60's range, previous EKG noted to exhibited sinus bradycardia  -overnight telemetry with no episodes of bradycardia  -had positive orthostatics with tachycardia on standing, status post 1 L fluid bolus  -echocardiogram:  Preserved EF 55%, mild inferior and inferolateral wall hypokinesis, no significant valvular abnormalities    Plan  · Patient is stable for discharge  · Advised on Cardiology follow-up for possible 0 patch, however patient would prefer to follow-up with her primary care physician 1st  · Epic message sent to Dr Eleazar Delacruz to assist patient with possible outpatient heart monitoring if appropriate

## 2022-05-27 NOTE — PLAN OF CARE
Problem: PHYSICAL THERAPY ADULT  Goal: Performs mobility at highest level of function for planned discharge setting  See evaluation for individualized goals  Description: Treatment/Interventions: LE strengthening/ROM, Functional transfer training, Elevations, Therapeutic exercise, Endurance training, Equipment eval/education, Gait training, Bed mobility, Patient/family training          See flowsheet documentation for full assessment, interventions and recommendations  Note: Prognosis: Good  Problem List: Decreased strength, Decreased range of motion, Decreased endurance, Impaired balance, Decreased mobility, Decreased safety awareness  Assessment: Pt came to the hospital for evaluation of bradycardia and dizziness  Dx: bradycardia, dizziness, hyponatremia, LE edema ,anemia, and ambulatory dysfunction  order placed for PT eval and tx, w/ activity order of up w/ assist  pt presents w/ comorbidities of vertigo, HTN, ambulatory dysfunction, LE neuropathy, and anemia and personal factors of advanced age, mobilizing w/ assistive device, stair(s) to enter home, limited home support and inability to perform IADLs  pt presents w/ weakness, decreased ROM, decreased endurance, impaired balance, gait deviations, decreased safety awareness and fall risk  these impairments are evident in findings from physical examination (weakness and decreased ROM), mobility assessment (need for standby to min assist w/ all phases of mobility when usually mobilizing independently, tolerance to only 90 feet of ambulation and need for cueing for mobility technique), and Barthel Index: 60/100  pt needed input for mobility technique  pt is at risk for falls due to physical and safety awareness deficits   pt's clinical presentation is unstable/unpredictable (evident in tachycardia, bradycardia, poor blood pressure control, tolerance to only 90 feet of ambulation, pain impacting overall mobility status and need for input for mobility technique/safety)  pt needs inpatient PT tx to improve mobility deficits and progress mobility training as appropriate  discharge recommendation is for home PT to reduce fall risk and maximize level of functional independence  Pt would benefit from Gerontology consult to address aging related issues  PT Discharge Recommendation: Home with home health rehabilitation          See flowsheet documentation for full assessment

## 2022-05-27 NOTE — ASSESSMENT & PLAN NOTE
-POA sensation of room spinning, lightheadedness  -history of vertigo, previously evaluated by ENT, per patient above symptoms seems worst than her baseline  -has received vestibular therapy in the past,current therapy meclizine 25 mg t i d   -recent medication changes: HCTZ 12 5 mg daily for lower extremity edema, patient has been on this medication in the past  - neurological exam nonfocal, normal glycemic, EKG : NSR HR 60's, prior EKG on file with findings consistent with sinus bradycardia    -positive orthostasis, likely secondary to recent initiation of HCTZ  -status post 1 L IV fluid bolus    Plan  · Hold HCTZ until seen by PCP  · Continue losartan  · Follow-up with primary care physician

## 2022-05-27 NOTE — PROGRESS NOTES
[facility]  Progress Note - Sveta Vigil 12/11/1930, 80 y o  female MRN: [id number]  Unit/Bed#: S -01 Encounter: [id number]  Primary Care Provider: Anup Sweet MD   Date and time admitted to hospital: 5/26/2022  2:09 PM    * Bradycardia  Assessment & Plan  -according to patient heart rate noted by visiting nurse in the 50 range  -associated symptoms include significantly dizziness, lightheadedness  -underlying history of vertigo, no known history of heart disease   -In hospital, EKG showed normal sinus rhythm heart rate in the 60's range, previous EKG noted to exhibited sinus bradycardia  -no echocardiogram on file  -patient has not been evaluated with any Holter monitoring in the past    Plan  Telemetry reviewed and unremarkable  Echocardiogram ordered, pending   If echo within normal limits, patient can follow up with PCP or cardiology  May need holter monitoring as outpatient  Will give 500cc bolus of NS today    Dizziness  Assessment & Plan  -POA sensation of room spinning, lightheadedness  -history of vertigo, previously evaluated by ENT, per patient above symptoms seems worst than her baseline  -has received vestibular therapy in the past,current therapy meclizine 25 mg t i d   -recent medication changes: HCTZ 12 5 mg daily for lower extremity edema, patient has been on this medication in the past  - neurological exam nonfocal, normal glycemic, EKG : NSR HR 60's, prior EKG on file with findings consistent with sinus bradycardia    -possible etiology of dizziness symptomatic bradycardia vs medication side effect with new hctz    -will continue above medications  -orthostatic vitals   -will hold gabapentin and HCTZ for now in the setting of dizziness    Lower extremity edema  Assessment & Plan  Bilateral lower extremity edema, started approx 2 weeks ago  Initiated on  HCTZ per her pcp, (-) sob, orthopnea, PND  ultrasound lower extremities negative for DVT or thrombophlebitis  Review past labs revealed significant hypoalbuminemia 2 7 which could be contributory    Echo pending    Anemia  Assessment & Plan  -macrocytic anemia, Stable  - reviewed labs, levels in the 200 range,  s/p vitamin B12 supplementation  -monitor CBC, follow up PCP    Vertigo  Assessment & Plan  Patient with history of vertigo on meclizine    Hyponatremia  Assessment & Plan  Recent Labs     05/26/22  1602 05/27/22  0515   SODIUM 132* 133*   Review records reveal a pattern of chronicity ,however there is some levels documented within normal limits  Will hold hydrochlorothiazide for now  Monitor BMP while hospitalized, can follow up with PCP    Ambulatory dysfunction  Assessment & Plan  Multifactorial in history possible bilateral neuropathy, as well as severe osteoarthritis  Previously evaluated for physical therapy, post acute rehab has been recommended however patient has declined  Pt leaves by herself , significant ambulatory dysfunction and dizzines , will order PT/OT evaluation  Hypertension  Assessment & Plan  Blood pressure reviewed, acceptable  range   Continue losartan        VTE Pharmacologic Prophylaxis: VTE Score: 6 High Risk (Score >/= 5) - Pharmacological DVT Prophylaxis Ordered: enoxaparin (Lovenox)  Sequential Compression Devices Ordered  Patient Centered Rounds: I performed bedside rounds with nursing staff today  Discussions with Specialists or Other Care Team Provider: case management    Education and Discussions with Family / Patient: Patient declined call to   Current Length of Stay: 0 day(s)  Current Patient Status: Observation   Discharge Plan: Anticipate discharge later today or tomorrow to home  Code Status: Level 1 - Full Code    Subjective:   Patient reports slight improvement to lightheadedness compared to admission  Endorses continued weakness, neuropathy, and edema of lower extremities   Is displeased with location of her room, otherwise with no complaints  Denies chest pain, palpitations, shortness of breath  Objective:     Vitals:   Temp (24hrs), Av 1 °F (36 7 °C), Min:97 8 °F (36 6 °C), Max:98 3 °F (36 8 °C)    Temp:  [97 8 °F (36 6 °C)-98 3 °F (36 8 °C)] 98 2 °F (36 8 °C)  HR:  [] 72  Resp:  [16-18] 16  BP: (125-179)/(64-85) 125/65  SpO2:  [96 %-98 %] 98 %  Body mass index is 26 17 kg/m²  Input and Output Summary (last 24 hours): Intake/Output Summary (Last 24 hours) at 2022 1103  Last data filed at 2022 0644  Gross per 24 hour   Intake 240 ml   Output 800 ml   Net -560 ml       Physical Exam:   Physical Exam  Vitals and nursing note reviewed  Constitutional:       General: She is not in acute distress  Appearance: Normal appearance  She is not toxic-appearing  HENT:      Head: Normocephalic and atraumatic  Eyes:      Extraocular Movements: Extraocular movements intact  Cardiovascular:      Rate and Rhythm: Normal rate and regular rhythm  Heart sounds: Normal heart sounds  No murmur heard  Pulmonary:      Effort: Pulmonary effort is normal  No respiratory distress  Breath sounds: Normal breath sounds  No wheezing  Abdominal:      General: Bowel sounds are normal  There is no distension  Palpations: Abdomen is soft  Tenderness: There is no abdominal tenderness  Musculoskeletal:      Right lower leg: Edema present  Left lower leg: Edema present  Comments: Mild edema RLE slightly more than LLE   Skin:     General: Skin is warm and dry  Neurological:      General: No focal deficit present  Mental Status: She is alert     Psychiatric:         Mood and Affect: Mood normal          Behavior: Behavior normal           Additional Data:     Labs:  Results from last 7 days   Lab Units 22  0515   WBC Thousand/uL 4 36   HEMOGLOBIN g/dL 9 2*   HEMATOCRIT % 27 8*   PLATELETS Thousands/uL 213   NEUTROS PCT % 56   LYMPHS PCT % 25   MONOS PCT % 11   EOS PCT % 6     Results from last 7 days   Lab Units 05/27/22  0515   SODIUM mmol/L 133*   POTASSIUM mmol/L 4 5   CHLORIDE mmol/L 101   CO2 mmol/L 25   BUN mg/dL 21   CREATININE mg/dL 1 17   ANION GAP mmol/L 7   CALCIUM mg/dL 8 7   GLUCOSE RANDOM mg/dL 74                       Lines/Drains:  Invasive Devices  Report    Peripheral Intravenous Line  Duration           Peripheral IV 05/26/22 Right Antecubital <1 day                  Telemetry:  Telemetry Orders (From admission, onward)             48 Hour Telemetry Monitoring  Continuous x 48 hours        References:    Telemetry Guidelines   Question:  Reason for 48 Hour Telemetry  Answer:  Arrhythmias Requiring Medical Therapy (eg  SVT, Vtach/fib, Bradycardia, Uncontrolled A-fib)                 Telemetry Reviewed: NSR, one moment of bradycardia to 47 however rate appeared greater than 47 so possibly due to atrifact  Indication for Continued Telemetry Use: Arrthymias requiring medical therapy             Imaging: lower limb VAS     Recent Cultures (last 7 days):         Last 24 Hours Medication List:   Current Facility-Administered Medications   Medication Dose Route Frequency Provider Last Rate    acetaminophen  650 mg Oral Q6H PRN Jessenia Shannon MD      aspirin  81 mg Oral Daily Jessenia Shannon MD      calcium carbonate-vitamin D  1 tablet Oral Daily With Breakfast Jessenia Shannon MD      enoxaparin  30 mg Subcutaneous Daily Jessenia Shannon MD      losartan  25 mg Oral Daily Jessenia Shannon MD      lubiprostone  8 mcg Oral BID With Meals Mary Michael MD      meclizine  25 mg Oral Q8H Jessenia Shannon MD      senna-docusate sodium  2 tablet Oral Daily Jessenia Shannon MD      sodium chloride  500 mL Intravenous Once Jolene Shay MD          Today, Patient Was Seen By: Apolinar Yang MD    **Please Note: This note may have been constructed using a voice recognition system  **

## 2022-05-27 NOTE — CASE MANAGEMENT
Case Management Assessment & Discharge Planning Note    Patient name Sylvester Bhatia  Location S /S -01 MRN [id number]  : 1930 Date 2022       Current Admission Date: 2022  Current Admission Diagnosis:Bradycardia   Patient Active Problem List    Diagnosis Date Noted    Bradycardia 2022    Anemia 2022    Lower extremity edema 2022    Vertigo 2021    Megaloblastic anemia 2021    Ambulatory dysfunction 2021    Dizziness 2021    Hyponatremia 2021    Anxiety 2021    Iron deficiency anemia 2021    CKD (chronic kidney disease) stage 3, GFR 30-59 ml/min ([facility]) 2020    Closed fracture of thoracic vertebra ([facility] ) 2018    Lumbosacral spondylosis without myelopathy 2018    Hypertension 2018    Atherosclerosis of native artery of extremity ([facility] ) 2018    Idiopathic peripheral neuropathy 2018    Benign positional vertigo 2017    Edema 2017    Allergic rhinitis 2016    Insomnia 2016    Gastroparesis 2016    Vestibular dysfunction 2016    Chronic constipation 10/20/2015    IBS (irritable bowel syndrome) 10/20/2015    Osteoporosis 10/20/2015    Thoracic compression fracture (HCC) 10/20/2015    Low back pain 10/08/2015      LOS (days): 0  Geometric Mean LOS (GMLOS) (days):   Days to GMLOS:     OBJECTIVE:              Current admission status: Observation       Preferred Pharmacy:   [address] [place] [address]  Phone: [phone] Fax: [phone]    Primary Care Provider: Jeromy Diallo MD    Primary Insurance: MEDICARE  Secondary Insurance: BLUE CROSS    ASSESSMENT:  Active Health Care Proxies    There are no active Health Care Proxies on file              Obs Notice Signed: 22         Patient Information  Admitted from[de-identified] Home  Mental Status: Alert  During Assessment patient was accompanied by: Not accompanied during assessment  Assessment information provided by[de-identified] Patient  Primary Caregiver: Self  Support Systems: Self  What city do you live in[place] [facility] entry access options   Select all that apply : Stairs  Number of steps to enter home : 3  Type of Current Residence: [place]  In the last 12 months, was there a time when you were not able to pay the mortgage or rent on time?: No  In the last 12 months, how many places have you lived?: 1  In the last 12 months, was there a time when you did not have a steady place to sleep or slept in a shelter (including now)?: No  Homeless/housing insecurity resource given?: N/A  Living Arrangements: Lives Alone    Activities of Daily Living Prior to Admission  Functional Status: Independent  Completes ADLs independently?: Yes  Ambulates independently?: Yes  Does patient use assisted devices?: Yes  Assisted Devices (DME) used: Bonnie Bolton  Does patient currently own DME?: Yes  What DME does the patient currently own?: Arvind Kenny  Does patient have a history of Outpatient Therapy (PT/OT)?: No  Does the patient have a history of Short-Term Rehab?: No  Does patient have a history of HHC?: Yes  Does patient currently have [facility]?: Yes (believes through [facility])         Patient Information Continued  Does patient have prescription coverage?: Yes  Within the past 12 months, you worried that your food would run out before you got the money to buy more : Never true  Within the past 12 months, the food you bought just didn't last and you didn't have money to get more : Never true  Food insecurity resource given?: N/A  Does patient receive dialysis treatments?: No  Does patient have a history of substance abuse?: No  Does patient have a history of Mental Health Diagnosis?: No         Means of Transportation  Means of Transport to Appts[de-identified] Friends  In the past 12 months, has lack of transportation kept you from medical appointments or from getting medications?: No  In the past 12 months, has lack of transportation kept you from meetings, work, or from getting things needed for daily living?: No  Was application for public transport provided?: N/A        DISCHARGE DETAILS:    Discharge planning discussed with[de-identified] patient at bedside  [place] of Choice: Yes (re: home care)  Comments - Freedom of Choice: requesting resumption of care through [facility]  CM contacted family/caregiver?: No- see comments (declined)  Were Treatment Team discharge recommendations reviewed with patient/caregiver?: Yes  Did patient/caregiver verbalize understanding of patient care needs?: Yes  Were patient/caregiver advised of the risks associated with not following Treatment Team discharge recommendations?: Yes         [address]         Is the patient interested in [facility] at discharge?: Yes  [address] requested[de-identified] Nursing, Occupational Therapy, Physical [address] Name[de-identified] Other ([facility])  [address] Provider[de-identified] PCP  Home Health Services Needed[de-identified] Evaluate Functional Status and Safety, Gait/ADL Training, Strengthening/Theraputic Exercises to Improve Function, Heart Failure Management  Homebound Criteria Met[de-identified] Requires the Assistance of Another Person for Safe Ambulation or to Leave the Home, Uses an Assist Device (i e  cane, walker, etc)  Supporting Clincal Findings[de-identified] Fatigues Easliy in [employer], Limited Endurance    DME Referral Provided  Referral made for DME?: No    Other Referral/Resources/Interventions Provided:  Interventions: [facility]  Referral Comments: Patient admitted under observation due to bradycardia; anticipate possible d/c for today  PT/OT recommending home care services  Met with patient at bedside to complete assessment  Patient states that she lives alone, but has been independent with ADLs, ambulation   Has a ranch home with 3 steps to get in from both the front and back  Patient has a basement, but reports she never goes downstairs  Patient states that she walks with a cane, walker or uses her rollator  Reports that she gave up driving about a year ago, but relies on good friends/neighbors to transport her when needed  Patient has one son who lives in the [facility] area  Reports that neighbor/friend will be transporting her home at discharge  BLACK reviewed which patient signed; copy provided  Patient aware of home care recommendation and reports that she's current with [facility] for home nursing  Requesting referral back to them for resumption of care; will add PT/OT evaluations as well      Would you like to participate in our [address] service program?  : No - Declined    Treatment Team Recommendation: Home with [facility]  Discharge Destination Plan[de-identified] Home with [facility] ([facility])  Transport at Discharge : Auto with designated

## 2022-05-27 NOTE — PLAN OF CARE
Problem: OCCUPATIONAL THERAPY ADULT  Goal: Performs self-care activities at highest level of function for planned discharge setting  See evaluation for individualized goals  Description: Treatment Interventions: ADL retraining, Functional transfer training, UE strengthening/ROM, Endurance training, Patient/family training, Activityengagement, Energy conservation  Equipment Recommended: (S) Shower transfer bench ($$) (Recommend home OT to assess pt's environment  Pt with difficulty bringing LE's in to shower )       See flowsheet documentation for full assessment, interventions and recommendations  Outcome: Progressing  Note: Limitation: Decreased UE ROM, Decreased endurance, Decreased self-care trans, Decreased high-level ADLs, Mood limitation, Decreased Safe judgement during ADL, Decreased cognition  Prognosis: Good  Assessment: Pt is a 80 y o  female seen for OT evaluation s/p [facility] on 5/26/2022 w/ Bradycardia  Comorbidities affecting pt's functional performance at time of assessment are listed above  Personal factors affecting pt at time of IE include:steps to enter environment, limited home support, difficulty performing ADLS, difficulty performing IADLS  and environment  Prior to admission, pt was reportedly modified independent with ADLs, light IADLs, and functional mobility  Pt lives alone but reports having supportive neighbor who checks in or pt talks to someone on the phone at least daily  Upon evaluation: Pt requires (S) for all basic self cares and functional mobility  Pt did need minimal assist for bed mobility  Pt presents mildly below baseline and is eager to return home, however, the following deficits impacting occupational performance: decreased ROM, decreased balance, decreased tolerance, impaired memory, impaired problem solving and decreased safety awareness   Pt to benefit from continued skilled OT tx while in the hospital to address deficits as defined above and maximize level of functional independence w ADL's and functional mobility  Occupational Performance areas to address include: bathing/shower, socialization, functional mobility, community mobility, clothing management, social participation and leisure participation  Pt with score of 60/100 on the Barthel Index  Pt's raw score on the -PAC Daily activity inpatient short form is 21, standardized score is 44 27, greater than 39 4  Patients at this level are likely to benefit from DC to home  This writer agrees with the latter recommendation but with the addition of home OT services and daily checks (would prefer a face-to face check in from neighbor or family  At minimum- continue to have daily phone check ins  Pt currently at a (S) level)  Furthermore, recommend exploring increased social opportunities as pt admitted to having a history of calling 1-800 numbers "just to talk to someone " Based on OT evaluation, the assessment(s) completed, performance deficits listed, and current level of function, pt identified as a moderate complexity evaluation       OT Discharge Recommendation: (S) Home with home health rehabilitation (and daily check in from friend/family/neightbor; phone call daily at minimum )

## 2022-05-27 NOTE — ASSESSMENT & PLAN NOTE
Recent Labs     05/26/22  1602 05/27/22  0515   SODIUM 132* 133*   Review records reveal a pattern of chronicity ,however there is some levels documented within normal limits  Will hold hydrochlorothiazide for now  Monitor BMP while hospitalized, can follow up with PCP

## 2022-05-27 NOTE — ASSESSMENT & PLAN NOTE
Bilateral lower extremity edema, started approx 2 weeks ago  Initiated on  HCTZ per her pcp, (-) sob, orthopnea, PND  ultrasound lower extremities negative for DVT or thrombophlebitis    Review past labs revealed significant hypoalbuminemia 2 7 which could be contributory    Echo pending

## 2022-05-27 NOTE — ASSESSMENT & PLAN NOTE
-according to patient heart rate noted by visiting nurse in the 50 range  -associated symptoms include significantly dizziness, lightheadedness  -underlying history of vertigo, no known history of heart disease   -In hospital, EKG showed normal sinus rhythm heart rate in the 60's range, previous EKG noted to exhibited sinus bradycardia  -no echocardiogram on file  -patient has not been evaluated with any Holter monitoring in the past    Plan  Telemetry reviewed and unremarkable  Echocardiogram ordered, pending   If echo within normal limits, patient can follow up with PCP or cardiology  May need holter monitoring as outpatient     Will give 500cc bolus of NS today

## 2022-05-27 NOTE — CASE MANAGEMENT
Case Management Progress Note    Patient name Brittaney Silveira  Location S /S -01 MRN [id number]  : 1930 Date 2022       LOS (days): 0  Geometric Mean LOS (GMLOS) (days):   Days to [address]:        OBJECTIVE:        Current admission status: Observation  Preferred Pharmacy[facility]   [facility] [address] [facility] [address] [facility], [address] [place] [address]  Phone: [phone] Fax: [phone]    Primary Care Provider: Edward Huddleston MD    Primary Insurance: MEDICARE  Secondary Insurance: BLUE CROSS    PROGRESS NOTE:    Confirmation received from [facility] that patient current with them for homecare; will resume services at discharge  AVS forwarded to them via [facility]  No other d/c needs identified at this time

## 2022-05-27 NOTE — DISCHARGE INSTR - AVS FIRST PAGE
Dear Shabana Gonzalez,     It was our pleasure to care for you here at [facility]  It is our hope that we were always able to exceed the expected standards for your care during your stay  You were hospitalized due to dizziness and low heart rate  You were cared for on the [facility] 3rd floor by Arpit Loera MD under the service of Duane Cabrales MD with the [facility] who covers for your primary care physician (PCP), Marixa Sheldon MD, while you were hospitalized  If you have any questions or concerns related to this hospitalization, you may contact us at [phone]  For follow up as well as any medication refills, we recommend that you follow up with your primary care physician  A registered nurse will reach out to you by phone within a few days after your discharge to answer any additional questions that you may have after going home  However, at this time we provide for you here, the most important instructions / recommendations at discharge:     Notable Medication Adjustments -   Stop taking hydrochlorothiazide (medication that was prescribed for swelling of your feet) until you see your primary care physician  Continue all other home medications as you did prior to admission  Testing Required after Discharge -   None currently  You will need outpatient heart monitoring (zio patch), your PCP will refer you to cardiology when you follow up with them  Important follow up information -   We advise you to follow up with cardiology   However, as you requested, we have not provided a cardiology referral  Please follow up with your primary care physician Dr Charles Tucker within 1 week  Other Instructions -   Make sure you drink plenty of water  Please review this entire after visit summary as additional general instructions including medication list, appointments, activity, diet, any pertinent wound care, and other additional recommendations from your care team that may be provided for you        Sincerely,     Hero Orozco MD

## 2022-05-27 NOTE — ASSESSMENT & PLAN NOTE
-macrocytic anemia, Stable  -reviewed labs, levels in the 200 range,  s/p vitamin B12 supplementation  -continue outpatient workup  -follow-up with PCP

## 2022-05-27 NOTE — ASSESSMENT & PLAN NOTE
Recent Labs     05/26/22  1602 05/27/22  0515   SODIUM 132* 133*     · Improved this morning  · Status post 1 L normal saline bolus  · Continue to hold HCTZ

## 2022-05-27 NOTE — PLAN OF CARE
Problem: MOBILITY - ADULT  Goal: Maintain or return to baseline ADL function  Description: INTERVENTIONS:  -  Assess patient's ability to carry out ADLs; assess patient's baseline for ADL function and identify physical deficits which impact ability to perform ADLs (bathing, care of mouth/teeth, toileting, grooming, dressing, etc )  - Assess/evaluate cause of self-care deficits   - Assess range of motion  - Assess patient's mobility; develop plan if impaired  - Assess patient's need for assistive devices and provide as appropriate  - Encourage maximum independence but intervene and supervise when necessary  - Involve family in performance of ADLs  - Assess for home care needs following discharge   - Consider OT consult to assist with ADL evaluation and planning for discharge  - Provide patient education as appropriate  Outcome: Progressing  Goal: Maintains/Returns to pre admission functional level  Description: INTERVENTIONS:  - Perform BMAT or MOVE assessment daily    - Set and communicate daily mobility goal to care team and patient/family/caregiver     - Collaborate with rehabilitation services on mobility goals if consulted  - Perform Range of Motion   - Dangle patient  - Stand patient  - Ambulate patient   - Out of bed to chair   - Out of bed for meals   - Out of bed for toileting  - Record patient progress and toleration of activity level   Outcome: Progressing     Problem: Potential for Falls  Goal: Patient will remain free of falls  Description: INTERVENTIONS:  - Educate patient/family on patient safety including physical limitations  - Instruct patient to call for assistance with activity   - Consult OT/PT to assist with strengthening/mobility   - Keep Call bell within reach  - Keep bed low and locked with side rails adjusted as appropriate  - Keep care items and personal belongings within reach  - Initiate and maintain comfort rounds  - Make Fall Risk Sign visible to staff  - Offer Toileting every 2 Hours, in advance of need  - Initiate/Maintain bed/chair alarm  - Obtain necessary fall risk management equipment  - Apply yellow socks and bracelet for high fall risk patients  - Consider moving patient to room near nurses station  Outcome: Progressing     Problem: Prexisting or High Potential for Compromised Skin Integrity  Goal: Skin integrity is maintained or improved  Description: INTERVENTIONS:  - Identify patients at risk for skin breakdown  - Assess and monitor skin integrity  - Assess and monitor nutrition and hydration status  - Monitor labs   - Assess for incontinence   - Turn and reposition patient  - Assist with mobility/ambulation  - Relieve pressure over bony prominences  - Avoid friction and shearing  - Provide appropriate hygiene as needed including keeping skin clean and dry  - Evaluate need for skin moisturizer/barrier cream  - Collaborate with interdisciplinary team   - Patient/family teaching  - Consider wound care consult   Outcome: Progressing

## 2022-05-29 NOTE — ED PROVIDER NOTES
History  Chief Complaint   Patient presents with    Dizziness     Pt reports hx of vertigo, currently too dizzy to walk  Reports increased bilateral ankle swelling     80 yr female- states has a hx of chronic dizziness- vertigo---  Has vn  Visit at home- pt c/o worsening dizziness  And has pulse documented at 42 by vn-- pt denies any new dizziness- no cp/sob/near syncope- no diplopia/ dysarthria /dysphagia/dysphonia/ dysmetria -  States over last month with ble edema- rle greater than -- normal amount of urine output- no melena/brbpr- no hx of vte       History provided by:  Patient   used: No        Prior to Admission Medications   Prescriptions Last Dose Informant Patient Reported? Taking?    Acetaminophen Extra Strength 500 MG tablet 2022 at Unknown time Self Yes Yes   Nutritional Supplements (Ensure High Protein) LIQD   No No   Sig: Take 1 Can by mouth 2 (two) times a day   aspirin 81 MG tablet 2022 at Unknown time Self Yes Yes   Sig: Take 81 mg by mouth   calcium carbonate-vitamin D (OSCAL-D) 500 mg-200 units per tablet 2022 at Unknown time Self No Yes   Sig: Take 1 tablet by mouth daily with breakfast   cholecalciferol (VITAMIN D3) 1,000 units tablet 2022 at Unknown time Self Yes Yes   denosumab (PROLIA) 60 mg/mL  Self Yes No   Si mL   fexofenadine (ALLEGRA) 180 MG tablet 2022 at Unknown time Self Yes Yes   Sig: Take 180 mg by mouth daily   gabapentin (NEURONTIN) 100 mg capsule 2022 at Unknown time  No Yes   Sig: TAKE 1 CAPSULE BY MOUTH THREE TIMES DAILY   hydrochlorothiazide (HYDRODIURIL) 12 5 mg tablet  Self No No   Sig: Take 1 tablet (12 5 mg total) by mouth daily as needed (leg swelling)   linaCLOtide (Linzess) 290 MCG CAPS 2022 at Unknown time Self No Yes   Sig: Take 1 capsule by mouth daily   losartan (COZAAR) 25 mg tablet 2022 at Unknown time Self No Yes   Sig: Take 1 tablet by mouth once daily   meclizine (ANTIVERT) 25 mg tablet 2022 at Unknown time Self No Yes   Sig: TAKE 1 TABLET BY MOUTH EVERY 8 HOURS   senna-docusate sodium (SENOKOT S) 8 6-50 mg per tablet 5/25/2022 at Unknown time Self Yes Yes   Sig: Take 2 tablets by mouth daily      Facility-Administered Medications: None       Past Medical History:   Diagnosis Date    Allergic rhinitis     Disease of thyroid gland     Dizziness     Hypothyroidism 10/20/2015    Malignant neoplasm of colon ([address] )     last assessed: 10/20/2015    Neuropathy     Tinnitus        Past Surgical History:   Procedure Laterality Date    ADENOIDECTOMY      APPENDECTOMY      last assessed: 10/20/2015    CATARACT EXTRACTION, BILATERAL      CHOLECYSTECTOMY      last assessed: 10/20/2015    COLECTOMY      last assessed: 10/20/2015; partial     TONSILLECTOMY      last assessed: 10/20/2015       Family History   Problem Relation Age of Onset    Heart disease Mother         cardiac disorder    Alzheimer's disease Sister     No Known Problems Father     Leukemia Brother      I have reviewed and agree with the history as documented  E-Cigarette/Vaping    E-Cigarette Use Never User      E-Cigarette/Vaping Substances    Nicotine No     THC No     CBD No     Flavoring No     Other No     Unknown No      Social History     Tobacco Use    Smoking status: Former Smoker     Packs/day: 0 25    Smokeless tobacco: Never Used    Tobacco comment: quit 50 yrs ago   Vaping Use    Vaping Use: Never used   Substance Use Topics    Alcohol use: No    Drug use: Never       Review of Systems   Constitutional: Positive for activity change  Negative for appetite change, chills, diaphoresis, fatigue, fever and unexpected weight change  HENT: Negative  Eyes: Negative  Respiratory: Negative  Cardiovascular: Positive for leg swelling  Negative for chest pain and palpitations  Gastrointestinal: Negative  Endocrine: Negative  Genitourinary: Negative  Musculoskeletal: Negative  Skin: Negative  Allergic/Immunologic: Negative  Neurological: Positive for dizziness  Hematological: Negative  Physical Exam  Physical Exam  Vitals and nursing note reviewed  Constitutional:       General: She is not in acute distress  Appearance: Normal appearance  She is not ill-appearing, toxic-appearing or diaphoretic  Comments: avss-- - pulse ox 98 % on ra- interpretation is normal- no intervention    HENT:      Head: Normocephalic and atraumatic  Nose: Nose normal       Mouth/Throat:      Mouth: Mucous membranes are moist    Eyes:      General: No scleral icterus  Right eye: No discharge  Left eye: No discharge  Extraocular Movements: Extraocular movements intact  Conjunctiva/sclera: Conjunctivae normal       Pupils: Pupils are equal, round, and reactive to light  Comments: Mm pink   Neck:      Vascular: No carotid bruit  Comments: No pmt c/t/l/s spine   Cardiovascular:      Rate and Rhythm: Normal rate and regular rhythm  Pulses: Normal pulses  Heart sounds: Normal heart sounds  No murmur heard  No friction rub  No gallop  Pulmonary:      Effort: Pulmonary effort is normal  No respiratory distress  Breath sounds: Normal breath sounds  No stridor  No wheezing, rhonchi or rales  Chest:      Chest wall: No tenderness  Abdominal:      General: Bowel sounds are normal  There is no distension  Palpations: Abdomen is soft  There is no mass  Tenderness: There is no abdominal tenderness  There is no right CVA tenderness, left CVA tenderness, guarding or rebound  Hernia: No hernia is present  Comments: Soft nt/nd- no hsm- no cva tenderness/ no ascites- no peritoneal signs - no pulsatile abd mass/bruit/ tenderness   Musculoskeletal:         General: No swelling, tenderness, deformity or signs of injury  Normal range of motion  Cervical back: Normal range of motion and neck supple  No rigidity or tenderness        Right lower leg: Edema present  Left lower leg: Edema present  Comments: Equal bilateral radial/dp pulses-  1 plus ble pretibial edema- rle greater than lle-  nt- no erythema   Lymphadenopathy:      Cervical: No cervical adenopathy  Skin:     Capillary Refill: Capillary refill takes less than 2 seconds  Coloration: Skin is pale  Skin is not jaundiced  Findings: No bruising, erythema, lesion or rash  Neurological:      General: No focal deficit present  Mental Status: She is alert and oriented to person, place, and time  Mental status is at baseline  Cranial Nerves: No cranial nerve deficit  Sensory: No sensory deficit  Motor: No weakness  Comments: Normal non focal neuro exam    Psychiatric:         Mood and Affect: Mood normal          Behavior: Behavior normal          Thought Content:  Thought content normal          Judgment: Judgment normal          Vital Signs  ED Triage Vitals   Temperature Pulse Respirations Blood Pressure SpO2   05/26/22 1415 05/26/22 1415 05/26/22 1415 05/26/22 1415 05/26/22 1415   98 2 °F (36 8 °C) 65 18 125/72 98 %      Temp Source Heart Rate Source Patient Position - Orthostatic VS BP Location FiO2 (%)   05/26/22 1415 05/27/22 0746 05/26/22 1818 05/26/22 1818 --   Oral Monitor Sitting Left arm       Pain Score       05/26/22 1415       No Pain           Vitals:    05/27/22 1500 05/27/22 1502 05/27/22 1504 05/27/22 1507   BP: 130/60 144/64 147/85    Pulse: 81 80 (!) 50    Patient Position - Orthostatic VS: Lying - Orthostatic VS Sitting - Orthostatic VS Standing - Orthostatic VS Standing for 3 minutes - Orthostatic VS         Visual Acuity  Visual Acuity    Flowsheet Row Most Recent Value   L Pupil Size (mm) 3   R Pupil Size (mm) 3          ED Medications  Medications   sodium chloride 0 9 % bolus 500 mL (500 mL Intravenous New Bag 5/27/22 1151)       Diagnostic Studies  Results Reviewed     Procedure Component Value Units Date/Time    Basic metabolic panel [[id number]]  (Abnormal) Collected: 05/26/22 1602    Lab Status: Final result Specimen: Blood from Arm, Left Updated: 05/26/22 1634     Sodium 132 mmol/L      Potassium 4 4 mmol/L      Chloride 98 mmol/L      CO2 28 mmol/L      ANION GAP 6 mmol/L      BUN 18 mg/dL      Creatinine 1 17 mg/dL      Glucose 82 mg/dL      Calcium 9 2 mg/dL      eGFR 40 ml/min/1 73sq m     Narrative:      National Kidney Disease Foundation guidelines for Chronic Kidney Disease (CKD):     Stage 1 with normal or high GFR (GFR > 90 mL/min/1 73 square meters)    Stage 2 Mild CKD (GFR = 60-89 mL/min/1 73 square meters)    Stage 3A Moderate CKD (GFR = 45-59 mL/min/1 73 square meters)    Stage 3B Moderate CKD (GFR = 30-44 mL/min/1 73 square meters)    Stage 4 Severe CKD (GFR = 15-29 mL/min/1 73 square meters)    Stage 5 End Stage CKD (GFR <15 mL/min/1 73 square meters)  Note: GFR calculation is accurate only with a steady state creatinine    CBC and differential [[id number]]  (Abnormal) Collected: 05/26/22 1602    Lab Status: Final result Specimen: Blood from Arm, Left Updated: 05/26/22 1617     WBC 4 14 Thousand/uL      RBC 2 80 Million/uL      Hemoglobin 9 6 g/dL      Hematocrit 28 8 %       fL      MCH 34 3 pg      MCHC 33 3 g/dL      RDW 11 9 %      MPV 10 5 fL      Platelets 479 Thousands/uL      nRBC 0 /100 WBCs      Neutrophils Relative 60 %      Immat GRANS % 0 %      Lymphocytes Relative 23 %      Monocytes Relative 11 %      Eosinophils Relative 5 %      Basophils Relative 1 %      Neutrophils Absolute 2 49 Thousands/µL      Immature Grans Absolute 0 01 Thousand/uL      Lymphocytes Absolute 0 94 Thousands/µL      Monocytes Absolute 0 44 Thousand/µL      Eosinophils Absolute 0 22 Thousand/µL      Basophils Absolute 0 04 Thousands/µL                  VAS lower limb venous duplex study, unilateral/limited   Final Result by Alma Newton MD (05/27 6812)                 Procedures  Procedures         ED Course  ED Course as of 05/29/22 1052   Thu May 26, 2022   1549 Er md note- 3/22 lab s from [facility] on care everywhre reviewed by er md   65 Er md note- 11/21 cta of head neck report reviewed by er md    1653 Er md note- previous labs reviewed and compared by er md    1706 Rle doppler u/s- thru tech  neg for rle prox/ calf dvt    1708 Er md note- from past chart- pulse has been anywhere from 60-70's    1713 Er md note discussed lab/ us/ ecg results with pt and disposition- given documented  pulse of 42 by vn today -p would recommend obs type admit -- pt is agreeable - [facility] tiger texted    1719 Er md medical decision making note- based on h and p- with hx of chronic  dizziness- ? Vertigo - with non focal neuro exam no cerebellar signs-  no head/neck pain or injury-will not order any neuro imaging at this point  and will admit to [facility] --as she does nto feel at her baseline                                               MDM    Disposition  Final diagnoses:   Dizziness   Bradycardia   Ambulatory dysfunction     Time reflects when diagnosis was documented in both MDM as applicable and the Disposition within this note     Time User Action Codes Description Comment    5/26/2022  5:22 PM Marivel Ellsworth Add [R42] Dizziness     5/26/2022  5:22 PM Eura Natter D Add [R00 1] Bradycardia     5/26/2022  5:22 PM Marivel Ellsworth Add [R26 2] Ambulatory dysfunction       ED Disposition     ED Disposition   Admit    Condition   Stable    Date/Time   Thu May 26, 2022  5:25 PM    Comment   Case was discussed with dr Jennifer Ramirez and the patient's admission status was agreed to be Admission Status: observation status to the service of Dr Ghada Sam              Follow-up Information     Follow up With Specialties Details Why Contact Info    Jesus Perry MD Family Medicine Schedule an appointment as soon as possible for a visit in 1 week(s)  [address] [place] [address] [address]      [facility] [place] [facility]  Home care agency will be in touch to schedule in-home visit for resumption of care  Mildred Martinez [phone]          Discharge Medication List as of 5/27/2022  3:24 PM      CONTINUE these medications which have NOT CHANGED    Details   Acetaminophen Extra Strength 500 MG tablet Starting Tue 3/15/2022, Historical Med      aspirin 81 MG tablet Take 81 mg by mouth, Historical Med      calcium carbonate-vitamin D (OSCAL-D) 500 mg-200 units per tablet Take 1 tablet by mouth daily with breakfast, Starting Fri 9/3/2021, Normal      cholecalciferol (VITAMIN D3) 1,000 units tablet Starting Tue 3/15/2022, Historical Med      fexofenadine (ALLEGRA) 180 MG tablet Take 180 mg by mouth daily, Historical Med      gabapentin (NEURONTIN) 100 mg capsule TAKE 1 CAPSULE BY MOUTH THREE TIMES DAILY, Normal      linaCLOtide (Linzess) 290 MCG CAPS Take 1 capsule by mouth daily, Starting Tue 12/21/2021, Normal      losartan (COZAAR) 25 mg tablet Take 1 tablet by mouth once daily, Normal      meclizine (ANTIVERT) 25 mg tablet TAKE 1 TABLET BY MOUTH EVERY 8 HOURS, Normal      senna-docusate sodium (SENOKOT S) 8 6-50 mg per tablet Take 2 tablets by mouth daily, Historical Med      denosumab (PROLIA) 60 mg/mL 1 mL, Historical Med      Nutritional Supplements (Ensure High Protein) LIQD Take 1 Can by mouth 2 (two) times a day, Starting Mon 5/9/2022, Normal         STOP taking these medications       hydrochlorothiazide (HYDRODIURIL) 12 5 mg tablet Comments:   Reason for Stopping:               Outpatient Discharge Orders   EXTERNAL: Ambulatory Referral to Home Health   Standing Status: Future Standing Exp   Date: 05/27/23      Discharge Diet       PDMP Review       Value Time User    PDMP Reviewed  Yes 8/30/2021  3:32 PM Luis Cui MD          ED Provider  Electronically Signed by           Yamel Christy MD  05/29/22 4238

## 2022-05-31 ENCOUNTER — TRANSITIONAL CARE MANAGEMENT (OUTPATIENT)
Dept: FAMILY MEDICINE CLINIC | Facility: CLINIC | Age: 87
End: 2022-05-31

## 2022-05-31 DIAGNOSIS — I10 ESSENTIAL HYPERTENSION: ICD-10-CM

## 2022-05-31 RX ORDER — LOSARTAN POTASSIUM 25 MG/1
TABLET ORAL
Qty: 90 TABLET | Refills: 0 | Status: SHIPPED | OUTPATIENT
Start: 2022-05-31

## 2022-06-30 ENCOUNTER — TELEPHONE (OUTPATIENT)
Dept: FAMILY MEDICINE CLINIC | Facility: CLINIC | Age: 87
End: 2022-06-30

## 2022-06-30 NOTE — TELEPHONE ENCOUNTER
Jamey Thomas, 2003 [facility] Nurse with [facility], called to report that the patient's heart rate has been lower than usual   She kept the pulse ox on her finger for 5 minutes today and her pulse ranged from 43-61bpm, which is new for her  She stated the patient told her she has been feeling draggy and less energetic  She is scheduled for a 3 month follow up on 8/9, and Jamey Thomas would like to know if she should come in sooner 
Kasie Fagan advised 
She was in the ER for gladys 1 month ago -  fixed with hydration   She needs to drink more is all     And no it was sinus gladys   So coming in early is not helpful
<<-----Click here for Discharge Medication Review

## 2022-07-06 DIAGNOSIS — R42 VERTIGO: ICD-10-CM

## 2022-07-06 RX ORDER — MECLIZINE HYDROCHLORIDE 25 MG/1
TABLET ORAL
Qty: 90 TABLET | Refills: 0 | Status: SHIPPED | OUTPATIENT
Start: 2022-07-06

## 2022-08-06 DIAGNOSIS — K52.9 CHRONIC DIARRHEA: ICD-10-CM

## 2022-08-08 RX ORDER — CLINDAMYCIN HYDROCHLORIDE 150 MG/1
150 CAPSULE ORAL 3 TIMES DAILY
COMMUNITY
Start: 2022-07-14 | End: 2022-08-10

## 2022-08-08 RX ORDER — LINACLOTIDE 290 UG/1
CAPSULE, GELATIN COATED ORAL
Qty: 90 CAPSULE | Refills: 0 | Status: SHIPPED | OUTPATIENT
Start: 2022-08-08 | End: 2022-10-25 | Stop reason: SDUPTHER

## 2022-08-10 ENCOUNTER — OFFICE VISIT (OUTPATIENT)
Dept: FAMILY MEDICINE CLINIC | Facility: CLINIC | Age: 87
End: 2022-08-10
Payer: MEDICARE

## 2022-08-10 ENCOUNTER — TELEPHONE (OUTPATIENT)
Dept: FAMILY MEDICINE CLINIC | Facility: CLINIC | Age: 87
End: 2022-08-10

## 2022-08-10 VITALS
HEART RATE: 96 BPM | WEIGHT: 135 LBS | RESPIRATION RATE: 16 BRPM | OXYGEN SATURATION: 97 % | SYSTOLIC BLOOD PRESSURE: 134 MMHG | BODY MASS INDEX: 26.5 KG/M2 | DIASTOLIC BLOOD PRESSURE: 72 MMHG | HEIGHT: 60 IN

## 2022-08-10 DIAGNOSIS — G60.9 IDIOPATHIC PERIPHERAL NEUROPATHY: ICD-10-CM

## 2022-08-10 DIAGNOSIS — R60.0 LOWER EXTREMITY EDEMA: ICD-10-CM

## 2022-08-10 DIAGNOSIS — K58.1 IRRITABLE BOWEL SYNDROME WITH CONSTIPATION: ICD-10-CM

## 2022-08-10 DIAGNOSIS — H81.10 BENIGN PAROXYSMAL POSITIONAL VERTIGO, UNSPECIFIED LATERALITY: ICD-10-CM

## 2022-08-10 DIAGNOSIS — G60.9 IDIOPATHIC PERIPHERAL NEUROPATHY: Primary | ICD-10-CM

## 2022-08-10 DIAGNOSIS — L20.9 ATOPIC DERMATITIS, UNSPECIFIED TYPE: Primary | ICD-10-CM

## 2022-08-10 PROCEDURE — 99214 OFFICE O/P EST MOD 30 MIN: CPT | Performed by: FAMILY MEDICINE

## 2022-08-10 RX ORDER — GABAPENTIN 100 MG
100 CAPSULE ORAL 3 TIMES DAILY
Qty: 90 CAPSULE | Refills: 0 | Status: SHIPPED | OUTPATIENT
Start: 2022-08-10 | End: 2022-08-11

## 2022-08-10 RX ORDER — TRIAMCINOLONE ACETONIDE 1 MG/G
CREAM TOPICAL 2 TIMES DAILY
Qty: 45 G | Refills: 1 | Status: SHIPPED | OUTPATIENT
Start: 2022-08-10

## 2022-08-10 NOTE — TELEPHONE ENCOUNTER
Patient stated she takes gabapentin for her legs  When she first started taking it, she was given Neurontin, but has not been able to get this since  She feels that Neurontin gave her better results  She would like to know if it is possible to get Neurontin instead of gabapentin

## 2022-08-10 NOTE — PROGRESS NOTES
Assessment/Plan:  No changes to meds at this time   Cont with compression stockings   see jong about iron issues   Edema can be from the gabpentin   Use the cream for the rash       1  Atopic dermatitis, unspecified type  -     triamcinolone (KENALOG) 0 1 % cream; Apply topically 2 (two) times a day    2  Benign paroxysmal positional vertigo, unspecified laterality  Comments:  meclizine    3  Idiopathic peripheral neuropathy  Comments:  lupe    4  Irritable bowel syndrome with constipation  Assessment & Plan:  Take it 630am empty stomach   Does help       5  BMI 26 0-26 9,adult    6  Lower extremity edema       Drinking boost 1 daily  And water daily       Did have iron infusion 2 months ago with jong      Subjective:      Patient ID: Marshal Harris is a 80 y o  female  HPI  Here to go over chronic issues and labs / imaging studies if applicable  End of June with gladys   Was seen in the ER for it prior also and hydration was curative     Also with form for [facility] drives 1 block or 2 at the most   And no driving at night     Did have "hive" on the left buttock and left inner thigh x 1 week   No changes in meds or exposure        The following portions of the patient's history were reviewed and updated as appropriate: allergies, current medications, past family history, past medical history, past social history, past surgical history and problem list     Review of Systems   Constitutional: Positive for activity change, appetite change and fatigue  Negative for fever and unexpected weight change  HENT: Negative for nosebleeds and trouble swallowing  Eyes: Negative for visual disturbance  Respiratory: Negative for chest tightness and shortness of breath  Cardiovascular: Negative for chest pain, palpitations and leg swelling  Gastrointestinal: Negative for abdominal pain, constipation, diarrhea and nausea  Endocrine: Negative for cold intolerance  Genitourinary: Negative for dysuria and urgency  Musculoskeletal: Positive for back pain and gait problem  Negative for joint swelling and myalgias  Skin: Negative for rash  Neurological: Positive for weakness, light-headedness and numbness  Negative for tremors, seizures and syncope  Hematological: Does not bruise/bleed easily  Psychiatric/Behavioral: Negative for hallucinations and suicidal ideas  Objective:      /72 (BP Location: Left arm, Patient Position: Sitting, Cuff Size: Standard)   Pulse 96   Resp 16   Ht 5' (1 524 m)   Wt 61 2 kg (135 lb)   SpO2 97%   BMI 26 37 kg/m²     No visits with results within 2 Week(s) from this visit     Latest known visit with results is:   Admission on 05/26/2022, Discharged on 05/27/2022   Component Date Value    Ventricular Rate 05/26/2022 61     Atrial Rate 05/26/2022 63     WV Interval 05/26/2022 184     QRSD Interval 05/26/2022 88     QT Interval 05/26/2022 410     QTC Interval 05/26/2022 413     P Axis 05/26/2022 72     QRS Axis 05/26/2022 -25     T Wave Axis 05/26/2022 57     WBC 05/26/2022 4 14 (A)    RBC 05/26/2022 2 80 (A)    Hemoglobin 05/26/2022 9 6 (A)    Hematocrit 05/26/2022 28 8 (A)    MCV 05/26/2022 103 (A)    MCH 05/26/2022 34 3     MCHC 05/26/2022 33 3     RDW 05/26/2022 11 9     MPV 05/26/2022 10 5     Platelets 84/93/0540 206     nRBC 05/26/2022 0     Neutrophils Relative 05/26/2022 60     Immat GRANS % 05/26/2022 0     Lymphocytes Relative 05/26/2022 23     Monocytes Relative 05/26/2022 11     Eosinophils Relative 05/26/2022 5     Basophils Relative 05/26/2022 1     Neutrophils Absolute 05/26/2022 2 49     Immature Grans Absolute 05/26/2022 0 01     Lymphocytes Absolute 05/26/2022 0 94     Monocytes Absolute 05/26/2022 0 44     Eosinophils Absolute 05/26/2022 0 22     Basophils Absolute 05/26/2022 0 04     Sodium 05/26/2022 132 (A)    Potassium 05/26/2022 4 4     Chloride 05/26/2022 98     CO2 05/26/2022 28     ANION GAP 05/26/2022 6     BUN 05/26/2022 18     Creatinine 05/26/2022 1 17     Glucose 05/26/2022 82     Calcium 05/26/2022 9 2     eGFR 05/26/2022 40     A4C EF 05/27/2022 58     LVIDd 05/27/2022 4 30     LVIDS 05/27/2022 2 70     IVSd 05/27/2022 1 00     LVPWd 05/27/2022 1 00     FS 05/27/2022 37     MV E' Tissue Velocity Se* 05/27/2022 5     E wave deceleration time 05/27/2022 197     MV Peak E Gregg 05/27/2022 66     MV Peak A Gregg 05/27/2022 0 89     AV LVOT peak gradient 05/27/2022 2     RVID d 05/27/2022 3 7     RVOT PMAX 05/27/2022 2     LA size 05/27/2022 4 4     SIRISHA A4C 05/27/2022 21 7     RAA A4C 05/27/2022 14 6     AV peak gradient 05/27/2022 5     AV peak gradient 05/27/2022 56     AV Deceleration Time 05/27/2022 1,702     AV regurgitation pressur* 05/27/2022 494     MV stenosis pressure 1/2* 05/27/2022 57     MV valve area p 1/2 meth* 05/27/2022 3 86     TR Peak Gregg 05/27/2022 2 9     Triscuspid Valve Regurgi* 05/27/2022 35 0     PV peak gradient antegra* 05/27/2022 3     RVOT peak gregg 05/27/2022 0 69     Ao root 05/27/2022 3 60     Asc Ao 05/27/2022 3 8     Tricuspid valve peak reg* 05/27/2022 2 94     Left ventricular stroke * 05/27/2022 53 00     IVS 05/27/2022 1     LEFT VENTRICLE SYSTOLIC * 87/46/4278 28     LV DIASTOLIC VOLUME (MOD* 74/77/1216 81     LVSV, 2D 05/27/2022 53     LV EF 05/27/2022 55     Sodium 05/27/2022 133 (A)    Potassium 05/27/2022 4 5     Chloride 05/27/2022 101     CO2 05/27/2022 25     ANION GAP 05/27/2022 7     BUN 05/27/2022 21     Creatinine 05/27/2022 1 17     Glucose 05/27/2022 74     Calcium 05/27/2022 8 7     eGFR 05/27/2022 40     WBC 05/27/2022 4 36     RBC 05/27/2022 2 71 (A)    Hemoglobin 05/27/2022 9 2 (A)    Hematocrit 05/27/2022 27 8 (A)    MCV 05/27/2022 103 (A)    MCH 05/27/2022 33 9     MCHC 05/27/2022 33 1     RDW 05/27/2022 11 8     MPV 05/27/2022 10 3     Platelets 71/53/7320 213     nRBC 05/27/2022 0     Neutrophils Relative 05/27/2022 56     Immat GRANS % 05/27/2022 1     Lymphocytes Relative 05/27/2022 25     Monocytes Relative 05/27/2022 11     Eosinophils Relative 05/27/2022 6     Basophils Relative 05/27/2022 1     Neutrophils Absolute 05/27/2022 2 48     Immature Grans Absolute 05/27/2022 0 03     Lymphocytes Absolute 05/27/2022 1 07     Monocytes Absolute 05/27/2022 0 46     Eosinophils Absolute 05/27/2022 0 27     Basophils Absolute 05/27/2022 0 05     TSH 3RD GENERATON 05/27/2022 2 698           Physical Exam  Vitals and nursing note reviewed  Constitutional:       Appearance: She is well-developed  Comments: cedric   HENT:      Head: Normocephalic and atraumatic  Cardiovascular:      Rate and Rhythm: Normal rate and regular rhythm  Heart sounds: Normal heart sounds  No murmur heard  Pulmonary:      Effort: Pulmonary effort is normal       Breath sounds: Normal breath sounds  No wheezing or rales  Abdominal:      General: Bowel sounds are normal  There is no distension  Palpations: Abdomen is soft  Tenderness: There is no abdominal tenderness  Musculoskeletal:         General: No tenderness  Normal range of motion  Cervical back: Normal range of motion and neck supple  Right lower leg: Edema (+2) present  Left lower leg: Edema (+1) present  Lymphadenopathy:      Cervical: No cervical adenopathy  Skin:     General: Skin is warm and dry  Capillary Refill: Capillary refill takes less than 2 seconds  Findings: No rash  Neurological:      Mental Status: She is alert and oriented to person, place, and time  Cranial Nerves: No cranial nerve deficit  Sensory: No sensory deficit  Motor: No abnormal muscle tone  Psychiatric:         Behavior: Behavior normal          Thought Content: Thought content normal          Judgment: Judgment normal          Does have compression stockings      BMI Counseling: Body mass index is 26 37 kg/m²   The BMI is above normal  Nutrition recommendations include decreasing portion sizes, encouraging healthy choices of fruits and vegetables, decreasing fast food intake, consuming healthier snacks and limiting drinks that contain sugar  Exercise recommendations include exercising 3-5 times per week  No pharmacotherapy was ordered  Rationale for BMI follow-up plan is due to patient being overweight or obese  Falls Plan of Care: balance, strength, and gait training instructions were provided  Medications that increase falls were reviewed           Cris Veloz MD  [place] [id number]

## 2022-08-11 ENCOUNTER — TELEPHONE (OUTPATIENT)
Dept: FAMILY MEDICINE CLINIC | Facility: CLINIC | Age: 87
End: 2022-08-11

## 2022-08-11 DIAGNOSIS — G60.9 IDIOPATHIC PERIPHERAL NEUROPATHY: ICD-10-CM

## 2022-08-11 RX ORDER — GABAPENTIN 100 MG/1
100 CAPSULE ORAL 3 TIMES DAILY
Qty: 90 CAPSULE | Refills: 0 | Status: SHIPPED | OUTPATIENT
Start: 2022-08-11 | End: 2022-10-03

## 2022-08-29 ENCOUNTER — OFFICE VISIT (OUTPATIENT)
Dept: GASTROENTEROLOGY | Facility: CLINIC | Age: 87
End: 2022-08-29
Payer: MEDICARE

## 2022-08-29 VITALS
WEIGHT: 135 LBS | SYSTOLIC BLOOD PRESSURE: 133 MMHG | DIASTOLIC BLOOD PRESSURE: 66 MMHG | HEART RATE: 72 BPM | BODY MASS INDEX: 26.5 KG/M2 | HEIGHT: 60 IN

## 2022-08-29 DIAGNOSIS — R14.0 BLOATING: ICD-10-CM

## 2022-08-29 DIAGNOSIS — K59.01 SLOW TRANSIT CONSTIPATION: ICD-10-CM

## 2022-08-29 DIAGNOSIS — D50.0 IRON DEFICIENCY ANEMIA DUE TO CHRONIC BLOOD LOSS: Primary | ICD-10-CM

## 2022-08-29 PROCEDURE — 99214 OFFICE O/P EST MOD 30 MIN: CPT | Performed by: INTERNAL MEDICINE

## 2022-08-29 NOTE — PROGRESS NOTES
[facility] - Outpatient Follow-up Note  Christie Reynolds 80 y o  female MRN: [id number]  Encounter: [id number]          ASSESSMENT AND PLAN:      1  Iron deficiency anemia due to chronic blood loss  [de-identified] year female now come for follow-up, because of drop in hemoglobin and hematocrit, she is following with Dr Holland Cooks for iron infusion, she denies any melena or rectal bleeding, she had a colonoscopy few years ago, history of colon cancer, status post surgery long time ago, currently complained of change in bowel habit with worsening of constipation, she is taking Linzess along with the 3 send every day  Will schedule for stool for occult blood and then decide about scheduling for screening endoscopic workup  - Occult Blood, Fecal Immunochemical; Future    2  Slow transit constipation  She is complaining worsening of bloating and abdominal discomfort with irregular bowel movement, advised to continue with Linzess and senna  - XR abdomen obstruction series; Future    3  Bloating    - XR abdomen obstruction series; Future    ______________________________________________________________________    SUBJECTIVE:  Patient seen and examined, she come for follow-up  She is complaining worsening of constipation, bloating and drop in hemoglobin and hematocrit, she is following with him at oncologist for iron infusion, she denies any melena or rectal bleeding, she is taking Linzess 290 mcg p o  q day and senna 3 tablets p o  q day  she has a multiple question about her antihypertensive medication and also about Neurontin  I advised her to talk to PCP about Cozaar and Neurontin  She denies any heartburn, no reflux symptoms , no chronic NSAID use      REVIEW OF SYSTEMS IS OTHERWISE NEGATIVE        Historical Information   Past Medical History:   Diagnosis Date    Allergic rhinitis     Disease of thyroid gland     Dizziness     Hypothyroidism 10/20/2015    Malignant neoplasm of colon ([facility] [address] ) last assessed: 10/20/2015    Neuropathy     Tinnitus      Past Surgical History:   Procedure Laterality Date    ADENOIDECTOMY      APPENDECTOMY      last assessed: 10/20/2015    CATARACT EXTRACTION, BILATERAL      CHOLECYSTECTOMY      last assessed: 10/20/2015    COLECTOMY      last assessed: 10/20/2015; partial     TONSILLECTOMY      last assessed: 10/20/2015     Social History   Social History     Substance and Sexual Activity   Alcohol Use No     Social History     Substance and Sexual Activity   Drug Use Never     Social History     Tobacco Use   Smoking Status Former Smoker    Packs/day: 0 25   Smokeless Tobacco Never Used   Tobacco Comment    quit 50 yrs ago     Family History   Problem Relation Age of Onset    Heart disease Mother         cardiac disorder    Alzheimer's disease Sister     No Known Problems Father     Leukemia Brother        Meds/Allergies       Current Outpatient Medications:     Acetaminophen Extra Strength 500 MG tablet    aspirin 81 MG tablet    calcium carbonate-vitamin D (OSCAL-D) 500 mg-200 units per tablet    cholecalciferol (VITAMIN D3) 1,000 units tablet    denosumab (PROLIA) 60 mg/mL    fexofenadine (ALLEGRA) 180 MG tablet    gabapentin (NEURONTIN) 100 mg capsule    Linzess 290 MCG CAPS    losartan (COZAAR) 25 mg tablet    meclizine (ANTIVERT) 25 mg tablet    Nutritional Supplements (Ensure High Protein) LIQD    senna-docusate sodium (SENOKOT S) 8 6-50 mg per tablet    triamcinolone (KENALOG) 0 1 % cream    Allergies   Allergen Reactions    Penicillins Hives     Other reaction(s): Other (See Comments)  hives           Objective     Blood pressure 133/66, pulse 72, height 5' (1 524 m), weight 61 2 kg (135 lb)  Body mass index is 26 37 kg/m²        PHYSICAL EXAM:      General Appearance:   Alert, cooperative, no distress   HEENT:   Normocephalic, atraumatic, anicteric      Neck:  Supple, symmetrical, trachea midline   Lungs:   Clear to auscultation bilaterally; no rales, rhonchi or wheezing; respirations unlabored    Heart[de-identified]   Regular rate and rhythm; no murmur, rub, or gallop  Abdomen:   Soft, non-tender, non-distended; normal bowel sounds; no masses, no organomegaly    Genitalia:   Deferred    Rectal:   Deferred    Extremities:  No cyanosis, clubbing or edema    Pulses:  2+ and symmetric    Skin:  No jaundice, rashes, or lesions    Lymph nodes:  No palpable cervical lymphadenopathy        Lab Results:   No visits with results within 1 Day(s) from this visit     Latest known visit with results is:   Admission on 05/26/2022, Discharged on 05/27/2022   Component Date Value    Ventricular Rate 05/26/2022 61     Atrial Rate 05/26/2022 63     CT Interval 05/26/2022 184     QRSD Interval 05/26/2022 88     QT Interval 05/26/2022 410     QTC Interval 05/26/2022 413     P Axis 05/26/2022 72     QRS Axis 05/26/2022 -25     T Wave Axis 05/26/2022 57     WBC 05/26/2022 4 14 (A)    RBC 05/26/2022 2 80 (A)    Hemoglobin 05/26/2022 9 6 (A)    Hematocrit 05/26/2022 28 8 (A)    MCV 05/26/2022 103 (A)    MCH 05/26/2022 34 3     MCHC 05/26/2022 33 3     RDW 05/26/2022 11 9     MPV 05/26/2022 10 5     Platelets 31/08/1101 206     nRBC 05/26/2022 0     Neutrophils Relative 05/26/2022 60     Immat GRANS % 05/26/2022 0     Lymphocytes Relative 05/26/2022 23     Monocytes Relative 05/26/2022 11     Eosinophils Relative 05/26/2022 5     Basophils Relative 05/26/2022 1     Neutrophils Absolute 05/26/2022 2 49     Immature Grans Absolute 05/26/2022 0 01     Lymphocytes Absolute 05/26/2022 0 94     Monocytes Absolute 05/26/2022 0 44     Eosinophils Absolute 05/26/2022 0 22     Basophils Absolute 05/26/2022 0 04     Sodium 05/26/2022 132 (A)    Potassium 05/26/2022 4 4     Chloride 05/26/2022 98     CO2 05/26/2022 28     ANION GAP 05/26/2022 6     BUN 05/26/2022 18     Creatinine 05/26/2022 1 17     Glucose 05/26/2022 82     Calcium 05/26/2022 9 2     eGFR 05/26/2022 40     A4C EF 05/27/2022 58     LVIDd 05/27/2022 4 30     LVIDS 05/27/2022 2 70     IVSd 05/27/2022 1 00     LVPWd 05/27/2022 1 00     FS 05/27/2022 37     MV E' Tissue Velocity Se* 05/27/2022 5     E wave deceleration time 05/27/2022 197     MV Peak E Gregg 05/27/2022 66     MV Peak A Gregg 05/27/2022 0 89     AV LVOT peak gradient 05/27/2022 2     RVID d 05/27/2022 3 7     RVOT PMAX 05/27/2022 2     LA size 05/27/2022 4 4     SIRISHA A4C 05/27/2022 21 7     RAA A4C 05/27/2022 14 6     AV peak gradient 05/27/2022 5     AV peak gradient 05/27/2022 56     AV Deceleration Time 05/27/2022 1,702     AV regurgitation pressur* 05/27/2022 494     MV stenosis pressure 1/2* 05/27/2022 57     MV valve area p 1/2 meth* 05/27/2022 3 86     TR Peak Gregg 05/27/2022 2 9     Triscuspid Valve Regurgi* 05/27/2022 35 0     PV peak gradient antegra* 05/27/2022 3     RVOT peak gregg 05/27/2022 0 69     Ao root 05/27/2022 3 60     Asc Ao 05/27/2022 3 8     Tricuspid valve peak reg* 05/27/2022 2 94     Left ventricular stroke * 05/27/2022 53 00     IVS 05/27/2022 1     LEFT VENTRICLE SYSTOLIC * 49/45/7571 28     LV DIASTOLIC VOLUME (MOD* 59/29/0519 81     LVSV, 2D 05/27/2022 53     LV EF 05/27/2022 55     Sodium 05/27/2022 133 (A)    Potassium 05/27/2022 4 5     Chloride 05/27/2022 101     CO2 05/27/2022 25     ANION GAP 05/27/2022 7     BUN 05/27/2022 21     Creatinine 05/27/2022 1 17     Glucose 05/27/2022 74     Calcium 05/27/2022 8 7     eGFR 05/27/2022 40     WBC 05/27/2022 4 36     RBC 05/27/2022 2 71 (A)    Hemoglobin 05/27/2022 9 2 (A)    Hematocrit 05/27/2022 27 8 (A)    MCV 05/27/2022 103 (A)    MCH 05/27/2022 33 9     MCHC 05/27/2022 33 1     RDW 05/27/2022 11 8     MPV 05/27/2022 10 3     Platelets 26/70/1732 213     nRBC 05/27/2022 0     Neutrophils Relative 05/27/2022 56     Immat GRANS % 05/27/2022 1     Lymphocytes Relative 05/27/2022 25     Monocytes Relative 05/27/2022 11     Eosinophils Relative 05/27/2022 6     Basophils Relative 05/27/2022 1     Neutrophils Absolute 05/27/2022 2 48     Immature Grans Absolute 05/27/2022 0 03     Lymphocytes Absolute 05/27/2022 1 07     Monocytes Absolute 05/27/2022 0 46     Eosinophils Absolute 05/27/2022 0 27     Basophils Absolute 05/27/2022 0 05     TSH 3RD GENERATON 05/27/2022 2 698          Radiology Results:   No results found

## 2022-09-02 ENCOUNTER — OFFICE VISIT (OUTPATIENT)
Dept: OBGYN CLINIC | Facility: MEDICAL CENTER | Age: 87
End: 2022-09-02
Payer: MEDICARE

## 2022-09-02 VITALS
HEART RATE: 43 BPM | WEIGHT: 133 LBS | SYSTOLIC BLOOD PRESSURE: 148 MMHG | HEIGHT: 60 IN | BODY MASS INDEX: 26.11 KG/M2 | DIASTOLIC BLOOD PRESSURE: 61 MMHG

## 2022-09-02 DIAGNOSIS — G89.29 CHRONIC LEFT HIP PAIN: ICD-10-CM

## 2022-09-02 DIAGNOSIS — M16.12 PRIMARY OSTEOARTHRITIS OF LEFT HIP: Primary | ICD-10-CM

## 2022-09-02 DIAGNOSIS — M25.552 CHRONIC LEFT HIP PAIN: ICD-10-CM

## 2022-09-02 PROCEDURE — 99213 OFFICE O/P EST LOW 20 MIN: CPT | Performed by: ORTHOPAEDIC SURGERY

## 2022-09-02 NOTE — PROGRESS NOTES
Assessment:   Diagnosis ICD-10-CM Associated Orders   1  Primary osteoarthritis of left hip  M16 12 FL injection left hip (non arthrogram)   2  Chronic left hip pain  M25 552 FL injection left hip (non arthrogram)    G89 29        Plan:  Diagnosis, treatment options and associated risks were discussed with the patient including no treatment, nonsurgical treatment and potential for surgical intervention  The patient was given the opportunity to ask questions regarding each  It was explained to the patient that based upon her current medial concerns, at this point in time, this is not a surgical problem  Treatment for the above diagnoses and associated symptoms of this nature is generally conservative including a physician directed physical therapy program, improved fitness/weight loss, anti-inflammatories, and potentially various injections  Recommend possibly undergoing a left hip intra-articular injection of cortisone performed by Radiology under fluoroscopy  She will go home and think about this and call and schedule if she wishes to pursue a left hip injection  Weightbearing activities as tolerated  To do next visit:  Return for re-check on an as needed basis (PRN)  The above stated was discussed in layman's terms and the patient expressed understanding  All questions were answered to the patient's satisfaction  Scribe Attestation    I,:  Marquis Rose am acting as a scribe while in the presence of the attending physician :       I,:  Ambrose Gupta MD personally performed the services described in this documentation    as scribed in my presence :             Subjective: Donavan Moe is a 80 y o  female who presents today for repeat evaluation of her left hip due to return of pain  She has had 2 left hip trochanteric bursal injections without any relief  Her last being April of this year  She has terrible groin and thigh pain    She has difficulty turning in bed, and at times is severe and unrelenting  She uses a single-point cane for ambulatory assistance        Review of systems negative unless otherwise specified in HPI  Review of Systems    Past Medical History:   Diagnosis Date    Allergic rhinitis     Disease of thyroid gland     Dizziness     Hypothyroidism 10/20/2015    Malignant neoplasm of colon ([address] )     last assessed: 10/20/2015    Neuropathy     Tinnitus        Past Surgical History:   Procedure Laterality Date    ADENOIDECTOMY      APPENDECTOMY      last assessed: 10/20/2015    CATARACT EXTRACTION, BILATERAL      CHOLECYSTECTOMY      last assessed: 10/20/2015    COLECTOMY      last assessed: 10/20/2015; partial     TONSILLECTOMY      last assessed: 10/20/2015       Family History   Problem Relation Age of Onset    Heart disease Mother         cardiac disorder    Alzheimer's disease Sister     No Known Problems Father     Leukemia Brother        Social History     Occupational History    Occupation: Retired - [employer]    Tobacco Use    Smoking status: Former Smoker     Packs/day: 0 25    Smokeless tobacco: Never Used    Tobacco comment: quit 50 yrs ago   Vaping Use    Vaping Use: Never used   Substance and Sexual Activity    Alcohol use: No    Drug use: Never    Sexual activity: Not Currently         Current Outpatient Medications:     Acetaminophen Extra Strength 500 MG tablet, , Disp: , Rfl:     aspirin 81 MG tablet, Take 81 mg by mouth, Disp: , Rfl:     calcium carbonate-vitamin D (OSCAL-D) 500 mg-200 units per tablet, Take 1 tablet by mouth daily with breakfast, Disp: 30 tablet, Rfl: 0    cholecalciferol (VITAMIN D3) 1,000 units tablet, , Disp: , Rfl:     denosumab (PROLIA) 60 mg/mL, 1 mL, Disp: , Rfl:     fexofenadine (ALLEGRA) 180 MG tablet, Take 180 mg by mouth daily, Disp: , Rfl:     gabapentin (NEURONTIN) 100 mg capsule, Take 1 capsule (100 mg total) by mouth 3 (three) times a day, Disp: 90 capsule, Rfl: 0    Linzess 290 MCG CAPS, Take 1 capsule by mouth once daily, Disp: 90 capsule, Rfl: 0    losartan (COZAAR) 25 mg tablet, Take 1 tablet by mouth once daily, Disp: 90 tablet, Rfl: 0    meclizine (ANTIVERT) 25 mg tablet, TAKE 1 TABLET BY MOUTH EVERY 8 HOURS, Disp: 90 tablet, Rfl: 0    Nutritional Supplements (Ensure High Protein) LIQD, Take 1 Can by mouth 2 (two) times a day, Disp: 237 mL, Rfl: 3    senna-docusate sodium (SENOKOT S) 8 6-50 mg per tablet, Take 2 tablets by mouth daily, Disp: , Rfl:     triamcinolone (KENALOG) 0 1 % cream, Apply topically 2 (two) times a day, Disp: 45 g, Rfl: 1    Allergies   Allergen Reactions    Penicillins Hives     Other reaction(s): Other (See Comments)  hives            Vitals:    09/02/22 1352   BP: 148/61   Pulse: (!) 43       Objective:                    Left Hip Exam     Tenderness   The patient is experiencing tenderness in the anterior  Range of Motion   Flexion: 80 (Leg goes into external rotation)   External rotation: 10   Internal rotation: 0 (With groin pain)     Muscle Strength   Left hip normal muscle strength: Pain with resistance  Abduction: 4/5   Adduction: 4/5   Flexion: 4/5     Other   Sensation: normal    Comments:    Slight leg length inequality with left being shorter than right            Diagnostics, reviewed and taken today if performed as documented:    None performed          Procedures, if performed today:    Procedures    None performed      Portions of the record may have been created with voice recognition software  Occasional wrong word or "sound a like" substitutions may have occurred due to the inherent limitations of voice recognition software  Read the chart carefully and recognize, using context, where substitutions have occurred

## 2022-09-10 ENCOUNTER — APPOINTMENT (OUTPATIENT)
Dept: RADIOLOGY | Age: 87
End: 2022-09-10
Payer: MEDICARE

## 2022-09-10 DIAGNOSIS — R14.0 BLOATING: ICD-10-CM

## 2022-09-10 DIAGNOSIS — K59.01 SLOW TRANSIT CONSTIPATION: ICD-10-CM

## 2022-09-10 PROCEDURE — 74022 RADEX COMPL AQT ABD SERIES: CPT

## 2022-09-12 ENCOUNTER — TELEPHONE (OUTPATIENT)
Dept: GASTROENTEROLOGY | Facility: CLINIC | Age: 87
End: 2022-09-12

## 2022-09-12 NOTE — TELEPHONE ENCOUNTER
Patients GI provider:  Dr La Alcantar    Number to return call: [phone]    Reason for call: Pt calling to get her results for her xray, and her stool sample    Scheduled procedure/appointment date if applicable: N/A

## 2022-09-13 ENCOUNTER — OFFICE VISIT (OUTPATIENT)
Dept: GASTROENTEROLOGY | Facility: CLINIC | Age: 87
End: 2022-09-13
Payer: MEDICARE

## 2022-09-13 VITALS
DIASTOLIC BLOOD PRESSURE: 69 MMHG | WEIGHT: 136 LBS | SYSTOLIC BLOOD PRESSURE: 160 MMHG | HEART RATE: 75 BPM | BODY MASS INDEX: 26.7 KG/M2 | HEIGHT: 60 IN

## 2022-09-13 DIAGNOSIS — Z86.010 HX OF COLONIC POLYPS: ICD-10-CM

## 2022-09-13 DIAGNOSIS — K57.90 DIVERTICULOSIS: ICD-10-CM

## 2022-09-13 DIAGNOSIS — Z12.11 COLON CANCER SCREENING: ICD-10-CM

## 2022-09-13 DIAGNOSIS — K59.00 CONSTIPATION, UNSPECIFIED CONSTIPATION TYPE: Primary | ICD-10-CM

## 2022-09-13 DIAGNOSIS — K56.7 ILEUS (HCC): ICD-10-CM

## 2022-09-13 PROBLEM — Z86.0100 HX OF COLONIC POLYPS: Status: ACTIVE | Noted: 2022-09-13

## 2022-09-13 PROCEDURE — 99214 OFFICE O/P EST MOD 30 MIN: CPT | Performed by: NURSE PRACTITIONER

## 2022-09-13 NOTE — PATIENT INSTRUCTIONS
Senna increase to 3 tablets daily at bedtime  Continue enemas daily  Drink plenty of fluid  High-fiber diet

## 2022-09-13 NOTE — PROGRESS NOTES
[facility] - Outpatient Follow-up Note  Qian Sam 80 y o  female MRN: [id number]  Encounter: [id number]          ASSESSMENT AND PLAN:      1  Constipation, unspecified constipation type  2  [facility])  Patient has history of constipation  Patient has been taking fleets enemas, senna, and Linzess over the last 2 days  Patient reported having 2 bowel movements yesterday evening  Patient apprehensive about increasing her senna daily at night   -Reinforce to patient to take senna 3 tablets daily at bedtime for 1 week then may decrease to 2 tablets daily at bedtime  Continue Linzess 290 mcg daily  Continue fleets enemas daily  - XR abdomen obstruction series; repeat x-ray in 10-14 days  -Continue high-fiber diet  -Drink plenty of fluids    3  Hx of colonic polyps  Patient has history of colon polyps  One small colon polyp seen on colonoscopy done 07/16/2020  Biopsy positive for tubular adenoma  4  Colon cancer screening  Colon cancer screening up-to-date  Would not recommend any further colon cancer screening due to age  5  Diverticulosis  Colonoscopy done 07/16/2020 showed left-sided diverticulosis  Patient currently denies any lower abdominal pain   -Continue high-fiber diet    Follow-up with Dr Mekhi Tsang 3 months    ______________________________________________________________________    SUBJECTIVE:  This is a 25-year-old female who presents to office for follow-up after abdominal obstructive x-ray  Patient had x-ray of abdomen ordered on previous visit due to constipation  Abdomen x-ray showed variable distention of small and large bowel loops, mostly large bowel, suggesting ileus  Mild fecal retention in descending colon  Patient was instructed by office at that time to take senna 2 tablets nightly continue Linzess and Fleet enema daily  Patient denies acid reflux, heartburn, nausea, vomiting, epigastric or abdominal pain   Patient denies blood in stool, blood from rectal area, or black tarry stool  Patient had 2 bowel movements last evening  Patient denies abdominal bloating abdominal bloating  Abdomen is distended  Abdomen exam benign abdomen soft, no tenderness or guarding  Patient is a former smoker  No family history of gastric or colon cancer  REVIEW OF SYSTEMS IS OTHERWISE NEGATIVE        Historical Information   Past Medical History:   Diagnosis Date    Allergic rhinitis     Disease of thyroid gland     Dizziness     Hypothyroidism 10/20/2015    Malignant neoplasm of colon ([address] )     last assessed: 10/20/2015    Neuropathy     Tinnitus      Past Surgical History:   Procedure Laterality Date    ADENOIDECTOMY      APPENDECTOMY      last assessed: 10/20/2015    CATARACT EXTRACTION, BILATERAL      CHOLECYSTECTOMY      last assessed: 10/20/2015    COLECTOMY      last assessed: 10/20/2015; partial     TONSILLECTOMY      last assessed: 10/20/2015     Social History   Social History     Substance and Sexual Activity   Alcohol Use No     Social History     Substance and Sexual Activity   Drug Use Never     Social History     Tobacco Use   Smoking Status Former Smoker    Packs/day: 0 25   Smokeless Tobacco Never Used   Tobacco Comment    quit 50 yrs ago     Family History   Problem Relation Age of Onset    Heart disease Mother         cardiac disorder    Alzheimer's disease Sister     No Known Problems Father     Leukemia Brother        Meds/Allergies       Current Outpatient Medications:     Acetaminophen Extra Strength 500 MG tablet    aspirin 81 MG tablet    calcium carbonate-vitamin D (OSCAL-D) 500 mg-200 units per tablet    cholecalciferol (VITAMIN D3) 1,000 units tablet    denosumab (PROLIA) 60 mg/mL    fexofenadine (ALLEGRA) 180 MG tablet    gabapentin (NEURONTIN) 100 mg capsule    Linzess 290 MCG CAPS    losartan (COZAAR) 25 mg tablet    meclizine (ANTIVERT) 25 mg tablet    Nutritional Supplements (Ensure High Protein) LIQD   senna-docusate sodium (SENOKOT S) 8 6-50 mg per tablet    triamcinolone (KENALOG) 0 1 % cream    Allergies   Allergen Reactions    Penicillins Hives     Other reaction(s): Other (See Comments)  hives           Objective     Blood pressure 160/69, pulse 75, height 5' (1 524 m), weight 61 7 kg (136 lb)  Body mass index is 26 56 kg/m²  PHYSICAL EXAM:      General Appearance:   Alert, cooperative, no distress   HEENT:   Normocephalic, atraumatic, anicteric      Neck:  Supple, symmetrical, trachea midline   Lungs:   Clear to auscultation bilaterally; no rales, rhonchi or wheezing; respirations unlabored    Heart[de-identified]   Regular rate and rhythm; no murmur, rub, or gallop  Abdomen:   Soft, non-tender, non-distended; normal bowel sounds; no masses, no organomegaly    Genitalia:   Deferred    Rectal:   Deferred    Extremities:  No cyanosis, clubbing or edema    Pulses:  2+ and symmetric    Skin:  No jaundice, rashes, or lesions    Lymph nodes:  No palpable cervical lymphadenopathy        Lab Results:   No visits with results within 1 Day(s) from this visit     Latest known visit with results is:   Admission on 05/26/2022, Discharged on 05/27/2022   Component Date Value    Ventricular Rate 05/26/2022 61     Atrial Rate 05/26/2022 63     AL Interval 05/26/2022 184     QRSD Interval 05/26/2022 88     QT Interval 05/26/2022 410     QTC Interval 05/26/2022 413     P Axis 05/26/2022 72     QRS Axis 05/26/2022 -25     T Wave Axis 05/26/2022 57     WBC 05/26/2022 4 14 (A)    RBC 05/26/2022 2 80 (A)    Hemoglobin 05/26/2022 9 6 (A)    Hematocrit 05/26/2022 28 8 (A)    MCV 05/26/2022 103 (A)    MCH 05/26/2022 34 3     MCHC 05/26/2022 33 3     RDW 05/26/2022 11 9     MPV 05/26/2022 10 5     Platelets 95/00/0560 206     nRBC 05/26/2022 0     Neutrophils Relative 05/26/2022 60     Immat GRANS % 05/26/2022 0     Lymphocytes Relative 05/26/2022 23     Monocytes Relative 05/26/2022 11     Eosinophils Relative 05/26/2022 5     Basophils Relative 05/26/2022 1     Neutrophils Absolute 05/26/2022 2 49     Immature Grans Absolute 05/26/2022 0 01     Lymphocytes Absolute 05/26/2022 0 94     Monocytes Absolute 05/26/2022 0 44     Eosinophils Absolute 05/26/2022 0 22     Basophils Absolute 05/26/2022 0 04     Sodium 05/26/2022 132 (A)    Potassium 05/26/2022 4 4     Chloride 05/26/2022 98     CO2 05/26/2022 28     ANION GAP 05/26/2022 6     BUN 05/26/2022 18     Creatinine 05/26/2022 1 17     Glucose 05/26/2022 82     Calcium 05/26/2022 9 2     eGFR 05/26/2022 40     A4C EF 05/27/2022 58     LVIDd 05/27/2022 4 30     LVIDS 05/27/2022 2 70     IVSd 05/27/2022 1 00     LVPWd 05/27/2022 1 00     FS 05/27/2022 37     MV E' Tissue Velocity Se* 05/27/2022 5     E wave deceleration time 05/27/2022 197     MV Peak E Gregg 05/27/2022 66     MV Peak A Gregg 05/27/2022 0 89     AV LVOT peak gradient 05/27/2022 2     RVID d 05/27/2022 3 7     RVOT PMAX 05/27/2022 2     LA size 05/27/2022 4 4     SIRISHA A4C 05/27/2022 21 7     RAA A4C 05/27/2022 14 6     AV peak gradient 05/27/2022 5     AV peak gradient 05/27/2022 56     AV Deceleration Time 05/27/2022 1,702     AV regurgitation pressur* 05/27/2022 494     MV stenosis pressure 1/2* 05/27/2022 57     MV valve area p 1/2 meth* 05/27/2022 3 86     TR Peak Gregg 05/27/2022 2 9     Triscuspid Valve Regurgi* 05/27/2022 35 0     PV peak gradient antegra* 05/27/2022 3     RVOT peak gregg 05/27/2022 0 69     Ao root 05/27/2022 3 60     Asc Ao 05/27/2022 3 8     Tricuspid valve peak reg* 05/27/2022 2 94     Left ventricular stroke * 05/27/2022 53 00     IVS 05/27/2022 1     LEFT VENTRICLE SYSTOLIC * 03/73/5123 28     LV DIASTOLIC VOLUME (MOD* 08/99/3609 81     LVSV, 2D 05/27/2022 53     LV EF 05/27/2022 55     Sodium 05/27/2022 133 (A)    Potassium 05/27/2022 4 5     Chloride 05/27/2022 101     CO2 05/27/2022 25     ANION GAP 05/27/2022 7     BUN 05/27/2022 21     Creatinine 05/27/2022 1 17     Glucose 05/27/2022 74     Calcium 05/27/2022 8 7     eGFR 05/27/2022 40     WBC 05/27/2022 4 36     RBC 05/27/2022 2 71 (A)    Hemoglobin 05/27/2022 9 2 (A)    Hematocrit 05/27/2022 27 8 (A)    MCV 05/27/2022 103 (A)    MCH 05/27/2022 33 9     MCHC 05/27/2022 33 1     RDW 05/27/2022 11 8     MPV 05/27/2022 10 3     Platelets 91/29/4263 213     nRBC 05/27/2022 0     Neutrophils Relative 05/27/2022 56     Immat GRANS % 05/27/2022 1     Lymphocytes Relative 05/27/2022 25     Monocytes Relative 05/27/2022 11     Eosinophils Relative 05/27/2022 6     Basophils Relative 05/27/2022 1     Neutrophils Absolute 05/27/2022 2 48     Immature Grans Absolute 05/27/2022 0 03     Lymphocytes Absolute 05/27/2022 1 07     Monocytes Absolute 05/27/2022 0 46     Eosinophils Absolute 05/27/2022 0 27     Basophils Absolute 05/27/2022 0 05     TSH 3RD GENERATON 05/27/2022 2 698          Radiology Results:   XR abdomen obstruction series    Result Date: 9/10/2022  Narrative: OBSTRUCTION SERIES INDICATION:   K59 01: Slow transit constipation R14 0: Abdominal distension (gaseous)  COMPARISON:  Chest x-ray 3/9/2015 EXAM PERFORMED/VIEWS:  XR ABDOMEN OBSTRUCTION SERIES FINDINGS: Variable distention of small and large bowel loops, mostly large bowel, suggests ileus  Mild fecal retention in the descending colon  No free air beneath the hemidiaphragms  No pathologic calcifications or soft tissue masses evident  Evidence of kyphoplasty to several thoracic vertebral bodies  Severe degenerative arthritic changes at the left hip  Moderate degenerative arthritic changes of the right hip  Examination of the chest reveals a normal cardiomediastinal silhouette  Lungs are clear  Impression: Variable distention of small and large bowel loops, mostly large bowel, suggests ileus  Mild fecal retention in the descending colon   Workstation performed: [id number]

## 2022-09-20 ENCOUNTER — OFFICE VISIT (OUTPATIENT)
Dept: OBGYN CLINIC | Facility: CLINIC | Age: 87
End: 2022-09-20
Payer: MEDICARE

## 2022-09-20 VITALS
SYSTOLIC BLOOD PRESSURE: 132 MMHG | BODY MASS INDEX: 26.7 KG/M2 | WEIGHT: 136 LBS | HEIGHT: 60 IN | DIASTOLIC BLOOD PRESSURE: 80 MMHG

## 2022-09-20 DIAGNOSIS — L90.0 LICHEN SCLEROSUS: Primary | ICD-10-CM

## 2022-09-20 PROCEDURE — 99213 OFFICE O/P EST LOW 20 MIN: CPT | Performed by: OBSTETRICS & GYNECOLOGY

## 2022-09-20 RX ORDER — CLOBETASOL PROPIONATE 0.5 MG/G
OINTMENT TOPICAL 2 TIMES DAILY
Qty: 30 G | Refills: 0 | Status: SHIPPED | OUTPATIENT
Start: 2022-09-20

## 2022-09-20 NOTE — PROGRESS NOTES
Assessment/Plan:     Diagnoses and all orders for this visit:    Lichen sclerosus  -     clobetasol (TEMOVATE) 0 05 % ointment; Apply topically 2 (two) times a day          70-year-old female  History of colon cancer   Osteoporosis follow-up with PCP   Lichen sclerosis was taking triamcinolone ointment  Plan   Diet/exercise  Calcium/vitamin-D  Follow-up with PCP DEXA scan management for osteoporosis  Mammogram offered patient declined  Management option for lichen sclerosis discussed with patient   Clobetasol twice a week  Female hygiene discussed with patient  Return to office in 6 months follow-up     All questions answered  Subjective:      Patient ID: Radha Salomon is a 80 y o  female  HPI  Patient seen evaluated presents to the office today follow-up on lichen sclerosis patient is complaining of intermittent irritation in her genital area denies any constant vaginal itching denies any dysuria denies any urgency or frequency   Patient is not sexually active  Management option of lichen sclerosis discussed with patient as well as Female hygiene discussed with patient in details  The following portions of the patient's history were reviewed and updated as appropriate: allergies, current medications, past family history, past medical history, past social history, past surgical history and problem list     Review of Systems      Objective:      /80 (BP Location: Left arm, Patient Position: Sitting, Cuff Size: Adult)   Ht 5' (1 524 m)   Wt 61 7 kg (136 lb)   BMI 26 56 kg/m²          Physical Exam  Constitutional:       Appearance: She is well-developed  Abdominal:      General: There is no distension  Palpations: Abdomen is soft  Tenderness: There is no abdominal tenderness  Genitourinary:     Labia:         Right: No rash, tenderness or lesion  Left: No rash, tenderness or lesion  Vagina: No signs of injury  No vaginal discharge, erythema or tenderness        Cervix: No cervical motion tenderness, discharge or friability  Adnexa:         Right: No mass, tenderness or fullness  Left: No mass, tenderness or fullness  Comments: Vulvar atrophy fused labia minora with labia majora noted skin color is waxy then typical for lichen sclerosis no discoloration or other abnormality noted no ulceration noted  Neurological:      Mental Status: She is alert and oriented to person, place, and time     Psychiatric:         Behavior: Behavior normal

## 2022-09-23 ENCOUNTER — TELEPHONE (OUTPATIENT)
Dept: OBGYN CLINIC | Facility: CLINIC | Age: 87
End: 2022-09-23

## 2022-09-23 NOTE — TELEPHONE ENCOUNTER
Pt called and said the cream Dr Zuleika Lora prescribed for her says she should take it 2x daily, she said she told her 2 days a week

## 2022-09-27 ENCOUNTER — TELEPHONE (OUTPATIENT)
Dept: GASTROENTEROLOGY | Facility: CLINIC | Age: 87
End: 2022-09-27

## 2022-09-27 ENCOUNTER — APPOINTMENT (OUTPATIENT)
Dept: RADIOLOGY | Age: 87
End: 2022-09-27
Payer: MEDICARE

## 2022-09-27 DIAGNOSIS — K56.7 ILEUS (HCC): ICD-10-CM

## 2022-09-27 PROCEDURE — 74022 RADEX COMPL AQT ABD SERIES: CPT

## 2022-09-27 NOTE — TELEPHONE ENCOUNTER
Jessica iEd from radiology called with th STAT xray ordered by [facility] available in [place]   Tiger text sent

## 2022-09-27 NOTE — TELEPHONE ENCOUNTER
Called and spoke to patient  X-ray does not show any ileus or obstruction  Persistent constipation however patient reports she is doing well at this time and having daily formed soft bowel movement in the morning  Will continue with her regimen  She is aware of alarm symptoms  Recommend calling patient for follow-up in 2-3 months  Please call patient to set this up      Diane Navarro

## 2022-10-02 DIAGNOSIS — G60.9 IDIOPATHIC PERIPHERAL NEUROPATHY: ICD-10-CM

## 2022-10-03 RX ORDER — GABAPENTIN 100 MG/1
CAPSULE ORAL
Qty: 90 CAPSULE | Refills: 0 | Status: SHIPPED | OUTPATIENT
Start: 2022-10-03 | End: 2022-10-25 | Stop reason: SDUPTHER

## 2022-10-25 ENCOUNTER — OFFICE VISIT (OUTPATIENT)
Dept: FAMILY MEDICINE CLINIC | Facility: CLINIC | Age: 87
End: 2022-10-25
Payer: MEDICARE

## 2022-10-25 VITALS
HEART RATE: 106 BPM | OXYGEN SATURATION: 98 % | DIASTOLIC BLOOD PRESSURE: 72 MMHG | SYSTOLIC BLOOD PRESSURE: 126 MMHG | WEIGHT: 135 LBS | RESPIRATION RATE: 16 BRPM | BODY MASS INDEX: 26.5 KG/M2 | HEIGHT: 60 IN

## 2022-10-25 DIAGNOSIS — G60.9 IDIOPATHIC PERIPHERAL NEUROPATHY: ICD-10-CM

## 2022-10-25 DIAGNOSIS — M16.12 ARTHRITIS OF LEFT HIP: ICD-10-CM

## 2022-10-25 DIAGNOSIS — K59.09 CHRONIC CONSTIPATION: ICD-10-CM

## 2022-10-25 DIAGNOSIS — N18.31 STAGE 3A CHRONIC KIDNEY DISEASE (HCC): ICD-10-CM

## 2022-10-25 DIAGNOSIS — D64.9 ANEMIA, UNSPECIFIED TYPE: ICD-10-CM

## 2022-10-25 DIAGNOSIS — M81.0 OSTEOPOROSIS, UNSPECIFIED OSTEOPOROSIS TYPE, UNSPECIFIED PATHOLOGICAL FRACTURE PRESENCE: Primary | ICD-10-CM

## 2022-10-25 PROCEDURE — 99214 OFFICE O/P EST MOD 30 MIN: CPT | Performed by: FAMILY MEDICINE

## 2022-10-25 RX ORDER — LINACLOTIDE 290 UG/1
1 CAPSULE, GELATIN COATED ORAL DAILY
Qty: 90 CAPSULE | Refills: 1 | Status: SHIPPED | OUTPATIENT
Start: 2022-10-25

## 2022-10-25 RX ORDER — GABAPENTIN 100 MG/1
100 CAPSULE ORAL 3 TIMES DAILY
Qty: 270 CAPSULE | Refills: 1 | Status: SHIPPED | OUTPATIENT
Start: 2022-10-25

## 2022-10-25 NOTE — PROGRESS NOTES
Assessment/Plan:  Once again we reivewed her severe left hip arthritis   She does have worsening pain in the LLE also   There might a component of nerve pain but she would not like to increase the gabapentin     Also she needs the updated dexa  She nneds a refill on the linzess whick does help her it just costs aout $100 a month   She cont  To see heme for blood disorders   No changes at this time       1  Osteoporosis, unspecified osteoporosis type, unspecified pathological fracture presence  -     DXA bone density spine hip and pelvis; Future; Expected date: 10/25/2022    2  Idiopathic peripheral neuropathy  -     gabapentin (NEURONTIN) 100 mg capsule; Take 1 capsule (100 mg total) by mouth 3 (three) times a day    3  Arthritis of left hip    4  Chronic constipation  -     linaCLOtide (Linzess) 290 MCG CAPS; Take 1 capsule by mouth daily    5  Stage 3a chronic kidney disease ([facility] [address] )    6  Anemia, unspecified type       Subjective:      Patient ID: Oswaldo Mcintosh is a 80 y o  female  HPI Here to go over chronic issues and labs / imaging studies if applicable  The following portions of the patient's history were reviewed and updated as appropriate: allergies, current medications, past family history, past medical history, past social history, past surgical history and problem list     Review of Systems   Constitutional: Positive for activity change, appetite change and fatigue  Negative for fever and unexpected weight change  HENT: Negative for nosebleeds and trouble swallowing  Eyes: Negative for visual disturbance  Respiratory: Negative for chest tightness and shortness of breath  Cardiovascular: Negative for chest pain, palpitations and leg swelling  Gastrointestinal: Negative for abdominal pain, constipation, diarrhea and nausea  Endocrine: Negative for cold intolerance  Genitourinary: Negative for dysuria and urgency  Musculoskeletal: Positive for back pain and gait problem   Negative for joint swelling and myalgias  Skin: Negative for rash  Neurological: Positive for weakness, light-headedness and numbness  Negative for tremors, seizures and syncope  Hematological: Does not bruise/bleed easily  Psychiatric/Behavioral: Negative for hallucinations and suicidal ideas  Objective:      /72 (BP Location: Right arm, Patient Position: Sitting, Cuff Size: Standard)   Pulse (!) 106   Resp 16   Ht 5' (1 524 m)   Wt 61 2 kg (135 lb)   SpO2 98%   BMI 26 37 kg/m²     No visits with results within 2 Week(s) from this visit     Latest known visit with results is:   Admission on 05/26/2022, Discharged on 05/27/2022   Component Date Value   • Ventricular Rate 05/26/2022 61    • Atrial Rate 05/26/2022 63    • WI Interval 05/26/2022 184    • QRSD Interval 05/26/2022 88    • QT Interval 05/26/2022 410    • QTC Interval 05/26/2022 413    • P Axis 05/26/2022 72    • QRS Axis 05/26/2022 -25    • T Wave Axis 05/26/2022 57    • WBC 05/26/2022 4 14 (A)   • RBC 05/26/2022 2 80 (A)   • Hemoglobin 05/26/2022 9 6 (A)   • Hematocrit 05/26/2022 28 8 (A)   • MCV 05/26/2022 103 (A)   • MCH 05/26/2022 34 3    • MCHC 05/26/2022 33 3    • RDW 05/26/2022 11 9    • MPV 05/26/2022 10 5    • Platelets 70/85/1914 206    • nRBC 05/26/2022 0    • Neutrophils Relative 05/26/2022 60    • Immat GRANS % 05/26/2022 0    • Lymphocytes Relative 05/26/2022 23    • Monocytes Relative 05/26/2022 11    • Eosinophils Relative 05/26/2022 5    • Basophils Relative 05/26/2022 1    • Neutrophils Absolute 05/26/2022 2 49    • Immature Grans Absolute 05/26/2022 0 01    • Lymphocytes Absolute 05/26/2022 0 94    • Monocytes Absolute 05/26/2022 0 44    • Eosinophils Absolute 05/26/2022 0 22    • Basophils Absolute 05/26/2022 0 04    • Sodium 05/26/2022 132 (A)   • Potassium 05/26/2022 4 4    • Chloride 05/26/2022 98    • CO2 05/26/2022 28    • ANION GAP 05/26/2022 6    • BUN 05/26/2022 18    • Creatinine 05/26/2022 1 17    • Glucose 05/26/2022 82    • Calcium 05/26/2022 9 2    • eGFR 05/26/2022 40    • A4C EF 05/27/2022 58    • LVIDd 05/27/2022 4 30    • LVIDS 05/27/2022 2 70    • IVSd 05/27/2022 1 00    • LVPWd 05/27/2022 1 00    • FS 05/27/2022 37    • MV E' Tissue Velocity Se* 05/27/2022 5    • E wave deceleration time 05/27/2022 197    • MV Peak E Gregg 05/27/2022 66    • MV Peak A Gregg 05/27/2022 0 89    • AV LVOT peak gradient 05/27/2022 2    • RVID d 05/27/2022 3 7    • RVOT PMAX 05/27/2022 2    • LA size 05/27/2022 4 4    • SIRISHA A4C 05/27/2022 21 7    • RAA A4C 05/27/2022 14 6    • AV peak gradient 05/27/2022 5    • AV peak gradient 05/27/2022 56    • AV Deceleration Time 05/27/2022 1,702    • AV regurgitation pressur* 05/27/2022 494    • MV stenosis pressure 1/2* 05/27/2022 57    • MV valve area p 1/2 meth* 05/27/2022 3 86    • TR Peak Gregg 05/27/2022 2 9    • Triscuspid Valve Regurgi* 05/27/2022 35 0    • PV peak gradient antegra* 05/27/2022 3    • RVOT peak gregg 05/27/2022 0 69    • Ao root 05/27/2022 3 60    • Asc Ao 05/27/2022 3 8    • Tricuspid valve peak reg* 05/27/2022 2 94    • Left ventricular stroke * 05/27/2022 53 00    • IVS 05/27/2022 1    • LEFT VENTRICLE SYSTOLIC * 51/29/3475 28    • LV DIASTOLIC VOLUME (MOD* 71/14/0985 81    • LVSV, 2D 05/27/2022 53    • LV EF 05/27/2022 55    • Sodium 05/27/2022 133 (A)   • Potassium 05/27/2022 4 5    • Chloride 05/27/2022 101    • CO2 05/27/2022 25    • ANION GAP 05/27/2022 7    • BUN 05/27/2022 21    • Creatinine 05/27/2022 1 17    • Glucose 05/27/2022 74    • Calcium 05/27/2022 8 7    • eGFR 05/27/2022 40    • WBC 05/27/2022 4 36    • RBC 05/27/2022 2 71 (A)   • Hemoglobin 05/27/2022 9 2 (A)   • Hematocrit 05/27/2022 27 8 (A)   • MCV 05/27/2022 103 (A)   • MCH 05/27/2022 33 9    • MCHC 05/27/2022 33 1    • RDW 05/27/2022 11 8    • MPV 05/27/2022 10 3    • Platelets 75/10/0943 213    • nRBC 05/27/2022 0    • Neutrophils Relative 05/27/2022 56    • Immat GRANS % 05/27/2022 1    • Lymphocytes Relative 05/27/2022 25    • Monocytes Relative 05/27/2022 11    • Eosinophils Relative 05/27/2022 6    • Basophils Relative 05/27/2022 1    • Neutrophils Absolute 05/27/2022 2 48    • Immature Grans Absolute 05/27/2022 0 03    • Lymphocytes Absolute 05/27/2022 1 07    • Monocytes Absolute 05/27/2022 0 46    • Eosinophils Absolute 05/27/2022 0 27    • Basophils Absolute 05/27/2022 0 05    • TSH 3RD GENERATON 05/27/2022 2 698           Physical Exam  Vitals and nursing note reviewed  Constitutional:       Appearance: She is well-developed  Comments: cane   HENT:      Head: Normocephalic and atraumatic  Cardiovascular:      Rate and Rhythm: Normal rate and regular rhythm  Heart sounds: Normal heart sounds  No murmur heard  Pulmonary:      Effort: Pulmonary effort is normal       Breath sounds: Normal breath sounds  No wheezing or rales  Abdominal:      General: Bowel sounds are normal  There is no distension  Palpations: Abdomen is soft  Tenderness: There is no abdominal tenderness  Musculoskeletal:         General: No tenderness  Normal range of motion  Cervical back: Normal range of motion and neck supple  Right lower leg: Edema (+2) present  Left lower leg: Edema (+1) present  Lymphadenopathy:      Cervical: No cervical adenopathy  Skin:     General: Skin is warm and dry  Capillary Refill: Capillary refill takes less than 2 seconds  Findings: No rash  Neurological:      Mental Status: She is alert and oriented to person, place, and time  Cranial Nerves: No cranial nerve deficit  Sensory: No sensory deficit  Motor: No abnormal muscle tone  Psychiatric:         Behavior: Behavior normal          Thought Content:  Thought content normal          Judgment: Judgment normal              [facility]

## 2022-11-12 PROBLEM — Z12.11 COLON CANCER SCREENING: Status: RESOLVED | Noted: 2022-09-13 | Resolved: 2022-11-12

## 2022-11-21 ENCOUNTER — OFFICE VISIT (OUTPATIENT)
Dept: GASTROENTEROLOGY | Facility: CLINIC | Age: 87
End: 2022-11-21

## 2022-11-21 VITALS
HEART RATE: 66 BPM | SYSTOLIC BLOOD PRESSURE: 144 MMHG | BODY MASS INDEX: 26.5 KG/M2 | WEIGHT: 135 LBS | DIASTOLIC BLOOD PRESSURE: 80 MMHG | HEIGHT: 60 IN

## 2022-11-21 DIAGNOSIS — Z86.010 HX OF COLONIC POLYPS: ICD-10-CM

## 2022-11-21 DIAGNOSIS — Z12.11 COLON CANCER SCREENING: ICD-10-CM

## 2022-11-21 DIAGNOSIS — K59.00 CONSTIPATION, UNSPECIFIED CONSTIPATION TYPE: Primary | ICD-10-CM

## 2022-11-21 DIAGNOSIS — K57.90 DIVERTICULOSIS: ICD-10-CM

## 2022-11-21 NOTE — PROGRESS NOTES
[facility] - Outpatient Follow-up Note  Israel Cavazos 80 y o  female MRN: [id number]  Encounter: [id number]          ASSESSMENT AND PLAN:      1  Constipation, unspecified constipation type  Patient has history of constipation  Patient reports constipation controlled with Linzess and senna  X-ray of abdomen done on previous office visit showed constipation, no obstruction   -Continue high-fiber diet  -Drink plenty of fluids  -Continue Linzess 290 mcg daily  -Continue fleets enemas daily  -Continue senna 2 tabs daily at bedtime    2  Hx of colonic polyps  Patient has history of colon polyps  One small colon polyp seen on colonoscopy done 07/16/2020 biopsy positive for tubular adenoma  No further colonoscopy is recommended due to patient's age  3  Diverticulosis  Patient currently denies any lower GI issues  Patient denies abdominal pain, blood in stool, blood from rectal area, or diarrhea  No signs of diverticulitis  4  Colon cancer screening  Up-to-date  Colonoscopy done 07/16/2020 showed left-sided diverticulosis and 1 small colon polyp  No further colon cancer screening recommended due to age  Follow-up in 3 months  ______________________________________________________________________    SUBJECTIVE:  This is a 27-year-old female who presents follow-up for constipation  X-ray abdominal obstructive series done 09/27/2022 showed constipation  No obstructive bowel gas pattern  Patient reports her bowel patterns are good on Linzess, fleets enema, and senna  Patient usually has bowel movements daily and where occasionally miss 1 day between bowel movements  Patient denies nausea, vomiting, acid reflux, heartburn, epigastric or abdominal pain  Patient denies blood in stool, blood from rectal area, or black tarry stool  Abdomen exam benign no abdominal tenderness or guarding  Patient is a former smoker  No family history of gastric or colon cancer        REVIEW OF SYSTEMS IS OTHERWISE NEGATIVE  Historical Information   Past Medical History:   Diagnosis Date   • Allergic rhinitis    • Disease of thyroid gland    • Dizziness    • Hypothyroidism 10/20/2015   • Malignant neoplasm of colon ([address] )     last assessed: 10/20/2015   • Neuropathy    • Tinnitus      Past Surgical History:   Procedure Laterality Date   • ADENOIDECTOMY     • APPENDECTOMY      last assessed: 10/20/2015   • CATARACT EXTRACTION, BILATERAL     • CHOLECYSTECTOMY      last assessed: 10/20/2015   • COLECTOMY      last assessed: 10/20/2015; partial    • TONSILLECTOMY      last assessed: 10/20/2015     Social History   Social History     Substance and Sexual Activity   Alcohol Use No     Social History     Substance and Sexual Activity   Drug Use Never     Social History     Tobacco Use   Smoking Status Former   • Packs/day: 0 25   • Types: Cigarettes   Smokeless Tobacco Never   Tobacco Comments    quit 50 yrs ago     Family History   Problem Relation Age of Onset   • Heart disease Mother         cardiac disorder   • Alzheimer's disease Sister    • No Known Problems Father    • Leukemia Brother        Meds/Allergies       Current Outpatient Medications:   •  Acetaminophen Extra Strength 500 MG tablet  •  aspirin 81 MG tablet  •  calcium carbonate-vitamin D (OSCAL-D) 500 mg-200 units per tablet  •  cholecalciferol (VITAMIN D3) 1,000 units tablet  •  clobetasol (TEMOVATE) 0 05 % ointment  •  denosumab (PROLIA) 60 mg/mL  •  fexofenadine (ALLEGRA) 180 MG tablet  •  gabapentin (NEURONTIN) 100 mg capsule  •  linaCLOtide (Linzess) 290 MCG CAPS  •  losartan (COZAAR) 25 mg tablet  •  meclizine (ANTIVERT) 25 mg tablet  •  Nutritional Supplements (Ensure High Protein) LIQD  •  senna-docusate sodium (SENOKOT S) 8 6-50 mg per tablet  •  triamcinolone (KENALOG) 0 1 % cream    Allergies   Allergen Reactions   • Penicillins Hives     Other reaction(s):  Other (See Comments)  hives           Objective     Blood pressure 144/80, pulse 66, height 5' (1 524 m), weight 61 2 kg (135 lb)  Body mass index is 26 37 kg/m²  PHYSICAL EXAM:      General Appearance:   Alert, cooperative, no distress   HEENT:   Normocephalic, atraumatic, anicteric      Neck:  Supple, symmetrical, trachea midline   Lungs:   Clear to auscultation bilaterally; no rales, rhonchi or wheezing; respirations unlabored    Heart[de-identified]   Regular rate and rhythm; no murmur, rub, or gallop  Abdomen:   Soft, non-tender, non-distended; normal bowel sounds; no masses, no organomegaly    Genitalia:   Deferred    Rectal:   Deferred    Extremities:  No cyanosis, clubbing or edema    Pulses:  2+ and symmetric    Skin:  No jaundice, rashes, or lesions    Lymph nodes:  No palpable cervical lymphadenopathy        Lab Results:   No visits with results within 1 Day(s) from this visit     Latest known visit with results is:   Admission on 05/26/2022, Discharged on 05/27/2022   Component Date Value   • Ventricular Rate 05/26/2022 61    • Atrial Rate 05/26/2022 63    • IL Interval 05/26/2022 184    • QRSD Interval 05/26/2022 88    • QT Interval 05/26/2022 410    • QTC Interval 05/26/2022 413    • P Axis 05/26/2022 72    • QRS Axis 05/26/2022 -25    • T Wave Axis 05/26/2022 57    • WBC 05/26/2022 4 14 (L)    • RBC 05/26/2022 2 80 (L)    • Hemoglobin 05/26/2022 9 6 (L)    • Hematocrit 05/26/2022 28 8 (L)    • MCV 05/26/2022 103 (H)    • MCH 05/26/2022 34 3    • MCHC 05/26/2022 33 3    • RDW 05/26/2022 11 9    • MPV 05/26/2022 10 5    • Platelets 81/63/4989 206    • nRBC 05/26/2022 0    • Neutrophils Relative 05/26/2022 60    • Immat GRANS % 05/26/2022 0    • Lymphocytes Relative 05/26/2022 23    • Monocytes Relative 05/26/2022 11    • Eosinophils Relative 05/26/2022 5    • Basophils Relative 05/26/2022 1    • Neutrophils Absolute 05/26/2022 2 49    • Immature Grans Absolute 05/26/2022 0 01    • Lymphocytes Absolute 05/26/2022 0 94    • Monocytes Absolute 05/26/2022 0 44    • Eosinophils Absolute 05/26/2022 0 22    • Basophils Absolute 05/26/2022 0 04    • Sodium 05/26/2022 132 (L)    • Potassium 05/26/2022 4 4    • Chloride 05/26/2022 98    • CO2 05/26/2022 28    • ANION GAP 05/26/2022 6    • BUN 05/26/2022 18    • Creatinine 05/26/2022 1 17    • Glucose 05/26/2022 82    • Calcium 05/26/2022 9 2    • eGFR 05/26/2022 40    • A4C EF 05/27/2022 58    • LVIDd 05/27/2022 4 30    • LVIDS 05/27/2022 2 70    • IVSd 05/27/2022 1 00    • LVPWd 05/27/2022 1 00    • FS 05/27/2022 37    • MV E' Tissue Velocity Se* 05/27/2022 5    • E wave deceleration time 05/27/2022 197    • MV Peak E Gregg 05/27/2022 66    • MV Peak A Gregg 05/27/2022 0 89    • AV LVOT peak gradient 05/27/2022 2    • RVID d 05/27/2022 3 7    • RVOT PMAX 05/27/2022 2    • LA size 05/27/2022 4 4    • SIRISHA A4C 05/27/2022 21 7    • RAA A4C 05/27/2022 14 6    • AV peak gradient 05/27/2022 5    • AV peak gradient 05/27/2022 56    • AV Deceleration Time 05/27/2022 1,702    • AV regurgitation pressur* 05/27/2022 494    • MV stenosis pressure 1/2* 05/27/2022 57    • MV valve area p 1/2 meth* 05/27/2022 3 86    • TR Peak Gregg 05/27/2022 2 9    • Triscuspid Valve Regurgi* 05/27/2022 35 0    • PV peak gradient antegra* 05/27/2022 3    • RVOT peak gregg 05/27/2022 0 69    • Ao root 05/27/2022 3 60    • Asc Ao 05/27/2022 3 8    • Tricuspid valve peak reg* 05/27/2022 2 94    • Left ventricular stroke * 05/27/2022 53 00    • IVS 05/27/2022 1    • LEFT VENTRICLE SYSTOLIC * 92/16/8286 28    • LV DIASTOLIC VOLUME (MOD* 00/76/0028 81    • LVSV, 2D 05/27/2022 53    • LV EF 05/27/2022 55    • Sodium 05/27/2022 133 (L)    • Potassium 05/27/2022 4 5    • Chloride 05/27/2022 101    • CO2 05/27/2022 25    • ANION GAP 05/27/2022 7    • BUN 05/27/2022 21    • Creatinine 05/27/2022 1 17    • Glucose 05/27/2022 74    • Calcium 05/27/2022 8 7    • eGFR 05/27/2022 40    • WBC 05/27/2022 4 36    • RBC 05/27/2022 2 71 (L)    • Hemoglobin 05/27/2022 9 2 (L)    • Hematocrit 05/27/2022 27 8 (L) • MCV 05/27/2022 103 (H)    • MCH 05/27/2022 33 9    • MCHC 05/27/2022 33 1    • RDW 05/27/2022 11 8    • MPV 05/27/2022 10 3    • Platelets 25/15/2173 213    • nRBC 05/27/2022 0    • Neutrophils Relative 05/27/2022 56    • Immat GRANS % 05/27/2022 1    • Lymphocytes Relative 05/27/2022 25    • Monocytes Relative 05/27/2022 11    • Eosinophils Relative 05/27/2022 6    • Basophils Relative 05/27/2022 1    • Neutrophils Absolute 05/27/2022 2 48    • Immature Grans Absolute 05/27/2022 0 03    • Lymphocytes Absolute 05/27/2022 1 07    • Monocytes Absolute 05/27/2022 0 46    • Eosinophils Absolute 05/27/2022 0 27    • Basophils Absolute 05/27/2022 0 05    • TSH 3RD GENERATON 05/27/2022 2 698          Radiology Results:   No results found

## 2022-11-23 DIAGNOSIS — I10 ESSENTIAL HYPERTENSION: ICD-10-CM

## 2022-11-23 RX ORDER — LOSARTAN POTASSIUM 25 MG/1
TABLET ORAL
Qty: 90 TABLET | Refills: 0 | Status: SHIPPED | OUTPATIENT
Start: 2022-11-23

## 2022-11-28 ENCOUNTER — TELEPHONE (OUTPATIENT)
Dept: FAMILY MEDICINE CLINIC | Facility: CLINIC | Age: 87
End: 2022-11-28

## 2022-11-28 NOTE — TELEPHONE ENCOUNTER
Patient called to request an order for a raised toilet seat to be submitted through insurance  She is not sure of the process of submitting the order through insurance  She stumbled/fell a few days ago and is achy and feels a raised toilet seat would help  She did not think it was necessary to be seen for her fall

## 2023-01-14 ENCOUNTER — NURSE TRIAGE (OUTPATIENT)
Dept: OTHER | Facility: OTHER | Age: 88
End: 2023-01-14

## 2023-01-14 NOTE — TELEPHONE ENCOUNTER
Patient is able to walk around with holding on to furniture and using her cane  She took one Meclixine at [address] [place] [address] and wants to see if that helps her  She is going to try to lay down and sleep now  She is refusing to go to the ER now  I told her to call us back if she changes her mind and/or she does not feel any better after resting  She agreed to do so

## 2023-01-14 NOTE — TELEPHONE ENCOUNTER
Reason for Disposition  • SEVERE dizziness (vertigo) (e g , unable to walk without assistance)    Additional Information  • Negative: [1] MODERATE dizziness (e g , vertigo; feels very unsteady, interferes with normal activities) AND [2] has been evaluated by physician for this    Answer Assessment - Initial Assessment Questions  1  DESCRIPTION: "Describe your dizziness "      "I feel like I am spinning "   Took Meclixine at 0845   2  VERTIGO: "Do you feel like either you or the room is spinning or tilting?"       Yes- she states that this feels like a worse case than what she every had before  3  LIGHTHEADED: "Do you feel lightheaded?" (e g , somewhat faint, woozy, weak upon standing)      A little weak  4  SEVERITY: "How bad is it?"  "Can you walk?"    - MILD: Feels unsteady but walking normally  - MODERATE: Feels very unsteady when walking, but not falling; interferes with normal activities (e g , school, work)   - SEVERE: Unable to walk without falling, or requires assistance to walk without falling  Moderate  5  ONSET:  "When did the dizziness begin?"      During the night she felt it coming on, when she got up to go to the BR  6  AGGRAVATING FACTORS: "Does anything make it worse?" (e g , standing, change in head position)      Movement  7  CAUSE: "What do you think is causing the dizziness?"      She has Vertigo  8  RECURRENT SYMPTOM: "Have you had dizziness before?" If Yes, ask: "When was the last time?" "What happened that time?"      Yes  9  OTHER SYMPTOMS: "Do you have any other symptoms?" (e g , headache, weakness, numbness, vomiting, earache)      No other SXS    10  PREGNANCY: "Is there any chance you are pregnant?" "When was your last menstrual period?"       N/A    Protocols used: DIZZINESS - VERTIGO-ADULT-

## 2023-01-14 NOTE — TELEPHONE ENCOUNTER
Reason for Disposition  • [1] MODERATE dizziness (e g , vertigo; feels very unsteady, interferes with normal activities) AND [2] has been evaluated by physician for this    Answer Assessment - Initial Assessment Questions  1  VERTIGO: "Do you feel like either you or the room is spinning or tilting?"      Yes    2  LIGHTHEADED: "Do you feel lightheaded?" (e g , somewhat faint, woozy, weak upon standing)  No    3  SEVERITY: "How bad is it?"  "Can you walk?"    - MILD: Feels unsteady but walking normally  - MODERATE: Feels very unsteady when walking, but not falling; interferes with normal activities (e g , school, work)   - SEVERE: Unable to walk without falling, or requires assistance to walk without falling  Moderate    4  ONSET:  "When did the dizziness begin?"  A couple of days    5  AGGRAVATING FACTORS: "Does anything make it worse?" (e g , standing, change in head position)     Standing    6  CAUSE: "What do you think is causing the dizziness?"    Unknown    7  RECURRENT SYMPTOM: "Have you had dizziness before?" If Yes, ask: "When was the last time?" "What happened that time?"  Yes takes meclizine (ANTIVERT) 25 mg tablet     8  OTHER SYMPTOMS: "Do you have any other symptoms?" (e g , headache, weakness, numbness, vomiting, earache)  Denies    9  PREGNANCY: "Is there any chance you are pregnant?" "When was your last menstrual period?"       N/a    Protocols used: DIZZINESS - VERTIGO-ADULT-AH    Patient requesting if [address] can come out to home today  Advised patient offices are closed and she will need to be evaluated at ER if symptoms worsen  Pt verbalized understanding

## 2023-01-14 NOTE — TELEPHONE ENCOUNTER
Regarding: dizziness  ----- Message from Barbara Christine sent at 1/14/2023  8:46 AM EST -----  " I am dizzy, I called and was speaking to someone and they just hung up on me "

## 2023-01-14 NOTE — TELEPHONE ENCOUNTER
Please call pt to schedule follow up on sertraline dose change- should have OV or phone visit. Refilled. Suzan Willis PA-C     Regarding: feeling very dizzy  ----- Message from Manda Brewster sent at 1/14/2023  8:38 AM EST -----  " I've been feeling very dizzy this morning "

## 2023-01-16 ENCOUNTER — TELEPHONE (OUTPATIENT)
Dept: FAMILY MEDICINE CLINIC | Facility: CLINIC | Age: 88
End: 2023-01-16

## 2023-01-16 NOTE — TELEPHONE ENCOUNTER
Pt called and stated that she was dizzy all weekend she called health call and she stated that someone was suppose to call back and didn't  I told her that you were off today that made her very upset because you should be at work   I did schedule her for tomorrow and told her if her dizziness gets worse to go to the ER

## 2023-01-17 ENCOUNTER — OFFICE VISIT (OUTPATIENT)
Dept: FAMILY MEDICINE CLINIC | Facility: CLINIC | Age: 88
End: 2023-01-17

## 2023-01-17 VITALS
HEART RATE: 79 BPM | DIASTOLIC BLOOD PRESSURE: 84 MMHG | SYSTOLIC BLOOD PRESSURE: 142 MMHG | HEIGHT: 60 IN | RESPIRATION RATE: 16 BRPM | OXYGEN SATURATION: 99 % | BODY MASS INDEX: 26.37 KG/M2

## 2023-01-17 DIAGNOSIS — H81.10 BENIGN PAROXYSMAL POSITIONAL VERTIGO, UNSPECIFIED LATERALITY: ICD-10-CM

## 2023-01-17 DIAGNOSIS — D64.9 ANEMIA, UNSPECIFIED TYPE: ICD-10-CM

## 2023-01-17 DIAGNOSIS — M79.605 PAIN IN BOTH LOWER EXTREMITIES: ICD-10-CM

## 2023-01-17 DIAGNOSIS — E46 MALNUTRITION DUE TO RENAL DISEASE (HCC): ICD-10-CM

## 2023-01-17 DIAGNOSIS — N18.31 STAGE 3A CHRONIC KIDNEY DISEASE (HCC): ICD-10-CM

## 2023-01-17 DIAGNOSIS — R26.2 AMBULATORY DYSFUNCTION: Primary | ICD-10-CM

## 2023-01-17 DIAGNOSIS — I70.203 ATHEROSCLEROSIS OF NATIVE ARTERY OF BOTH LOWER EXTREMITIES, WITH UNSPECIFIED PRESENCE OF CLINICAL MANIFESTATION (HCC): ICD-10-CM

## 2023-01-17 DIAGNOSIS — E61.1 IRON DEFICIENCY: ICD-10-CM

## 2023-01-17 DIAGNOSIS — G60.9 IDIOPATHIC PERIPHERAL NEUROPATHY: ICD-10-CM

## 2023-01-17 DIAGNOSIS — M79.604 PAIN IN BOTH LOWER EXTREMITIES: ICD-10-CM

## 2023-01-17 DIAGNOSIS — N28.9 MALNUTRITION DUE TO RENAL DISEASE (HCC): ICD-10-CM

## 2023-01-17 DIAGNOSIS — K58.1 IRRITABLE BOWEL SYNDROME WITH CONSTIPATION: ICD-10-CM

## 2023-01-17 NOTE — PROGRESS NOTES
Assessment and Plan:   She does not want to see another heme   She is reluctant to see PM   We can increase lupe for now   Cont with GI and Linzess   She has not found a home health aid yet     Problem List Items Addressed This Visit        Digestive    IBS (irritable bowel syndrome)       Cardiovascular and Mediastinum    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)       Nervous and Auditory    Benign positional vertigo    Idiopathic peripheral neuropathy    Relevant Orders    Ambulatory Referral to Pain Management    Ambulatory Referral to Complex Care Management Program       Genitourinary    CKD (chronic kidney disease) stage 3, GFR 30-59 ml/min ([facility])       Other    Ambulatory dysfunction - Primary    Relevant Orders    Ambulatory Referral to Pain Management    Ambulatory Referral to Complex Care Management Program    Anemia    Malnutrition due to renal disease ([facility] [address] )   Other Visit Diagnoses     Pain in both lower extremities        good blood flow       Relevant Orders    Ambulatory Referral to Pain Management    Iron deficiency            BMI Counseling: Body mass index is 26 37 kg/m²  The BMI is above normal  Nutrition recommendations include decreasing portion sizes, encouraging healthy choices of fruits and vegetables, decreasing fast food intake, consuming healthier snacks and limiting drinks that contain sugar  Exercise recommendations include exercising 3-5 times per week  No pharmacotherapy was ordered  Rationale for BMI follow-up plan is due to patient being overweight or obese  Depression Screening and Follow-up Plan: Patient was screened for depression during today's encounter  They screened negative with a PHQ-2 score of 0  Falls Plan of Care: balance, strength, and gait training instructions were provided  Medications that increase falls were reviewed         Preventive health issues were discussed with patient, and age appropriate screening tests were ordered as noted in patient's After Visit Summary  Personalized health advice and appropriate referrals for health education or preventive services given if needed, as noted in patient's After Visit Summary  History of Present Illness:     Patient presents for a Medicare Wellness Visit    More dizzy last week        Patient Care Team:  Aureliano Jackson MD as PCP - General (Family Medicine)  Hodan Marin MD     Review of Systems:     Review of Systems   Constitutional: Positive for activity change, appetite change and fatigue  Negative for fever and unexpected weight change  HENT: Negative for nosebleeds and trouble swallowing  Eyes: Negative for visual disturbance  Respiratory: Negative for chest tightness and shortness of breath  Cardiovascular: Negative for chest pain, palpitations and leg swelling  Gastrointestinal: Negative for abdominal pain, constipation, diarrhea and nausea  Endocrine: Negative for cold intolerance  Genitourinary: Negative for dysuria and urgency  Musculoskeletal: Positive for back pain and gait problem  Negative for joint swelling and myalgias  Skin: Negative for rash  Neurological: Positive for dizziness, weakness, light-headedness and numbness  Negative for tremors, seizures and syncope  Hematological: Does not bruise/bleed easily  Psychiatric/Behavioral: Negative for hallucinations and suicidal ideas          Problem List:     Patient Active Problem List   Diagnosis   • Allergic rhinitis   • Atherosclerosis of native artery of extremity ([facility])   • Benign positional vertigo   • Constipation   • Closed fracture of thoracic vertebra ([facility])   • Edema   • Gastroparesis   • IBS (irritable bowel syndrome)   • Idiopathic peripheral neuropathy   • Insomnia   • Lumbosacral spondylosis without myelopathy   • Osteoporosis   • Thoracic compression fracture ([facility])   • Vestibular dysfunction   • Hypertension   • Low back pain   • CKD (chronic kidney disease) stage 3, GFR 30-59 ml/min ([facility])   • Iron deficiency anemia   • Anxiety   • Ambulatory dysfunction   • Dizziness   • Hyponatremia   • Megaloblastic anemia   • Vertigo   • Bradycardia   • Anemia   • Lower extremity edema   • Ileus (HCC)   • Hx of colonic polyps   • Colon cancer screening   • Diverticulosis   • Arthritis of left hip   • Malnutrition due to renal disease [facility])      Past Medical and Surgical History:     Past Medical History:   Diagnosis Date   • Allergic rhinitis    • Disease of thyroid gland    • Dizziness    • Hypothyroidism 10/20/2015   • Malignant neoplasm of colon ([address] )     last assessed: 10/20/2015   • Neuropathy    • Tinnitus      Past Surgical History:   Procedure Laterality Date   • ADENOIDECTOMY     • APPENDECTOMY      last assessed: 10/20/2015   • CATARACT EXTRACTION, BILATERAL     • CHOLECYSTECTOMY      last assessed: 10/20/2015   • COLECTOMY      last assessed: 10/20/2015; partial    • TONSILLECTOMY      last assessed: 10/20/2015      Family History:     Family History   Problem Relation Age of Onset   • Heart disease Mother         cardiac disorder   • Alzheimer's disease Sister    • No Known Problems Father    • Leukemia Brother       Social History:     Social History     Socioeconomic History   • Marital status:       Spouse name: None   • Number of children: None   • Years of education: 15   • Highest education level: None   Occupational History   • Occupation: Retired - [employer]    Tobacco Use   • Smoking status: Former     Packs/day: 0 25     Types: Cigarettes   • Smokeless tobacco: Never   • Tobacco comments:     quit 50 yrs ago   Vaping Use   • Vaping Use: Never used   Substance and Sexual Activity   • Alcohol use: No   • Drug use: Never   • Sexual activity: Not Currently   Other Topics Concern   • None   Social History Narrative    · E-cigarette/vape status:   Never used electronic cigarettes      · Do you currently or have you served in the [employer] [facility] [employer]:   No      · Were you activated, into active duty, as a member of the [employer] or as a Reservist:   No      · Exercise level:   Occasional walks daily - when weather is nice     · Diet:   Regular      · General stress level:   High      · Caffeine intake: Moderate      · Seat belts used routinely:   Yes      · Sunscreen used routinely:   Yes      · Smoke alarm in home: Yes      · Advance directive: Yes      · Salt Intake:   minimal      Social Determinants of Health     Financial Resource Strain: Low Risk    • Difficulty of Paying Living Expenses: Not hard at all   Food Insecurity: No Food Insecurity   • Worried About Running Out of Food in the Last Year: Never true   • Ran Out of Food in the Last Year: Never true   Transportation Needs: No Transportation Needs   • Lack of Transportation (Medical): No   • Lack of Transportation (Non-Medical):  No   Physical Activity: Not on file   Stress: Not on file   Social Connections: Not on file   Intimate Partner Violence: Not on file   Housing Stability: Low Risk    • Unable to Pay for Housing in the Last Year: No   • Number of Places Lived in the Last Year: 1   • Unstable Housing in the Last Year: No      Medications and Allergies:     Current Outpatient Medications   Medication Sig Dispense Refill   • Acetaminophen Extra Strength 500 MG tablet      • aspirin 81 MG tablet Take 81 mg by mouth     • calcium carbonate-vitamin D (OSCAL-D) 500 mg-200 units per tablet Take 1 tablet by mouth daily with breakfast 30 tablet 0   • cholecalciferol (VITAMIN D3) 1,000 units tablet      • clobetasol (TEMOVATE) 0 05 % ointment Apply topically 2 (two) times a day 30 g 0   • denosumab (PROLIA) 60 mg/mL 1 mL     • fexofenadine (ALLEGRA) 180 MG tablet Take 180 mg by mouth daily     • gabapentin (NEURONTIN) 100 mg capsule Take 1 capsule (100 mg total) by mouth 3 (three) times a day 270 capsule 1   • linaCLOtide (Linzess) 290 MCG CAPS Take 1 capsule by mouth daily 90 capsule 1   • losartan (COZAAR) 25 mg tablet Take 1 tablet by mouth once daily 90 tablet 0   • meclizine (ANTIVERT) 25 mg tablet TAKE 1 TABLET BY MOUTH EVERY 8 HOURS 90 tablet 0   • Nutritional Supplements (Ensure High Protein) LIQD Take 1 Can by mouth 2 (two) times a day 237 mL 3   • senna-docusate sodium (SENOKOT S) 8 6-50 mg per tablet Take 2 tablets by mouth daily     • triamcinolone (KENALOG) 0 1 % cream Apply topically 2 (two) times a day 45 g 1     No current facility-administered medications for this visit  Allergies   Allergen Reactions   • Penicillins Hives     Other reaction(s): Other (See Comments)  hives      Immunizations:     Immunization History   Administered Date(s) Administered   • COVID-19 MODERNA VACC 0 5 ML IM 01/26/2021, 02/22/2021, 03/15/2022      Health Maintenance:         Topic Date Due   • DXA SCAN  11/04/2022   • Breast Cancer Screening: Mammogram  08/10/2099 (Originally 12/11/1970)   • Cervical Cancer Screening  10/25/2099 (Originally 12/11/1951)         Topic Date Due   • COVID-19 Vaccine (4 - Booster for Moderna series) 05/10/2022      Medicare Screening Tests and Risk Assessments: Mohan Nevarez is here for her Subsequent Wellness visit  Last Medicare Wellness visit information reviewed, patient interviewed and updates made to the record today  Health Risk Assessment:   Patient rates overall health as fair  Patient feels that their physical health rating is same  Patient is very satisfied with their life  Eyesight was rated as same  Hearing was rated as same  Patient feels that their emotional and mental health rating is same  Patients states they are never, rarely angry  Patient states they are never, rarely unusually tired/fatigued  Pain experienced in the last 7 days has been none  Patient states that she has experienced no weight loss or gain in last 6 months  Depression Screening:   PHQ-2 Score: 0      Fall Risk Screening:    In the past year, patient has experienced: no history of falling in past year      Urinary Incontinence Screening: Patient has not leaked urine accidently in the last six months  Home Safety:  Patient does not have trouble with stairs inside or outside of their home  Patient has working smoke alarms and has working carbon monoxide detector  Home safety hazards include: none  Nutrition:   Current diet is Regular  Medications:   Patient is currently taking over-the-counter supplements  OTC medications include: see medication list  Patient is able to manage medications  Activities of Daily Living (ADLs)/Instrumental Activities of Daily Living (IADLs):   Walk and transfer into and out of bed and chair?: Yes  Dress and groom yourself?: Yes    Bathe or shower yourself?: Yes    Feed yourself? Yes  Do your laundry/housekeeping?: Yes  Manage your money, pay your bills and track your expenses?: Yes  Make your own meals?: Yes    Do your own shopping?: Yes    Previous Hospitalizations:   Any hospitalizations or ED visits within the last 12 months?: Yes    How many hospitalizations have you had in the last year?: 1-2    Advance Care Planning:   Living will: Yes    Durable POA for healthcare:  Yes    Advanced directive: Yes    Five wishes given: No      Cognitive Screening:   Provider or family/friend/caregiver concerned regarding cognition?: No    PREVENTIVE SCREENINGS      Cardiovascular Screening:    General: Screening Current      Diabetes Screening:     General: Screening Current      Colorectal Cancer Screening:     General: Screening Not Indicated and Screening Current      Breast Cancer Screening:     General: Screening Not Indicated      Cervical Cancer Screening:    General: Screening Not Indicated      Osteoporosis Screening:    General: Screening Not Indicated and History Osteoporosis      Abdominal Aortic Aneurysm (AAA) Screening:        General: Screening Not Indicated      Lung Cancer Screening:     General: Screening Not Indicated      Hepatitis C Screening:      Hep C Screening Accepted: No     Screening, Brief Intervention, and Referral to Treatment (SBIRT)    Screening  Typical number of drinks in a day: 0  Typical number of drinks in a week: 0  Interpretation: Low risk drinking behavior  Single Item Drug Screening:  How often have you used an illegal drug (including marijuana) or a prescription medication for non-medical reasons in the past year? never    Single Item Drug Screen Score: 0  Interpretation: Negative screen for possible drug use disorder    Brief Intervention  Alcohol & drug use screenings were reviewed  No concerns regarding substance use disorder identified  No results found  Physical Exam:     /84 (BP Location: Left arm, Patient Position: Sitting, Cuff Size: Standard)   Pulse 79   Resp 16   Ht 5' (1 524 m)   SpO2 99%   BMI 26 37 kg/m²     Physical Exam  Vitals and nursing note reviewed  Constitutional:       Appearance: She is well-developed  Comments: Cane     HENT:      Head: Normocephalic and atraumatic  Right Ear: External ear normal       Left Ear: External ear normal       Nose: Nose normal    Eyes:      Conjunctiva/sclera: Conjunctivae normal       Pupils: Pupils are equal, round, and reactive to light  Cardiovascular:      Rate and Rhythm: Normal rate and regular rhythm  Heart sounds: Normal heart sounds  No murmur heard  Pulmonary:      Effort: Pulmonary effort is normal       Breath sounds: Normal breath sounds  No wheezing  Abdominal:      General: Bowel sounds are normal       Palpations: Abdomen is soft  Musculoskeletal:         General: No tenderness  Normal range of motion  Cervical back: Normal range of motion and neck supple  Comments: compresion stockings     Lymphadenopathy:      Cervical: No cervical adenopathy  Skin:     General: Skin is warm and dry  Capillary Refill: Capillary refill takes less than 2 seconds  Neurological:      Mental Status: She is alert and oriented to person, place, and time     Psychiatric: Behavior: Behavior normal          Thought Content:  Thought content normal          Judgment: Judgment normal           Brain Vyas MD

## 2023-01-17 NOTE — PATIENT INSTRUCTIONS
Medicare Preventive Visit Patient Instructions  Thank you for completing your Welcome to Medicare Visit or Medicare Annual Wellness Visit today  Your next wellness visit will be due in one year (1/18/2024)  The screening/preventive services that you may require over the next 5-10 years are detailed below  Some tests may not apply to you based off risk factors and/or age  Screening tests ordered at today's visit but not completed yet may show as past due  Also, please note that scanned in results may not display below  Preventive Screenings:  Service Recommendations Previous Testing/Comments   Colorectal Cancer Screening  * Colonoscopy    * Fecal Occult Blood Test (FOBT)/Fecal Immunochemical Test (FIT)  * Fecal DNA/Cologuard Test  * Flexible Sigmoidoscopy Age: 39-70 years old   Colonoscopy: every 10 years (may be performed more frequently if at higher risk)  OR  FOBT/FIT: every 1 year  OR  Cologuard: every 3 years  OR  Sigmoidoscopy: every 5 years  Screening may be recommended earlier than age 39 if at higher risk for colorectal cancer  Also, an individualized decision between you and your healthcare provider will decide whether screening between the ages of 74-80 would be appropriate  Colonoscopy: 07/16/2020  FOBT/FIT: 09/02/2022  Cologuard: Not on file  Sigmoidoscopy: Not on file    Screening Not Indicated     Breast Cancer Screening Age: 36 years old  Frequency: every 1-2 years  Not required if history of left and right mastectomy Mammogram: Not on file        Cervical Cancer Screening Between the ages of 21-29, pap smear recommended once every 3 years  Between the ages of 33-67, can perform pap smear with HPV co-testing every 5 years     Recommendations may differ for women with a history of total hysterectomy, cervical cancer, or abnormal pap smears in past  Pap Smear: 05/02/2022    Screening Not Indicated   Hepatitis C Screening Once for adults born between 1945 and 1965  More frequently in patients at high risk for Hepatitis C Hep C Antibody: Not on file        Diabetes Screening 1-2 times per year if you're at risk for diabetes or have pre-diabetes Fasting glucose: 75 mg/dL (11/3/2021)  A1C: 5 3 % (5/11/2020)  Screening Current   Cholesterol Screening Once every 5 years if you don't have a lipid disorder  May order more often based on risk factors  Lipid panel: 05/11/2020    Screening Current     Other Preventive Screenings Covered by Medicare:  1  Abdominal Aortic Aneurysm (AAA) Screening: covered once if your at risk  You're considered to be at risk if you have a family history of AAA  2  Lung Cancer Screening: covers low dose CT scan once per year if you meet all of the following conditions: (1) Age 50-69; (2) No signs or symptoms of lung cancer; (3) Current smoker or have quit smoking within the last 15 years; (4) You have a tobacco smoking history of at least 20 pack years (packs per day multiplied by number of years you smoked); (5) You get a written order from a healthcare provider  3  Glaucoma Screening: covered annually if you're considered high risk: (1) You have diabetes OR (2) Family history of glaucoma OR (3)  aged 48 and older OR (3)  American aged 72 and older  3  Osteoporosis Screening: covered every 2 years if you meet one of the following conditions: (1) You're estrogen deficient and at risk for osteoporosis based off medical history and other findings; (2) Have a vertebral abnormality; (3) On glucocorticoid therapy for more than 3 months; (4) Have primary hyperparathyroidism; (5) On osteoporosis medications and need to assess response to drug therapy  · Last bone density test (DXA Scan): 11/04/2019   5  HIV Screening: covered annually if you're between the age of 15-65  Also covered annually if you are younger than 13 and older than 72 with risk factors for HIV infection   For pregnant patients, it is covered up to 3 times per pregnancy  Immunizations:  Immunization Recommendations   Influenza Vaccine Annual influenza vaccination during flu season is recommended for all persons aged >= 6 months who do not have contraindications   Pneumococcal Vaccine   * Pneumococcal conjugate vaccine = PCV13 (Prevnar 13), PCV15 (Vaxneuvance), PCV20 (Prevnar 20)  * Pneumococcal polysaccharide vaccine = PPSV23 (Pneumovax) Adults 25-60 years old: 1-3 doses may be recommended based on certain risk factors  Adults 72 years old: 1-2 doses may be recommended based off what pneumonia vaccine you previously received   Hepatitis B Vaccine 3 dose series if at intermediate or high risk (ex: diabetes, end stage renal disease, liver disease)   Tetanus (Td) Vaccine - COST NOT COVERED BY MEDICARE PART B Following completion of primary series, a booster dose should be given every 10 years to maintain immunity against tetanus  Td may also be given as tetanus wound prophylaxis  Tdap Vaccine - COST NOT COVERED BY MEDICARE PART B Recommended at least once for all adults  For pregnant patients, recommended with each pregnancy  Shingles Vaccine (Shingrix) - COST NOT COVERED BY MEDICARE PART B  2 shot series recommended in those aged 48 and above     Health Maintenance Due:      Topic Date Due   • DXA SCAN  11/04/2022   • Breast Cancer Screening: Mammogram  08/10/2099 (Originally 12/11/1970)   • Cervical Cancer Screening  10/25/2099 (Originally 12/11/1951)     Immunizations Due:      Topic Date Due   • COVID-19 Vaccine (4 - Booster for Moderna series) 05/10/2022     Advance Directives   What are advance directives? Advance directives are legal documents that state your wishes and plans for medical care  These plans are made ahead of time in case you lose your ability to make decisions for yourself  Advance directives can apply to any medical decision, such as the treatments you want, and if you want to donate organs  What are the types of advance directives?   There are many types of advance directives, and each state has rules about how to use them  You may choose a combination of any of the following:  · Living will: This is a written record of the treatment you want  You can also choose which treatments you do not want, which to limit, and which to stop at a certain time  This includes surgery, medicine, IV fluid, and tube feedings  · Durable power of  for healthcare [facility]): This is a written record that states who you want to make healthcare choices for you when you are unable to make them for yourself  This person, called a proxy, is usually a family member or a friend  You may choose more than 1 proxy  · Do not resuscitate (DNR) order:  A DNR order is used in case your heart stops beating or you stop breathing  It is a request not to have certain forms of treatment, such as CPR  A DNR order may be included in other types of advance directives  · Medical directive: This covers the care that you want if you are in a coma, near death, or unable to make decisions for yourself  You can list the treatments you want for each condition  Treatment may include pain medicine, surgery, blood transfusions, dialysis, IV or tube feedings, and a ventilator (breathing machine)  · Values history: This document has questions about your views, beliefs, and how you feel and think about life  This information can help others choose the care that you would choose  Why are advance directives important? An advance directive helps you control your care  Although spoken wishes may be used, it is better to have your wishes written down  Spoken wishes can be misunderstood, or not followed  Treatments may be given even if you do not want them  An advance directive may make it easier for your family to make difficult choices about your care  Urinary Incontinence   Urinary incontinence (UI)  is when you lose control of your bladder   UI develops because your bladder cannot store or empty urine properly  The 3 most common types of UI are stress incontinence, urge incontinence, or both  Medicines:   · May be given to help strengthen your bladder control  Report any side effects of medication to your healthcare provider  Do pelvic muscle exercises often:  Your pelvic muscles help you stop urinating  Squeeze these muscles tight for 5 seconds, then relax for 5 seconds  Gradually work up to squeezing for 10 seconds  Do 3 sets of 15 repetitions a day, or as directed  This will help strengthen your pelvic muscles and improve bladder control  Train your bladder:  Go to the bathroom at set times, such as every 2 hours, even if you do not feel the urge to go  You can also try to hold your urine when you feel the urge to go  For example, hold your urine for 5 minutes when you feel the urge to go  As that becomes easier, hold your urine for 10 minutes  Self-care:   · Keep a UI record  Write down how often you leak urine and how much you leak  Make a note of what you were doing when you leaked urine  · Drink liquids as directed  You may need to limit the amount of liquid you drink to help control your urine leakage  Do not drink any liquid right before you go to bed  Limit or do not have drinks that contain caffeine or alcohol  · Prevent constipation  Eat a variety of high-fiber foods  Good examples are high-fiber cereals, beans, vegetables, and whole-grain breads  Walking is the best way to trigger your intestines to have a bowel movement  · Exercise regularly and maintain a healthy weight  Weight loss and exercise will decrease pressure on your bladder and help you control your leakage  · Use a catheter as directed  to help empty your bladder  A catheter is a tiny, plastic tube that is put into your bladder to drain your urine  · Go to behavior therapy as directed  Behavior therapy may be used to help you learn to control your urge to urinate      Weight Management   Why it is important to manage your weight:  Being overweight increases your risk of health conditions such as heart disease, high blood pressure, type 2 diabetes, and certain types of cancer  It can also increase your risk for osteoarthritis, sleep apnea, and other respiratory problems  Aim for a slow, steady weight loss  Even a small amount of weight loss can lower your risk of health problems  How to lose weight safely:  A safe and healthy way to lose weight is to eat fewer calories and get regular exercise  You can lose up about 1 pound a week by decreasing the number of calories you eat by 500 calories each day  Healthy meal plan for weight management:  A healthy meal plan includes a variety of foods, contains fewer calories, and helps you stay healthy  A healthy meal plan includes the following:  · Eat whole-grain foods more often  A healthy meal plan should contain fiber  Fiber is the part of grains, fruits, and vegetables that is not broken down by your body  Whole-grain foods are healthy and provide extra fiber in your diet  Some examples of whole-grain foods are whole-wheat breads and pastas, oatmeal, brown rice, and bulgur  · Eat a variety of vegetables every day  Include dark, leafy greens such as spinach, kale, tess greens, and mustard greens  Eat yellow and orange vegetables such as carrots, sweet potatoes, and winter squash  · Eat a variety of fruits every day  Choose fresh or canned fruit (canned in its own juice or light syrup) instead of juice  Fruit juice has very little or no fiber  · Eat low-fat dairy foods  Drink fat-free (skim) milk or 1% milk  Eat fat-free yogurt and low-fat cottage cheese  Try low-fat cheeses such as mozzarella and other reduced-fat cheeses  · Choose meat and other protein foods that are low in fat  Choose beans or other legumes such as split peas or lentils  Choose fish, skinless poultry (chicken or turkey), or lean cuts of red meat (beef or pork)   Before you cook meat or poultry, cut off any visible fat  · Use less fat and oil  Try baking foods instead of frying them  Add less fat, such as margarine, sour cream, regular salad dressing and mayonnaise to foods  Eat fewer high-fat foods  Some examples of high-fat foods include french fries, doughnuts, ice cream, and cakes  · Eat fewer sweets  Limit foods and drinks that are high in sugar  This includes candy, cookies, regular soda, and sweetened drinks  Exercise:  Exercise at least 30 minutes per day on most days of the week  Some examples of exercise include walking, biking, dancing, and swimming  You can also fit in more physical activity by taking the stairs instead of the elevator or parking farther away from stores  Ask your healthcare provider about the best exercise plan for you  © Copyright [employer] 2018 Information is for End User's use only and may not be sold, redistributed or otherwise used for commercial purposes   All illustrations and images included in CareNotes® are the copyrighted property of [employer]  or [address] [place] [address]

## 2023-01-20 ENCOUNTER — TELEPHONE (OUTPATIENT)
Dept: FAMILY MEDICINE CLINIC | Facility: CLINIC | Age: 88
End: 2023-01-20

## 2023-01-20 PROBLEM — Z12.11 COLON CANCER SCREENING: Status: RESOLVED | Noted: 2022-09-13 | Resolved: 2023-01-20

## 2023-01-20 NOTE — TELEPHONE ENCOUNTER
Mayi Mobley woke up with vertigo and said it's making it difficult to get around or do anything  She said she thinks it would be a good idea to have a home nurse again  She wanted to know what she can do in the mean time  She was just seen on 1/17

## 2023-01-23 NOTE — TELEPHONE ENCOUNTER
Can we look through the old notes ans see what social work said about her home care situation in the past   I think she just needs a home aid not a nurse

## 2023-01-24 ENCOUNTER — PATIENT OUTREACH (OUTPATIENT)
Dept: FAMILY MEDICINE CLINIC | Facility: CLINIC | Age: 88
End: 2023-01-24

## 2023-01-24 NOTE — PROGRESS NOTES
Patient lives alone, is requesting someone to "come out to the home and check on her every once in awhile"  Pt is independent in her care  Has supportive neighbors and friends  They assist with errands and meals  Pt has a  "[employer]" that provides her transportation to appointments  Unlikely that patient would meet criteria for [address] [facility] [address] at this time  Discussed private duty caregivers  Pt familiar with same and had them in the past but could not recall name of agency  Provided phone number for [employer] and [employer] [facility]  Pt will call to obtain more information  Will follow up with patient in one month

## 2023-01-30 ENCOUNTER — OFFICE VISIT (OUTPATIENT)
Dept: FAMILY MEDICINE CLINIC | Facility: CLINIC | Age: 88
End: 2023-01-30

## 2023-01-30 VITALS
HEART RATE: 78 BPM | DIASTOLIC BLOOD PRESSURE: 62 MMHG | WEIGHT: 135 LBS | SYSTOLIC BLOOD PRESSURE: 116 MMHG | RESPIRATION RATE: 16 BRPM | OXYGEN SATURATION: 96 % | BODY MASS INDEX: 26.5 KG/M2 | HEIGHT: 60 IN

## 2023-01-30 DIAGNOSIS — G60.9 IDIOPATHIC PERIPHERAL NEUROPATHY: Primary | ICD-10-CM

## 2023-01-30 DIAGNOSIS — R26.2 AMBULATORY DYSFUNCTION: ICD-10-CM

## 2023-01-30 DIAGNOSIS — K59.00 CONSTIPATION, UNSPECIFIED CONSTIPATION TYPE: ICD-10-CM

## 2023-01-30 DIAGNOSIS — M16.12 ARTHRITIS OF LEFT HIP: ICD-10-CM

## 2023-01-30 NOTE — PROGRESS NOTES
Assessment/Plan:    please call PM for HANNY     Go back to 1 lupe tid     Call home aid company     1  Idiopathic peripheral neuropathy  -     traMADol-acetaminophen (ULTRACET) 37 5-325 mg per tablet; Take 1 tablet by mouth 2 (two) times a day as needed for moderate pain    2  Ambulatory dysfunction  -     traMADol-acetaminophen (ULTRACET) 37 5-325 mg per tablet; Take 1 tablet by mouth 2 (two) times a day as needed for moderate pain    3  Arthritis of left hip  -     traMADol-acetaminophen (ULTRACET) 37 5-325 mg per tablet; Take 1 tablet by mouth 2 (two) times a day as needed for moderate pain    4  Constipation, unspecified constipation type       Subjective:      Patient ID: Justin Hansen is a 80 y o  female  HPI    Left pain is worse   Has compresion stockings   Read about the caspacin wraps   Follows with jong - needs dexa scan and iron def   Left hip OA saw brogle for CS but never scheduled       The following portions of the patient's history were reviewed and updated as appropriate: allergies, current medications, past family history, past medical history, past social history, past surgical history and problem list     Review of Systems   Constitutional: Positive for activity change, appetite change and fatigue  Negative for fever and unexpected weight change  HENT: Negative for nosebleeds and trouble swallowing  Eyes: Negative for visual disturbance  Respiratory: Negative for chest tightness and shortness of breath  Cardiovascular: Negative for chest pain, palpitations and leg swelling  Gastrointestinal: Negative for abdominal pain, constipation, diarrhea and nausea  Endocrine: Negative for cold intolerance  Genitourinary: Negative for dysuria and urgency  Musculoskeletal: Positive for back pain and gait problem  Negative for joint swelling and myalgias  Skin: Negative for rash  Neurological: Positive for dizziness, weakness, light-headedness and numbness   Negative for tremors, seizures and syncope  Hematological: Does not bruise/bleed easily  Psychiatric/Behavioral: Negative for hallucinations and suicidal ideas  Objective:      /62 (BP Location: Left arm, Patient Position: Sitting, Cuff Size: Standard)   Pulse 78   Resp 16   Ht 5' (1 524 m)   Wt 61 2 kg (135 lb)   SpO2 96%   BMI 26 37 kg/m²     No visits with results within 2 Week(s) from this visit     Latest known visit with results is:   Admission on 05/26/2022, Discharged on 05/27/2022   Component Date Value   • Ventricular Rate 05/26/2022 61    • Atrial Rate 05/26/2022 63    • FL Interval 05/26/2022 184    • QRSD Interval 05/26/2022 88    • QT Interval 05/26/2022 410    • QTC Interval 05/26/2022 413    • P Axis 05/26/2022 72    • QRS Axis 05/26/2022 -25    • T Wave Axis 05/26/2022 57    • WBC 05/26/2022 4 14 (L)    • RBC 05/26/2022 2 80 (L)    • Hemoglobin 05/26/2022 9 6 (L)    • Hematocrit 05/26/2022 28 8 (L)    • MCV 05/26/2022 103 (H)    • MCH 05/26/2022 34 3    • MCHC 05/26/2022 33 3    • RDW 05/26/2022 11 9    • MPV 05/26/2022 10 5    • Platelets 88/96/0364 206    • nRBC 05/26/2022 0    • Neutrophils Relative 05/26/2022 60    • Immat GRANS % 05/26/2022 0    • Lymphocytes Relative 05/26/2022 23    • Monocytes Relative 05/26/2022 11    • Eosinophils Relative 05/26/2022 5    • Basophils Relative 05/26/2022 1    • Neutrophils Absolute 05/26/2022 2 49    • Immature Grans Absolute 05/26/2022 0 01    • Lymphocytes Absolute 05/26/2022 0 94    • Monocytes Absolute 05/26/2022 0 44    • Eosinophils Absolute 05/26/2022 0 22    • Basophils Absolute 05/26/2022 0 04    • Sodium 05/26/2022 132 (L)    • Potassium 05/26/2022 4 4    • Chloride 05/26/2022 98    • CO2 05/26/2022 28    • ANION GAP 05/26/2022 6    • BUN 05/26/2022 18    • Creatinine 05/26/2022 1 17    • Glucose 05/26/2022 82    • Calcium 05/26/2022 9 2    • eGFR 05/26/2022 40    • A4C EF 05/27/2022 58    • LVIDd 05/27/2022 4 30    • LVIDS 05/27/2022 2 70    • IVSd 05/27/2022 1 00    • LVPWd 05/27/2022 1 00    • FS 05/27/2022 37    • MV E' Tissue Velocity Se* 05/27/2022 5    • E wave deceleration time 05/27/2022 197    • MV Peak E Gregg 05/27/2022 66    • MV Peak A Gregg 05/27/2022 0 89    • AV LVOT peak gradient 05/27/2022 2    • RVID d 05/27/2022 3 7    • RVOT PMAX 05/27/2022 2    • LA size 05/27/2022 4 4    • SIRISHA A4C 05/27/2022 21 7    • RAA A4C 05/27/2022 14 6    • AV peak gradient 05/27/2022 5    • AV peak gradient 05/27/2022 56    • AV Deceleration Time 05/27/2022 1,702    • AV regurgitation pressur* 05/27/2022 494    • MV stenosis pressure 1/2* 05/27/2022 57    • MV valve area p 1/2 meth* 05/27/2022 3 86    • TR Peak Gregg 05/27/2022 2 9    • Triscuspid Valve Regurgi* 05/27/2022 35 0    • PV peak gradient antegra* 05/27/2022 3    • RVOT peak gregg 05/27/2022 0 69    • Ao root 05/27/2022 3 60    • Asc Ao 05/27/2022 3 8    • Tricuspid valve peak reg* 05/27/2022 2 94    • Left ventricular stroke * 05/27/2022 53 00    • IVS 05/27/2022 1    • LEFT VENTRICLE SYSTOLIC * 32/62/6068 28    • LV DIASTOLIC VOLUME (MOD* 73/76/5874 81    • LVSV, 2D 05/27/2022 53    • LV EF 05/27/2022 55    • Sodium 05/27/2022 133 (L)    • Potassium 05/27/2022 4 5    • Chloride 05/27/2022 101    • CO2 05/27/2022 25    • ANION GAP 05/27/2022 7    • BUN 05/27/2022 21    • Creatinine 05/27/2022 1 17    • Glucose 05/27/2022 74    • Calcium 05/27/2022 8 7    • eGFR 05/27/2022 40    • WBC 05/27/2022 4 36    • RBC 05/27/2022 2 71 (L)    • Hemoglobin 05/27/2022 9 2 (L)    • Hematocrit 05/27/2022 27 8 (L)    • MCV 05/27/2022 103 (H)    • MCH 05/27/2022 33 9    • MCHC 05/27/2022 33 1    • RDW 05/27/2022 11 8    • MPV 05/27/2022 10 3    • Platelets 88/73/2742 213    • nRBC 05/27/2022 0    • Neutrophils Relative 05/27/2022 56    • Immat GRANS % 05/27/2022 1    • Lymphocytes Relative 05/27/2022 25    • Monocytes Relative 05/27/2022 11    • Eosinophils Relative 05/27/2022 6    • Basophils Relative 05/27/2022 1    • Neutrophils Absolute 05/27/2022 2 48    • Immature Grans Absolute 05/27/2022 0 03    • Lymphocytes Absolute 05/27/2022 1 07    • Monocytes Absolute 05/27/2022 0 46    • Eosinophils Absolute 05/27/2022 0 27    • Basophils Absolute 05/27/2022 0 05    • TSH 3RD GENERATON 05/27/2022 2 698           Physical Exam  Vitals and nursing note reviewed  Constitutional:       Appearance: She is well-developed  Comments: cane   HENT:      Head: Normocephalic and atraumatic  Cardiovascular:      Rate and Rhythm: Normal rate and regular rhythm  Heart sounds: Normal heart sounds  No murmur heard  Pulmonary:      Effort: Pulmonary effort is normal       Breath sounds: Normal breath sounds  No wheezing or rales  Abdominal:      General: Bowel sounds are normal  There is no distension  Palpations: Abdomen is soft  Tenderness: There is no abdominal tenderness  Musculoskeletal:         General: No tenderness  Normal range of motion  Cervical back: Normal range of motion and neck supple  Right lower leg: Edema (+2) present  Left lower leg: Edema (+1) present  Lymphadenopathy:      Cervical: No cervical adenopathy  Skin:     General: Skin is warm and dry  Capillary Refill: Capillary refill takes less than 2 seconds  Findings: No rash  Neurological:      Mental Status: She is alert and oriented to person, place, and time  Cranial Nerves: No cranial nerve deficit  Sensory: No sensory deficit  Motor: No abnormal muscle tone  Psychiatric:         Behavior: Behavior normal          Thought Content:  Thought content normal          Judgment: Judgment normal              Yu Mcleod MD  [place] [address]

## 2023-02-07 DIAGNOSIS — I10 ESSENTIAL HYPERTENSION: ICD-10-CM

## 2023-02-07 RX ORDER — LOSARTAN POTASSIUM 25 MG/1
TABLET ORAL
Qty: 90 TABLET | Refills: 0 | Status: SHIPPED | OUTPATIENT
Start: 2023-02-07

## 2023-02-21 ENCOUNTER — OFFICE VISIT (OUTPATIENT)
Dept: GASTROENTEROLOGY | Facility: CLINIC | Age: 88
End: 2023-02-21

## 2023-02-21 VITALS
WEIGHT: 141.4 LBS | BODY MASS INDEX: 27.62 KG/M2 | HEART RATE: 68 BPM | DIASTOLIC BLOOD PRESSURE: 76 MMHG | SYSTOLIC BLOOD PRESSURE: 124 MMHG

## 2023-02-21 DIAGNOSIS — Z90.49 H/O RIGHT HEMICOLECTOMY: ICD-10-CM

## 2023-02-21 DIAGNOSIS — D50.0 IRON DEFICIENCY ANEMIA DUE TO CHRONIC BLOOD LOSS: ICD-10-CM

## 2023-02-21 DIAGNOSIS — R14.0 BLOATING: ICD-10-CM

## 2023-02-21 DIAGNOSIS — R22.41 LOCALIZED SWELLING OF RIGHT LOWER EXTREMITY: ICD-10-CM

## 2023-02-21 DIAGNOSIS — K58.1 IRRITABLE BOWEL SYNDROME WITH CONSTIPATION: Primary | ICD-10-CM

## 2023-02-28 ENCOUNTER — PATIENT OUTREACH (OUTPATIENT)
Dept: FAMILY MEDICINE CLINIC | Facility: CLINIC | Age: 88
End: 2023-02-28

## 2023-02-28 NOTE — PROGRESS NOTES
Left message on patients voicemail with my name and contact number if future assistance needed  Will close to complex care management at this time

## 2023-03-21 DIAGNOSIS — L90.0 LICHEN SCLEROSUS: ICD-10-CM

## 2023-03-23 RX ORDER — CLOBETASOL PROPIONATE 0.5 MG/G
OINTMENT TOPICAL 2 TIMES DAILY
Qty: 30 G | Refills: 0 | Status: SHIPPED | OUTPATIENT
Start: 2023-03-23

## 2023-04-04 ENCOUNTER — OFFICE VISIT (OUTPATIENT)
Dept: OBGYN CLINIC | Facility: CLINIC | Age: 88
End: 2023-04-04

## 2023-04-04 VITALS
WEIGHT: 137 LBS | SYSTOLIC BLOOD PRESSURE: 120 MMHG | HEIGHT: 60 IN | DIASTOLIC BLOOD PRESSURE: 84 MMHG | BODY MASS INDEX: 26.9 KG/M2

## 2023-04-04 DIAGNOSIS — L90.0 LICHEN SCLEROSUS: Primary | ICD-10-CM

## 2023-04-05 NOTE — PROGRESS NOTES
Assessment/Plan:     Diagnoses and all orders for this visit:    Lichen sclerosus      20-year-old female  History of colon cancer  Osteoporosis follow-up with PCP  Lichen sclerosis on clobetasol ointment  Plan  Continue clobetasol twice a week  Female hygiene reviewed and discussed with patient  Return to office in 6 months for follow-up    Subjective:      Patient ID: Mignon Quick is a 80 y o  female  HPI  Patient seen and evaluated presented to the office today follow-up on lichen sclerosis denies any complain occasional itching happen when she is not using the ointment importance of being compliant with medication discussed with patient    Denies any vaginal bleeding,  odor or discharge denies any vaginal itching external irritation intermittent    The following portions of the patient's history were reviewed and updated as appropriate: allergies, current medications, past family history, past medical history, past social history, past surgical history and problem list     Review of Systems      Objective:      /84 (BP Location: Left arm, Patient Position: Sitting, Cuff Size: Adult)   Ht 5' (1 524 m)   Wt 62 1 kg (137 lb)   BMI 26 76 kg/m²          Physical Exam    Constitutional:       Appearance: She is well-developed  Abdominal:      General: There is no distension  Palpations: Abdomen is soft  Tenderness: There is no abdominal tenderness  Genitourinary:     Labia:         Right: No rash, tenderness or lesion  Left: No rash, tenderness or lesion  Vagina: No signs of injury  No vaginal discharge, erythema or tenderness  Cervix: No cervical motion tenderness, discharge or friability  Adnexa:         Right: No mass, tenderness or fullness  Left: No mass, tenderness or fullness              Comments: Vulvar atrophy fused labia minora with labia majora noted skin color is waxy then typical for lichen sclerosis no discoloration or other abnormality noted no ulceration noted  Neurological:      Mental Status: She is alert and oriented to person, place, and time     Psychiatric:         Behavior: Behavior normal

## 2023-05-08 RX ORDER — DOXYCYCLINE HYCLATE 100 MG
100 TABLET ORAL 2 TIMES DAILY
COMMUNITY
Start: 2023-02-28 | End: 2023-05-09

## 2023-05-09 ENCOUNTER — OFFICE VISIT (OUTPATIENT)
Dept: FAMILY MEDICINE CLINIC | Facility: CLINIC | Age: 88
End: 2023-05-09

## 2023-05-09 VITALS
HEART RATE: 84 BPM | DIASTOLIC BLOOD PRESSURE: 83 MMHG | WEIGHT: 137 LBS | SYSTOLIC BLOOD PRESSURE: 153 MMHG | OXYGEN SATURATION: 96 % | BODY MASS INDEX: 26.9 KG/M2 | HEIGHT: 60 IN

## 2023-05-09 DIAGNOSIS — K58.1 IRRITABLE BOWEL SYNDROME WITH CONSTIPATION: ICD-10-CM

## 2023-05-09 DIAGNOSIS — M81.0 OSTEOPOROSIS, UNSPECIFIED OSTEOPOROSIS TYPE, UNSPECIFIED PATHOLOGICAL FRACTURE PRESENCE: ICD-10-CM

## 2023-05-09 DIAGNOSIS — I10 PRIMARY HYPERTENSION: ICD-10-CM

## 2023-05-09 DIAGNOSIS — F41.9 ANXIETY: ICD-10-CM

## 2023-05-09 DIAGNOSIS — M16.12 ARTHRITIS OF LEFT HIP: ICD-10-CM

## 2023-05-09 DIAGNOSIS — I49.9 IRREGULARLY IRREGULAR PULSE RHYTHM: Primary | ICD-10-CM

## 2023-05-09 DIAGNOSIS — R60.0 LOWER EXTREMITY EDEMA: ICD-10-CM

## 2023-05-09 PROBLEM — R00.1 BRADYCARDIA: Status: RESOLVED | Noted: 2022-05-26 | Resolved: 2023-05-09

## 2023-05-09 PROBLEM — R42 DIZZINESS: Status: RESOLVED | Noted: 2021-08-30 | Resolved: 2023-05-09

## 2023-05-09 PROBLEM — K56.7 ILEUS (HCC): Status: RESOLVED | Noted: 2022-09-13 | Resolved: 2023-05-09

## 2023-05-09 PROBLEM — R42 VERTIGO: Status: RESOLVED | Noted: 2021-11-02 | Resolved: 2023-05-09

## 2023-05-09 PROBLEM — S22.009A CLOSED FRACTURE OF THORACIC VERTEBRA (HCC): Status: RESOLVED | Noted: 2018-08-31 | Resolved: 2023-05-09

## 2023-05-09 PROBLEM — Z87.81 HISTORY OF COMPRESSION FRACTURE OF SPINE: Status: ACTIVE | Noted: 2023-05-09

## 2023-05-09 PROBLEM — R60.9 EDEMA: Status: RESOLVED | Noted: 2017-06-26 | Resolved: 2023-05-09

## 2023-05-09 PROBLEM — E87.1 HYPONATREMIA: Status: RESOLVED | Noted: 2021-08-30 | Resolved: 2023-05-09

## 2023-05-09 RX ORDER — METOPROLOL SUCCINATE 25 MG/1
25 TABLET, EXTENDED RELEASE ORAL DAILY
Qty: 90 TABLET | Refills: 1 | Status: SHIPPED | OUTPATIENT
Start: 2023-05-09

## 2023-05-09 RX ORDER — FUROSEMIDE 20 MG/1
10 TABLET ORAL DAILY
Qty: 45 TABLET | Refills: 1 | Status: SHIPPED | OUTPATIENT
Start: 2023-05-09

## 2023-05-09 NOTE — PROGRESS NOTES
Assessment/Plan:    1  Irregularly irregular pulse rhythm  -     metoprolol succinate (TOPROL-XL) 25 mg 24 hr tablet; Take 1 tablet (25 mg total) by mouth daily  -     Ambulatory Referral to Cardiology; Future  -     furosemide (LASIX) 20 mg tablet; Take 0 5 tablets (10 mg total) by mouth daily  -     TSH, 3rd generation with Free T4 reflex; Future  -     CBC and differential; Future  -     Magnesium; Future  -     Basic metabolic panel; Future  -     Lipid Panel with Direct LDL reflex; Future    2  Primary hypertension  -     TSH, 3rd generation with Free T4 reflex; Future  -     CBC and differential; Future  -     Magnesium; Future  -     Basic metabolic panel; Future  -     Lipid Panel with Direct LDL reflex; Future    3  Anxiety  -     Ambulatory Referral to [facility]; Future    4  Osteoporosis, unspecified osteoporosis type, unspecified pathological fracture presence    5  Lower extremity edema    6  Irritable bowel syndrome with constipation    7  Arthritis of left hip       Subjective:      Patient ID: Shree Hebert is a 80 y o  female  HPI  Here to go over chronic issues and labs / imaging studies if applicable       C/o edema   r >> L   With doppler neg 1 yr ago for same complaint   Does wear stockings    Just got the antibiotic for it from [facility]    We discussed her chronic leg pain right ankle edema with questionable cellulitis does wear compression stockings  Overall feeling of chest ailment no energy  Chronic constipation for which the Linzess to 90 helps but this caused a fair amount get samples from her GI doctor  Feels she is dizzy all the time and takes meclizine as needed which does not really seem to help much  Her hematologist has suggested that she go on to [facility] she does not want to  Also she needs to have Evenity or Prolia for the osteoporosis she is not sure who she is going to see but it sounds like Dr Noreene Mortimer  A lot of anxiety does live alone we will put a referral into behavioral health      The following portions of the patient's history were reviewed and updated as appropriate: allergies, current medications, past family history, past medical history, past social history, past surgical history and problem list     Review of Systems   Constitutional: Positive for activity change, appetite change and fatigue  Negative for fever and unexpected weight change  HENT: Negative for nosebleeds and trouble swallowing  Eyes: Negative for visual disturbance  Respiratory: Negative for chest tightness and shortness of breath  Cardiovascular: Negative for chest pain, palpitations and leg swelling  Gastrointestinal: Negative for abdominal pain, constipation, diarrhea and nausea  Endocrine: Negative for cold intolerance  Genitourinary: Negative for dysuria and urgency  Musculoskeletal: Positive for back pain and gait problem  Negative for joint swelling and myalgias  Skin: Negative for rash  Neurological: Positive for dizziness, weakness, light-headedness and numbness  Negative for tremors, seizures and syncope  Hematological: Does not bruise/bleed easily  Psychiatric/Behavioral: Negative for hallucinations and suicidal ideas  Objective:      /83   Pulse 84   Ht 5' (1 524 m)   Wt 62 1 kg (137 lb)   SpO2 96%   BMI 26 76 kg/m²     No visits with results within 2 Week(s) from this visit     Latest known visit with results is:   Admission on 05/26/2022, Discharged on 05/27/2022   Component Date Value   • Ventricular Rate 05/26/2022 61    • Atrial Rate 05/26/2022 63    • IN Interval 05/26/2022 184    • QRSD Interval 05/26/2022 88    • QT Interval 05/26/2022 410    • QTC Interval 05/26/2022 413    • P Axis 05/26/2022 72    • QRS Axis 05/26/2022 -25    • T Wave Axis 05/26/2022 57    • WBC 05/26/2022 4 14 (L)    • RBC 05/26/2022 2 80 (L)    • Hemoglobin 05/26/2022 9 6 (L)    • Hematocrit 05/26/2022 28 8 (L)    • MCV 05/26/2022 103 (H)    • MCH 05/26/2022 34 3 • MCHC 05/26/2022 33 3    • RDW 05/26/2022 11 9    • MPV 05/26/2022 10 5    • Platelets 09/67/2231 206    • nRBC 05/26/2022 0    • Neutrophils Relative 05/26/2022 60    • Immat GRANS % 05/26/2022 0    • Lymphocytes Relative 05/26/2022 23    • Monocytes Relative 05/26/2022 11    • Eosinophils Relative 05/26/2022 5    • Basophils Relative 05/26/2022 1    • Neutrophils Absolute 05/26/2022 2 49    • Immature Grans Absolute 05/26/2022 0 01    • Lymphocytes Absolute 05/26/2022 0 94    • Monocytes Absolute 05/26/2022 0 44    • Eosinophils Absolute 05/26/2022 0 22    • Basophils Absolute 05/26/2022 0 04    • Sodium 05/26/2022 132 (L)    • Potassium 05/26/2022 4 4    • Chloride 05/26/2022 98    • CO2 05/26/2022 28    • ANION GAP 05/26/2022 6    • BUN 05/26/2022 18    • Creatinine 05/26/2022 1 17    • Glucose 05/26/2022 82    • Calcium 05/26/2022 9 2    • eGFR 05/26/2022 40    • A4C EF 05/27/2022 58    • LVIDd 05/27/2022 4 30    • LVIDS 05/27/2022 2 70    • IVSd 05/27/2022 1 00    • LVPWd 05/27/2022 1 00    • FS 05/27/2022 37    • MV E' Tissue Velocity Se* 05/27/2022 5    • E wave deceleration time 05/27/2022 197    • MV Peak E Gregg 05/27/2022 66    • MV Peak A Gregg 05/27/2022 0 89    • AV LVOT peak gradient 05/27/2022 2    • RVID d 05/27/2022 3 7    • RVOT PMAX 05/27/2022 2    • LA size 05/27/2022 4 4    • SIRISHA A4C 05/27/2022 21 7    • RAA A4C 05/27/2022 14 6    • AV peak gradient 05/27/2022 5    • AV peak gradient 05/27/2022 56    • AV Deceleration Time 05/27/2022 1,702    • AV regurgitation pressur* 05/27/2022 494    • MV stenosis pressure 1/2* 05/27/2022 57    • MV valve area p 1/2 meth* 05/27/2022 3 86    • TR Peak Gregg 05/27/2022 2 9    • Triscuspid Valve Regurgi* 05/27/2022 35 0    • PV peak gradient antegra* 05/27/2022 3    • RVOT peak gregg 05/27/2022 0 69    • Ao root 05/27/2022 3 60    • Asc Ao 05/27/2022 3 8    • Tricuspid valve peak reg* 05/27/2022 2 94    • Left ventricular stroke * 05/27/2022 53 00    • IVS 05/27/2022 1    • LEFT VENTRICLE SYSTOLIC * 13/08/7245 28    • LV DIASTOLIC VOLUME (MOD* 95/72/8993 81    • LVSV, 2D 05/27/2022 53    • LV EF 05/27/2022 55    • Sodium 05/27/2022 133 (L)    • Potassium 05/27/2022 4 5    • Chloride 05/27/2022 101    • CO2 05/27/2022 25    • ANION GAP 05/27/2022 7    • BUN 05/27/2022 21    • Creatinine 05/27/2022 1 17    • Glucose 05/27/2022 74    • Calcium 05/27/2022 8 7    • eGFR 05/27/2022 40    • WBC 05/27/2022 4 36    • RBC 05/27/2022 2 71 (L)    • Hemoglobin 05/27/2022 9 2 (L)    • Hematocrit 05/27/2022 27 8 (L)    • MCV 05/27/2022 103 (H)    • MCH 05/27/2022 33 9    • MCHC 05/27/2022 33 1    • RDW 05/27/2022 11 8    • MPV 05/27/2022 10 3    • Platelets 23/55/4488 213    • nRBC 05/27/2022 0    • Neutrophils Relative 05/27/2022 56    • Immat GRANS % 05/27/2022 1    • Lymphocytes Relative 05/27/2022 25    • Monocytes Relative 05/27/2022 11    • Eosinophils Relative 05/27/2022 6    • Basophils Relative 05/27/2022 1    • Neutrophils Absolute 05/27/2022 2 48    • Immature Grans Absolute 05/27/2022 0 03    • Lymphocytes Absolute 05/27/2022 1 07    • Monocytes Absolute 05/27/2022 0 46    • Eosinophils Absolute 05/27/2022 0 27    • Basophils Absolute 05/27/2022 0 05    • TSH 3RD GENERATON 05/27/2022 2 698           Physical Exam  Vitals and nursing note reviewed  Constitutional:       Appearance: She is well-developed  Comments: cane   HENT:      Head: Normocephalic and atraumatic  Cardiovascular:      Rate and Rhythm: Normal rate and regular rhythm  Heart sounds: Normal heart sounds  No murmur heard  Pulmonary:      Effort: Pulmonary effort is normal       Breath sounds: Normal breath sounds  No wheezing or rales  Abdominal:      General: Bowel sounds are normal  There is no distension  Palpations: Abdomen is soft  Tenderness: There is no abdominal tenderness  Musculoskeletal:         General: No tenderness  Normal range of motion        Cervical back: Normal range of motion and neck supple  Right lower leg: Edema (+2) present  Left lower leg: Edema (+1) present  Lymphadenopathy:      Cervical: No cervical adenopathy  Skin:     General: Skin is warm and dry  Capillary Refill: Capillary refill takes less than 2 seconds  Findings: No rash  Neurological:      Mental Status: She is alert and oriented to person, place, and time  Cranial Nerves: No cranial nerve deficit  Sensory: No sensory deficit  Motor: No abnormal muscle tone  Psychiatric:         Behavior: Behavior normal          Thought Content:  Thought content normal          Judgment: Judgment normal               Alber Rodriguez MD  [place] [id number]

## 2023-05-10 ENCOUNTER — TELEPHONE (OUTPATIENT)
Dept: FAMILY MEDICINE CLINIC | Facility: CLINIC | Age: 88
End: 2023-05-10

## 2023-05-10 NOTE — TELEPHONE ENCOUNTER
Patient called and stated that she has been urinating quite a bit, Monday she was up 5 times (5 ounces) and Tuesday night quite a few times (6 ounces)  She's concerned because the doctor yesterday said that he was going to call in a water pill and if she's going this much does she need the water

## 2023-05-12 DIAGNOSIS — R35.0 FREQUENCY OF URINATION: Primary | ICD-10-CM

## 2023-05-12 NOTE — TELEPHONE ENCOUNTER
She can do a UA at the hospital over the weekend or here Monday   But she should take the water pill

## 2023-05-17 ENCOUNTER — TELEPHONE (OUTPATIENT)
Dept: FAMILY MEDICINE CLINIC | Facility: CLINIC | Age: 88
End: 2023-05-17

## 2023-05-17 NOTE — TELEPHONE ENCOUNTER
Patient called confused about medication  I spoke with Melo Christensen and he answered her question about the metoprolol and losartan  He said that she should take both  She also wanted to know if she should take both at the same time or at different times

## 2023-05-19 ENCOUNTER — TELEPHONE (OUTPATIENT)
Dept: FAMILY MEDICINE CLINIC | Facility: CLINIC | Age: 88
End: 2023-05-19

## 2023-05-19 NOTE — TELEPHONE ENCOUNTER
Patient called back and wanted to know if it is necessary to take both the metoprolol and losartan, or if she can just take the metoprolol if it is the strongest of the two medications

## 2023-05-28 DIAGNOSIS — I10 ESSENTIAL HYPERTENSION: ICD-10-CM

## 2023-05-30 RX ORDER — LOSARTAN POTASSIUM 25 MG/1
TABLET ORAL
Qty: 90 TABLET | Refills: 0 | Status: SHIPPED | OUTPATIENT
Start: 2023-05-30

## 2023-06-07 ENCOUNTER — OFFICE VISIT (OUTPATIENT)
Dept: GASTROENTEROLOGY | Facility: CLINIC | Age: 88
End: 2023-06-07
Payer: MEDICARE

## 2023-06-07 VITALS
SYSTOLIC BLOOD PRESSURE: 159 MMHG | DIASTOLIC BLOOD PRESSURE: 74 MMHG | BODY MASS INDEX: 26.5 KG/M2 | WEIGHT: 135 LBS | HEART RATE: 54 BPM | HEIGHT: 60 IN

## 2023-06-07 DIAGNOSIS — Z90.49 HISTORY OF RIGHT HEMICOLECTOMY: ICD-10-CM

## 2023-06-07 DIAGNOSIS — K58.1 IRRITABLE BOWEL SYNDROME WITH CONSTIPATION: ICD-10-CM

## 2023-06-07 DIAGNOSIS — R14.0 BLOATING: Primary | ICD-10-CM

## 2023-06-07 PROCEDURE — 99214 OFFICE O/P EST MOD 30 MIN: CPT | Performed by: INTERNAL MEDICINE

## 2023-06-07 NOTE — PROGRESS NOTES
[facility] - Outpatient Follow-up Note  Lesia Hung 80 y o  female MRN: [id number]  Encounter: [id number]          ASSESSMENT AND PLAN:      1  Bloating  80-year-old female come for follow-up with complaint of worsening of bloating  She is taking Linzess 290 mcg p o  daily and having good bowel movement but still having a lot of bloating and gas pain  She denying any heartburn or reflux symptoms  She denying any abdominal pain, advised her to avoid dairy products, will schedule for SIBO breath test to rule out small bowel bacterial overgrowth and then decide about antibiotic course    2  Irritable bowel syndrome with constipation  Continue with Linzess 290 micrograms p o  daily, 1 month supply sample was given from the office, I also advised her to take MiraLAX as needed     3  History of right hemicolectomy  history of colon cancer, s/p right hemicolectomy, last colonoscopy shows no recurrence of cancer    4  History of chronic back pain, lower extremity swelling and neuropathy-, patient has a multiple question about peripheral neuropathy, right lower extremity swelling  Advised to follow-up with orthopedic and podiatrist, I advise her to discuss with PCP for vascular surgery consultation  also  Her echocardiogram from last year shows normal ejection fraction, she is taking low-dose of Lasix, she is also wearing tight stocking, lower extremity Doppler shows no evidence of DVT  ______________________________________________________________________    SUBJECTIVE:  Patient seen and examined, she come for follow-up, she is complaining worsening of abdominal bloating, she is taking Linzess, she denying any constipation but she still having persistent bloating  She denying any heartburn, no reflux symptoms, no nausea or vomiting, her appetite is good    She has a multiple complain about lower extremity swelling, history of peripheral neuropathy and chronic lower back pain, she was seen by multiple specialists including pain management, she tried Neurontin but no improvement in neuropathy related symptoms, she denying any melena, no rectal bleeding  REVIEW OF SYSTEMS IS OTHERWISE NEGATIVE        Historical Information   Past Medical History:   Diagnosis Date   • Allergic rhinitis    • Disease of thyroid gland    • Dizziness    • Hypothyroidism 10/20/2015   • Malignant neoplasm of colon ([facility] [address] )     last assessed: 10/20/2015   • Neuropathy    • Tinnitus      Past Surgical History:   Procedure Laterality Date   • ADENOIDECTOMY     • APPENDECTOMY      last assessed: 10/20/2015   • CATARACT EXTRACTION, BILATERAL     • CHOLECYSTECTOMY      last assessed: 10/20/2015   • COLECTOMY      last assessed: 10/20/2015; partial    • TONSILLECTOMY      last assessed: 10/20/2015     Social History   Social History     Substance and Sexual Activity   Alcohol Use No     Social History     Substance and Sexual Activity   Drug Use Never     Social History     Tobacco Use   Smoking Status Former   • Packs/day: 0 25   • Types: Cigarettes   Smokeless Tobacco Never   Tobacco Comments    quit 50 yrs ago     Family History   Problem Relation Age of Onset   • Heart disease Mother         cardiac disorder   • Alzheimer's disease Sister    • No Known Problems Father    • Leukemia Brother        Meds/Allergies       Current Outpatient Medications:   •  Acetaminophen Extra Strength 500 MG tablet  •  aspirin 81 MG tablet  •  calcium carbonate-vitamin D (OSCAL-D) 500 mg-200 units per tablet  •  cholecalciferol (VITAMIN D3) 1,000 units tablet  •  fexofenadine (ALLEGRA) 180 MG tablet  •  furosemide (LASIX) 20 mg tablet  •  gabapentin (NEURONTIN) 100 mg capsule  •  linaCLOtide (Linzess) 290 MCG CAPS  •  losartan (COZAAR) 25 mg tablet  •  meclizine (ANTIVERT) 25 mg tablet  •  metoprolol succinate (TOPROL-XL) 25 mg 24 hr tablet  •  Nutritional Supplements (Ensure High Protein) LIQD  •  senna-docusate sodium (SENOKOT S) 8 6-50 mg per tablet  •  clobetasol (TEMOVATE) 0 05 % ointment  •  triamcinolone (KENALOG) 0 1 % cream    Allergies   Allergen Reactions   • Penicillins Hives     Other reaction(s): Other (See Comments)  hives           Objective     Blood pressure 159/74, pulse (!) 54, height 5' (1 524 m), weight 61 2 kg (135 lb)  Body mass index is 26 37 kg/m²  PHYSICAL EXAM:      General Appearance:   Alert, cooperative, no distress   HEENT:   Normocephalic, atraumatic, anicteric      Neck:  Supple, symmetrical, trachea midline   Lungs:   Clear to auscultation bilaterally; no rales, rhonchi or wheezing; respirations unlabored    Heart[de-identified]   Regular rate and rhythm; no murmur, rub, or gallop  Abdomen:   Soft, non-tender, non-distended; normal bowel sounds; no masses, no organomegaly    Genitalia:   Deferred    Rectal:   Deferred    Extremities:  No cyanosis, clubbing or edema    Pulses:  2+ and symmetric    Skin:  No jaundice, rashes, or lesions    Lymph nodes:  No palpable cervical lymphadenopathy        Lab Results:   No visits with results within 1 Day(s) from this visit     Latest known visit with results is:   Admission on 05/26/2022, Discharged on 05/27/2022   Component Date Value   • Ventricular Rate 05/26/2022 61    • Atrial Rate 05/26/2022 63    • MI Interval 05/26/2022 184    • QRSD Interval 05/26/2022 88    • QT Interval 05/26/2022 410    • QTC Interval 05/26/2022 413    • P Axis 05/26/2022 72    • QRS Axis 05/26/2022 -25    • T Wave Axis 05/26/2022 57    • WBC 05/26/2022 4 14 (L)    • RBC 05/26/2022 2 80 (L)    • Hemoglobin 05/26/2022 9 6 (L)    • Hematocrit 05/26/2022 28 8 (L)    • MCV 05/26/2022 103 (H)    • MCH 05/26/2022 34 3    • MCHC 05/26/2022 33 3    • RDW 05/26/2022 11 9    • MPV 05/26/2022 10 5    • Platelets 14/03/7660 206    • nRBC 05/26/2022 0    • Neutrophils Relative 05/26/2022 60    • Immat GRANS % 05/26/2022 0    • Lymphocytes Relative 05/26/2022 23    • Monocytes Relative 05/26/2022 11    • Eosinophils Relative 05/26/2022 5    • Basophils Relative 05/26/2022 1    • Neutrophils Absolute 05/26/2022 2 49    • Immature Grans Absolute 05/26/2022 0 01    • Lymphocytes Absolute 05/26/2022 0 94    • Monocytes Absolute 05/26/2022 0 44    • Eosinophils Absolute 05/26/2022 0 22    • Basophils Absolute 05/26/2022 0 04    • Sodium 05/26/2022 132 (L)    • Potassium 05/26/2022 4 4    • Chloride 05/26/2022 98    • CO2 05/26/2022 28    • ANION GAP 05/26/2022 6    • BUN 05/26/2022 18    • Creatinine 05/26/2022 1 17    • Glucose 05/26/2022 82    • Calcium 05/26/2022 9 2    • eGFR 05/26/2022 40    • A4C EF 05/27/2022 58    • LVIDd 05/27/2022 4 30    • LVIDS 05/27/2022 2 70    • IVSd 05/27/2022 1 00    • LVPWd 05/27/2022 1 00    • FS 05/27/2022 37    • MV E' Tissue Velocity Se* 05/27/2022 5    • E wave deceleration time 05/27/2022 197    • MV Peak E Gregg 05/27/2022 66    • MV Peak A Gregg 05/27/2022 0 89    • AV LVOT peak gradient 05/27/2022 2    • RVID d 05/27/2022 3 7    • RVOT PMAX 05/27/2022 2    • LA size 05/27/2022 4 4    • SIRISHA A4C 05/27/2022 21 7    • RAA A4C 05/27/2022 14 6    • AV peak gradient 05/27/2022 5    • AV peak gradient 05/27/2022 56    • AV Deceleration Time 05/27/2022 1,702    • AV regurgitation pressur* 05/27/2022 494    • MV stenosis pressure 1/2* 05/27/2022 57    • MV valve area p 1/2 meth* 05/27/2022 3 86    • TR Peak Gregg 05/27/2022 2 9    • Triscuspid Valve Regurgi* 05/27/2022 35 0    • PV peak gradient antegra* 05/27/2022 3    • RVOT peak gregg 05/27/2022 0 69    • Ao root 05/27/2022 3 60    • Asc Ao 05/27/2022 3 8    • Tricuspid valve peak reg* 05/27/2022 2 94    • Left ventricular stroke * 05/27/2022 53 00    • IVS 05/27/2022 1    • LEFT VENTRICLE SYSTOLIC * 43/55/6648 28    • LV DIASTOLIC VOLUME (MOD* 38/61/4989 81    • LVSV, 2D 05/27/2022 53    • LV EF 05/27/2022 55    • Sodium 05/27/2022 133 (L)    • Potassium 05/27/2022 4 5    • Chloride 05/27/2022 101    • CO2 05/27/2022 25    • ANION GAP 05/27/2022 7    • BUN 05/27/2022 21    • Creatinine 05/27/2022 1 17    • Glucose 05/27/2022 74    • Calcium 05/27/2022 8 7    • eGFR 05/27/2022 40    • WBC 05/27/2022 4 36    • RBC 05/27/2022 2 71 (L)    • Hemoglobin 05/27/2022 9 2 (L)    • Hematocrit 05/27/2022 27 8 (L)    • MCV 05/27/2022 103 (H)    • MCH 05/27/2022 33 9    • MCHC 05/27/2022 33 1    • RDW 05/27/2022 11 8    • MPV 05/27/2022 10 3    • Platelets 11/56/3068 213    • nRBC 05/27/2022 0    • Neutrophils Relative 05/27/2022 56    • Immat GRANS % 05/27/2022 1    • Lymphocytes Relative 05/27/2022 25    • Monocytes Relative 05/27/2022 11    • Eosinophils Relative 05/27/2022 6    • Basophils Relative 05/27/2022 1    • Neutrophils Absolute 05/27/2022 2 48    • Immature Grans Absolute 05/27/2022 0 03    • Lymphocytes Absolute 05/27/2022 1 07    • Monocytes Absolute 05/27/2022 0 46    • Eosinophils Absolute 05/27/2022 0 27    • Basophils Absolute 05/27/2022 0 05    • TSH 3RD GENERATON 05/27/2022 2 698          Radiology Results:   No results found

## 2023-06-13 ENCOUNTER — TELEPHONE (OUTPATIENT)
Dept: GASTROENTEROLOGY | Facility: CLINIC | Age: 88
End: 2023-06-13

## 2023-06-13 NOTE — TELEPHONE ENCOUNTER
I called patient to let her know that she is able to hold the [address] for only the morning of her SIBO test  Patient said she wants to hold off on the SIBO test  She will contact us when she can come in to pick it up   I mailed her information on the breath test

## 2023-06-19 ENCOUNTER — TELEPHONE (OUTPATIENT)
Dept: GASTROENTEROLOGY | Facility: CLINIC | Age: 88
End: 2023-06-19

## 2023-06-19 NOTE — TELEPHONE ENCOUNTER
I called and spoke to patient  I advised her not to take the  samples and she can  new samples at our [address]  She verbalized understanding

## 2023-06-19 NOTE — TELEPHONE ENCOUNTER
Patients GI provider:  Dr Lydia Babb    Number to return call: [phone]    Reason for call: Pt calling stating Nayeli Sanches samples that were given to her were out dated and would like to know if it is ok to take  If not, can she get new ones      Scheduled procedure/appointment date if applicable: N/A

## 2023-06-30 ENCOUNTER — TELEPHONE (OUTPATIENT)
Age: 88
End: 2023-06-30

## 2023-06-30 NOTE — TELEPHONE ENCOUNTER
Spoke with pt >20 min regarding symptoms and her medication not working well  Pt explained her normal BM regimen was daily and she only utilized senna  Currently pt is taking linzess 290mcg and 2 Senna at night  Although pt is having bm daily, bm is hard pellet like stool  Pt admits to straining and not feeling like she emptied out  Her   last bm was this morning  Hard and pellet like  Denies other GI symptoms  Pt looking for further advice to help with symptoms  She also wondered when her f/u OV should be  Pt expressed sadness toward her aging  I validated feelings and reassured pt she can reach out to us with any questions  Ii stated I would reach out to provider for further advice  Pt verbalized gratitude  Pt requests VM be left with any care advice

## 2023-07-06 NOTE — TELEPHONE ENCOUNTER
Left message for patient to take the Miralax powder, one teaspoon daily along with the Linzess. Also to please call office to schedule 6-8 week follow up appointment.

## 2023-07-10 ENCOUNTER — HOSPITAL ENCOUNTER (OUTPATIENT)
Dept: RADIOLOGY | Age: 88
Discharge: HOME | End: 2023-07-10
Payer: MEDICARE

## 2023-07-10 DIAGNOSIS — M81.0 OSTEOPOROSIS, UNSPECIFIED OSTEOPOROSIS TYPE, UNSPECIFIED PATHOLOGICAL FRACTURE PRESENCE: ICD-10-CM

## 2023-07-10 PROCEDURE — 77080 DXA BONE DENSITY AXIAL: CPT

## 2023-07-11 ENCOUNTER — OFFICE VISIT (OUTPATIENT)
Dept: FAMILY MEDICINE CLINIC | Facility: CLINIC | Age: 88
End: 2023-07-11
Payer: MEDICARE

## 2023-07-11 VITALS
WEIGHT: 135 LBS | HEART RATE: 68 BPM | DIASTOLIC BLOOD PRESSURE: 82 MMHG | BODY MASS INDEX: 26.5 KG/M2 | RESPIRATION RATE: 16 BRPM | OXYGEN SATURATION: 98 % | HEIGHT: 60 IN | SYSTOLIC BLOOD PRESSURE: 124 MMHG

## 2023-07-11 DIAGNOSIS — M81.0 OSTEOPOROSIS, UNSPECIFIED OSTEOPOROSIS TYPE, UNSPECIFIED PATHOLOGICAL FRACTURE PRESENCE: ICD-10-CM

## 2023-07-11 DIAGNOSIS — G60.9 IDIOPATHIC PERIPHERAL NEUROPATHY: ICD-10-CM

## 2023-07-11 DIAGNOSIS — K58.1 IRRITABLE BOWEL SYNDROME WITH CONSTIPATION: Primary | ICD-10-CM

## 2023-07-11 DIAGNOSIS — Z87.81 HISTORY OF COMPRESSION FRACTURE OF SPINE: ICD-10-CM

## 2023-07-11 DIAGNOSIS — D64.9 ANEMIA, UNSPECIFIED TYPE: ICD-10-CM

## 2023-07-11 PROCEDURE — 99214 OFFICE O/P EST MOD 30 MIN: CPT | Performed by: FAMILY MEDICINE

## 2023-07-11 NOTE — PROGRESS NOTES
Assessment/Plan:    1. Irritable bowel syndrome with constipation    2. History of compression fracture of spine  Comments:  wears a brace daily for a few years now     3. Osteoporosis, unspecified osteoporosis type, unspecified pathological fracture presence    4. Anemia, unspecified type    5. Idiopathic peripheral neuropathy       Feels the lasix did nothing   Ok to stop it   Use miralax   Cont with linzess   And cont with met suc and losartan  Didn't want another arterial doppler   Will see dr Conrad Aly again   Has internal hip pain with known xray findings   She should get a hip replacement     I still dont have her blood work from hematology     Subjective:      Patient ID: Saul Hewitt is a 80 y.o. female. HPI     Will get the prolia from dr Dot Ramirez now that the dexa is done   Normal hr today   And legs hurt   Still with constopaitn   and using stockings daily     Taking 10mg lasix   Stopped it 1 week ago     The following portions of the patient's history were reviewed and updated as appropriate: allergies, current medications, past family history, past medical history, past social history, past surgical history and problem list.    Review of Systems   Constitutional: Positive for activity change, appetite change and fatigue. Negative for fever and unexpected weight change. HENT: Negative for nosebleeds and trouble swallowing. Eyes: Negative for visual disturbance. Respiratory: Negative for chest tightness and shortness of breath. Cardiovascular: Negative for chest pain, palpitations and leg swelling. Gastrointestinal: Negative for abdominal pain, constipation, diarrhea and nausea. Endocrine: Negative for cold intolerance. Genitourinary: Negative for dysuria and urgency. Musculoskeletal: Positive for back pain and gait problem. Negative for joint swelling and myalgias. Skin: Negative for rash. Neurological: Positive for dizziness, weakness, light-headedness and numbness.  Negative for tremors, seizures and syncope. Hematological: Does not bruise/bleed easily. Psychiatric/Behavioral: Negative for hallucinations and suicidal ideas. Objective:      /82 (BP Location: Left arm, Patient Position: Sitting, Cuff Size: Standard)   Pulse 68   Resp 16   Ht 5' (1.524 m)   Wt 61.2 kg (135 lb)   SpO2 98%   BMI 26.37 kg/m²     No visits with results within 2 Week(s) from this visit.    Latest known visit with results is:   Admission on 05/26/2022, Discharged on 05/27/2022   Component Date Value   • Ventricular Rate 05/26/2022 61    • Atrial Rate 05/26/2022 63    • ME Interval 05/26/2022 184    • QRSD Interval 05/26/2022 88    • QT Interval 05/26/2022 410    • QTC Interval 05/26/2022 413    • P Axis 05/26/2022 72    • QRS Axis 05/26/2022 -25    • T Wave Axis 05/26/2022 57    • WBC 05/26/2022 4.14 (L)    • RBC 05/26/2022 2.80 (L)    • Hemoglobin 05/26/2022 9.6 (L)    • Hematocrit 05/26/2022 28.8 (L)    • MCV 05/26/2022 103 (H)    • MCH 05/26/2022 34.3    • MCHC 05/26/2022 33.3    • RDW 05/26/2022 11.9    • MPV 05/26/2022 10.5    • Platelets 72/09/4485 206    • nRBC 05/26/2022 0    • Neutrophils Relative 05/26/2022 60    • Immat GRANS % 05/26/2022 0    • Lymphocytes Relative 05/26/2022 23    • Monocytes Relative 05/26/2022 11    • Eosinophils Relative 05/26/2022 5    • Basophils Relative 05/26/2022 1    • Neutrophils Absolute 05/26/2022 2.49    • Immature Grans Absolute 05/26/2022 0.01    • Lymphocytes Absolute 05/26/2022 0.94    • Monocytes Absolute 05/26/2022 0.44    • Eosinophils Absolute 05/26/2022 0.22    • Basophils Absolute 05/26/2022 0.04    • Sodium 05/26/2022 132 (L)    • Potassium 05/26/2022 4.4    • Chloride 05/26/2022 98    • CO2 05/26/2022 28    • ANION GAP 05/26/2022 6    • BUN 05/26/2022 18    • Creatinine 05/26/2022 1.17    • Glucose 05/26/2022 82    • Calcium 05/26/2022 9.2    • eGFR 05/26/2022 40    • A4C EF 05/27/2022 58    • LVIDd 05/27/2022 4.30    • LVIDS 05/27/2022 2.70    • IVSd 05/27/2022 1.00    • LVPWd 05/27/2022 1.00    • FS 05/27/2022 37    • MV E' Tissue Velocity Se* 05/27/2022 5    • E wave deceleration time 05/27/2022 197    • MV Peak E Gregg 05/27/2022 66    • MV Peak A Gregg 05/27/2022 0.89    • AV LVOT peak gradient 05/27/2022 2    • RVID d 05/27/2022 3.7    • RVOT PMAX 05/27/2022 2    • LA size 05/27/2022 4.4    • SIRISHA A4C 05/27/2022 21.7    • RAA A4C 05/27/2022 14.6    • AV peak gradient 05/27/2022 5    • AV peak gradient 05/27/2022 56    • AV Deceleration Time 05/27/2022 1,702    • AV regurgitation pressur* 05/27/2022 494    • MV stenosis pressure 1/2* 05/27/2022 57    • MV valve area p 1/2 meth* 05/27/2022 3.86    • TR Peak Gregg 05/27/2022 2.9    • Triscuspid Valve Regurgi* 05/27/2022 35.0    • PV peak gradient antegra* 05/27/2022 3    • RVOT peak gregg 05/27/2022 0.69    • Ao root 05/27/2022 3.60    • Asc Ao 05/27/2022 3.8    • Tricuspid valve peak reg* 05/27/2022 2.94    • Left ventricular stroke * 05/27/2022 53.00    • IVS 05/27/2022 1    • LEFT VENTRICLE SYSTOLIC * 82/40/0315 28    • LV DIASTOLIC VOLUME (MOD* 47/33/2375 81    • LVSV, 2D 05/27/2022 53    • LV EF 05/27/2022 55    • Sodium 05/27/2022 133 (L)    • Potassium 05/27/2022 4.5    • Chloride 05/27/2022 101    • CO2 05/27/2022 25    • ANION GAP 05/27/2022 7    • BUN 05/27/2022 21    • Creatinine 05/27/2022 1.17    • Glucose 05/27/2022 74    • Calcium 05/27/2022 8.7    • eGFR 05/27/2022 40    • WBC 05/27/2022 4.36    • RBC 05/27/2022 2.71 (L)    • Hemoglobin 05/27/2022 9.2 (L)    • Hematocrit 05/27/2022 27.8 (L)    • MCV 05/27/2022 103 (H)    • MCH 05/27/2022 33.9    • MCHC 05/27/2022 33.1    • RDW 05/27/2022 11.8    • MPV 05/27/2022 10.3    • Platelets 16/06/2192 213    • nRBC 05/27/2022 0    • Neutrophils Relative 05/27/2022 56    • Immat GRANS % 05/27/2022 1    • Lymphocytes Relative 05/27/2022 25    • Monocytes Relative 05/27/2022 11    • Eosinophils Relative 05/27/2022 6    • Basophils Relative 05/27/2022 1    • Neutrophils Absolute 05/27/2022 2.48    • Immature Grans Absolute 05/27/2022 0.03    • Lymphocytes Absolute 05/27/2022 1.07    • Monocytes Absolute 05/27/2022 0.46    • Eosinophils Absolute 05/27/2022 0.27    • Basophils Absolute 05/27/2022 0.05    • TSH 3RD GENERATON 05/27/2022 2.698           Physical Exam  Vitals and nursing note reviewed. Constitutional:       Appearance: She is well-developed. Comments: cane   HENT:      Head: Normocephalic and atraumatic. Cardiovascular:      Rate and Rhythm: Normal rate and regular rhythm. Heart sounds: Normal heart sounds. No murmur heard. Pulmonary:      Effort: Pulmonary effort is normal.      Breath sounds: Normal breath sounds. No wheezing or rales. Abdominal:      General: Bowel sounds are normal. There is no distension. Palpations: Abdomen is soft. Tenderness: There is no abdominal tenderness. Musculoskeletal:         General: Tenderness present. Cervical back: Normal range of motion and neck supple. Right lower leg: Edema (+2) present. Left lower leg: Edema (+1) present. Lymphadenopathy:      Cervical: No cervical adenopathy. Skin:     General: Skin is warm and dry. Capillary Refill: Capillary refill takes less than 2 seconds. Findings: No rash. Neurological:      Mental Status: She is alert and oriented to person, place, and time. Cranial Nerves: No cranial nerve deficit. Sensory: No sensory deficit. Motor: No abnormal muscle tone. Psychiatric:         Behavior: Behavior normal.         Thought Content:  Thought content normal.         Judgment: Judgment normal.             Soumya Perdue MD  [address] [place] [address]

## 2023-07-26 ENCOUNTER — TELEPHONE (OUTPATIENT)
Dept: PSYCHIATRY | Facility: CLINIC | Age: 88
End: 2023-07-26

## 2023-07-26 NOTE — TELEPHONE ENCOUNTER
A message was left for pt  To return call to intake dept. Pt will be placed on proper wait list at that time.

## 2023-08-10 DIAGNOSIS — I10 ESSENTIAL HYPERTENSION: ICD-10-CM

## 2023-08-10 RX ORDER — LOSARTAN POTASSIUM 25 MG/1
TABLET ORAL
Qty: 90 TABLET | Refills: 0 | Status: SHIPPED | OUTPATIENT
Start: 2023-08-10

## 2023-08-18 ENCOUNTER — TELEPHONE (OUTPATIENT)
Dept: FAMILY MEDICINE CLINIC | Facility: CLINIC | Age: 88
End: 2023-08-18

## 2023-08-18 ENCOUNTER — TELEPHONE (OUTPATIENT)
Age: 88
End: 2023-08-18

## 2023-08-18 NOTE — TELEPHONE ENCOUNTER
Jeny Craig would like to know if Dr. Loan Sun would order and give her the prolia injections. States current provider she was getting them out is leaving the office. She did have done density done on 7/10/2023.

## 2023-08-18 NOTE — TELEPHONE ENCOUNTER
Patient called in to inquire about Prolia injections. She has had 10 injections to date. Bone density completed 7/10/23. Patient's current provider from [facility] is leaving. Requesting recommendation from provider to continue injections.

## 2023-08-19 DIAGNOSIS — G60.9 IDIOPATHIC PERIPHERAL NEUROPATHY: ICD-10-CM

## 2023-08-19 DIAGNOSIS — K59.09 CHRONIC CONSTIPATION: ICD-10-CM

## 2023-08-21 RX ORDER — LINACLOTIDE 290 UG/1
CAPSULE, GELATIN COATED ORAL DAILY
Qty: 90 CAPSULE | Refills: 0 | Status: SHIPPED | OUTPATIENT
Start: 2023-08-21

## 2023-08-21 RX ORDER — GABAPENTIN 100 MG/1
100 CAPSULE ORAL 3 TIMES DAILY
Qty: 270 CAPSULE | Refills: 0 | Status: SHIPPED | OUTPATIENT
Start: 2023-08-21

## 2023-08-24 ENCOUNTER — APPOINTMENT (EMERGENCY)
Dept: CT IMAGING | Facility: HOSPITAL | Age: 88
End: 2023-08-24
Payer: MEDICARE

## 2023-08-24 ENCOUNTER — APPOINTMENT (EMERGENCY)
Dept: RADIOLOGY | Facility: HOSPITAL | Age: 88
End: 2023-08-24
Payer: MEDICARE

## 2023-08-24 ENCOUNTER — HOSPITAL ENCOUNTER (INPATIENT)
Facility: HOSPITAL | Age: 88
LOS: 3 days | Discharge: NURSING, REHAB OR LONG-TERM CARE | End: 2023-08-28
Attending: EMERGENCY MEDICINE | Admitting: HOSPITALIST
Payer: MEDICARE

## 2023-08-24 ENCOUNTER — TELEPHONE (OUTPATIENT)
Dept: FAMILY MEDICINE CLINIC | Facility: CLINIC | Age: 88
End: 2023-08-24

## 2023-08-24 ENCOUNTER — APPOINTMENT (OUTPATIENT)
Dept: MRI IMAGING | Facility: HOSPITAL | Age: 88
End: 2023-08-24
Payer: MEDICARE

## 2023-08-24 DIAGNOSIS — R26.2 AMBULATORY DYSFUNCTION: ICD-10-CM

## 2023-08-24 DIAGNOSIS — R42 DIZZINESS: Primary | ICD-10-CM

## 2023-08-24 LAB
2HR DELTA HS TROPONIN: 0 NG/L
ALBUMIN SERPL BCP-MCNC: 4.2 G/DL (ref 3.5–5)
ALP SERPL-CCNC: 54 U/L (ref 34–104)
ALT SERPL W P-5'-P-CCNC: 11 U/L (ref 7–52)
ANION GAP SERPL CALCULATED.3IONS-SCNC: 6 MMOL/L
AST SERPL W P-5'-P-CCNC: 15 U/L (ref 13–39)
BACTERIA UR QL AUTO: ABNORMAL /HPF
BASOPHILS # BLD AUTO: 0.04 THOUSANDS/ÂΜL (ref 0–0.1)
BASOPHILS NFR BLD AUTO: 1 % (ref 0–1)
BILIRUB SERPL-MCNC: 0.38 MG/DL (ref 0.2–1)
BILIRUB UR QL STRIP: NEGATIVE
BUN SERPL-MCNC: 22 MG/DL (ref 5–25)
CALCIUM SERPL-MCNC: 9.3 MG/DL (ref 8.4–10.2)
CARDIAC TROPONIN I PNL SERPL HS: 3 NG/L
CARDIAC TROPONIN I PNL SERPL HS: 3 NG/L
CHLORIDE SERPL-SCNC: 99 MMOL/L (ref 96–108)
CLARITY UR: CLEAR
CO2 SERPL-SCNC: 26 MMOL/L (ref 21–32)
COLOR UR: ABNORMAL
CREAT SERPL-MCNC: 0.98 MG/DL (ref 0.6–1.3)
EOSINOPHIL # BLD AUTO: 0.09 THOUSAND/ÂΜL (ref 0–0.61)
EOSINOPHIL NFR BLD AUTO: 2 % (ref 0–6)
ERYTHROCYTE [DISTWIDTH] IN BLOOD BY AUTOMATED COUNT: 12.2 % (ref 11.6–15.1)
GFR SERPL CREATININE-BSD FRML MDRD: 50 ML/MIN/1.73SQ M
GLUCOSE SERPL-MCNC: 79 MG/DL (ref 65–140)
GLUCOSE UR STRIP-MCNC: NEGATIVE MG/DL
HCT VFR BLD AUTO: 30.5 % (ref 34.8–46.1)
HGB BLD-MCNC: 10 G/DL (ref 11.5–15.4)
HGB UR QL STRIP.AUTO: NEGATIVE
IMM GRANULOCYTES # BLD AUTO: 0.02 THOUSAND/UL (ref 0–0.2)
IMM GRANULOCYTES NFR BLD AUTO: 0 % (ref 0–2)
KETONES UR STRIP-MCNC: NEGATIVE MG/DL
LEUKOCYTE ESTERASE UR QL STRIP: ABNORMAL
LYMPHOCYTES # BLD AUTO: 0.81 THOUSANDS/ÂΜL (ref 0.6–4.47)
LYMPHOCYTES NFR BLD AUTO: 18 % (ref 14–44)
MCH RBC QN AUTO: 33.6 PG (ref 26.8–34.3)
MCHC RBC AUTO-ENTMCNC: 32.8 G/DL (ref 31.4–37.4)
MCV RBC AUTO: 102 FL (ref 82–98)
MONOCYTES # BLD AUTO: 0.5 THOUSAND/ÂΜL (ref 0.17–1.22)
MONOCYTES NFR BLD AUTO: 11 % (ref 4–12)
NEUTROPHILS # BLD AUTO: 3.17 THOUSANDS/ÂΜL (ref 1.85–7.62)
NEUTS SEG NFR BLD AUTO: 68 % (ref 43–75)
NITRITE UR QL STRIP: NEGATIVE
NON-SQ EPI CELLS URNS QL MICRO: ABNORMAL /HPF
NRBC BLD AUTO-RTO: 0 /100 WBCS
PH UR STRIP.AUTO: 5.5 [PH]
PLATELET # BLD AUTO: 213 THOUSANDS/UL (ref 149–390)
PMV BLD AUTO: 9.7 FL (ref 8.9–12.7)
POTASSIUM SERPL-SCNC: 4.6 MMOL/L (ref 3.5–5.3)
PROT SERPL-MCNC: 6.8 G/DL (ref 6.4–8.4)
PROT UR STRIP-MCNC: NEGATIVE MG/DL
RBC # BLD AUTO: 2.98 MILLION/UL (ref 3.81–5.12)
RBC #/AREA URNS AUTO: ABNORMAL /HPF
SODIUM SERPL-SCNC: 131 MMOL/L (ref 135–147)
SP GR UR STRIP.AUTO: 1.01 (ref 1–1.03)
UROBILINOGEN UR STRIP-ACNC: <2 MG/DL
WBC # BLD AUTO: 4.63 THOUSAND/UL (ref 4.31–10.16)
WBC #/AREA URNS AUTO: ABNORMAL /HPF

## 2023-08-24 PROCEDURE — 85025 COMPLETE CBC W/AUTO DIFF WBC: CPT | Performed by: PHYSICIAN ASSISTANT

## 2023-08-24 PROCEDURE — 36415 COLL VENOUS BLD VENIPUNCTURE: CPT | Performed by: PHYSICIAN ASSISTANT

## 2023-08-24 PROCEDURE — G1004 CDSM NDSC: HCPCS

## 2023-08-24 PROCEDURE — 93005 ELECTROCARDIOGRAM TRACING: CPT

## 2023-08-24 PROCEDURE — 71046 X-RAY EXAM CHEST 2 VIEWS: CPT

## 2023-08-24 PROCEDURE — 70450 CT HEAD/BRAIN W/O DYE: CPT

## 2023-08-24 PROCEDURE — 99223 1ST HOSP IP/OBS HIGH 75: CPT | Performed by: INTERNAL MEDICINE

## 2023-08-24 PROCEDURE — 80053 COMPREHEN METABOLIC PANEL: CPT | Performed by: PHYSICIAN ASSISTANT

## 2023-08-24 PROCEDURE — 99285 EMERGENCY DEPT VISIT HI MDM: CPT

## 2023-08-24 PROCEDURE — 99285 EMERGENCY DEPT VISIT HI MDM: CPT | Performed by: PHYSICIAN ASSISTANT

## 2023-08-24 PROCEDURE — 81001 URINALYSIS AUTO W/SCOPE: CPT | Performed by: PHYSICIAN ASSISTANT

## 2023-08-24 PROCEDURE — 70551 MRI BRAIN STEM W/O DYE: CPT

## 2023-08-24 PROCEDURE — 84484 ASSAY OF TROPONIN QUANT: CPT | Performed by: PHYSICIAN ASSISTANT

## 2023-08-24 RX ORDER — ACETAMINOPHEN 325 MG/1
650 TABLET ORAL EVERY 4 HOURS PRN
Status: DISCONTINUED | OUTPATIENT
Start: 2023-08-24 | End: 2023-08-28 | Stop reason: HOSPADM

## 2023-08-24 RX ORDER — METOPROLOL SUCCINATE 25 MG/1
25 TABLET, EXTENDED RELEASE ORAL DAILY
Status: DISCONTINUED | OUTPATIENT
Start: 2023-08-25 | End: 2023-08-28 | Stop reason: HOSPADM

## 2023-08-24 RX ORDER — ONDANSETRON 2 MG/ML
4 INJECTION INTRAMUSCULAR; INTRAVENOUS EVERY 6 HOURS PRN
Status: DISCONTINUED | OUTPATIENT
Start: 2023-08-24 | End: 2023-08-28 | Stop reason: HOSPADM

## 2023-08-24 RX ORDER — MECLIZINE HYDROCHLORIDE 25 MG/1
25 TABLET ORAL EVERY 8 HOURS SCHEDULED
Status: DISCONTINUED | OUTPATIENT
Start: 2023-08-24 | End: 2023-08-28 | Stop reason: HOSPADM

## 2023-08-24 RX ORDER — SODIUM CHLORIDE 9 MG/ML
50 INJECTION, SOLUTION INTRAVENOUS CONTINUOUS
Status: DISCONTINUED | OUTPATIENT
Start: 2023-08-24 | End: 2023-08-25

## 2023-08-24 RX ORDER — GABAPENTIN 100 MG/1
100 CAPSULE ORAL 3 TIMES DAILY
Status: DISCONTINUED | OUTPATIENT
Start: 2023-08-24 | End: 2023-08-28 | Stop reason: HOSPADM

## 2023-08-24 RX ORDER — ENOXAPARIN SODIUM 100 MG/ML
30 INJECTION SUBCUTANEOUS DAILY
Status: DISCONTINUED | OUTPATIENT
Start: 2023-08-25 | End: 2023-08-28 | Stop reason: HOSPADM

## 2023-08-24 RX ORDER — ENOXAPARIN SODIUM 100 MG/ML
40 INJECTION SUBCUTANEOUS DAILY
Status: DISCONTINUED | OUTPATIENT
Start: 2023-08-25 | End: 2023-08-24 | Stop reason: DRUGHIGH

## 2023-08-24 RX ORDER — ASPIRIN 325 MG
325 TABLET ORAL ONCE
Status: COMPLETED | OUTPATIENT
Start: 2023-08-24 | End: 2023-08-24

## 2023-08-24 RX ORDER — LORATADINE 10 MG/1
10 TABLET ORAL DAILY
Status: DISCONTINUED | OUTPATIENT
Start: 2023-08-25 | End: 2023-08-28 | Stop reason: HOSPADM

## 2023-08-24 RX ADMIN — GABAPENTIN 100 MG: 100 CAPSULE ORAL at 19:34

## 2023-08-24 RX ADMIN — SODIUM CHLORIDE 50 ML/HR: 0.9 INJECTION, SOLUTION INTRAVENOUS at 19:40

## 2023-08-24 RX ADMIN — MECLIZINE HYDROCHLORIDE 25 MG: 25 TABLET ORAL at 21:40

## 2023-08-24 RX ADMIN — ASPIRIN 325 MG ORAL TABLET 325 MG: 325 PILL ORAL at 19:33

## 2023-08-24 NOTE — ASSESSMENT & PLAN NOTE
Sodium noted to be 131 upon admission  Suspect related to poor oral intake  Provide gentle IV fluid  Recheck in a.m.

## 2023-08-24 NOTE — TELEPHONE ENCOUNTER
Patient called in and stated she's very dizzy more than normal, pain in both legs and very hard to walk. I advised patient to be evaluated at the ER and she said she'll have someone take her to [facility]. She lives alone and is just very worried.

## 2023-08-24 NOTE — ASSESSMENT & PLAN NOTE
Home regimen Cozaar 25 mg daily, metoprolol succinate 25 mg daily  Hold Cozaar to allow for permissive hypertension  Lasix was on patient's med rec but she states she is not taking this

## 2023-08-24 NOTE — ED NOTES
Patient transported to [facility] [address], RN  08/24/23 [id number]

## 2023-08-24 NOTE — PROGRESS NOTES
Pharmacy-Driven Renal Dosing Protocol    Per [employer] approved Pharmacy-Driven Renal Dosing Protocol, enoxaparin (Lovenox) 40 mg SQ daily has been reduced to enoxaparin (Lovenox) 30 mg SQ daily for a CrCl < 30 mL/min.     Dimitris Kang, PharmD  Pharmacist

## 2023-08-24 NOTE — H&P
[facility]  H&P  Name: Alma Allison 80 y.o. female I MRN: [id number]  Unit/Bed#: S -01 I Date of Admission: 8/24/2023   Date of Service: 8/24/2023 I Hospital Day: 0      Assessment/Plan   * Dizziness  Assessment & Plan  Presenting with complaint of dizziness and difficulty ambulating  Has a history of vertigo however symptoms are worse than usual  CT head with no acute intracranial abnormality, stable chronic microangiopathic changes  Placed on CVA pathway  Already on aspirin 81 mg daily  Give aspirin bolus 325 mg now  Currently refusing statin until lipid panel is checked  Check hemoglobin A1c and lipid panel  Check MRI brain  Carotid ultrasound  Obtain echocardiogram  PT/OT consulted  Place on telemetry  Allow for permissive hypertension  Trial scheduled meclizine  Consult neurology     Ambulatory dysfunction  Assessment & Plan  With difficulty walking  Placed on stroke pathway  PT OT evaluation  Uses cane to ambulate at baseline but currently unable    Hyponatremia  Assessment & Plan  Sodium noted to be 131 upon admission  Suspect related to poor oral intake  Provide gentle IV fluid  Recheck in a.m.     Anemia  Assessment & Plan  History of anemia  Hemoglobin stable at 10.0  No signs of active bleeding    CKD (chronic kidney disease) stage 3, GFR 30-59 ml/min [facility])  Assessment & Plan  Lab Results   Component Value Date    EGFR 50 08/24/2023    EGFR 40 05/27/2022    EGFR 40 05/26/2022    CREATININE 0.98 08/24/2023    CREATININE 1.17 05/27/2022    CREATININE 1.17 05/26/2022   Creatinine appearing to be around baseline    Primary hypertension  Assessment & Plan  Home regimen Cozaar 25 mg daily, metoprolol succinate 25 mg daily  Hold Cozaar to allow for permissive hypertension  Lasix was on patient's med rec but she states she is not taking this        VTE Prophylaxis: Enoxaparin (Lovenox)  / sequential compression device   Code Status: DNR/DNI  Anticipated Length of Stay: Patient will be admitted on an Observation basis with an anticipated length of stay of less than 2 midnights. Justification for Hospital Stay: Dizziness/ambulatory dysfunction and CVA pathway    Chief Complaint:   Dizziness / unable to ambulate    History of Present Illness: Sudhir Reddy is a 80 y.o. female with a past medical history of BPV, hypertension, history of colon cancer, allergic rhinitis, peripheral neuropathy. She presents with the complaint of dizziness that started this morning upon waking up. She reports she usually feels dizzy in the morning but it does subside on its own or with meclizine. Today her symptoms persisted and she was unable to ambulate. She reports her swaying and felt like she was going to fall over. She was nervous as she lives at home alone and decided to seek further evaluation in the emergency room. She does have a history of vertigo however symptoms seem worse at this time. She denies any nausea or vomiting. In the ED her CT head was negative for acute intracranial abnormality and her lab work was essentially normal except for a sodium of 141 and a hemoglobin of 10.0. Usually does not use any assistive devices in her home. Uses a cane when she is out of the house. Former smoker. Does not drink alcohol or use drugs. Review of Systems:    General:   No Fever or chills; + unable to ambulate   EENT:   No ear pain, facial swelling; No sneezing, sore throat. Skin:   No rashes, color changes. Respiratory:     No shortness of breath, cough, wheezing, stridor. Cardiovascular:     No chest pain, palpitations. Gastrointestinal:    No nausea, vomiting, diarrhea; No abdominal pain. Musculoskeletal:     No arthralgias, myalgias, swelling. Neurologic:   + dizziness, numbness, weakness. No speech difficulties. Psych:   No agitation, suicidal ideations.     Otherwise, All other twelve-point review of systems normal.     Past Medical and Surgical History: Past Medical History:   Diagnosis Date   • Allergic rhinitis    • Disease of thyroid gland    • Dizziness    • Hypothyroidism 10/20/2015   • Malignant neoplasm of colon ([address])     last assessed: 10/20/2015   • Neuropathy    • Tinnitus        Past Surgical History:   Procedure Laterality Date   • ADENOIDECTOMY     • APPENDECTOMY      last assessed: 10/20/2015   • CATARACT EXTRACTION, BILATERAL     • CHOLECYSTECTOMY      last assessed: 10/20/2015   • COLECTOMY      last assessed: 10/20/2015; partial    • TONSILLECTOMY      last assessed: 10/20/2015       Meds/Allergies:    Current Facility-Administered Medications   Medication Dose Route Frequency Provider Last Rate Last Admin   • acetaminophen (TYLENOL) tablet 650 mg  650 mg Oral Q4H PRN Clarita Bower PA-C       • [START ON 8/25/2023] aspirin (ECOTRIN LOW STRENGTH) EC tablet 81 mg  81 mg Oral Daily Amaris Guevara PA-C       • aspirin tablet 325 mg  325 mg Oral Once Clarita Bower PA-C       • [START ON 8/25/2023] enoxaparin (LOVENOX) subcutaneous injection 40 mg  40 mg Subcutaneous Daily Amaris Guevara PA-C       • gabapentin (NEURONTIN) capsule 100 mg  100 mg Oral TID Clarita Bower PA-C       • [START ON 8/25/2023] loratadine (CLARITIN) tablet 10 mg  10 mg Oral Daily Amaris Guevara PA-C       • meclizine (ANTIVERT) tablet 25 mg  25 mg Oral Q8H [address] Amaris Guevara PA-C       • [START ON 8/25/2023] metoprolol succinate (TOPROL-XL) 24 hr tablet 25 mg  25 mg Oral Daily Amaris Guevara PA-C       • ondansetron (ZOFRAN) injection 4 mg  4 mg Intravenous Q6H PRN Amaris Guevara PA-C       • sodium chloride 0.9 % infusion  50 mL/hr Intravenous Continuous Amaris Guevara PA-C           Allergies   Allergen Reactions   • Penicillins Hives     Other reaction(s): Other (See Comments)  hives       Allergies: Allergies   Allergen Reactions   • Penicillins Hives     Other reaction(s): Other (See Comments)  hives       Social History:     Marital Status:       Substance Use History:   Social History     Substance and Sexual Activity   Alcohol Use No     Social History     Tobacco Use   Smoking Status Former   • Packs/day: 0.25   • Types: Cigarettes   Smokeless Tobacco Never   Tobacco Comments    quit 50 yrs ago     Social History     Substance and Sexual Activity   Drug Use Never       Family History:    CAD    Physical Exam:     Vitals:   Blood Pressure: 162/76 (08/24/23 1608)  Pulse: 91 (08/24/23 1515)  Temperature: 98.4 °F (36.9 °C) (08/24/23 1608)  Temp Source: Oral (08/24/23 1200)  Respirations: 16 (08/24/23 1515)  SpO2: 94 % (08/24/23 1515)    Physical Exam  Vitals and nursing note reviewed. Constitutional:       General: She is not in acute distress. Appearance: Normal appearance. She is normal weight. She is not ill-appearing, toxic-appearing or diaphoretic. Comments: Kyphosis   HENT:      Head: Normocephalic and atraumatic. Eyes:      General: No scleral icterus. Cardiovascular:      Rate and Rhythm: Normal rate and regular rhythm. Heart sounds: Murmur heard. Pulmonary:      Effort: Pulmonary effort is normal. No respiratory distress. Breath sounds: Normal breath sounds. No stridor. No wheezing or rhonchi. Abdominal:      General: Bowel sounds are normal. There is no distension. Palpations: Abdomen is soft. There is no mass. Tenderness: There is no abdominal tenderness. Hernia: No hernia is present. Musculoskeletal:      Cervical back: Neck supple. Skin:     General: Skin is warm and dry. Neurological:      Mental Status: She is oriented to person, place, and time. Mental status is at baseline. Cranial Nerves: No cranial nerve deficit. Sensory: No sensory deficit. Motor: No weakness.       Coordination: Coordination abnormal.      Gait: Gait abnormal.   Psychiatric:         Mood and Affect: Mood normal.         Behavior: Behavior normal.         Additional Data:     Lab Results: I have personally reviewed pertinent reports. Results from last 7 days   Lab Units 08/24/23  1218   WBC Thousand/uL 4.63   HEMOGLOBIN g/dL 10.0*   HEMATOCRIT % 30.5*   PLATELETS Thousands/uL 213   NEUTROS PCT % 68   LYMPHS PCT % 18   MONOS PCT % 11   EOS PCT % 2     Results from last 7 days   Lab Units 08/24/23  1218   POTASSIUM mmol/L 4.6   CHLORIDE mmol/L 99   CO2 mmol/L 26   BUN mg/dL 22   CREATININE mg/dL 0.98   CALCIUM mg/dL 9.3   ALK PHOS U/L 54   ALT U/L 11   AST U/L 15           Lines/Drains:  Invasive Devices     Peripheral Intravenous Line  Duration           Peripheral IV 08/24/23 Left Antecubital <1 day                Imaging: I have personally reviewed pertinent reports. CT head without contrast    Result Date: 8/24/2023  Narrative: CT BRAIN - WITHOUT CONTRAST INDICATION:   Dizziness, non-specific dizziness. COMPARISON: 11/3/2021 TECHNIQUE:  CT examination of the brain was performed. Multiplanar 2D reformatted images were created from the source data. Radiation dose length product (DLP) for this visit:  984 mGy-cm . This examination, like all CT scans performed in the [facility], was performed utilizing techniques to minimize radiation dose exposure, including the use of iterative reconstruction and automated exposure control. IMAGE QUALITY:  Diagnostic. FINDINGS: PARENCHYMA: Decreased attenuation is noted in periventricular and subcortical white matter demonstrating an appearance that is statistically most likely to represent moderate microangiopathic change; this appearance is similar when compared to most recent prior examination. No CT signs of acute infarction. No intracranial mass, mass effect or midline shift. No acute parenchymal hemorrhage. VENTRICLES AND EXTRA-AXIAL SPACES:  Normal for the patient's age. VISUALIZED ORBITS: Normal visualized orbits. PARANASAL SINUSES: Normal visualized paranasal sinuses.  CALVARIUM AND EXTRACRANIAL SOFT TISSUES:  Normal.     Impression: No acute intracranial abnormality. Stable chronic microangiopathic changes within the brain. Workstation performed: [id number]     XR chest 2 views    Result Date: 2023  Narrative: CHEST INDICATION:   dizziness. COMPARISON: 2021 EXAM PERFORMED/VIEWS:  XR CHEST PA & LATERAL The frontal view was performed utilizing dual energy radiographic technique. FINDINGS: Cardiomediastinal silhouette appears unremarkable. The lungs are clear. Chronic interstitial accentuation. Lower thoracic 2 contiguous vertebroplasties. No pneumothorax or pleural effusion. Osseous structures appear within normal limits for patient age. Impression: No acute cardiopulmonary disease. Workstation performed: [id number]       EKG, Pathology, and Other Studies Reviewed on Admission:   · EK braydcardia    [employer] Records Reviewed: Yes     Total Time for Visit, including Counseling / Coordination of Care: 1 hour. Greater than 50% of this total time spent on direct patient counseling and coordination of care      ** Please Note: This note has been constructed using a voice recognition system.  **

## 2023-08-24 NOTE — ED PROVIDER NOTES
History  Chief Complaint   Patient presents with   • Difficulty Walking     Pt presents with ambulatory dysfunction, states she woke up this am and couldn't walk. States she was dizzy and had decreased muscle strength     43-year-old female presents the emergency department with complaints of dizziness and difficulty with ambulation. States she has had ongoing issues with intermittent dizziness for some time. Feels that things have been slowly getting worse. Usually takes meclizine for vertigo as needed. Notes that she has been afraid of falling over the past several days and had difficulty with ambulation today even with her cane. States that both of her legs have been feeling increasingly weak. She denies chest pain or difficulty breathing. No headaches, or changes in her speech or vision. History provided by:  Patient   used: No        Prior to Admission Medications   Prescriptions Last Dose Informant Patient Reported? Taking?    Acetaminophen Extra Strength 500 MG tablet  Self Yes No   Linzess 290 MCG CAPS   No No   Sig: Take 1 capsule by mouth once daily   Nutritional Supplements (Ensure High Protein) LIQD  Self No No   Sig: Take 1 Can by mouth 2 (two) times a day   aspirin 81 MG tablet  Self Yes No   Sig: Take 81 mg by mouth   calcium carbonate-vitamin D (OSCAL-D) 500 mg-200 units per tablet  Self No No   Sig: Take 1 tablet by mouth daily with breakfast   cholecalciferol (VITAMIN D3) 1,000 units tablet  Self Yes No   fexofenadine (ALLEGRA) 180 MG tablet  Self Yes No   Sig: Take 180 mg by mouth daily   furosemide (LASIX) 20 mg tablet  Self No No   Sig: Take 0.5 tablets (10 mg total) by mouth daily   gabapentin (NEURONTIN) 100 mg capsule   No No   Sig: TAKE 1 CAPSULE BY MOUTH THREE TIMES DAILY   losartan (COZAAR) 25 mg tablet   No No   Sig: Take 1 tablet by mouth once daily   meclizine (ANTIVERT) 25 mg tablet  Self No No   Sig: TAKE 1 TABLET BY MOUTH EVERY 8 HOURS   metoprolol succinate (TOPROL-XL) 25 mg 24 hr tablet  Self No No   Sig: Take 1 tablet (25 mg total) by mouth daily   senna-docusate sodium (SENOKOT S) 8.6-50 mg per tablet  Self Yes No   Sig: Take 2 tablets by mouth daily      Facility-Administered Medications: None       Past Medical History:   Diagnosis Date   • Allergic rhinitis    • Disease of thyroid gland    • Dizziness    • Hypothyroidism 10/20/2015   • Malignant neoplasm of colon ([address])     last assessed: 10/20/2015   • Neuropathy    • Tinnitus        Past Surgical History:   Procedure Laterality Date   • ADENOIDECTOMY     • APPENDECTOMY      last assessed: 10/20/2015   • CATARACT EXTRACTION, BILATERAL     • CHOLECYSTECTOMY      last assessed: 10/20/2015   • COLECTOMY      last assessed: 10/20/2015; partial    • TONSILLECTOMY      last assessed: 10/20/2015       Family History   Problem Relation Age of Onset   • Heart disease Mother         cardiac disorder   • Alzheimer's disease Sister    • No Known Problems Father    • Leukemia Brother      I have reviewed and agree with the history as documented. E-Cigarette/Vaping   • E-Cigarette Use Never User      E-Cigarette/Vaping Substances   • Nicotine No    • THC No    • CBD No    • Flavoring No    • Other No    • Unknown No      Social History     Tobacco Use   • Smoking status: Former     Packs/day: 0.25     Types: Cigarettes   • Smokeless tobacco: Never   • Tobacco comments:     quit 50 yrs ago   Vaping Use   • Vaping Use: Never used   Substance Use Topics   • Alcohol use: No   • Drug use: Never       Review of Systems   Constitutional: Negative for activity change, appetite change, chills and fever. HENT: Negative for congestion, dental problem, drooling, ear discharge, ear pain, mouth sores, nosebleeds, rhinorrhea, sore throat and trouble swallowing. Eyes: Negative for pain, discharge and itching. Respiratory: Negative for cough, chest tightness, shortness of breath and wheezing.     Cardiovascular: Negative for chest pain and palpitations. Gastrointestinal: Negative for abdominal pain, blood in stool, constipation, diarrhea, nausea and vomiting. Endocrine: Negative for cold intolerance and heat intolerance. Genitourinary: Negative for difficulty urinating, dysuria, flank pain, frequency and urgency. Musculoskeletal: Positive for gait problem. Skin: Negative for rash and wound. Allergic/Immunologic: Negative for food allergies and immunocompromised state. Neurological: Positive for dizziness and weakness. Negative for seizures, syncope, numbness and headaches. Psychiatric/Behavioral: Negative for agitation, behavioral problems and confusion. Physical Exam  Physical Exam  Vitals and nursing note reviewed. Constitutional:       General: She is not in acute distress. Appearance: She is not diaphoretic. HENT:      Head: Normocephalic and atraumatic. Right Ear: External ear normal.      Left Ear: External ear normal.      Mouth/Throat:      Mouth: Mucous membranes are moist.   Eyes:      Extraocular Movements: Extraocular movements intact. Conjunctiva/sclera: Conjunctivae normal.      Pupils: Pupils are equal, round, and reactive to light. Neck:      Vascular: No JVD. Trachea: No tracheal deviation. Cardiovascular:      Rate and Rhythm: Normal rate. Rhythm irregular. Heart sounds: Normal heart sounds. No murmur heard. No friction rub. No gallop. Pulmonary:      Effort: Pulmonary effort is normal. No respiratory distress. Breath sounds: Normal breath sounds. No wheezing or rales. Chest:      Chest wall: No tenderness. Abdominal:      General: Bowel sounds are normal. There is no distension. Palpations: Abdomen is soft. Tenderness: There is no abdominal tenderness. There is no guarding. Musculoskeletal:         General: No tenderness or deformity. Normal range of motion. Lymphadenopathy:      Cervical: No cervical adenopathy.    Skin:     General: Skin is warm and dry. Findings: No erythema or rash. Neurological:      Mental Status: She is alert and oriented to person, place, and time.    Psychiatric:         Mood and Affect: Mood normal.         Behavior: Behavior normal.         Vital Signs  ED Triage Vitals [08/24/23 1200]   Temperature Pulse Respirations Blood Pressure SpO2   98.2 °F (36.8 °C) 58 18 158/74 98 %      Temp Source Heart Rate Source Patient Position - Orthostatic VS BP Location FiO2 (%)   Oral Monitor Sitting Right arm --      Pain Score       No Pain           Vitals:    08/24/23 1200 08/24/23 1300 08/24/23 1400 08/24/23 1515   BP: 158/74 166/73 (!) 174/74 98/66   Pulse: 58 (!) 53 56 91   Patient Position - Orthostatic VS: Sitting            Visual Acuity      ED Medications  Medications - No data to display    Diagnostic Studies  Results Reviewed     Procedure Component Value Units Date/Time    Urine Microscopic [[id number]]  (Abnormal) Collected: 08/24/23 1510    Lab Status: Final result Specimen: Urine, Clean Catch Updated: 08/24/23 1525     RBC, UA None Seen /hpf      WBC, UA 2-4 /hpf      Epithelial Cells None Seen /hpf      Bacteria, UA Occasional /hpf     UA w Reflex to Microscopic w Reflex to Culture [[id number]]  (Abnormal) Collected: 08/24/23 1510    Lab Status: Final result Specimen: Urine, Clean Catch Updated: 08/24/23 1525     Color, UA Light Yellow     Clarity, UA Clear     Specific Gravity, UA 1.013     pH, UA 5.5     Leukocytes, UA Moderate     Nitrite, UA Negative     Protein, UA Negative mg/dl      Glucose, UA Negative mg/dl      Ketones, UA Negative mg/dl      Urobilinogen, UA <2.0 mg/dl      Bilirubin, UA Negative     Occult Blood, UA Negative    HS Troponin I 2hr [[id number]]  (Normal) Collected: 08/24/23 1401    Lab Status: Final result Specimen: Blood from Arm, Left Updated: 08/24/23 1431     hs TnI 2hr 3 ng/L      Delta 2hr hsTnI 0 ng/L     HS Troponin 0hr (reflex protocol) [[id number]]  (Normal) Collected: 08/24/23 1218    Lab Status: Final result Specimen: Blood from Arm, Left Updated: 08/24/23 1251     hs TnI 0hr 3 ng/L     Comprehensive metabolic panel [[id number]]  (Abnormal) Collected: 08/24/23 1218    Lab Status: Final result Specimen: Blood from Arm, Left Updated: 08/24/23 1245     Sodium 131 mmol/L      Potassium 4.6 mmol/L      Chloride 99 mmol/L      CO2 26 mmol/L      ANION GAP 6 mmol/L      BUN 22 mg/dL      Creatinine 0.98 mg/dL      Glucose 79 mg/dL      Calcium 9.3 mg/dL      AST 15 U/L      ALT 11 U/L      Alkaline Phosphatase 54 U/L      Total Protein 6.8 g/dL      Albumin 4.2 g/dL      Total Bilirubin 0.38 mg/dL      eGFR 50 ml/min/1.73sq m     Narrative:      National Kid[employer] Disease Foundation guidelines for Chronic Kidney Disease (CKD):   •  Stage 1 with normal or high GFR (GFR > 90 mL/min/1.73 square meters)  •  Stage 2 Mild CKD (GFR = 60-89 mL/min/1.73 square meters)  •  Stage 3A Moderate CKD (GFR = 45-59 mL/min/1.73 square meters)  •  Stage 3B Moderate CKD (GFR = 30-44 mL/min/1.73 square meters)  •  Stage 4 Severe CKD (GFR = 15-29 mL/min/1.73 square meters)  •  Stage 5 End Stage CKD (GFR <15 mL/min/1.73 square meters)  Note: GFR calculation is accurate only with a steady state creatinine    CBC and differential [[id number]]  (Abnormal) Collected: 08/24/23 1218    Lab Status: Final result Specimen: Blood from Arm, Left Updated: 08/24/23 1227     WBC 4.63 Thousand/uL      RBC 2.98 Million/uL      Hemoglobin 10.0 g/dL      Hematocrit 30.5 %       fL      MCH 33.6 pg      MCHC 32.8 g/dL      RDW 12.2 %      MPV 9.7 fL      Platelets 544 Thousands/uL      nRBC 0 /100 WBCs      Neutrophils Relative 68 %      Immat GRANS % 0 %      Lymphocytes Relative 18 %      Monocytes Relative 11 %      Eosinophils Relative 2 %      Basophils Relative 1 %      Neutrophils Absolute 3.17 Thousands/µL      Immature Grans Absolute 0.02 Thousand/uL      Lymphocytes Absolute 0.81 Thousands/µL      Monocytes Absolute 0.50 Thousand/µL      Eosinophils Absolute 0.09 Thousand/µL      Basophils Absolute 0.04 Thousands/µL                  CT head without contrast   Final Result by Jennifer Jolly MD (08/24 1447)      No acute intracranial abnormality. Stable chronic microangiopathic changes within the brain. Workstation performed: [id number]         XR chest 2 views   ED Interpretation by Loan Oconnell PA-C (08/24 1252)   No focal consolidation       Final Result by Beltran Melton MD (08/24 1322)      No acute cardiopulmonary disease. Workstation performed: [id number]                    Procedures  ECG 12 Lead Documentation Only    Date/Time: 8/24/2023 12:36 PM    Performed by: Loan Oconnell PA-C  Authorized by: Loan Oconnell PA-C    Indications / Diagnosis:  Dizziness  ECG reviewed by me, the ED Provider: yes    Patient location:  ED  Previous ECG:     Previous ECG:  Compared to current    Comparison ECG info:  5/26/22    Similarity:  Changes noted  Interpretation:     Interpretation: abnormal    Rate:     ECG rate:  56    ECG rate assessment: bradycardic    Rhythm:     Rhythm: sinus rhythm    Ectopy:     Ectopy: PAC    QRS:     QRS axis:  Left    QRS intervals:  Normal  Conduction:     Conduction: normal    ST segments:     ST segments:  Normal  T waves:     T waves: non-specific               ED Course                               SBIRT 20yo+    Flowsheet Row Most Recent Value   Initial Alcohol Screen: US AUDIT-C     1. How often do you have a drink containing alcohol? 0 Filed at: 08/24/2023 1209   2. How many drinks containing alcohol do you have on a typical day you are drinking? 0 Filed at: 08/24/2023 1209   3a. Male UNDER 65: How often do you have five or more drinks on one occasion? 0 Filed at: 08/24/2023 1209   3b. FEMALE Any Age, or MALE 65+: How often do you have 4 or more drinks on one occassion?  0 Filed at: 08/24/2023 1209   Audit-C Score 0 Filed at: 08/24/2023 1209   RODERICK: How many times in the past year have you. .. Used an illegal drug or used a prescription medication for non-medical reasons? Never Filed at: 08/24/2023 1209                    Medical Decision Making  Differential diagnosis includes but not limited to: Dysrhythmia, electrolyte abnormality, vertigo, CVA    Ambulatory dysfunction: acute illness or injury  Dizziness: acute illness or injury  Amount and/or Complexity of Data Reviewed  Labs: ordered. Decision-making details documented in ED Course. Radiology: ordered and independent interpretation performed. Decision-making details documented in ED Course. Risk  Decision regarding hospitalization. Disposition  Final diagnoses:   Dizziness   Ambulatory dysfunction     Time reflects when diagnosis was documented in both MDM as applicable and the Disposition within this note     Time User Action Codes Description Comment    8/24/2023  3:30 PM Solo Landaverde Add [R42] Dizziness     8/24/2023  3:30 PM Luis Cueva Add [R26.2] Ambulatory dysfunction       ED Disposition     ED Disposition   Admit    Condition   Stable    Date/Time   u Aug 24, 2023  3:30 PM    Comment   Case was discussed with LEBRON and the patient's admission status was agreed to be Admission Status: observation status to the service of Dr. Doyle Rodas . Follow-up Information    None         Patient's Medications   Discharge Prescriptions    No medications on file       No discharge procedures on file.     PDMP Review       Value Time User    PDMP Reviewed  Yes 1/30/2023  2:08 PM Reese Starr MD          ED Provider  Electronically Signed by           Solo Landaverde PA-C  08/24/23 7267

## 2023-08-24 NOTE — ASSESSMENT & PLAN NOTE
Lab Results   Component Value Date    EGFR 50 08/24/2023    EGFR 40 05/27/2022    EGFR 40 05/26/2022    CREATININE 0.98 08/24/2023    CREATININE 1.17 05/27/2022    CREATININE 1.17 05/26/2022   Creatinine appearing to be around baseline

## 2023-08-24 NOTE — ASSESSMENT & PLAN NOTE
Presenting with complaint of dizziness and difficulty ambulating  Has a history of vertigo however symptoms are worse than usual  CT head with no acute intracranial abnormality, stable chronic microangiopathic changes  Placed on CVA pathway  Already on aspirin 81 mg daily  Give aspirin bolus 325 mg now  Currently refusing statin until lipid panel is checked  Check hemoglobin A1c and lipid panel  Check MRI brain  Carotid ultrasound  Obtain echocardiogram  PT/OT consulted  Place on telemetry  Allow for permissive hypertension  Trial scheduled meclizine  Consult neurology

## 2023-08-24 NOTE — ASSESSMENT & PLAN NOTE
With difficulty walking  Placed on stroke pathway  PT OT evaluation  Uses cane to ambulate at baseline but currently unable

## 2023-08-25 ENCOUNTER — APPOINTMENT (OUTPATIENT)
Dept: NON INVASIVE DIAGNOSTICS | Facility: HOSPITAL | Age: 88
End: 2023-08-25
Payer: MEDICARE

## 2023-08-25 ENCOUNTER — APPOINTMENT (OUTPATIENT)
Dept: VASCULAR ULTRASOUND | Facility: HOSPITAL | Age: 88
End: 2023-08-25
Payer: MEDICARE

## 2023-08-25 LAB
ANION GAP SERPL CALCULATED.3IONS-SCNC: 4 MMOL/L
AORTIC ROOT: 3.4 CM
APICAL FOUR CHAMBER EJECTION FRACTION: 60 %
ASCENDING AORTA: 3.7 CM
BUN SERPL-MCNC: 19 MG/DL (ref 5–25)
CALCIUM SERPL-MCNC: 9 MG/DL (ref 8.4–10.2)
CHLORIDE SERPL-SCNC: 103 MMOL/L (ref 96–108)
CHOLEST SERPL-MCNC: 167 MG/DL
CO2 SERPL-SCNC: 27 MMOL/L (ref 21–32)
CREAT SERPL-MCNC: 0.88 MG/DL (ref 0.6–1.3)
E WAVE DECELERATION TIME: 201 MS
EST. AVERAGE GLUCOSE BLD GHB EST-MCNC: 117 MG/DL
FRACTIONAL SHORTENING: 23 (ref 28–44)
GFR SERPL CREATININE-BSD FRML MDRD: 57 ML/MIN/1.73SQ M
GLUCOSE SERPL-MCNC: 76 MG/DL (ref 65–140)
HBA1C MFR BLD: 5.7 %
HDLC SERPL-MCNC: 62 MG/DL
INTERVENTRICULAR SEPTUM IN DIASTOLE (PARASTERNAL SHORT AXIS VIEW): 1.3 CM
INTERVENTRICULAR SEPTUM: 1.3 CM (ref 0.6–1.1)
LAAS-AP2: 13.4 CM2
LAAS-AP4: 13.6 CM2
LDLC SERPL CALC-MCNC: 93 MG/DL (ref 0–100)
LEFT ATRIUM SIZE: 4.6 CM
LEFT ATRIUM VOLUME (MOD BIPLANE): 31 ML
LEFT INTERNAL DIMENSION IN SYSTOLE: 3.3 CM (ref 2.1–4)
LEFT VENTRICULAR INTERNAL DIMENSION IN DIASTOLE: 4.3 CM (ref 3.5–6)
LEFT VENTRICULAR POSTERIOR WALL IN END DIASTOLE: 0.9 CM
LEFT VENTRICULAR STROKE VOLUME: 39 ML
LVSV (TEICH): 39 ML
MV E'TISSUE VEL-LAT: 8 CM/S
MV E'TISSUE VEL-SEP: 5 CM/S
MV PEAK A VEL: 1.02 M/S
MV PEAK E VEL: 68 CM/S
MV STENOSIS PRESSURE HALF TIME: 58 MS
MV VALVE AREA P 1/2 METHOD: 3.79
POTASSIUM SERPL-SCNC: 4.2 MMOL/L (ref 3.5–5.3)
RA PRESSURE ESTIMATED: 4 MMHG
RIGHT ATRIUM AREA SYSTOLE A4C: 10.5 CM2
RIGHT VENTRICLE ID DIMENSION: 3.2 CM
RV PSP: 30 MMHG
SL CV LEFT ATRIUM LENGTH A2C: 4.9 CM
SL CV LV EF: 55
SL CV PED ECHO LEFT VENTRICLE DIASTOLIC VOLUME (MOD BIPLANE) 2D: 82 ML
SL CV PED ECHO LEFT VENTRICLE SYSTOLIC VOLUME (MOD BIPLANE) 2D: 43 ML
SODIUM SERPL-SCNC: 134 MMOL/L (ref 135–147)
TR MAX PG: 26 MMHG
TR PEAK VELOCITY: 2.6 M/S
TRICUSPID ANNULAR PLANE SYSTOLIC EXCURSION: 1.6 CM
TRICUSPID VALVE PEAK REGURGITATION VELOCITY: 2.56 M/S
TRIGL SERPL-MCNC: 58 MG/DL

## 2023-08-25 PROCEDURE — 97116 GAIT TRAINING THERAPY: CPT

## 2023-08-25 PROCEDURE — 80061 LIPID PANEL: CPT | Performed by: PHYSICIAN ASSISTANT

## 2023-08-25 PROCEDURE — 93880 EXTRACRANIAL BILAT STUDY: CPT | Performed by: SURGERY

## 2023-08-25 PROCEDURE — 93306 TTE W/DOPPLER COMPLETE: CPT | Performed by: INTERNAL MEDICINE

## 2023-08-25 PROCEDURE — 97163 PT EVAL HIGH COMPLEX 45 MIN: CPT

## 2023-08-25 PROCEDURE — 83036 HEMOGLOBIN GLYCOSYLATED A1C: CPT | Performed by: PHYSICIAN ASSISTANT

## 2023-08-25 PROCEDURE — 80048 BASIC METABOLIC PNL TOTAL CA: CPT | Performed by: PHYSICIAN ASSISTANT

## 2023-08-25 PROCEDURE — 93306 TTE W/DOPPLER COMPLETE: CPT

## 2023-08-25 PROCEDURE — 99232 SBSQ HOSP IP/OBS MODERATE 35: CPT | Performed by: HOSPITALIST

## 2023-08-25 PROCEDURE — 99222 1ST HOSP IP/OBS MODERATE 55: CPT | Performed by: STUDENT IN AN ORGANIZED HEALTH CARE EDUCATION/TRAINING PROGRAM

## 2023-08-25 PROCEDURE — 93880 EXTRACRANIAL BILAT STUDY: CPT

## 2023-08-25 RX ADMIN — ENOXAPARIN SODIUM 30 MG: 30 INJECTION SUBCUTANEOUS at 09:18

## 2023-08-25 RX ADMIN — LORATADINE 10 MG: 10 TABLET ORAL at 09:17

## 2023-08-25 RX ADMIN — ACETAMINOPHEN 650 MG: 325 TABLET, FILM COATED ORAL at 21:12

## 2023-08-25 RX ADMIN — GABAPENTIN 100 MG: 100 CAPSULE ORAL at 09:17

## 2023-08-25 RX ADMIN — MECLIZINE HYDROCHLORIDE 25 MG: 25 TABLET ORAL at 21:07

## 2023-08-25 RX ADMIN — MECLIZINE HYDROCHLORIDE 25 MG: 25 TABLET ORAL at 13:36

## 2023-08-25 RX ADMIN — GABAPENTIN 100 MG: 100 CAPSULE ORAL at 17:03

## 2023-08-25 RX ADMIN — GABAPENTIN 100 MG: 100 CAPSULE ORAL at 21:07

## 2023-08-25 RX ADMIN — METOPROLOL SUCCINATE 25 MG: 25 TABLET, EXTENDED RELEASE ORAL at 09:17

## 2023-08-25 RX ADMIN — MECLIZINE HYDROCHLORIDE 25 MG: 25 TABLET ORAL at 04:49

## 2023-08-25 RX ADMIN — ASPIRIN 81 MG: 81 TABLET, COATED ORAL at 09:17

## 2023-08-25 NOTE — PHYSICAL THERAPY NOTE
PHYSICAL THERAPY EVALUATION NOTE    Patient Name: Royal Molina  [id number] Date: 8/25/2023  AGE:   80 y.o. Mrn:   [id number]  ADMIT DX:  Dizziness [R42]  Ambulatory dysfunction [R26.2]    Past Medical History:   Diagnosis Date    Allergic rhinitis     Disease of thyroid gland     Dizziness     Hypothyroidism 10/20/2015    Malignant neoplasm of colon ([address])     last assessed: 10/20/2015    Neuropathy     Tinnitus      Length Of Stay: 0  PHYSICAL THERAPY EVALUATION :    08/25/23 1413   PT Last Visit   PT Visit Date 08/25/23   Pain Assessment   Pain Assessment Tool 0-10   Pain Score No Pain  (at rest, 6/10 w/ activity)   Pain Location/Orientation Orientation: Left; Location: Hip   Hospital Pain Intervention(s) Repositioned; Ambulation/increased activity   Restrictions/Precautions   Other Precautions Chair Alarm; Bed Alarm;Telemetry; Fall Risk;Pain;Hard of hearing  ([place])   Home Living   Type of [address] [place] [address] One level; Other (Comment)  (3 REJI w/ railing)   Additional Comments lives alone. ambulates w/ cane. owns walker. independent w/ ADLs. receives assistance w/ IADLs. no falls in last 6 months. General   Additional Pertinent History 8/24/23 at 20:00 blood pressure was 171/72   Family/Caregiver Present No   Cognition   Arousal/Participation Cooperative   Orientation Level Oriented to person;Oriented to place; Other (Comment)  (pt was identified w/ full name, birth date)   Following Commands Follows one step commands with increased time or repetition   Comments room air resting pulse ox 95% and 68 BPM, active 94 % and 82 BPM. supine blood pressure 144/52. Subjective   Subjective pt seen supine in bed. agreed to PT eval. states having left hip pain w/ ambulation. also has intermittent vertigo. occasional input was necessary for task focus.    RUE Assessment   RUE Assessment WFL   LUE Assessment   LUE Assessment WFL   RLE Assessment   RLE Assessment WFL  (hip and knee 4-/5, ankle 3+/5)   LLE Assessment   LLE Assessment X  (hip flexion 3-/5 extension 3/5, knee and ankle 3+/5)   Vision-Basic Assessment   Current Vision Wears glasses all the time   Coordination   Sensation X  (light touch impaired bilateral feet and ankles)   Bed Mobility   Supine to Sit 4  Minimal assistance   Additional items Assist x 1;HOB elevated; Bedrails; Increased time required;Verbal cues;LE management  (for bedrail use, trunk/LE positioning)   Transfers   Sit to Stand 3  Moderate assistance   Additional items Assist x 1; Increased time required;Verbal cues  (for LE positioning)   Stand to Sit 3  Moderate assistance  (pt attempted to see before appropriately aligned w/ chair)   Additional items Assist x 1; Impulsive;Verbal cues  (for body positoning, safety)   Ambulation/Elevation   Gait pattern Antalgic;Decreased L stance; Inconsistent dhaval; Excessively slow   Gait Assistance 3  Moderate assist   Additional items Assist x 1;Verbal cues; Tactile cues  (for cane positioning, full step lenght, safety)   Assistive Device [facility]   Distance 5 feet. seated rest break. 20 feet.  (additional not possible due to fatigue pain)   Stair Management Assistance Not tested  (due to limited ambulation tolerance, safety concern)   Balance   Static Sitting Fair +   Static Standing Poor +  (w/ cane)   Ambulatory Poor  (w/ cane)   Assessment   Problem List Decreased strength;Decreased endurance;Decreased range of motion; Impaired balance;Decreased mobility; Decreased safety awareness; Impaired vision; Impaired sensation;Pain   Assessment Pt presents with the complaint of dizziness. Pt was unable to ambulate. Dx: anemia, hyponatremia, ambulatory dysfunction, CKD, HTN, and vertigo. order placed for PT eval and tx, w/ activity order of up and out of bed as tolerated.  pt presents w/ comorbidities of vertigo, HTN, colon cancer, anemia, CKD, and peripheral neuropathy and personal factors of advanced age, mobilizing w/ assistive device, stair(s) to enter home and inability to perform IADLs. pt presents w/ pain, weakness, decreased ROM, decreased endurance, impaired balance, gait deviations, altered sensation, decreased safety awareness and fall risk. these impairments are evident in findings from physical examination (weakness, decreased ROM and altered sensation), mobility assessment (need for mod assist w/ all phases of mobility when usually mobilizing independently, tolerance to only 20 feet of ambulation and need for cueing for mobility technique), and Barthel Index: 55/100. pt needed input for mobility technique. pt is at risk for falls due to physical and safety awareness deficits. pt's clinical presentation is unstable/unpredictable (evident in poor blood pressure control, need for assist w/ all phases of mobility when usually mobilizing independently, tolerance to only 20 feet of ambulation, pain impacting overall mobility status and need for input for mobility technique/safety). pt needs inpatient PT tx to improve mobility deficits and progress mobility training as appropriate. discharge recommendation is for inpatient rehab to reduce fall risk and maximize level of functional independence. pt would benefit from Orthopedics consult to address left hip pain. Goals   Patient Goals go home. do things for myself. STG Expiration Date 09/04/23   Short Term Goal #1 pt will:  Increase bilateral LE strength 1/2 grade to facilitate independent mobility, Perform bed mobility w/ supervision to increase level of independence, Perform all transfers w/ supervision to improve independence, Ambulate 150 ft. with least restrictive assistive device w/ supervision w/o LOB to improve functional independence, Navigate 3 stair(s) w/ minx1 with unilateral handrail to facilitate return to previous living environment, Increase ambulatory balance 1 grade to decrease risk for falls, Complete exercise program independently to increase strength and endurance, Tolerate 3 hr OOB to faciliate upright tolerance, Improve Barthel Index score to 75 or greater to facilitate independence and Complete Timed Up and Go or Comfortable Gait Speed to further assess mobility and monitor progress   PT Treatment Day 1   Plan   Treatment/Interventions Functional transfer training;LE strengthening/ROM; Elevations; Therapeutic exercise; Endurance training;Patient/family training;Equipment eval/education; Bed mobility;Gait training   PT Frequency 3-5x/wk   Recommendation   PT Discharge Recommendation Post acute rehabilitation services   Additional Comments (S)  pt would benefit from Orthopedics consult to address left hip pain   AM-PAC Basic Mobility Inpatient   Turning in Flat Bed Without Bedrails 3   Lying on Back to Sitting on Edge of Flat Bed Without Bedrails 2   Moving Bed to Chair 3   Standing Up From Chair Using Arms 3   Walk in Room 3   Climb 3-5 Stairs With Railing 1   Basic Mobility Inpatient Raw Score 15   Basic Mobility Standardized Score 36.97   Highest Level Of Mobility   -[facility] Goal 4: Move to chair/commode   -HL Achieved 7: Walk 25 feet or more   Barthel Index   Feeding 10   Bathing 0   Grooming Score 5   Dressing Score 5   Bladder Score 10   Bowels Score 10   Toilet Use Score 5   Transfers (Bed/Chair) Score 10   Mobility (Level Surface) Score 0   Stairs Score 0   Barthel Index Score 55   Additional Treatment Session   Start Time 1413   End Time 1441   Treatment Assessment Pt agreed to participate in PT intervention. Therapist incorporated roller walker use w/ ambulation to improve gait stability. Sit <---> stand transfer w/ min to modx1 (w/ cues for hand placement, LE positioning, and safety). ambulated 20 feet, 25 feet w/ roller walker w/ minx1 and chair follow. Seated rest break was required. Additional ambulation was not possible due to fatigue and pain.  Pt was noted to have improvement w/ use of roller walker w/ increased ambulation distance and decreased level of assist to maintain safety. continued inpatient PT tx is indicated to reduce fall risk. Equipment Use roller walker   Additional Treatment Day 1   End of Consult   Patient Position at End of Consult Bedside chair; All needs within reach;Bed/Chair alarm activated     The patient's AM-PAC Basic Mobility Inpatient Short Form Raw Score is 15. A Raw score of less than or equal to 16 suggests the patient may benefit from discharge to post-acute rehabilitation services. Please also refer to the recommendation of the Physical Therapist for safe discharge planning. Skilled PT recommended while in hospital and upon DC to progress pt toward treatment goals.      Talat Bryant, PT

## 2023-08-25 NOTE — ASSESSMENT & PLAN NOTE
Home regimen Cozaar 25 mg daily, metoprolol succinate 25 mg daily  Hold Cozaar to allow for permissive hypertension  Lasix was on patient's med rec but she states she is not taking this    Plan:   BP goal: normotensive:   Restart medication

## 2023-08-25 NOTE — CASE MANAGEMENT
Case Management Discharge Planning Note    Patient name Aleida Ozuna  Location S /S -21 MRN [id number]  : 1930 Date 2023       Current Admission Date: 2023  Current Admission Diagnosis:Dizziness   Patient Active Problem List    Diagnosis Date Noted   • History of compression fracture of spine 2023   • Malnutrition due to renal disease ([address]) 2023   • Arthritis of left hip 10/25/2022   • Hx of colonic polyps 2022   • Diverticulosis 2022   • Anemia 2022   • Lower extremity edema 2022   • Megaloblastic anemia 2021   • Ambulatory dysfunction 2021   • Dizziness 2021   • Hyponatremia 2021   • Anxiety 2021   • Iron deficiency anemia 2021   • CKD (chronic kidney disease) stage 3, GFR 30-59 ml/min ([facility]) 2020   • Lumbosacral spondylosis without myelopathy 2018   • Primary hypertension 2018   • Atherosclerosis of native artery of extremity ([address]) 2018   • Idiopathic peripheral neuropathy 2018   • Benign positional vertigo 2017   • Allergic rhinitis 2016   • Insomnia 2016   • Gastroparesis 2016   • IBS (irritable bowel syndrome) 10/20/2015   • Osteoporosis 10/20/2015   • Low back pain 10/08/2015      LOS (days): 0  Geometric Mean LOS (GMLOS) (days):   Days to GMLOS:     OBJECTIVE:            Current admission status: Observation   Preferred Pharmacy:   [facility] [address] BUCK BERNARDO [address], [address]  [address] [place] [address]  Phone: [phone] Fax: [phone]    Primary Care Provider: Jose Lucas MD    Primary Insurance: MEDICARE  Secondary Insurance: BLUE CROSS    DISCHARGE DETAILS:      [address]         Is the patient interested in [address] at discharge?: Yes  [facility] requested[de-identified] Nursing, Occupational Therapy, Physical [address] Name[de-identified] [facility] External Referral Reason (only applicable if external HHA name selected): Patient has established relationship with provider  [facility] Provider[de-identified] PCP  Home Health Services Needed[de-identified] Evaluate Functional Status and Safety, Gait/ADL Training, Strengthening/Theraputic Exercises to Improve Function  Homebound Criteria Met[de-identified] Requires the Assistance of Another Person for Safe Ambulation or to Leave the Home, Uses an Assist Device (i.e. cane, walker, etc)  Supporting Clincal Findings[de-identified] Fatigues Easliy in [employer], Limited Endurance    DME Referral Provided  Referral made for DME?: No    Other Referral/Resources/Interventions Provided:  Interventions: HHC  Referral Comments: [facility] confirmed available and reserved in aidin. AVS updated.          Treatment Team Recommendation: Short Term Rehab  Discharge Destination Plan[de-identified] Home with Nataliya1 Denis OROZCOEPaty at Discharge : Family

## 2023-08-25 NOTE — ASSESSMENT & PLAN NOTE
With difficulty walking  Placed on stroke pathway  PT OT evaluation  Uses cane to ambulate at baseline but currently unable    Plan:   PT recommends acute rehab

## 2023-08-25 NOTE — CONSULTS
NEUROLOGY RESIDENCY CONSULT NOTE     Name: Marilyn Mota   Age & Sex: 80 y.o. female   MRN: [id number]  Unit/Bed#: S -01   Encounter: [id number]  Length of Stay: 0    ASSESSMENT & PLAN     * Dizziness  [address] is a 80 y.o. female, history of BPV, hypertension, colon cancer, allergic rhinitis, peripheral neuropathy, who presented to [facility]ED on 8/24/23 with dizziness x1 day. Patient reported that yesterday she woke up with dizziness and BLE pain after extensive walking the night prior. Patient reports that her dizziness is no worse than usual. She states that she came to ED because she did not want to be home by herself unable to ambulate due to leg pain/weakness. Neurology was consulted for dizziness. Workup:   - Stroke pathway initiated by primary team  - CT head: No acute intracranial abnormality  - Labs significant for hemoglobin of 10 and sodium of 131  - lipid panel WNL: total cholesterol 167 LDL 93  - MRI brain no evidence of acute infarct, intracranial hemorrhage or mass  - Carotid ultrasound pending  - Echocardiogram pending  - orthostatics    Impression: Patient's dizziness most likely due to BPPV. Ambulatory dysfunction most likely due to neuropathy and myalgias after long walk. Patient's sx appear to be chronic in nature and have required rehab in the past. No focal deficits on exam. CVA/TIA less likely. Plan:  - continue meclizine 25 mg PRN   - PT/OT  - recommending vestibular rehab  - defer correction of metabolic derangements to primary team  - no further recommendations from neurology. Please reach out with any questions. Marilyn Mota will not need outpatient follow up with Neurology. She will not require outpatient neurological testing.     SUBJECTIVE     Reason for Consult / Principal Problem: "dizziness"  Hx and PE limited by: none    HPI: Marilyn Mota is a 80 y.o. right handed female, history of BPV, hypertension, colon cancer, allergic rhinitis, peripheral neuropathy, who presented to [facility]ED on 8/24/23 with dizziness x1 day. Patient reported to ED that yesterday she woke up with dizziness that did not improve with meclizine. She reports that she normally wakes up with dizziness, but never persist throughout the day. She reports that due to her symptoms she was unable to ambulate. Patient ambulates at baseline with cane. Patient reports history of vertigo, but symptoms appear worse than usual.    In ED, patient had CT head which was negative for acute intracranial abnormality. Labs were significant for hemoglobin of 10 and sodium of 131. MRI brain with no evidence of acute infarct, intracranial hemorrhage or mass. Initial blood pressure 158/74 with heart rate of 58. Glucose 79. Per chart review, patient dizziness and ambulatory dysfunction has been ongoing for many years. Patient has seen PCP and ENT and had extensive workup including prior vestibular rehab. Patient was advised to continue meclizine and PT. Today, she reports that the night before she came to the hospital she did more walking than she normally does. She reports that after her long walk her legs felt like rubber. She reports that she came to the hospital because she was unable to ambulate due to weakness and pain in legs. She reports that she had this dizziness for years and that she cannot turn her head to either side because of this. She described the dizziness as "things moving around me." She reports that she has been prescribed meclizine but does not take it on a regular basis when she gets the symptoms because she takes enough pills to begin with. Patient reports numbness in the bilateral lower extremities due to neuropathy and is taking gabapentin at home.   She states that she normally ambulates with either a cane or walker at home but could not due to "legs felt like rubber."     Inpatient consult to Neurology  Consult performed by: Debbie Mojica DO  Consult ordered by: Nelida Bryant PA-C          Historical Information   Past Medical History:   Diagnosis Date   • Allergic rhinitis    • Disease of thyroid gland    • Dizziness    • Hypothyroidism 10/20/2015   • Malignant neoplasm of colon ([address])     last assessed: 10/20/2015   • Neuropathy    • Tinnitus      Past Surgical History:   Procedure Laterality Date   • ADENOIDECTOMY     • APPENDECTOMY      last assessed: 10/20/2015   • CATARACT EXTRACTION, BILATERAL     • CHOLECYSTECTOMY      last assessed: 10/20/2015   • COLECTOMY      last assessed: 10/20/2015; partial    • TONSILLECTOMY      last assessed: 10/20/2015     Social History   Social History     Substance and Sexual Activity   Alcohol Use No     Social History     Substance and Sexual Activity   Drug Use Never     E-Cigarette/Vaping   • E-Cigarette Use Never User      E-Cigarette/Vaping Substances   • Nicotine No    • THC No    • CBD No    • Flavoring No    • Other No    • Unknown No      Social History     Tobacco Use   Smoking Status Former   • Packs/day: 0.25   • Types: Cigarettes   Smokeless Tobacco Never   Tobacco Comments    quit 50 yrs ago     Family History: non-contributory  Meds/Allergies   all current active meds have been reviewed and current meds:   Current Facility-Administered Medications   Medication Dose Route Frequency   • acetaminophen (TYLENOL) tablet 650 mg  650 mg Oral Q4H PRN   • aspirin (ECOTRIN LOW STRENGTH) EC tablet 81 mg  81 mg Oral Daily   • enoxaparin (LOVENOX) subcutaneous injection 30 mg  30 mg Subcutaneous Daily   • gabapentin (NEURONTIN) capsule 100 mg  100 mg Oral TID   • loratadine (CLARITIN) tablet 10 mg  10 mg Oral Daily   • meclizine (ANTIVERT) tablet 25 mg  25 mg Oral Q8H [facility] & intermediate   • metoprolol succinate (TOPROL-XL) 24 hr tablet 25 mg  25 mg Oral Daily   • ondansetron (ZOFRAN) injection 4 mg  4 mg Intravenous Q6H PRN     Allergies   Allergen Reactions   • Penicillins Hives     Other reaction(s):  Other (See Comments)  hives       Review of previous medical records was completed. Review of Systems   Constitutional: Negative for chills and fever. HENT: Positive for hearing loss (chronic) and tinnitus (chronic). Eyes: Negative for pain and visual disturbance. Respiratory: Negative for cough and shortness of breath. Cardiovascular: Negative for chest pain and palpitations. Gastrointestinal: Positive for nausea (resolved). Negative for abdominal pain and vomiting. Genitourinary: Negative for flank pain and hematuria. Musculoskeletal: Positive for gait problem and myalgias. Negative for back pain. Skin: Negative for rash. Neurological: Positive for dizziness, weakness (BLE) and numbness (BLE (chronic)). Negative for tremors, seizures, syncope, speech difficulty, light-headedness and headaches. Psychiatric/Behavioral: Negative for confusion. OBJECTIVE     Patient ID: Tawny Fan is a 80 y.o. female. Vitals:   Vitals:    23 1000 23 1002 23 1004 23 1007   BP: 141/65 142/70 145/65 145/69   BP Location:       Pulse: 58 69 69 63   Resp:       Temp:       TempSrc:       SpO2:       Weight:       Height:          Body mass index is 26.17 kg/m². Intake/Output Summary (Last 24 hours) at 2023 1116  Last data filed at 2023 1014  Gross per 24 hour   Intake --   Output 2000 ml   Net -2000 ml       Temperature:   Temp (24hrs), Av.4 °F (36.9 °C), Min:97.9 °F (36.6 °C), Max:98.7 °F (37.1 °C)    Temperature: 98.7 °F (37.1 °C)    Invasive Devices: Invasive Devices     Peripheral Intravenous Line  Duration           Peripheral IV 23 Left Antecubital <1 day                Physical Exam  Vitals and nursing note reviewed. Constitutional:       General: She is not in acute distress. Appearance: She is well-developed. HENT:      Head: Normocephalic and atraumatic.    Eyes:      Extraocular Movements: EOM normal.      Conjunctiva/sclera: Conjunctivae normal.      Pupils: Pupils are equal, round, and reactive to light. Cardiovascular:      Rate and Rhythm: Normal rate. Heart sounds: No murmur heard. Pulmonary:      Effort: Pulmonary effort is normal. No respiratory distress. Breath sounds: Normal breath sounds. Abdominal:      Palpations: Abdomen is soft. Tenderness: There is no abdominal tenderness. Musculoskeletal:      Cervical back: Neck supple. Right lower leg: Edema present. Left lower leg: Edema present. Skin:     General: Skin is warm and dry. Capillary Refill: Capillary refill takes less than 2 seconds. Neurological:      Mental Status: She is alert and oriented to person, place, and time. Cranial Nerves: Cranial nerves 2-12 are intact. Motor: Motor strength is normal.     Coordination: Finger-Nose-Finger Test normal.   Psychiatric:         Mood and Affect: Mood normal.         Speech: Speech normal.          Neurologic Exam     Mental Status   Oriented to person, place, and time. Attention: normal. Concentration: normal.   Speech: speech is normal   Level of consciousness: alert  Knowledge: consistent with education. Able to name object. Able to repeat. Cranial Nerves   Cranial nerves II through XII intact. CN II   Visual fields full to confrontation. CN III, IV, VI   Pupils are equal, round, and reactive to light. Extraocular motions are normal.   Nystagmus: left     CN V   Facial sensation intact. CN VII   Facial expression full, symmetric. CN VIII   CN VIII normal.   Hearing: impaired (at baseline)    CN IX, X   CN IX normal.   CN X normal.     CN XI   CN XI normal.     CN XII   CN XII normal.     Motor Exam   Muscle bulk: normal  Overall muscle tone: normal  Right arm tone: normal  Left arm tone: normal  Right arm pronator drift: absent  Left arm pronator drift: absent  Right leg tone: normal  Left leg tone: normal    Strength   Strength 5/5 throughout. Sensory Exam   Light touch normal.   Right arm pinprick: normal  Left arm pinprick: normal  Right leg pinprick: decreased from knee  Left leg pinprick: decreased from knee    Gait, Coordination, and Reflexes     Gait  Gait: (did not assess. pt laying in bed)    Coordination   Finger to nose coordination: normal    Tremor   Resting tremor: absent    Reflexes   Reflexes 2+ except as noted. LABORATORY DATA     Labs: I have personally reviewed pertinent reports. Results from last 7 days   Lab Units 08/24/23  1218   WBC Thousand/uL 4.63   HEMOGLOBIN g/dL 10.0*   HEMATOCRIT % 30.5*   PLATELETS Thousands/uL 213   NEUTROS PCT % 68   MONOS PCT % 11   EOS PCT % 2      Results from last 7 days   Lab Units 08/25/23  0449 08/24/23  1218   POTASSIUM mmol/L 4.2 4.6   CHLORIDE mmol/L 103 99   CO2 mmol/L 27 26   BUN mg/dL 19 22   CREATININE mg/dL 0.88 0.98   CALCIUM mg/dL 9.0 9.3   ALK PHOS U/L  --  54   ALT U/L  --  11   AST U/L  --  15                            IMAGING & DIAGNOSTIC TESTING     Radiology Results: I have personally reviewed pertinent reports. MRI brain wo contrast   Final Result by Juan Matta MD (08/25 0147)      No evidence of acute infarct, intracranial hemorrhage or mass. Workstation performed: [id number]         CT head without contrast   Final Result by Rafita Le MD (08/24 1447)      No acute intracranial abnormality. Stable chronic microangiopathic changes within the brain. Workstation performed: [id number]         XR chest 2 views   ED Interpretation by Montserrat Cortez PA-C (08/24 1254)   No focal consolidation       Final Result by Doylene Saint, MD (08/24 1322)      No acute cardiopulmonary disease. Workstation performed: [id number]         VAS carotid complete study (*Order only if CTA has not been completed*)    (Results Pending)       Other Diagnostic Testing: I have personally reviewed pertinent reports.       ACTIVE MEDICATIONS     Current Facility-Administered Medications   Medication Dose Route Frequency   • acetaminophen (TYLENOL) tablet 650 mg  650 mg Oral Q4H PRN   • aspirin (ECOTRIN LOW STRENGTH) EC tablet 81 mg  81 mg Oral Daily   • enoxaparin (LOVENOX) subcutaneous injection 30 mg  30 mg Subcutaneous Daily   • gabapentin (NEURONTIN) capsule 100 mg  100 mg Oral TID   • loratadine (CLARITIN) tablet 10 mg  10 mg Oral Daily   • meclizine (ANTIVERT) tablet 25 mg  25 mg Oral Q8H [address]   • metoprolol succinate (TOPROL-XL) 24 hr tablet 25 mg  25 mg Oral Daily   • ondansetron (ZOFRAN) injection 4 mg  4 mg Intravenous Q6H PRN       Prior to Admission medications    Medication Sig Start Date End Date Taking?  Authorizing Provider   aspirin 81 MG tablet Take 81 mg by mouth   Yes Historical Provider, MD   calcium carbonate-vitamin D (OSCAL-D) 500 mg-200 units per tablet Take 1 tablet by mouth daily with breakfast 9/3/21  Yes Laure Clarke MD   cholecalciferol (VITAMIN D3) 1,000 units tablet  3/15/22  Yes Historical Provider, MD   fexofenadine (ALLEGRA) 180 MG tablet Take 180 mg by mouth daily   Yes Historical Provider, MD   Linzess 290 MCG CAPS Take 1 capsule by mouth once daily 8/21/23  Yes Veronica Daniel MD   losartan (COZAAR) 25 mg tablet Take 1 tablet by mouth once daily 8/10/23  Yes Veronica Daniel MD   senna-docusate sodium (SENOKOT S) 8.6-50 mg per tablet Take 2 tablets by mouth daily   Yes Historical Provider, MD   Acetaminophen Extra Strength 500 MG tablet  3/15/22   Historical Provider, MD   gabapentin (NEURONTIN) 100 mg capsule TAKE 1 CAPSULE BY MOUTH THREE TIMES DAILY 8/21/23   Veronica Daniel MD   meclizine (ANTIVERT) 25 mg tablet TAKE 1 TABLET BY MOUTH EVERY 8 HOURS 7/6/22   Veronica Daniel MD   metoprolol succinate (TOPROL-XL) 25 mg 24 hr tablet Take 1 tablet (25 mg total) by mouth daily 5/9/23   Veronica Daniel MD   Nutritional Supplements (Ensure High Protein) LIQD Take 1 Can by mouth 2 (two) times a day 5/9/22 Dev Ivan MD         CODE STATUS & ADVANCED DIRECTIVES     Code Status: Level 3 - DNAR and DNI  Advance Directive and Living Will:      Power of :    POLST:        VTE Pharmacologic Prophylaxis: Enoxaparin (Lovenox)  VTE Mechanical Prophylaxis: sequential compression device    ==  Jolene Benson DO   [facility] Res[facility], PGY-1

## 2023-08-25 NOTE — ASSESSMENT & PLAN NOTE
Sodium noted to be 131 upon admission  Suspect related to poor oral intake  Provide gentle IV fluid  Na: 134    Plan:   Encourage oral intake

## 2023-08-25 NOTE — PLAN OF CARE
Problem: MOBILITY - ADULT  Goal: Maintain or return to baseline ADL function  Description: INTERVENTIONS:  -  Assess patient's ability to carry out ADLs; assess patient's baseline for ADL function and identify physical deficits which impact ability to perform ADLs (bathing, care of mouth/teeth, toileting, grooming, dressing, etc.)  - Assess/evaluate cause of self-care deficits   - Assess range of motion  - Assess patient's mobility; develop plan if impaired  - Assess patient's need for assistive devices and provide as appropriate  - Encourage maximum independence but intervene and supervise when necessary  - Involve family in performance of ADLs  - Assess for home care needs following discharge   - Consider OT consult to assist with ADL evaluation and planning for discharge  - Provide patient education as appropriate  Outcome: Progressing  Goal: Maintains/Returns to pre admission functional level  Description: INTERVENTIONS:  - Perform BMAT or MOVE assessment daily.   - Set and communicate daily mobility goal to care team and patient/family/caregiver. - Collaborate with rehabilitation services on mobility goals if consulted  - Perform Range of Motion 2 times a day. - Reposition patient every 2 hours. - Dangle patient 2 times a day  - Stand patient 2 times a day  - Ambulate patient 3 times a day  - Out of bed to chair 3 times a day   - Out of bed for meals 3 times a day  - Out of bed for toileting  - Record patient progress and toleration of activity level   Outcome: Progressing     Problem: Neurological Deficit  Goal: Neurological status is stable or improving  Description: Interventions:  - Monitor and assess patient's level of consciousness, motor function, sensory function, and level of assistance needed for ADLs. - Monitor and report changes from baseline. Collaborate with interdisciplinary team to initiate plan and implement interventions as ordered. - Provide and maintain a safe environment.   - Consider seizure precautions. - Consider fall precautions. - Consider aspiration precautions. - Consider bleeding precautions. Outcome: Progressing     Problem: Activity Intolerance/Impaired Mobility  Goal: Mobility/activity is maintained at optimum level for patient  Description: Interventions:  - Assess and monitor patient  barriers to mobility and need for assistive/adaptive devices. - Assess patient's emotional response to limitations. - Collaborate with interdisciplinary team and initiate plans and interventions as ordered. - Encourage independent activity per ability.  - Maintain proper body alignment. - Perform active/passive rom as tolerated/ordered. - Plan activities to conserve energy.  - Turn patient as appropriate  Outcome: Progressing     Problem: Communication Impairment  Goal: Ability to express needs and understand communication  Description: Assess patient's communication skills and ability to understand information. Patient will demonstrate use of effective communication techniques, alternative methods of communication and understanding even if not able to speak. - Encourage communication and provide alternate methods of communication as needed. - Collaborate with case management/ for discharge needs. - Include patient/family/caregiver in decisions related to communication. Outcome: Progressing     Problem: Potential for Aspiration  Goal: Non-ventilated patient's risk of aspiration is minimized  Description: Assess and monitor vital signs, respiratory status, and labs (WBC). Monitor for signs of aspiration (tachypnea, cough, rales, wheezing, cyanosis, fever). - Assess and monitor patient's ability to swallow. - Place patient up in chair to eat if possible. - HOB up at 90 degrees to eat if unable to get patient up into chair.  - Supervise patient during oral intake. - Instruct patient/ family to take small bites.   - Instruct patient/ family to take small single sips when taking liquids. - Follow patient-specific strategies generated by speech pathologist.  Outcome: Progressing  Goal: Ventilated patient's risk of aspiration is minimized  Description: Assess and monitor vital signs, respiratory status, airway cuff pressure, and labs (WBC). Monitor for signs of aspiration (tachypnea, cough, rales, wheezing, cyanosis, fever). - Elevate head of bed 30 degrees if patient has tube feeding.  - Monitor tube feeding. Outcome: Progressing     Problem: Nutrition  Goal: Nutrition/Hydration status is improving  Description: Monitor and assess patient's nutrition/hydration status for malnutrition (ex- brittle hair, bruises, dry skin, pale skin and conjunctiva, muscle wasting, smooth red tongue, and disorientation). Collaborate with interdisciplinary team and initiate plan and interventions as ordered. Monitor patient's weight and dietary intake as ordered or per policy. Utilize nutrition screening tool and intervene per policy. Determine patient's food preferences and provide high-protein, high-caloric foods as appropriate. - Assist patient with eating.  - Allow adequate time for meals.  - Encourage patient to take dietary supplement as ordered. - Collaborate with clinical nutritionist.  - Include patient/family/caregiver in decisions related to nutrition.   Outcome: Progressing     Problem: Prexisting or High Potential for Compromised Skin Integrity  Goal: Skin integrity is maintained or improved  Description: INTERVENTIONS:  - Identify patients at risk for skin breakdown  - Assess and monitor skin integrity  - Assess and monitor nutrition and hydration status  - Monitor labs   - Assess for incontinence   - Turn and reposition patient  - Assist with mobility/ambulation  - Relieve pressure over bony prominences  - Avoid friction and shearing  - Provide appropriate hygiene as needed including keeping skin clean and dry  - Evaluate need for skin moisturizer/barrier cream  - Collaborate with interdisciplinary team   - Patient/family teaching  - Consider wound care consult   Outcome: Progressing

## 2023-08-25 NOTE — CASE MANAGEMENT
Case Management Assessment & Discharge Planning Note    Patient name Eugenie Prado  Location S /S -49 MRN [id number]  : 1930 Date 2023       Current Admission Date: 2023  Current Admission Diagnosis:Dizziness   Patient Active Problem List    Diagnosis Date Noted   • History of compression fracture of spine 2023   • Malnutrition due to renal disease ([address]) 2023   • Arthritis of left hip 10/25/2022   • Hx of colonic polyps 2022   • Diverticulosis 2022   • Anemia 2022   • Lower extremity edema 2022   • Megaloblastic anemia 2021   • Ambulatory dysfunction 2021   • Dizziness 2021   • Hyponatremia 2021   • Anxiety 2021   • Iron deficiency anemia 2021   • CKD (chronic kidney disease) stage 3, GFR 30-59 ml/min ([facility]) 2020   • Lumbosacral spondylosis without myelopathy 2018   • Primary hypertension 2018   • Atherosclerosis of native artery of extremity ([address]) 2018   • Idiopathic peripheral neuropathy 2018   • Benign positional vertigo 2017   • Allergic rhinitis 2016   • Insomnia 2016   • Gastroparesis 2016   • IBS (irritable bowel syndrome) 10/20/2015   • Osteoporosis 10/20/2015   • Low back pain 10/08/2015      LOS (days): 0  Geometric Mean LOS (GMLOS) (days):   Days to GMLOS:     OBJECTIVE:              Current admission status: Observation       Preferred Pharmacy:   [facility] [address] [facility]ARD[facility] [address], [address] [address]  [address] [place] [address]  Phone: [phone] Fax: [phone]    Primary Care Provider: Larisa Ford MD    Primary Insurance: MEDICARE  Secondary Insurance: BLUE CROSS    ASSESSMENT:  Brownrt Proxies    There are no active Health Care Proxies on file.                       Patient Information  Admitted from[de-identified] Home  Mental Status: Alert  During Assessment patient was accompanied by: Not accompanied during assessment  Assessment information provided by[de-identified] Patient  Primary Caregiver: Self  Support Systems: Family members  [place] of Residence: [facility] [address] [facility] do you live in?: [employer] access options.  Select all that apply.: Stairs  Number of steps to enter home.: 3  Do the steps have railings?: Yes  Type of Current Residence: Ranch  In the last 12 months, was there a time when you were not able to pay the mortgage or rent on time?: No  In the last 12 months, was there a time when you did not have a steady place to sleep or slept in a shelter (including now)?: No  Homeless/housing insecurity resource given?: N/A  Living Arrangements: Lives Alone    Activities of Daily Living Prior to Admission  Functional Status: Independent  Completes ADLs independently?: Yes  Ambulates independently?: Yes  Does patient use assisted devices?: Yes  Assisted Devices (DME) used: Straight Cane, Bedside Commode  Does patient currently own DME?: Yes  What DME does the patient currently own?: Straight Trace Gala, Bedside Commode  Does patient have a history of Outpatient Therapy (PT/OT)?: Yes ([facility])  Does the patient have a history of Short-Term Rehab?: Yes [facility])  Does patient have a history of HHC?: No  Does patient currently have [facility]?: No         Patient Information Continued  Within the past 12 months, you worried that your food would run out before you got the money to buy more.: Never true  Within the past 12 months, the food you bought just didn't last and you didn't have money to get more.: Never true  Food insecurity resource given?: N/A         Means of Transportation  Means of Transport to Appts[de-identified] Friends  In the past 12 months, has lack of transportation kept you from medical appointments or from getting medications?: No  In the past 12 months, has lack of transportation kept you from meetings, work, or from getting things needed for daily living?: No  Was application for public transport provided?: N/A        DISCHARGE DETAILS:    Discharge planning discussed with[de-identified] patient  Freedom of Choice: Yes  Comments - Freedom of Choice: CM met with patient re: assessment and dcp. Patient lives alone in ranch home, 3 REJ[address]. Patient independent with ADLS, utilizes cane to ambulate, has hx of HHC with [facility] and STR with [facility], and has friends provide transport to appointments. Confirmed PCP Belia Shaver) and preferred pharmacy [facility]). Patient relayed she will call her friend for transport home when ready. PT rcmd STR - Patient declined STR opting for home with [address]. Patient relayed preference for [facility] as she had utilized them in the past. [facility] via [employer], awaiting response.   CM contacted family/caregiver?: Yes (left VM to pt's son requesting return call to discuss dcp update.)  Were Treatment Team discharge recommendations reviewed with patient/caregiver?: Yes  Did patient/caregiver verbalize understanding of patient care needs?: Yes  Were patient/caregiver advised of the risks associated with not following Treatment Team discharge recommendations?: Yes    Contacts  Patient Contacts: Stephanie Whitehead (son)  Relationship to Patient[de-identified] Family  Contact Method: Phone  Phone Number: [phone] (M)  Reason/Outcome: Continuity of Care, Emergency Contact, Discharge [address]         Is the patient interested in [address] at discharge?: Yes  [facility] requested[de-identified] Nursing, Occupational Therapy, Physical [address] Name[de-identified] [facility] External Referral Reason (only applicable if external HHA name selected): Patient has established relationship with provider  [facility] Provider[de-identified] PCP  Home Health Services Needed[de-identified] Evaluate Functional Status and Safety, Gait/ADL Training, Strengthening/Theraputic Exercises to Improve Function  Homebound Criteria Met[de-identified] Requires the Assistance of Another Person for Safe Ambulation or to Leave the Home, Uses an Assist Device (i.e. cane, walker, etc)  Supporting Clincal Findings[de-identified] Fatigues Easliy in [employer], Limited Endurance    DME Referral Provided  Referral made for DME?: No    Other Referral/Resources/Interventions Provided:  Interventions: [facility]  Referral Comments: PT rcmd STR. Patient declined STR - opting for Home with [facility]. [facility] - referral sent via [employer], awaiting response.          Treatment Team Recommendation: Short Term Rehab  Discharge Destination Plan[de-identified] Home with Nataliya1 Denis Rios N.E. at Discharge : Family

## 2023-08-25 NOTE — ASSESSMENT & PLAN NOTE
Tawny Fan is a 80 y.o. female, history of BPV, hypertension, colon cancer, allergic rhinitis, peripheral neuropathy, who presented to [facility] on 8/24/23 with dizziness x1 day. Patient reported that yesterday she woke up with dizziness and BLE pain after extensive walking the night prior. Patient reports that her dizziness is no worse than usual. She states that she came to ED because she did not want to be home by herself unable to ambulate due to leg pain/weakness. Neurology was consulted for dizziness. Workup:   - Stroke pathway initiated by primary team  - CT head: No acute intracranial abnormality  - Labs significant for hemoglobin of 10 and sodium of 131  - lipid panel WNL: total cholesterol 167 LDL 93  - MRI brain no evidence of acute infarct, intracranial hemorrhage or mass  - Carotid ultrasound pending  - Echocardiogram pending  - orthostatics    Impression: Patient's dizziness most likely due to BPPV. Ambulatory dysfunction most likely due to neuropathy and myalgias after long walk. Patient's sx appear to be chronic in nature and have required vestibular rehab in the past. No focal deficits on exam. CVA/TIA less likely. Plan:  - continue meclizine 25 mg PRN   - PT/OT  - recommending vestibular rehab  - defer correction of metabolic derangements to primary team  - no further recommendations from neurology. Please reach out with any questions.

## 2023-08-25 NOTE — PLAN OF CARE
Problem: MOBILITY - ADULT  Goal: Maintain or return to baseline ADL function  Description: INTERVENTIONS:  -  Assess patient's ability to carry out ADLs; assess patient's baseline for ADL function and identify physical deficits which impact ability to perform ADLs (bathing, care of mouth/teeth, toileting, grooming, dressing, etc.)  - Assess/evaluate cause of self-care deficits   - Assess range of motion  - Assess patient's mobility; develop plan if impaired  - Assess patient's need for assistive devices and provide as appropriate  - Encourage maximum independence but intervene and supervise when necessary  - Involve family in performance of ADLs  - Assess for home care needs following discharge   - Consider OT consult to assist with ADL evaluation and planning for discharge  - Provide patient education as appropriate  Outcome: Progressing  Goal: Maintains/Returns to pre admission functional level  Description: INTERVENTIONS:  - Perform BMAT or MOVE assessment daily.   - Set and communicate daily mobility goal to care team and patient/family/caregiver. - Collaborate with rehabilitation services on mobility goals if consulted  - Perform Range of Motion  times a day. - Reposition patient every  hours. - Dangle patient  times a day  - Stand patient  times a day  - Ambulate patient  times a day  - Out of bed to chair  times a day   - Out of bed for meals times a day  - Out of bed for toileting  - Record patient progress and toleration of activity level   Outcome: Progressing     Problem: Neurological Deficit  Goal: Neurological status is stable or improving  Description: Interventions:  - Monitor and assess patient's level of consciousness, motor function, sensory function, and level of assistance needed for ADLs. - Monitor and report changes from baseline. Collaborate with interdisciplinary team to initiate plan and implement interventions as ordered. - Provide and maintain a safe environment.   - Consider seizure precautions. - Consider fall precautions. - Consider aspiration precautions. - Consider bleeding precautions. Outcome: Progressing     Problem: Activity Intolerance/Impaired Mobility  Goal: Mobility/activity is maintained at optimum level for patient  Description: Interventions:  - Assess and monitor patient  barriers to mobility and need for assistive/adaptive devices. - Assess patient's emotional response to limitations. - Collaborate with interdisciplinary team and initiate plans and interventions as ordered. - Encourage independent activity per ability.  - Maintain proper body alignment. - Perform active/passive rom as tolerated/ordered. - Plan activities to conserve energy.  - Turn patient as appropriate  Outcome: Progressing     Problem: Communication Impairment  Goal: Ability to express needs and understand communication  Description: Assess patient's communication skills and ability to understand information. Patient will demonstrate use of effective communication techniques, alternative methods of communication and understanding even if not able to speak. - Encourage communication and provide alternate methods of communication as needed. - Collaborate with case management/ for discharge needs. - Include patient/family/caregiver in decisions related to communication. Outcome: Progressing     Problem: Potential for Aspiration  Goal: Non-ventilated patient's risk of aspiration is minimized  Description: Assess and monitor vital signs, respiratory status, and labs (WBC). Monitor for signs of aspiration (tachypnea, cough, rales, wheezing, cyanosis, fever). - Assess and monitor patient's ability to swallow. - Place patient up in chair to eat if possible. - HOB up at 90 degrees to eat if unable to get patient up into chair.  - Supervise patient during oral intake. - Instruct patient/ family to take small bites.   - Instruct patient/ family to take small single sips when taking liquids. - Follow patient-specific strategies generated by speech pathologist.  Outcome: Progressing  Goal: Ventilated patient's risk of aspiration is minimized  Description: Assess and monitor vital signs, respiratory status, airway cuff pressure, and labs (WBC). Monitor for signs of aspiration (tachypnea, cough, rales, wheezing, cyanosis, fever). - Elevate head of bed 30 degrees if patient has tube feeding.  - Monitor tube feeding. Outcome: Progressing     Problem: Nutrition  Goal: Nutrition/Hydration status is improving  Description: Monitor and assess patient's nutrition/hydration status for malnutrition (ex- brittle hair, bruises, dry skin, pale skin and conjunctiva, muscle wasting, smooth red tongue, and disorientation). Collaborate with interdisciplinary team and initiate plan and interventions as ordered. Monitor patient's weight and dietary intake as ordered or per policy. Utilize nutrition screening tool and intervene per policy. Determine patient's food preferences and provide high-protein, high-caloric foods as appropriate. - Assist patient with eating.  - Allow adequate time for meals.  - Encourage patient to take dietary supplement as ordered. - Collaborate with clinical nutritionist.  - Include patient/family/caregiver in decisions related to nutrition.   Outcome: Progressing

## 2023-08-25 NOTE — ASSESSMENT & PLAN NOTE
Presenting with complaint of dizziness and difficulty ambulating  Has a history of vertigo however symptoms are worse than usual  CT head with no acute intracranial abnormality, stable chronic microangiopathic changes  Placed on CVA pathway  Already on aspirin 81 mg daily   lipid panel uremarkable  Check hemoglobin A1c and lipid panel: 5.7  PT/OT consulted: Recommended rehab  Allow for permissive hypertension  Neurology: Stroke pathway: MRI brain no evidence of acute infarct, echo pending, carotid ultrasound result pending, CT head negative      Plan:   Awaiting disposition to possible acute rehab   Continue meclizine 25 mg daily  Continue Telemetry

## 2023-08-25 NOTE — ASSESSMENT & PLAN NOTE
Lab Results   Component Value Date    EGFR 57 08/25/2023    EGFR 50 08/24/2023    EGFR 40 05/27/2022    CREATININE 0.88 08/25/2023    CREATININE 0.98 08/24/2023    CREATININE 1.17 05/27/2022   Creatinine appearing to be around baseline

## 2023-08-25 NOTE — PROGRESS NOTES
[address] [facility]  Progress Note  Name: Aimee Pineda  MRN: [id number]  Unit/Bed#: S -01 I Date of Admission: 8/24/2023   Date of Service: 8/25/2023 I Hospital Day: 0    Assessment/Plan   * Dizziness  Assessment & Plan  Presenting with complaint of dizziness and difficulty ambulating  Has a history of vertigo however symptoms are worse than usual  CT head with no acute intracranial abnormality, stable chronic microangiopathic changes  Placed on CVA pathway  Already on aspirin 81 mg daily   lipid panel uremarkable  Check hemoglobin A1c and lipid panel: 5.7  PT/OT consulted: Recommended rehab  Allow for permissive hypertension  Neurology: Stroke pathway: MRI brain no evidence of acute infarct, echo pending, carotid ultrasound result pending, CT head negative      Plan:   Awaiting disposition to possible acute rehab   Continue meclizine 25 mg daily  Continue Telemetry           Anemia  Assessment & Plan  History of anemia  Hemoglobin stable at 10.0  No signs of active bleeding    Hyponatremia  Assessment & Plan  Sodium noted to be 131 upon admission  Suspect related to poor oral intake  Provide gentle IV fluid  Na: 134    Plan:   Encourage oral intake      Ambulatory dysfunction  Assessment & Plan  With difficulty walking  Placed on stroke pathway  PT OT evaluation  Uses cane to ambulate at baseline but currently unable    Plan:   PT recommends acute rehab    CKD (chronic kidney disease) stage 3, GFR 30-59 ml/min [facility])  Assessment & Plan  Lab Results   Component Value Date    EGFR 57 08/25/2023    EGFR 50 08/24/2023    EGFR 40 05/27/2022    CREATININE 0.88 08/25/2023    CREATININE 0.98 08/24/2023    CREATININE 1.17 05/27/2022   Creatinine appearing to be around baseline    Primary hypertension  Assessment & Plan  Home regimen Cozaar 25 mg daily, metoprolol succinate 25 mg daily  Hold Cozaar to allow for permissive hypertension  Lasix was on patient's med rec but she states she is not taking this    Plan:   BP goal: normotensive:   Restart medication            VTE Pharmacologic Prophylaxis:   Moderate Risk (Score 3-4) - Pharmacological DVT Prophylaxis Ordered: enoxaparin (Lovenox). Patient Centered Rounds: I performed bedside rounds with nursing staff today. Discussions with Specialists or Other Care Team Provider: Neurology    Education and Discussions with Family / Patient: Patient declined call to . Current Length of Stay: 0 day(s)  Current Patient Status: Observation   Discharge Plan: Anticipate discharge in 24-48 hrs to rehab facility. Code Status: Level 3 - DNAR and DNI    Subjective: No event overnight. Patient stated that she has a massive headache. Patient also has ringing in the ear all the time. .PT saw patient and recommended that she goes to acute rehab. Patient stated that she felt dizzy when walk and came in to get check. She denies fever, chills, shortness of breath. Objective:     Vitals:   Temp (24hrs), Av.3 °F (36.8 °C), Min:97.8 °F (36.6 °C), Max:98.7 °F (37.1 °C)    Temp:  [97.8 °F (36.6 °C)-98.7 °F (37.1 °C)] 97.8 °F (36.6 °C)  HR:  [58-70] 70  Resp:  [16-18] 18  BP: (127-171)/(60-76) 127/62  SpO2:  [93 %-98 %] 93 %  Body mass index is 26.17 kg/m². Input and Output Summary (last 24 hours): Intake/Output Summary (Last 24 hours) at 2023 1548  Last data filed at 2023 1014  Gross per 24 hour   Intake --   Output 2000 ml   Net -2000 ml       Physical Exam:   Physical Exam  Vitals and nursing note reviewed. Constitutional:       General: She is not in acute distress. Appearance: She is well-developed. HENT:      Head: Normocephalic and atraumatic. Eyes:      Conjunctiva/sclera: Conjunctivae normal.   Cardiovascular:      Rate and Rhythm: Normal rate and regular rhythm. Heart sounds: No murmur heard. Pulmonary:      Effort: Pulmonary effort is normal. No respiratory distress.       Breath sounds: Normal breath sounds. Abdominal:      General: Abdomen is flat. Bowel sounds are normal.      Palpations: Abdomen is soft. Tenderness: There is no abdominal tenderness. Musculoskeletal:         General: No swelling. Cervical back: Neck supple. Right lower leg: Edema present. Left lower leg: Edema present. Skin:     General: Skin is warm and dry. Capillary Refill: Capillary refill takes less than 2 seconds. Neurological:      Mental Status: She is alert and oriented to person, place, and time. Psychiatric:         Mood and Affect: Mood normal.         Additional Data:     Labs:  Results from last 7 days   Lab Units 08/24/23  1218   WBC Thousand/uL 4.63   HEMOGLOBIN g/dL 10.0*   HEMATOCRIT % 30.5*   PLATELETS Thousands/uL 213   NEUTROS PCT % 68   LYMPHS PCT % 18   MONOS PCT % 11   EOS PCT % 2     Results from last 7 days   Lab Units 08/25/23  0449 08/24/23  1218   SODIUM mmol/L 134* 131*   POTASSIUM mmol/L 4.2 4.6   CHLORIDE mmol/L 103 99   CO2 mmol/L 27 26   BUN mg/dL 19 22   CREATININE mg/dL 0.88 0.98   ANION GAP mmol/L 4 6   CALCIUM mg/dL 9.0 9.3   ALBUMIN g/dL  --  4.2   TOTAL BILIRUBIN mg/dL  --  0.38   ALK PHOS U/L  --  54   ALT U/L  --  11   AST U/L  --  15   GLUCOSE RANDOM mg/dL 76 79             Results from last 7 days   Lab Units 08/25/23  0449   HEMOGLOBIN A1C % 5.7*           Lines/Drains:  Invasive Devices     Peripheral Intravenous Line  Duration           Peripheral IV 08/24/23 Left Antecubital 1 day                  Telemetry:  Telemetry Orders (From admission, onward)             24 Hour Telemetry Monitoring  Continuous x 24 Hours (Telem)        Question:  Reason for 24 Hour Telemetry  Answer:  Syncope suspected to be cardiac in origin                 Telemetry Reviewed: Normal Sinus Rhythm and Atrial fibrillation.  HR averaging 63  Indication for Continued Telemetry Use: Arrthymias requiring medical therapy             Imaging: Reviewed radiology reports from this admission including: MRI brain    Recent Cultures (last 7 days):         Last 24 Hours Medication List:   Current Facility-Administered Medications   Medication Dose Route Frequency Provider Last Rate   • acetaminophen  650 mg Oral Q4H PRN Pino Wallace PA-C     • aspirin  81 mg Oral Daily Amaris Guevara PA-C     • enoxaparin  30 mg Subcutaneous Daily Amaris Guevara PA-C     • gabapentin  100 mg Oral TID Pino Wallace PA-C     • loratadine  10 mg Oral Daily Amaris Guevara PA-C     • meclizine  25 mg Oral Q8H [address] Amaris Guevara PA-C     • metoprolol succinate  25 mg Oral Daily Amaris Guevara PA-C     • ondansetron  4 mg Intravenous Q6H PRN Pino Wallace PA-C          Today, Patient Was Seen By: Maria Fernanda Butler MD    **Please Note: This note may have been constructed using a voice recognition system. **

## 2023-08-25 NOTE — OCCUPATIONAL THERAPY NOTE
Occupational Therapy Cancellation Note        Patient Name: Marybeth Gutierrez  NCZQV'[id number] Date: 8/25/2023 08/25/23 0981   Note Type   Note type Cancelled Session   Cancel Reasons Patient off floor/test  (Echo at bedside)   Additional Comments OT orders received, chart reviewed. Attempted eval, however pt recieving echo at this time.  Will f/u as schedule allows     Ayanna Palencia, OT

## 2023-08-25 NOTE — PLAN OF CARE
Problem: PHYSICAL THERAPY ADULT  Goal: Performs mobility at highest level of function for planned discharge setting. See evaluation for individualized goals. Description: Treatment/Interventions: Functional transfer training, LE strengthening/ROM, Elevations, Therapeutic exercise, Endurance training, Patient/family training, Equipment eval/education, Bed mobility, Gait training          See flowsheet documentation for full assessment, interventions and recommendations. Note:    Problem List: Decreased strength, Decreased endurance, Decreased range of motion, Impaired balance, Decreased mobility, Decreased safety awareness, Impaired vision, Impaired sensation, Pain  Assessment: Pt presents with the complaint of dizziness. Pt was unable to ambulate. Dx: anemia, hyponatremia, ambulatory dysfunction, CKD, HTN, and vertigo. order placed for PT eval and tx, w/ activity order of up and out of bed as tolerated. pt presents w/ comorbidities of vertigo, HTN, colon cancer, anemia, CKD, and peripheral neuropathy and personal factors of advanced age, mobilizing w/ assistive device, stair(s) to enter home and inability to perform IADLs. pt presents w/ pain, weakness, decreased ROM, decreased endurance, impaired balance, gait deviations, altered sensation, decreased safety awareness and fall risk. these impairments are evident in findings from physical examination (weakness, decreased ROM and altered sensation), mobility assessment (need for mod assist w/ all phases of mobility when usually mobilizing independently, tolerance to only 20 feet of ambulation and need for cueing for mobility technique), and Barthel Index: 55/100. pt needed input for mobility technique. pt is at risk for falls due to physical and safety awareness deficits.  pt's clinical presentation is unstable/unpredictable (evident in poor blood pressure control, need for assist w/ all phases of mobility when usually mobilizing independently, tolerance to only 20 feet of ambulation, pain impacting overall mobility status and need for input for mobility technique/safety). pt needs inpatient PT tx to improve mobility deficits and progress mobility training as appropriate. discharge recommendation is for inpatient rehab to reduce fall risk and maximize level of functional independence. pt would benefit from Orthopedics consult to address left hip pain. PT Discharge Recommendation: Post acute rehabilitation services    See flowsheet documentation for full assessment.

## 2023-08-25 NOTE — DISCHARGE INSTR - AVS FIRST PAGE
Dear Tawny Fan,     It was our pleasure to care for you here at [facility]. It is our hope that we were always able to exceed the expected standards for your care during your stay. You were hospitalized due to Dizziness. You were cared for on the third floor by Effie Wilkerson MD under the service of Niraj Smalls MD with the [facility]\"" Internal Medicine Hospitalist Group who covers for your primary care physician (PCP), Carlo Benitez MD, while you were hospitalized. If you have any questions or concerns related to this hospitalization, you may contact us at [phone]. For follow up as well as any medication refills, we recommend that you follow up with your primary care physician. A registered nurse will reach out to you by phone within a few days after your discharge to answer any additional questions that you may have after going home. However, at this time we provide for you here, the most important instructions / recommendations at discharge:     Notable Medication Adjustments -   No medication adjustment  Testing Required after Discharge -   No test required    Important follow up information -   Please follow-up with your primary doctor in a week    Other Instructions -   Please transition slowly when going from laying to sitting or sitting to standing. Please return to the Emergency room if you feel shortness of breath, severe virtogo making   Please review this entire after visit summary as additional general instructions including medication list, appointments, activity, diet, any pertinent wound care, and other additional recommendations from your care team that may be provided for you.       Sincerely,     Effie Wilkerson MD

## 2023-08-25 NOTE — SPEECH THERAPY NOTE
Speech Language/Pathology  Speech/Language Pathology  Assessment    Patient Name: Shakira Vargas  TLLOB'B Date: 8/25/2023     Problem List  Principal Problem:    Dizziness  Active Problems:    Primary hypertension    CKD (chronic kidney disease) stage 3, GFR 30-59 ml/min ([facility])    Ambulatory dysfunction    Hyponatremia    Anemia    Past Medical History  Past Medical History:   Diagnosis Date   • Allergic rhinitis    • Disease of thyroid gland    • Dizziness    • Hypothyroidism 10/20/2015   • Malignant neoplasm of colon ([address])     last assessed: 10/20/2015   • Neuropathy    • Tinnitus      Past Surgical History  Past Surgical History:   Procedure Laterality Date   • ADENOIDECTOMY     • APPENDECTOMY      last assessed: 10/20/2015   • CATARACT EXTRACTION, BILATERAL     • CHOLECYSTECTOMY      last assessed: 10/20/2015   • COLECTOMY      last assessed: 10/20/2015; partial    • TONSILLECTOMY      last assessed: 10/20/2015       Pt is a 81 yo female admitted to [address] [place] [address],[address] 8/24/23 dx: dizziness. Patient presents with complaints of dizziness, she has chronic dizziness however noted to have worsening and inability to walk because of dizziness. Patient currently being admitted for further evaluation as she lives by herself and has persistent dizziness and difficulty walking. Denies any chest pain, shortness of breath. Denies any nausea vomiting or abdominal pain. Consult received for speech/swallow eval on stroke pathway. Pt passed nsg swallow screen; tolerating regular diet w/o s/s dysphagia or aspiration. No speech/language deficits reported. MRI: 8/24/23  No evidence of acute infarct, intracranial hemorrhage or mass. No need for formal speech/swallow eval at this time. Reconsult if needed.     Veronique Bess CCC-SLP  Speech Pathologist  Available via  tiger text

## 2023-08-25 NOTE — CASE MANAGEMENT
Case Management Progress Note    Patient name Edelmira Tang  Location S /S -01 MRN [id number]  : 1930 Date 2023       LOS (days): 0  Geometric Mean LOS (GMLOS) (days):   Days to [address]:        OBJECTIVE:        Current admission status: Observation  Preferred Pharmacy:   [facility] [address]  [facility] [address]  Phone: [phone] Fax: [phone]    Primary Care Provider: Veronica Daniel MD    Primary Insurance: MEDICARE  Secondary Insurance: BLUE CROSS    PROGRESS NOTE:    CM received return call from patient's daughter-in-law Mena Monroy. Discussed dcp update - patient declined STR opting for home with [facility]. KATLIN relayed she and son were discussing with pt prior to admission of whether to move to a facility or live with them - patient choosing to remain home currently. KATLIN relayed would s/w patient tonight re: STR rcmd and C. CM provided pt's room phone number and unit charge nurse phone number to KATLIN.

## 2023-08-26 PROBLEM — E87.1 HYPONATREMIA: Status: RESOLVED | Noted: 2021-08-30 | Resolved: 2023-08-26

## 2023-08-26 LAB
ANION GAP SERPL CALCULATED.3IONS-SCNC: 4 MMOL/L
BASOPHILS # BLD AUTO: 0.05 THOUSANDS/ÂΜL (ref 0–0.1)
BASOPHILS NFR BLD AUTO: 1 % (ref 0–1)
BUN SERPL-MCNC: 24 MG/DL (ref 5–25)
CALCIUM SERPL-MCNC: 8.9 MG/DL (ref 8.4–10.2)
CHLORIDE SERPL-SCNC: 104 MMOL/L (ref 96–108)
CO2 SERPL-SCNC: 27 MMOL/L (ref 21–32)
CREAT SERPL-MCNC: 1.08 MG/DL (ref 0.6–1.3)
EOSINOPHIL # BLD AUTO: 0.16 THOUSAND/ÂΜL (ref 0–0.61)
EOSINOPHIL NFR BLD AUTO: 5 % (ref 0–6)
ERYTHROCYTE [DISTWIDTH] IN BLOOD BY AUTOMATED COUNT: 12.2 % (ref 11.6–15.1)
GFR SERPL CREATININE-BSD FRML MDRD: 44 ML/MIN/1.73SQ M
GLUCOSE SERPL-MCNC: 81 MG/DL (ref 65–140)
HCT VFR BLD AUTO: 28.8 % (ref 34.8–46.1)
HGB BLD-MCNC: 9.6 G/DL (ref 11.5–15.4)
IMM GRANULOCYTES # BLD AUTO: 0.02 THOUSAND/UL (ref 0–0.2)
IMM GRANULOCYTES NFR BLD AUTO: 1 % (ref 0–2)
LYMPHOCYTES # BLD AUTO: 1.04 THOUSANDS/ÂΜL (ref 0.6–4.47)
LYMPHOCYTES NFR BLD AUTO: 30 % (ref 14–44)
MCH RBC QN AUTO: 34.3 PG (ref 26.8–34.3)
MCHC RBC AUTO-ENTMCNC: 33.3 G/DL (ref 31.4–37.4)
MCV RBC AUTO: 103 FL (ref 82–98)
MONOCYTES # BLD AUTO: 0.39 THOUSAND/ÂΜL (ref 0.17–1.22)
MONOCYTES NFR BLD AUTO: 11 % (ref 4–12)
NEUTROPHILS # BLD AUTO: 1.85 THOUSANDS/ÂΜL (ref 1.85–7.62)
NEUTS SEG NFR BLD AUTO: 52 % (ref 43–75)
NRBC BLD AUTO-RTO: 0 /100 WBCS
PLATELET # BLD AUTO: 187 THOUSANDS/UL (ref 149–390)
PMV BLD AUTO: 10.1 FL (ref 8.9–12.7)
POTASSIUM SERPL-SCNC: 3.9 MMOL/L (ref 3.5–5.3)
RBC # BLD AUTO: 2.8 MILLION/UL (ref 3.81–5.12)
SODIUM SERPL-SCNC: 135 MMOL/L (ref 135–147)
WBC # BLD AUTO: 3.51 THOUSAND/UL (ref 4.31–10.16)

## 2023-08-26 PROCEDURE — 99232 SBSQ HOSP IP/OBS MODERATE 35: CPT | Performed by: HOSPITALIST

## 2023-08-26 PROCEDURE — 85025 COMPLETE CBC W/AUTO DIFF WBC: CPT

## 2023-08-26 PROCEDURE — 80048 BASIC METABOLIC PNL TOTAL CA: CPT

## 2023-08-26 RX ADMIN — MECLIZINE HYDROCHLORIDE 25 MG: 25 TABLET ORAL at 13:40

## 2023-08-26 RX ADMIN — ASPIRIN 81 MG: 81 TABLET, COATED ORAL at 08:23

## 2023-08-26 RX ADMIN — METOPROLOL SUCCINATE 25 MG: 25 TABLET, EXTENDED RELEASE ORAL at 08:24

## 2023-08-26 RX ADMIN — GABAPENTIN 100 MG: 100 CAPSULE ORAL at 21:07

## 2023-08-26 RX ADMIN — GABAPENTIN 100 MG: 100 CAPSULE ORAL at 16:19

## 2023-08-26 RX ADMIN — MECLIZINE HYDROCHLORIDE 25 MG: 25 TABLET ORAL at 21:07

## 2023-08-26 RX ADMIN — LORATADINE 10 MG: 10 TABLET ORAL at 08:23

## 2023-08-26 RX ADMIN — MECLIZINE HYDROCHLORIDE 25 MG: 25 TABLET ORAL at 05:04

## 2023-08-26 RX ADMIN — ENOXAPARIN SODIUM 30 MG: 30 INJECTION SUBCUTANEOUS at 08:24

## 2023-08-26 RX ADMIN — GABAPENTIN 100 MG: 100 CAPSULE ORAL at 08:23

## 2023-08-26 NOTE — ASSESSMENT & PLAN NOTE
Sodium noted to be 131 upon admission  Suspect related to poor oral intake    Na: Normalized    Plan:   Encourage oral intake

## 2023-08-26 NOTE — CASE MANAGEMENT
Case Management Discharge Planning Note    Patient name Aleida Ozuna  Location S /S -00 MRN [id number]  : 1930 Date 2023       Current Admission Date: 2023  Current Admission Diagnosis:Dizziness   Patient Active Problem List    Diagnosis Date Noted   • History of compression fracture of spine 2023   • Malnutrition due to renal disease ([address]) 2023   • Arthritis of left hip 10/25/2022   • Hx of colonic polyps 2022   • Diverticulosis 2022   • Anemia 2022   • Lower extremity edema 2022   • Megaloblastic anemia 2021   • Ambulatory dysfunction 2021   • Dizziness 2021   • Hyponatremia 2021   • Anxiety 2021   • Iron deficiency anemia 2021   • CKD (chronic kidney disease) stage 3, GFR 30-59 ml/min ([facility]) 2020   • Lumbosacral spondylosis without myelopathy 2018   • Primary hypertension 2018   • Atherosclerosis of native artery of extremity ([address]) 2018   • Idiopathic peripheral neuropathy 2018   • Benign positional vertigo 2017   • Allergic rhinitis 2016   • Insomnia 2016   • Gastroparesis 2016   • IBS (irritable bowel syndrome) 10/20/2015   • Osteoporosis 10/20/2015   • Low back pain 10/08/2015      LOS (days): 1  Geometric Mean LOS (GMLOS) (days):   Days to GMLOS:     OBJECTIVE:  Risk of Unplanned Readmission Score: 11.08         Current admission status: Inpatient   Preferred Pharmacy:   [facility] [address], [address] [facility] [address]  [address] [address]  Phone: [phone] Fax: [phone]    Primary Care Provider: Jose Lucas MD    Primary Insurance: MEDICARE  Secondary Insurance: BLUE CROSS    DISCHARGE DETAILS:    Discharge planning discussed with[de-identified] DIL- Misa  Freedom of Choice: Yes  Comments - Freedom of Choice: patient and family now agreeable to STR  CM contacted family/caregiver?: Yes  Were Treatment Team discharge recommendations reviewed with patient/caregiver?: Yes  Did patient/caregiver verbalize understanding of patient care needs?: N/A- going to facility  Were patient/caregiver advised of the risks associated with not following Treatment Team discharge recommendations?: Yes    Contacts  Patient Contacts: Misa  Relationship to Patient[de-identified] Family (daughter-in-law)  Contact Method: Phone  Phone Number: [phone]  Reason/Outcome: Continuity of Care, Emergency Contact, Discharge Planning, Referral    DME Referral Provided  Referral made for DME?: No    Other Referral/Resources/Interventions Provided:  Interventions: Short Term Rehab  Referral Comments: TT received from patients nurse stating patient is now agreeable to STR. Call made to KATLIN Bynum to discuss. Misa stating it was discussed and now patient would like to go to rehab, would not like to go to [address] [place] [address] is first choice. Referral placed in [address] [place] [address] and [facility] notified of preference. CM to follow up as able to continue with dcp.     Would you like to participate in our [address] service program?  : No - Declined    Treatment Team Recommendation: Short Term Rehab  Discharge Destination Plan[de-identified] Short Term Rehab

## 2023-08-26 NOTE — ASSESSMENT & PLAN NOTE
Home regimen Cozaar 25 mg daily, metoprolol succinate 25 mg daily  Hold Cozaar to allow for permissive hypertension  Lasix was on patient's med rec but she states she is not taking this    Plan:   Continue Home medication

## 2023-08-26 NOTE — ASSESSMENT & PLAN NOTE
With difficulty walking  Placed on stroke pathway  PT OT evaluation  Uses cane to ambulate at baseline but currently unable    Plan:   · PT recommends acute   · Possibly Monday

## 2023-08-26 NOTE — ASSESSMENT & PLAN NOTE
Presenting with complaint of dizziness and difficulty ambulating  Has a history of vertigo however symptoms are worse than usual  CT head with no acute intracranial abnormality, stable chronic microangiopathic changes  Placed on CVA pathway  Already on aspirin 81 mg daily   lipid panel uremarkable  Check hemoglobin A1c and lipid panel: 5.7  PT/OT consulted: Recommended  Post acute rehab  Allow for permissive hypertension  Neurology: Stroke pathway: MRI brain no evidence of acute infarct, echo pending, carotid ultrasound result pending, CT head negative    Plan:    Continue meclizine 25 mg daily  Possible discharge to rehab on Monday   CM working on Rehab location

## 2023-08-26 NOTE — PLAN OF CARE
Problem: MOBILITY - ADULT  Goal: Maintain or return to baseline ADL function  Description: INTERVENTIONS:  -  Assess patient's ability to carry out ADLs; assess patient's baseline for ADL function and identify physical deficits which impact ability to perform ADLs (bathing, care of mouth/teeth, toileting, grooming, dressing, etc.)  - Assess/evaluate cause of self-care deficits   - Assess range of motion  - Assess patient's mobility; develop plan if impaired  - Assess patient's need for assistive devices and provide as appropriate  - Encourage maximum independence but intervene and supervise when necessary  - Involve family in performance of ADLs  - Assess for home care needs following discharge   - Consider OT consult to assist with ADL evaluation and planning for discharge  - Provide patient education as appropriate  Outcome: Progressing  Goal: Maintains/Returns to pre admission functional level  Description: INTERVENTIONS:  - Perform BMAT or MOVE assessment daily.   - Set and communicate daily mobility goal to care team and patient/family/caregiver. - Collaborate with rehabilitation services on mobility goals if consulted  - Perform Range of Motion  times a day. - Reposition patient every  hours. - Dangle patient  times a day  - Stand patient  times a day  - Ambulate patient  times a day  - Out of bed to chair  times a day   - Out of bed for meals times a day  - Out of bed for toileting  - Record patient progress and toleration of activity level   Outcome: Progressing     Problem: Neurological Deficit  Goal: Neurological status is stable or improving  Description: Interventions:  - Monitor and assess patient's level of consciousness, motor function, sensory function, and level of assistance needed for ADLs. - Monitor and report changes from baseline. Collaborate with interdisciplinary team to initiate plan and implement interventions as ordered. - Provide and maintain a safe environment.   - Consider seizure precautions. - Consider fall precautions. - Consider aspiration precautions. - Consider bleeding precautions. Outcome: Progressing     Problem: Activity Intolerance/Impaired Mobility  Goal: Mobility/activity is maintained at optimum level for patient  Description: Interventions:  - Assess and monitor patient  barriers to mobility and need for assistive/adaptive devices. - Assess patient's emotional response to limitations. - Collaborate with interdisciplinary team and initiate plans and interventions as ordered. - Encourage independent activity per ability.  - Maintain proper body alignment. - Perform active/passive rom as tolerated/ordered. - Plan activities to conserve energy.  - Turn patient as appropriate  Outcome: Progressing     Problem: Communication Impairment  Goal: Ability to express needs and understand communication  Description: Assess patient's communication skills and ability to understand information. Patient will demonstrate use of effective communication techniques, alternative methods of communication and understanding even if not able to speak. - Encourage communication and provide alternate methods of communication as needed. - Collaborate with case management/ for discharge needs. - Include patient/family/caregiver in decisions related to communication. Outcome: Progressing     Problem: Potential for Aspiration  Goal: Non-ventilated patient's risk of aspiration is minimized  Description: Assess and monitor vital signs, respiratory status, and labs (WBC). Monitor for signs of aspiration (tachypnea, cough, rales, wheezing, cyanosis, fever). - Assess and monitor patient's ability to swallow. - Place patient up in chair to eat if possible. - HOB up at 90 degrees to eat if unable to get patient up into chair.  - Supervise patient during oral intake. - Instruct patient/ family to take small bites.   - Instruct patient/ family to take small single sips when taking liquids. - Follow patient-specific strategies generated by speech pathologist.  Outcome: Progressing  Goal: Ventilated patient's risk of aspiration is minimized  Description: Assess and monitor vital signs, respiratory status, airway cuff pressure, and labs (WBC). Monitor for signs of aspiration (tachypnea, cough, rales, wheezing, cyanosis, fever). - Elevate head of bed 30 degrees if patient has tube feeding.  - Monitor tube feeding. Outcome: Progressing     Problem: Nutrition  Goal: Nutrition/Hydration status is improving  Description: Monitor and assess patient's nutrition/hydration status for malnutrition (ex- brittle hair, bruises, dry skin, pale skin and conjunctiva, muscle wasting, smooth red tongue, and disorientation). Collaborate with interdisciplinary team and initiate plan and interventions as ordered. Monitor patient's weight and dietary intake as ordered or per policy. Utilize nutrition screening tool and intervene per policy. Determine patient's food preferences and provide high-protein, high-caloric foods as appropriate. - Assist patient with eating.  - Allow adequate time for meals.  - Encourage patient to take dietary supplement as ordered. - Collaborate with clinical nutritionist.  - Include patient/family/caregiver in decisions related to nutrition.   Outcome: Progressing     Problem: Prexisting or High Potential for Compromised Skin Integrity  Goal: Skin integrity is maintained or improved  Description: INTERVENTIONS:  - Identify patients at risk for skin breakdown  - Assess and monitor skin integrity  - Assess and monitor nutrition and hydration status  - Monitor labs   - Assess for incontinence   - Turn and reposition patient  - Assist with mobility/ambulation  - Relieve pressure over bony prominences  - Avoid friction and shearing  - Provide appropriate hygiene as needed including keeping skin clean and dry  - Evaluate need for skin moisturizer/barrier cream  - Collaborate with interdisciplinary team   - Patient/family teaching  - Consider wound care consult   Outcome: Progressing     Problem: Nutrition/Hydration-ADULT  Goal: Nutrient/Hydration intake appropriate for improving, restoring or maintaining nutritional needs  Description: Monitor and assess patient's nutrition/hydration status for malnutrition. Collaborate with interdisciplinary team and initiate plan and interventions as ordered. Monitor patient's weight and dietary intake as ordered or per policy. Utilize nutrition screening tool and intervene as necessary. Determine patient's food preferences and provide high-protein, high-caloric foods as appropriate.      INTERVENTIONS:  - Monitor oral intake, urinary output, labs, and treatment plans  - Assess nutrition and hydration status and recommend course of action  - Evaluate amount of meals eaten  - Assist patient with eating if necessary   - Allow adequate time for meals  - Recommend/ encourage appropriate diets, oral nutritional supplements, and vitamin/mineral supplements  - Order, calculate, and assess calorie counts as needed  - Recommend, monitor, and adjust tube feedings and TPN/PPN based on assessed needs  - Assess need for intravenous fluids  - Provide specific nutrition/hydration education as appropriate  - Include patient/family/caregiver in decisions related to nutrition  Outcome: Progressing

## 2023-08-26 NOTE — ASSESSMENT & PLAN NOTE
History of anemia  Hemoglobin stable at 10.0  No signs of active bleeding    Plan  Please continue to follow primary doctor

## 2023-08-26 NOTE — PROGRESS NOTES
[facility]  Progress Note  Name: Laureen Hung  MRN: [id number]  Unit/Bed#: S -01 I Date of Admission: 8/24/2023   Date of Service: 8/26/2023 I Hospital Day: 1    Assessment/Plan   * Dizziness  Assessment & Plan  Presenting with complaint of dizziness and difficulty ambulating  Has a history of vertigo however symptoms are worse than usual  CT head with no acute intracranial abnormality, stable chronic microangiopathic changes  Placed on CVA pathway  Already on aspirin 81 mg daily   lipid panel uremarkable  Check hemoglobin A1c and lipid panel: 5.7  PT/OT consulted: Recommended  Post acute rehab  Allow for permissive hypertension  Neurology: Stroke pathway: MRI brain no evidence of acute infarct, echo pending, carotid ultrasound result pending, CT head negative    Plan:    Continue meclizine 25 mg daily  Possible discharge to rehab on Monday   CM working on Rehab location             Ambulatory dysfunction  Assessment & Plan  With difficulty walking  Placed on stroke pathway  PT OT evaluation  Uses cane to ambulate at baseline but currently unable    Plan:   · PT recommends acute   · Possibly Monday      Anemia  Assessment & Plan  History of anemia  Hemoglobin stable at 10.0  No signs of active bleeding    Plan  Please continue to follow primary doctor     CKD (chronic kidney disease) stage 3, GFR 30-59 ml/min [facility])  Assessment & Plan  Lab Results   Component Value Date    EGFR 57 08/25/2023    EGFR 50 08/24/2023    EGFR 40 05/27/2022    CREATININE 0.88 08/25/2023    CREATININE 0.98 08/24/2023    CREATININE 1.17 05/27/2022   Creatinine appearing to be around baseline    Primary hypertension  Assessment & Plan  Home regimen Cozaar 25 mg daily, metoprolol succinate 25 mg daily  Hold Cozaar to allow for permissive hypertension  Lasix was on patient's med rec but she states she is not taking this    Plan:   Continue Home medication     Hyponatremia-resolved as of 2023  Assessment & Plan  Sodium noted to be 131 upon admission  Suspect related to poor oral intake    Na: Normalized    Plan:   Encourage oral intake           VTE Pharmacologic Prophylaxis: VTE Score: 4 Moderate Risk (Score 3-4) - Pharmacological DVT Prophylaxis Contraindicated. Sequential Compression Devices Ordered. Patient Centered Rounds: I performed bedside rounds with nursing staff today. Discussions with Specialists or Other Care Team Provider:Neurology   Education and Discussions with Family / Patient: Updated  (daughter in law) via phone. Current Length of Stay: 1 day(s)  Current Patient Status: Inpatient   Discharge Plan: Anticipate discharge in 24-48 hrs to rehab facility. Code Status: Level 3 - DNAR and DNI    Subjective:   No acute event overnight. This morning, patient is feeling well. Patient and family have agreed to rehab with her current ambulatory status. She still felt like the room was spinning while using the bathroom this morning. She also felt a little confused when she woke up but feels better. She denied shortness of breath, headache, fever and chills. She does feel constipated but refuses current bowel regimen. She will like her [employer] from home. Awaiting Monday for possible rehab. Objective:      Vitals:   Temp (24hrs), Av °F (36.7 °C), Min:97.6 °F (36.4 °C), Max:98.6 °F (37 °C)    Temp:  [97.6 °F (36.4 °C)-98.6 °F (37 °C)] 98.6 °F (37 °C)  HR:  [55-71] 58  Resp:  [16-18] 16  BP: (123-163)/(58-73) 129/62  SpO2:  [90 %-97 %] 95 %  Body mass index is 26.17 kg/m². Input and Output Summary (last 24 hours):   No intake or output data in the 24 hours ending 23 1248    Physical Exam:   Physical Exam  Vitals and nursing note reviewed. Constitutional:       General: She is not in acute distress. Appearance: She is well-developed. HENT:      Head: Normocephalic and atraumatic.       Mouth/Throat:      Mouth: Mucous membranes are moist. Eyes:      Conjunctiva/sclera: Conjunctivae normal.   Cardiovascular:      Rate and Rhythm: Normal rate. Rhythm irregular. Heart sounds: No murmur heard. Pulmonary:      Effort: Pulmonary effort is normal. No respiratory distress. Breath sounds: Normal breath sounds. Abdominal:      General: Bowel sounds are normal.      Palpations: Abdomen is soft. Tenderness: There is no abdominal tenderness. Musculoskeletal:         General: No swelling. Cervical back: Neck supple. Skin:     General: Skin is warm and dry. Capillary Refill: Capillary refill takes less than 2 seconds. Neurological:      Mental Status: She is alert. Psychiatric:         Mood and Affect: Mood normal.          Additional Data:     Labs:  Results from last 7 days   Lab Units 08/26/23  0451   WBC Thousand/uL 3.51*   HEMOGLOBIN g/dL 9.6*   HEMATOCRIT % 28.8*   PLATELETS Thousands/uL 187   NEUTROS PCT % 52   LYMPHS PCT % 30   MONOS PCT % 11   EOS PCT % 5     Results from last 7 days   Lab Units 08/26/23  0451 08/25/23  0449 08/24/23  1218   SODIUM mmol/L 135   < > 131*   POTASSIUM mmol/L 3.9   < > 4.6   CHLORIDE mmol/L 104   < > 99   CO2 mmol/L 27   < > 26   BUN mg/dL 24   < > 22   CREATININE mg/dL 1.08   < > 0.98   ANION GAP mmol/L 4   < > 6   CALCIUM mg/dL 8.9   < > 9.3   ALBUMIN g/dL  --   --  4.2   TOTAL BILIRUBIN mg/dL  --   --  0.38   ALK PHOS U/L  --   --  54   ALT U/L  --   --  11   AST U/L  --   --  15   GLUCOSE RANDOM mg/dL 81   < > 79    < > = values in this interval not displayed.              Results from last 7 days   Lab Units 08/25/23  0449   HEMOGLOBIN A1C % 5.7*           Lines/Drains:  Invasive Devices     Peripheral Intravenous Line  Duration           Peripheral IV 08/24/23 Left Antecubital 2 days                      Imaging: Reviewed radiology reports from this admission including: MRI brain and MRI spine    Recent Cultures (last 7 days):         Last 24 Hours Medication List:   Current Facility-Administered Medications   Medication Dose Route Frequency Provider Last Rate   • acetaminophen  650 mg Oral Q4H PRN Jennifer Vaz PA-C     • aspirin  81 mg Oral Daily Amaris Guevara PA-C     • enoxaparin  30 mg Subcutaneous Daily Amaris Guevara PA-C     • gabapentin  100 mg Oral TID Jennifer Vaz PA-C     • loratadine  10 mg Oral Daily Amaris Guevara PA-C     • meclizine  25 mg Oral Q8H [facility] Amaris Guevara PA-C     • metoprolol succinate  25 mg Oral Daily Amaris Guevara PA-C     • ondansetron  4 mg Intravenous Q6H PRN Jennifer Vaz PA-C          Today, Patient Was Seen By: Maximiliano Gamble MD    **Please Note: This note may have been constructed using a voice recognition system. **

## 2023-08-27 LAB
ANION GAP SERPL CALCULATED.3IONS-SCNC: 3 MMOL/L
ATRIAL RATE: 56 BPM
BASOPHILS # BLD AUTO: 0.04 THOUSANDS/ÂΜL (ref 0–0.1)
BASOPHILS NFR BLD AUTO: 1 % (ref 0–1)
BUN SERPL-MCNC: 24 MG/DL (ref 5–25)
CALCIUM SERPL-MCNC: 9.1 MG/DL (ref 8.4–10.2)
CHLORIDE SERPL-SCNC: 104 MMOL/L (ref 96–108)
CO2 SERPL-SCNC: 28 MMOL/L (ref 21–32)
CREAT SERPL-MCNC: 1.02 MG/DL (ref 0.6–1.3)
EOSINOPHIL # BLD AUTO: 0.25 THOUSAND/ÂΜL (ref 0–0.61)
EOSINOPHIL NFR BLD AUTO: 5 % (ref 0–6)
ERYTHROCYTE [DISTWIDTH] IN BLOOD BY AUTOMATED COUNT: 12.7 % (ref 11.6–15.1)
GFR SERPL CREATININE-BSD FRML MDRD: 47 ML/MIN/1.73SQ M
GLUCOSE SERPL-MCNC: 71 MG/DL (ref 65–140)
HCT VFR BLD AUTO: 30.7 % (ref 34.8–46.1)
HGB BLD-MCNC: 10.3 G/DL (ref 11.5–15.4)
IMM GRANULOCYTES # BLD AUTO: 0.03 THOUSAND/UL (ref 0–0.2)
IMM GRANULOCYTES NFR BLD AUTO: 1 % (ref 0–2)
LYMPHOCYTES # BLD AUTO: 1.45 THOUSANDS/ÂΜL (ref 0.6–4.47)
LYMPHOCYTES NFR BLD AUTO: 29 % (ref 14–44)
MCH RBC QN AUTO: 34.8 PG (ref 26.8–34.3)
MCHC RBC AUTO-ENTMCNC: 33.6 G/DL (ref 31.4–37.4)
MCV RBC AUTO: 104 FL (ref 82–98)
MONOCYTES # BLD AUTO: 0.52 THOUSAND/ÂΜL (ref 0.17–1.22)
MONOCYTES NFR BLD AUTO: 11 % (ref 4–12)
NEUTROPHILS # BLD AUTO: 2.66 THOUSANDS/ÂΜL (ref 1.85–7.62)
NEUTS SEG NFR BLD AUTO: 53 % (ref 43–75)
NRBC BLD AUTO-RTO: 0 /100 WBCS
P AXIS: 81 DEGREES
PLATELET # BLD AUTO: 220 THOUSANDS/UL (ref 149–390)
PMV BLD AUTO: 10.1 FL (ref 8.9–12.7)
POTASSIUM SERPL-SCNC: 4.3 MMOL/L (ref 3.5–5.3)
PR INTERVAL: 200 MS
QRS AXIS: -28 DEGREES
QRSD INTERVAL: 88 MS
QT INTERVAL: 420 MS
QTC INTERVAL: 405 MS
RBC # BLD AUTO: 2.96 MILLION/UL (ref 3.81–5.12)
SODIUM SERPL-SCNC: 135 MMOL/L (ref 135–147)
T WAVE AXIS: 71 DEGREES
VENTRICULAR RATE: 56 BPM
WBC # BLD AUTO: 4.95 THOUSAND/UL (ref 4.31–10.16)

## 2023-08-27 PROCEDURE — 80048 BASIC METABOLIC PNL TOTAL CA: CPT

## 2023-08-27 PROCEDURE — 85025 COMPLETE CBC W/AUTO DIFF WBC: CPT

## 2023-08-27 PROCEDURE — 99232 SBSQ HOSP IP/OBS MODERATE 35: CPT | Performed by: HOSPITALIST

## 2023-08-27 PROCEDURE — 93010 ELECTROCARDIOGRAM REPORT: CPT | Performed by: INTERNAL MEDICINE

## 2023-08-27 RX ORDER — AMOXICILLIN 250 MG
2 CAPSULE ORAL 2 TIMES DAILY
Status: DISCONTINUED | OUTPATIENT
Start: 2023-08-27 | End: 2023-08-28 | Stop reason: HOSPADM

## 2023-08-27 RX ORDER — POLYETHYLENE GLYCOL 3350 17 G/17G
17 POWDER, FOR SOLUTION ORAL DAILY
Status: DISCONTINUED | OUTPATIENT
Start: 2023-08-27 | End: 2023-08-28 | Stop reason: HOSPADM

## 2023-08-27 RX ADMIN — ENOXAPARIN SODIUM 30 MG: 30 INJECTION SUBCUTANEOUS at 09:53

## 2023-08-27 RX ADMIN — LORATADINE 10 MG: 10 TABLET ORAL at 09:52

## 2023-08-27 RX ADMIN — GABAPENTIN 100 MG: 100 CAPSULE ORAL at 15:01

## 2023-08-27 RX ADMIN — GABAPENTIN 100 MG: 100 CAPSULE ORAL at 21:25

## 2023-08-27 RX ADMIN — SENNOSIDES AND DOCUSATE SODIUM 2 TABLET: 50; 8.6 TABLET ORAL at 15:01

## 2023-08-27 RX ADMIN — GABAPENTIN 100 MG: 100 CAPSULE ORAL at 09:53

## 2023-08-27 RX ADMIN — METOPROLOL SUCCINATE 25 MG: 25 TABLET, EXTENDED RELEASE ORAL at 09:52

## 2023-08-27 RX ADMIN — MECLIZINE HYDROCHLORIDE 25 MG: 25 TABLET ORAL at 21:25

## 2023-08-27 RX ADMIN — MECLIZINE HYDROCHLORIDE 25 MG: 25 TABLET ORAL at 15:01

## 2023-08-27 RX ADMIN — POLYETHYLENE GLYCOL 3350 17 G: 17 POWDER, FOR SOLUTION ORAL at 15:01

## 2023-08-27 RX ADMIN — ASPIRIN 81 MG: 81 TABLET, COATED ORAL at 09:53

## 2023-08-27 NOTE — PROGRESS NOTES
Per Internal medicine provider, with the loss of IV access, and the patient has no IV medications ordered. A new IV placement is not needed.

## 2023-08-27 NOTE — PROGRESS NOTES
[facility]  Progress Note  Name: Joe Christine  MRN: [id number]  Unit/Bed#: S -01 I Date of Admission: 8/24/2023   Date of Service: 8/27/2023 I Hospital Day: 2    Assessment/Plan   * Dizziness  Assessment & Plan  Presenting with complaint of dizziness and difficulty ambulating  Has a history of vertigo however symptoms are worse than usual  CT head with no acute intracranial abnormality, stable chronic microangiopathic changes  Placed on CVA pathway  Already on aspirin 81 mg daily   lipid panel uremarkable  Check hemoglobin A1c and lipid panel: 5.7  PT/OT consulted: Recommended  Post acute rehab  Allow for permissive hypertension  Neurology: Stroke pathway: MRI brain no evidence of acute infarct, echo pending, carotid ultrasound result pending, CT head negative    Plan:    Continue meclizine 25 mg daily  Possible discharge to rehab on Monday   CM working on Rehab location             Anemia  Assessment & Plan  History of anemia  Hemoglobin stable at 10.0  No signs of active bleeding    Plan  Please continue to follow primary doctor     Ambulatory dysfunction  Assessment & Plan  With difficulty walking  Placed on stroke pathway  PT OT evaluation  Uses cane to ambulate at baseline but currently unable    Plan:   · PT recommends acute   · Possibly Monday      CKD (chronic kidney disease) stage 3, GFR 30-59 ml/min [facility])  Assessment & Plan  Lab Results   Component Value Date    EGFR 47 08/27/2023    EGFR 44 08/26/2023    EGFR 57 08/25/2023    CREATININE 1.02 08/27/2023    CREATININE 1.08 08/26/2023    CREATININE 0.88 08/25/2023   Creatinine appearing to be around baseline    Primary hypertension  Assessment & Plan  Home regimen Cozaar 25 mg daily, metoprolol succinate 25 mg daily  Hold Cozaar to allow for permissive hypertension  Lasix was on patient's med rec but she states she is not taking this    Plan:   Continue Home medication          VTE Pharmacologic Prophylaxis: VTE Score: 4 Moderate Risk (Score 3-4) - Pharmacological DVT Prophylaxis Contraindicated. Sequential Compression Devices Ordered. Patient Centered Rounds: I performed bedside rounds with nursing staff today. Discussions with Specialists or Other Care Team Provider:Neurology   Education and Discussions with Family / Patient: Updated  (daughter in law) via phone. Current Length of Stay: 2 day(s)  Current Patient Status: Inpatient   Discharge Plan: Anticipate discharge in 24-48 hrs to rehab facility. Code Status: Level 3 - DNAR and DNI    Subjective:   No acute event overnight. No acute complaints at this time. Objective:      Vitals:   Temp (24hrs), Av.9 °F (36.6 °C), Min:97.4 °F (36.3 °C), Max:98.4 °F (36.9 °C)    Temp:  [97.4 °F (36.3 °C)-98.4 °F (36.9 °C)] 97.4 °F (36.3 °C)  HR:  [55-64] 55  Resp:  [16-19] 16  BP: (132-155)/(61-69) 155/69  SpO2:  [96 %-99 %] 96 %  Body mass index is 26.17 kg/m². Input and Output Summary (last 24 hours): Intake/Output Summary (Last 24 hours) at 2023 1422  Last data filed at 2023 9446  Gross per 24 hour   Intake --   Output 700 ml   Net -700 ml       Physical Exam:   Physical Exam  Vitals and nursing note reviewed. Constitutional:       General: She is not in acute distress. Appearance: Normal appearance. She is well-developed. She is not ill-appearing. HENT:      Head: Normocephalic and atraumatic. Mouth/Throat:      Mouth: Mucous membranes are moist.   Eyes:      General: No scleral icterus. Conjunctiva/sclera: Conjunctivae normal.   Cardiovascular:      Rate and Rhythm: Normal rate. Rhythm irregular. Pulses: Normal pulses. Heart sounds: Normal heart sounds. No murmur heard. No friction rub. No gallop. Pulmonary:      Effort: Pulmonary effort is normal. No respiratory distress. Breath sounds: Normal breath sounds. No wheezing or rales.    Abdominal:      General: Bowel sounds are normal. There is no distension. Palpations: Abdomen is soft. Tenderness: There is no abdominal tenderness. There is no guarding. Musculoskeletal:         General: No swelling. Cervical back: Neck supple. Right lower leg: No edema. Left lower leg: No edema. Skin:     General: Skin is warm and dry. Capillary Refill: Capillary refill takes less than 2 seconds. Neurological:      Mental Status: She is alert and oriented to person, place, and time. Mental status is at baseline. Psychiatric:         Mood and Affect: Mood normal.         Behavior: Behavior normal.          Additional Data:     Labs:  Results from last 7 days   Lab Units 08/27/23  0507   WBC Thousand/uL 4.95   HEMOGLOBIN g/dL 10.3*   HEMATOCRIT % 30.7*   PLATELETS Thousands/uL 220   NEUTROS PCT % 53   LYMPHS PCT % 29   MONOS PCT % 11   EOS PCT % 5     Results from last 7 days   Lab Units 08/27/23  0507 08/25/23  0449 08/24/23  1218   SODIUM mmol/L 135   < > 131*   POTASSIUM mmol/L 4.3   < > 4.6   CHLORIDE mmol/L 104   < > 99   CO2 mmol/L 28   < > 26   BUN mg/dL 24   < > 22   CREATININE mg/dL 1.02   < > 0.98   ANION GAP mmol/L 3   < > 6   CALCIUM mg/dL 9.1   < > 9.3   ALBUMIN g/dL  --   --  4.2   TOTAL BILIRUBIN mg/dL  --   --  0.38   ALK PHOS U/L  --   --  54   ALT U/L  --   --  11   AST U/L  --   --  15   GLUCOSE RANDOM mg/dL 71   < > 79    < > = values in this interval not displayed.              Results from last 7 days   Lab Units 08/25/23  0449   HEMOGLOBIN A1C % 5.7*           Lines/Drains:  Invasive Devices     None                       Imaging: Reviewed radiology reports from this admission including: MRI brain and MRI spine    Recent Cultures (last 7 days):         Last 24 Hours Medication List:   Current Facility-Administered Medications   Medication Dose Route Frequency Provider Last Rate   • acetaminophen  650 mg Oral Q4H PRN Elen Dobbins PA-C     • aspirin  81 mg Oral Daily Amaris Guevara PA-C     • enoxaparin  30 mg Subcutaneous Daily Amaris Guevara PA-C     • gabapentin  100 mg Oral TID Cristobal West PA-C     • loratadine  10 mg Oral Daily Amaris Guevara PA-C     • meclizine  25 mg Oral Q8H [address] Amaris Guevara PA-C     • metoprolol succinate  25 mg Oral Daily Amaris Guevara PA-C     • ondansetron  4 mg Intravenous Q6H PRN Cristobal West PA-C          Today, Patient Was Seen By: Link Sheppard MD    **Please Note: This note may have been constructed using a voice recognition system. **

## 2023-08-27 NOTE — ASSESSMENT & PLAN NOTE
Lab Results   Component Value Date    EGFR 47 08/27/2023    EGFR 44 08/26/2023    EGFR 57 08/25/2023    CREATININE 1.02 08/27/2023    CREATININE 1.08 08/26/2023    CREATININE 0.88 08/25/2023   Creatinine appearing to be around baseline

## 2023-08-27 NOTE — CASE MANAGEMENT
Case Management Discharge Planning Note    Patient name Delfin Freeman  Location S /S -97 MRN [id number]  : 1930 Date 2023       Current Admission Date: 2023  Current Admission Diagnosis:Dizziness   Patient Active Problem List    Diagnosis Date Noted   • History of compression fracture of spine 2023   • Malnutrition due to renal disease ([address]) 2023   • Arthritis of left hip 10/25/2022   • Hx of colonic polyps 2022   • Diverticulosis 2022   • Anemia 2022   • Lower extremity edema 2022   • Megaloblastic anemia 2021   • Ambulatory dysfunction 2021   • Dizziness 2021   • Anxiety 2021   • Iron deficiency anemia 2021   • CKD (chronic kidney disease) stage 3, GFR 30-59 ml/min ([facility]) 2020   • Lumbosacral spondylosis without myelopathy 2018   • Primary hypertension 2018   • Atherosclerosis of native artery of extremity ([address]) 2018   • Idiopathic peripheral neuropathy 2018   • Benign positional vertigo 2017   • Allergic rhinitis 2016   • Insomnia 2016   • Gastroparesis 2016   • IBS (irritable bowel syndrome) 10/20/2015   • Osteoporosis 10/20/2015   • Low back pain 10/08/2015      LOS (days): 2  Geometric Mean LOS (GMLOS) (days):   Days to GMLOS:     OBJECTIVE:  Risk of Unplanned Readmission Score: 9.98         Current admission status: Inpatient   Preferred Pharmacy:   [facility] [address], [place] [address]  Phone: [phone] Fax: [phone]    Primary Care Provider: Tania Kothari MD    Primary Insurance: MEDICARE  Secondary Insurance: BLUE CROSS    DISCHARGE DETAILS:    Discharge planning discussed with[de-identified] patient at bedside, KATLIN Bynum over phone  Freedom of Choice: Yes  Comments - Freedom of Choice: Flip Reynoso CM contacted family/caregiver?: Yes  Were Treatment Team discharge recommendations reviewed with patient/caregiver?: Yes  Did patient/caregiver verbalize understanding of patient care needs?: N/A- going to facility  Were patient/caregiver advised of the risks associated with not following Treatment Team discharge recommendations?: Yes    Contacts  Patient Contacts: Misa  Relationship to Patient[de-identified] Family (daughter-in-law)  Contact Method: Phone  Phone Number: [phone]  Reason/Outcome: Continuity of Care, Emergency Contact, Discharge Planning, Referral    Requested [address]         Is the patient interested in [address] at discharge?: No    DME Referral Provided  Referral made for DME?: No    Other Referral/Resources/Interventions Provided:  Interventions: Short Term Rehab  Referral Comments: CM met with patient at bedside to review patient choice list. Patient would like to accept bed with [facility]- reserved in [address] [place] [address]. Call to KATLIN Bynum to discuss same, Misa also agreeable. 11:30 AM [facility] confirmed- all appropriate parties aware. No further CM needs anticipated at this time. Would you like to participate in our [facility] [place] service program?  : No - Declined    Treatment Team Recommendation: Short Term Rehab  Discharge Destination Plan[de-identified] Short Term Rehab  Transport at Discharge : Wheelchair [employer] by [facility] and Unit #): [facility]      IMM Given (Date):: 08/27/23  IMM Given to[de-identified] Patient (copy provided)  IMM reviewed with patient, patient agrees with discharge determination.     Accepting Facility Name, [address] [place] [address] : [facility], [facility]  Receiving Facility/Agency Phone Number: [phone]  Facility/Agency Fax Number: [phone]

## 2023-08-28 VITALS
DIASTOLIC BLOOD PRESSURE: 68 MMHG | SYSTOLIC BLOOD PRESSURE: 156 MMHG | BODY MASS INDEX: 26.31 KG/M2 | TEMPERATURE: 97.7 F | HEART RATE: 51 BPM | RESPIRATION RATE: 18 BRPM | OXYGEN SATURATION: 95 % | WEIGHT: 134 LBS | HEIGHT: 60 IN

## 2023-08-28 PROCEDURE — 99239 HOSP IP/OBS DSCHRG MGMT >30: CPT | Performed by: HOSPITALIST

## 2023-08-28 PROCEDURE — 97116 GAIT TRAINING THERAPY: CPT

## 2023-08-28 PROCEDURE — 97530 THERAPEUTIC ACTIVITIES: CPT

## 2023-08-28 RX ORDER — BISACODYL 10 MG
10 SUPPOSITORY, RECTAL RECTAL ONCE
Status: COMPLETED | OUTPATIENT
Start: 2023-08-28 | End: 2023-08-28

## 2023-08-28 RX ADMIN — ASPIRIN 81 MG: 81 TABLET, COATED ORAL at 08:26

## 2023-08-28 RX ADMIN — ENOXAPARIN SODIUM 30 MG: 30 INJECTION SUBCUTANEOUS at 08:25

## 2023-08-28 RX ADMIN — GABAPENTIN 100 MG: 100 CAPSULE ORAL at 08:26

## 2023-08-28 RX ADMIN — SENNOSIDES AND DOCUSATE SODIUM 2 TABLET: 50; 8.6 TABLET ORAL at 08:26

## 2023-08-28 RX ADMIN — POLYETHYLENE GLYCOL 3350 17 G: 17 POWDER, FOR SOLUTION ORAL at 08:26

## 2023-08-28 RX ADMIN — LORATADINE 10 MG: 10 TABLET ORAL at 08:26

## 2023-08-28 RX ADMIN — MECLIZINE HYDROCHLORIDE 25 MG: 25 TABLET ORAL at 06:15

## 2023-08-28 RX ADMIN — MECLIZINE HYDROCHLORIDE 25 MG: 25 TABLET ORAL at 14:59

## 2023-08-28 RX ADMIN — BISACODYL 10 MG: 10 SUPPOSITORY RECTAL at 10:53

## 2023-08-28 NOTE — ASSESSMENT & PLAN NOTE
With difficulty walking  PT OT evaluation: Recommend acute rehab  Uses cane to ambulate at baseline but currently unable    Plan:   · Discharged to Acute rehab

## 2023-08-28 NOTE — CASE MANAGEMENT
Case Management Discharge Planning Note    Patient name Sendy Leblanc  Location S /S -64 MRN [id number]  : 1930 Date 2023       Current Admission Date: 2023  Current Admission Diagnosis:Dizziness   Patient Active Problem List    Diagnosis Date Noted   • History of compression fracture of spine 2023   • Malnutrition due to renal disease ([address]) 2023   • Arthritis of left hip 10/25/2022   • Hx of colonic polyps 2022   • Diverticulosis 2022   • Anemia 2022   • Lower extremity edema 2022   • Megaloblastic anemia 2021   • Ambulatory dysfunction 2021   • Dizziness 2021   • Anxiety 2021   • Iron deficiency anemia 2021   • CKD (chronic kidney disease) stage 3, GFR 30-59 ml/min ([facility]) 2020   • Lumbosacral spondylosis without myelopathy 2018   • Primary hypertension 2018   • Atherosclerosis of native artery of extremity ([address]) 2018   • Idiopathic peripheral neuropathy 2018   • Benign positional vertigo 2017   • Allergic rhinitis 2016   • Insomnia 2016   • Gastroparesis 2016   • IBS (irritable bowel syndrome) 10/20/2015   • Osteoporosis 10/20/2015   • Low back pain 10/08/2015      LOS (days): 3  Geometric Mean LOS (GMLOS) (days): 1.90  Days to GMLOS:-1     OBJECTIVE:  Risk of Unplanned Readmission Score: 10.5         Current admission status: Inpatient   Preferred Pharmacy:   [facility] [address], [place] [address]  Phone: [phone] Fax: [phone]    Primary Care Provider: Matteo Stanford MD    Primary Insurance: MEDICARE  Secondary Insurance: BLUE CROSS    DISCHARGE DETAILS:                                          Other Referral/Resources/Interventions Provided:  Interventions: Transportation  Referral Comments: Per [facility] unable to accept patient to facility until patient has a BM.  Transport pushed back to 3pm via [facility] to [facility] today pending patient has a BM. All parties notified of same. Treatment Team Recommendation: Short Term Rehab  Discharge Destination Plan[de-identified] Short Term Rehab  Transport at Discharge : Wheelchair [employer] by [employer] and Unit #): [employer] [facility]   ETA of Transport (Date): 08/28/23  ETA of Transport (Time): 1500

## 2023-08-28 NOTE — PLAN OF CARE
Problem: MOBILITY - ADULT  Goal: Maintain or return to baseline ADL function  Description: INTERVENTIONS:  -  Assess patient's ability to carry out ADLs; assess patient's baseline for ADL function and identify physical deficits which impact ability to perform ADLs (bathing, care of mouth/teeth, toileting, grooming, dressing, etc.)  - Assess/evaluate cause of self-care deficits   - Assess range of motion  - Assess patient's mobility; develop plan if impaired  - Assess patient's need for assistive devices and provide as appropriate  - Encourage maximum independence but intervene and supervise when necessary  - Involve family in performance of ADLs  - Assess for home care needs following discharge   - Consider OT consult to assist with ADL evaluation and planning for discharge  - Provide patient education as appropriate  Outcome: Progressing  Goal: Maintains/Returns to pre admission functional level  Description: INTERVENTIONS:  - Perform BMAT or MOVE assessment daily.   - Set and communicate daily mobility goal to care team and patient/family/caregiver. - Collaborate with rehabilitation services on mobility goals if consulted  - Perform Range of Motion times a day. - Reposition patient every hours. - Dangle patient  times a day  - Stand patient  times a day  - Ambulate patient  times a day  - Out of bed to chair  times a day   - Out of bed for meals  times a day  - Out of bed for toileting  - Record patient progress and toleration of activity level   Outcome: Progressing     Problem: Neurological Deficit  Goal: Neurological status is stable or improving  Description: Interventions:  - Monitor and assess patient's level of consciousness, motor function, sensory function, and level of assistance needed for ADLs. - Monitor and report changes from baseline. Collaborate with interdisciplinary team to initiate plan and implement interventions as ordered. - Provide and maintain a safe environment.   - Consider seizure precautions. - Consider fall precautions. - Consider aspiration precautions. - Consider bleeding precautions. Outcome: Progressing     Problem: Neurological Deficit  Goal: Neurological status is stable or improving  Description: Interventions:  - Monitor and assess patient's level of consciousness, motor function, sensory function, and level of assistance needed for ADLs. - Monitor and report changes from baseline. Collaborate with interdisciplinary team to initiate plan and implement interventions as ordered. - Provide and maintain a safe environment. - Consider seizure precautions. - Consider fall precautions. - Consider aspiration precautions. - Consider bleeding precautions. Outcome: Progressing     Problem: Activity Intolerance/Impaired Mobility  Goal: Mobility/activity is maintained at optimum level for patient  Description: Interventions:  - Assess and monitor patient  barriers to mobility and need for assistive/adaptive devices. - Assess patient's emotional response to limitations. - Collaborate with interdisciplinary team and initiate plans and interventions as ordered. - Encourage independent activity per ability.  - Maintain proper body alignment. - Perform active/passive rom as tolerated/ordered. - Plan activities to conserve energy.  - Turn patient as appropriate  Outcome: Progressing     Problem: Communication Impairment  Goal: Ability to express needs and understand communication  Description: Assess patient's communication skills and ability to understand information. Patient will demonstrate use of effective communication techniques, alternative methods of communication and understanding even if not able to speak. - Encourage communication and provide alternate methods of communication as needed. - Collaborate with case management/ for discharge needs. - Include patient/family/caregiver in decisions related to communication.   Outcome: Progressing

## 2023-08-28 NOTE — ASSESSMENT & PLAN NOTE
Home regimen Cozaar 25 mg daily, metoprolol succinate 25 mg daily    Plan:   Continue Home medication

## 2023-08-28 NOTE — PHYSICAL THERAPY NOTE
PHYSICAL THERAPY NOTE    Patient Name: Alma Allison  EOKOB'V Date: 23 1136   PT Last Visit   PT Visit Date 23   Note Type   Note Type Treatment   Pain Assessment   Pain Assessment Tool 0-10   Pain Score 3   Pain Location/Orientation Orientation: Left; Location: Hip   Restrictions/Precautions   Other Precautions Chair Alarm; Bed Alarm;Telemetry; Fall Risk;Pain;Hard of hearing   General   Family/Caregiver Present No   Subjective   Subjective Patient sidelying in bed and is agreeable to participate in therapy session. Pt identifers obtained from name & . Bed Mobility   Supine to Sit 5  Supervision   Additional items Assist x 1;HOB elevated; Bedrails; Increased time required;Verbal cues   Sit to Supine Unable to assess   Additional Comments Patient seated OOB in recliner post session with chair alarm engaged, call bell and belongings in reach. Transfers   Sit to Stand 4  Minimal assistance   Additional items Assist x 1; Armrests; Increased time required;Verbal cues   Stand to Sit 4  Minimal assistance   Additional items Assist x 1; Armrests; Increased time required;Verbal cues   Toilet transfer 3  Moderate assistance   Additional items Assist x 1; Armrests; Increased time required;Verbal cues;Standard toilet  (retropulsion)   Ambulation/Elevation   Gait pattern Antalgic;Decreased L stance; Inconsistent dhaval; Excessively slow   Gait Assistance 4  Minimal assist   Additional items Assist x 1;Verbal cues   Assistive Device Rolling walker   Distance 90' x2, 15' x1   Balance   Static Sitting Fair +   Static Standing Poor +   Ambulatory Poor +   Assessment   Problem List Decreased strength;Decreased endurance; Impaired balance;Decreased mobility; Impaired judgement;Decreased safety awareness; Impaired hearing;Pain   Assessment Patient agreeable to participate in therapy session.  Patient demonstrates progression with functional mobility however does continue to require physical assistance throughout. Multiple sit<>stand transfers from varying surfaces with consistent min ax1 and instruction for hand placement and approach. Pt with tendency to abandon walker when approaching surface requiring increased time and instruction. Patient able to ambulate increased gait distance with roller walker and min ax1. Pt requires min ax1 for steadying balance and walker management with turns and obstacles. Continue to focus on OOB mobility with progression of transfers and ambulation as appropriate and able. Goals   Patient Goals none stated   STG Expiration Date 09/04/23   PT Treatment Day 2   Plan   Treatment/Interventions Functional transfer training;LE strengthening/ROM; Therapeutic exercise; Endurance training;Patient/family training;Equipment eval/education; Bed mobility;Gait training;Spoke to nursing   PT Frequency 3-5x/wk   Recommendation   PT Discharge Recommendation Post acute rehabilitation services   AM-PAC Basic Mobility Inpatient   Turning in Flat Bed Without Bedrails 3   Lying on Back to Sitting on Edge of Flat Bed Without Bedrails 2   Moving Bed to Chair 3   Standing Up From Chair Using Arms 3   Walk in Room 3   Climb 3-5 Stairs With Railing 1   Basic Mobility Inpatient Raw Score 15   Basic Mobility Standardized Score 36.97   Highest Level Of Mobility   -HL Goal 4: Move to chair/commode   JH-HLM Achieved 7: Walk 25 feet or more   End of Consult   Patient Position at End of Consult Bedside chair;Bed/Chair alarm activated; All needs within reach     The patient's AM-PAC Basic Mobility Inpatient Short Form Raw Score is 15. A Raw score of less than or equal to 16 suggests the patient may benefit from discharge to post-acute rehabilitation services. Please also refer to the recommendation of the Physical Therapist for safe discharge planning.       Diane Ibarra, PTA

## 2023-08-28 NOTE — PLAN OF CARE
Problem: PHYSICAL THERAPY ADULT  Goal: Performs mobility at highest level of function for planned discharge setting. See evaluation for individualized goals. Description: Treatment/Interventions: Functional transfer training, LE strengthening/ROM, Elevations, Therapeutic exercise, Endurance training, Patient/family training, Equipment eval/education, Bed mobility, Gait training          See flowsheet documentation for full assessment, interventions and recommendations. Outcome: Progressing  Note:    Problem List: Decreased strength, Decreased endurance, Impaired balance, Decreased mobility, Impaired judgement, Decreased safety awareness, Impaired hearing, Pain  Assessment: Patient agreeable to participate in therapy session. Patient demonstrates progression with functional mobility however does continue to require physical assistance throughout. Multiple sit<>stand transfers from varying surfaces with consistent min ax1 and instruction for hand placement and approach. Pt with tendency to abandon walker when approaching surface requiring increased time and instruction. Patient able to ambulate increased gait distance with roller walker and min ax1. Pt requires min ax1 for steadying balance and walker management with turns and obstacles. Continue to focus on OOB mobility with progression of transfers and ambulation as appropriate and able. PT Discharge Recommendation: Post acute rehabilitation services    See flowsheet documentation for full assessment.

## 2023-08-28 NOTE — ASSESSMENT & PLAN NOTE
Presenting with complaint of dizziness and difficulty ambulating  Has a history of vertigo however symptoms are worse than usual  CT head with no acute intracranial abnormality, stable chronic microangiopathic changes  Already on aspirin 81 mg daily   lipid panel uremarkable  Check hemoglobin A1c and lipid panel: 5.7  PT/OT consulted: Recommended  Post acute rehab  Neurology: Stroke pathway: MRI brain no evidence of acute infarct, echo pending, carotid ultrasound result pending, CT head negative    Plan:    Continue meclizine 25 mg daily  Discharge to [facility]

## 2023-08-28 NOTE — DISCHARGE SUMMARY
[address] [facility]  Discharge- Allen Medrano Yevgeniy 12/11/1930, 80 y.o. female MRN: [id number]  Unit/Bed#: S -01 Encounter: [id number]  Primary Care Provider: Carlo Benitez MD   Date and time admitted to hospital: 8/24/2023 11:53 AM    * Dizziness  Assessment & Plan  Presenting with complaint of dizziness and difficulty ambulating  Has a history of vertigo however symptoms are worse than usual  CT head with no acute intracranial abnormality, stable chronic microangiopathic changes  Already on aspirin 81 mg daily   lipid panel uremarkable  Check hemoglobin A1c and lipid panel: 5.7  PT/OT consulted: Recommended  Post acute rehab  Neurology: Stroke pathway: MRI brain no evidence of acute infarct, echo pending, carotid ultrasound result pending, CT head negative    Plan:    Continue meclizine 25 mg daily  Discharge [facility]          Ambulatory dysfunction  Assessment & Plan  With difficulty walking  PT OT evaluation: Recommend acute rehab  Uses cane to ambulate at baseline but currently unable    Plan:   · Discharged to Acute rehab     Anemia  Assessment & Plan  History of anemia  Hemoglobin stable at 10.0  No signs of active bleeding    Plan  Please continue to follow primary doctor     CKD (chronic kidney disease) stage 3, GFR 30-59 ml/min [facility])  Assessment & Plan  Lab Results   Component Value Date    EGFR 47 08/27/2023    EGFR 44 08/26/2023    EGFR 57 08/25/2023    CREATININE 1.02 08/27/2023    CREATININE 1.08 08/26/2023    CREATININE 0.88 08/25/2023   Creatinine appearing to be around baseline    Primary hypertension  Assessment & Plan  Home regimen Cozaar 25 mg daily, metoprolol succinate 25 mg daily    Plan:   Continue Home medication     Medical Problems     Resolved Problems  Date Reviewed: 8/24/2023          Resolved    Hyponatremia 8/26/2023     Resolved by  Effie Wilkerson MD        Discharging Resident: Effie Wilkerson MD  Discharging Attending: No att. providers found  PCP: Veronica Daniel MD  Admission Date:   Admission Orders (From admission, onward)     Ordered        08/25/23 1703  Inpatient Admission  Once            08/24/23 1530  Place in Observation  Once                      Discharge Date: 08/28/23    Consultations During Hospital Stay:  · Neurology    Procedures Performed:   · None    Significant Findings / Test Results:   · None    Incidental Findings:   · None    Test Results Pending at Discharge (will require follow up): · None     Outpatient Tests Requested:  · None    Complications:  None    Reason for Admission: None  Hospital Course: Edelmira Tang is a 80 y.o. female patient with a past medical history of BPV, hypertension, colon cancer, allergic rhintis, and peripheral neuropathy   originally presented to the hospital on 8/24/2023 due to dizziness. In the ED,her CT  head was negative for acute intracranial abnormality and her lab work were essentially normal except for sodium of 141 and a hemoglobin of 10. Mri showed no acute abnormality. Neurology was consulted and they recommended  ECHO, and carotid doppler. . Carotid doppler showed bilateral 50 percent stenosis and echo showed an ejection fraction of 55% with normal systolic function. It was recommended that patient continue  Meclizine 25 mg for her chronic history of vertigo. Physical therapy recommended that patient go to acute rehab due to her ambulatory difficulties. Patient was discharged to acute rehab so that she may continue physical therapy to strengthen her muscles. She needs to follow up with her primary doctor in a week. Please see above list of diagnoses and related plan for additional information. Condition at Discharge: good    Discharge Day Visit / Exam:   Subjective:    No acute event overnight. This morning patient said that  she was feeling better. Suppository was given to patient because of no bowel movement in 4 days.   Patient was able to have a bowel movement before being discharge. Patient endorsed in constipation, but denied chest pain, shortness of breath and lightheadedness. Vitals: Blood Pressure: 156/68 (08/28/23 0728)  Pulse: (!) 51 (08/28/23 0826)  Temperature: 97.7 °F (36.5 °C) (08/28/23 0728)  Temp Source: Oral (08/27/23 0511)  Respirations: 18 (08/28/23 0728)  Height: 5' (152.4 cm) (08/25/23 0844)  Weight - Scale: 60.8 kg (134 lb) (08/25/23 0844)  SpO2: 95 % (08/28/23 0728)  Exam:   Physical Exam  Vitals and nursing note reviewed. Constitutional:       General: She is not in acute distress. Appearance: She is well-developed. HENT:      Head: Normocephalic and atraumatic. Eyes:      Conjunctiva/sclera: Conjunctivae normal.   Cardiovascular:      Rate and Rhythm: Normal rate. Rhythm irregular. Heart sounds: No murmur heard. Pulmonary:      Effort: Pulmonary effort is normal. No respiratory distress. Breath sounds: Normal breath sounds. Abdominal:      Palpations: Abdomen is soft. Tenderness: There is no abdominal tenderness. Musculoskeletal:         General: No swelling. Cervical back: Neck supple. Skin:     General: Skin is warm and dry. Capillary Refill: Capillary refill takes less than 2 seconds. Neurological:      Mental Status: She is alert and oriented to person, place, and time. Psychiatric:         Mood and Affect: Mood normal.         Discussion with Family: Updated  (daughter in law) via phone. Discharge instructions/Information to patient and family:   See after visit summary for information provided to patient and family. Provisions for Follow-Up Care:  See after visit summary for information related to follow-up care and any pertinent home health orders. Disposition:   [address]: [facility]. [place] Texted SNF Physician.     Planned Readmission: None    Discharge Medications:  See after visit summary for reconciled discharge medications provided to patient and/or family.       **Please Note: This note may have been constructed using a voice recognition system**

## 2023-08-29 ENCOUNTER — NURSE TRIAGE (OUTPATIENT)
Dept: OTHER | Facility: OTHER | Age: 88
End: 2023-08-29

## 2023-08-29 ENCOUNTER — PATIENT OUTREACH (OUTPATIENT)
Dept: CASE MANAGEMENT | Facility: OTHER | Age: 88
End: 2023-08-29

## 2023-08-29 NOTE — PROGRESS NOTES
Outpatient Care Management STEVEN/SNF Pathway. Discharged 8/28/23 to [facility]. Email sent to facility to inform them the patient is on the STEVEN Pathway and I will be following them during their skilled stay. This Admin Coordinator will continue to monitor via chart review.

## 2023-08-29 NOTE — TELEPHONE ENCOUNTER
Left voicemail to advise patient that GI provider informed patient to contact PCP ro follow up on future Prolia injections.

## 2023-08-31 ENCOUNTER — PATIENT OUTREACH (OUTPATIENT)
Dept: CASE MANAGEMENT | Facility: OTHER | Age: 88
End: 2023-08-31

## 2023-08-31 NOTE — PROGRESS NOTES
Chart review completed. Email sent to [facility] ([facility] SNF Coordinator to obtain an update on patient. This Admin Coordinator will continue to monitor via chart review throughout episode.

## 2023-09-05 ENCOUNTER — PATIENT OUTREACH (OUTPATIENT)
Dept: CASE MANAGEMENT | Facility: OTHER | Age: 88
End: 2023-09-05

## 2023-09-08 ENCOUNTER — PATIENT OUTREACH (OUTPATIENT)
Dept: CASE MANAGEMENT | Facility: OTHER | Age: 88
End: 2023-09-08

## 2023-09-08 NOTE — PROGRESS NOTES
Chart review completed. Email sent to [facility] ([facility]) SNF Coordinator to obtain an update on patient. This Admin Coordinator will continue to monitor via chart review throughout episode.

## 2023-09-12 ENCOUNTER — PATIENT OUTREACH (OUTPATIENT)
Dept: CASE MANAGEMENT | Facility: OTHER | Age: 88
End: 2023-09-12

## 2023-09-12 DIAGNOSIS — N17.9 AKI (ACUTE KIDNEY INJURY) (HCC): Primary | ICD-10-CM

## 2023-09-12 NOTE — PROGRESS NOTES
Update received from [facility]) SNF Coordinator the patient is discharging today 9/12/23 to Home. A email was sent to the facility requesting discharge instructions. When CM assistant has received the Discharge paperwork CM assistant will attach to this encounter. I have removed myself off of the care team, added the CM to the care team who will follow the patient through the episode, sent the care manager an inbasket notifying them of the STEVEN/SNF Pathway. Ambulatory referral placed for complex care management.

## 2023-09-14 ENCOUNTER — NURSE TRIAGE (OUTPATIENT)
Age: 88
End: 2023-09-14

## 2023-09-14 ENCOUNTER — PATIENT OUTREACH (OUTPATIENT)
Dept: CASE MANAGEMENT | Facility: HOSPITAL | Age: 88
End: 2023-09-14

## 2023-09-14 NOTE — TELEPHONE ENCOUNTER
Spoke with patient regarding the directions and she feels she might be starting to feel she might be able to go. She did the Miralax this morning and will do this evening and see how goes. Will only try the Dulcolax also if needs. I advised patient to call office if had any problems.  Thank you

## 2023-09-14 NOTE — PROGRESS NOTES
Referral received and chart review performed. Pt hospitalized 8/24-8/28 with dizziness. Neuro work up negative and discharged to [facility] for rehab. Discharged home 9/12. Pt called for care management. Spoke with Tru Liang. She states she is not doing well since being home from the hospital. Reports that they did not have linzess in the hospital and now she is unable to have BM. Now that she is home she has restarted her linzess and also taking mirilax. She states she has not had a BM in 4 days. Denies any pain or discomfort at this time. Tru Liang also called GI office, was counseled to continue linzess and mirilax for now. Tru Liang also reports drinking some prune juice. Continues to eat 3 meals/day and stay hydrated. Reports good appetite. Tru Liang also c/o pain in legs. States she continues to take gabapentin but sometimes does not provide enough relief. States however this is baseline for her. Encouraged Tru Liang to share this at PCP appointment. Confirmed with Tru Liang upcoming appointments including 9/19 PCP, OB 10/3, and GI 10/19. Tru Liang states understanding. Tru Liang adds that she no longer drives and she has a  that she pays to take her to appointments as well as grocery shopping. Tru Liang states that she lives alone in a small ranch style home. She uses a cane and a walker, however she believes she is steady on her feet and does not need them often. Son checks on her once in a while. Tru Liang became tearful when speaking about her son and states she wishes he was around more. Therapeutic listening provided. Medications reviewed. Tru Liang states she manages her medications and takes them every morning. Denies other needs at this time. Consents to future outreach and expresses appreciation for this CM's call. She states sometimes she cannot keep her appointments straight and would like help staying on track. Also expresses increasing anxiety about having a BM.  Reassured Tru Liang to watch for signs and symptoms like increased bloating and belly pain and to call GI if no BM over the weekend. We spoke about s/s concerning to go to the hospital. Chrissie Ammy denies any symptoms at this time. Explained I am CM covering Tammy Amor, and I will place her on call schedule for follow up. Chrissie Gimenez agreeable and appreciative. Queenie Sarmiento RN CM added to care team and note routed to same.

## 2023-09-14 NOTE — TELEPHONE ENCOUNTER
SPOKE WITH PT, RECENT HOSPITALIZATION FOR DIZZINESS/VERTIGO, DID NOT HAVE LINZESS DURING HOSPITAL STAY, C/O CONSTIPATION, NO BM FOR 4-5 DAYS. TAKING LINZESS 290MCG DAILY, TOOK 1 DOSE OF MIRALAX YESTERDAY, EATING PRUNES. PT C/O BLOATING, NOT PASSING GAS, DENIES ABD PAIN, FEVER, CHILLS, N/V, BLACK OR BLOODY STOOL. URINE IS CLEAR. PT ADVISED CONTINUE LINZESS, HYDRATION, CAN TAKE MIRALAX BID, ANY OTHER RECOMMENDATIONS?

## 2023-09-22 ENCOUNTER — TELEPHONE (OUTPATIENT)
Age: 88
End: 2023-09-22

## 2023-09-22 NOTE — TELEPHONE ENCOUNTER
Physical therapist called   from [facility]. Patient is receiving in home pt.physical therapist stated he was concerned about the patients blood pressure decreasing after  exertion. Bp 140/70 after pt 120/60. Physical therapist stated patient had no symptoms.

## 2023-09-24 DIAGNOSIS — R42 VERTIGO: ICD-10-CM

## 2023-09-25 RX ORDER — MECLIZINE HYDROCHLORIDE 25 MG/1
TABLET ORAL
Qty: 90 TABLET | Refills: 0 | Status: SHIPPED | OUTPATIENT
Start: 2023-09-25

## 2023-09-29 ENCOUNTER — HOSPITAL ENCOUNTER (EMERGENCY)
Facility: HOSPITAL | Age: 88
Discharge: HOME | End: 2023-09-29
Attending: EMERGENCY MEDICINE
Payer: MEDICARE

## 2023-09-29 ENCOUNTER — NURSE TRIAGE (OUTPATIENT)
Age: 88
End: 2023-09-29

## 2023-09-29 VITALS
SYSTOLIC BLOOD PRESSURE: 160 MMHG | HEART RATE: 60 BPM | DIASTOLIC BLOOD PRESSURE: 77 MMHG | OXYGEN SATURATION: 99 % | TEMPERATURE: 98 F | RESPIRATION RATE: 18 BRPM

## 2023-09-29 DIAGNOSIS — N30.91 HEMORRHAGIC CYSTITIS: Primary | ICD-10-CM

## 2023-09-29 DIAGNOSIS — N39.0 UTI (URINARY TRACT INFECTION): ICD-10-CM

## 2023-09-29 LAB
ALBUMIN SERPL BCP-MCNC: 3.9 G/DL (ref 3.5–5)
ALP SERPL-CCNC: 57 U/L (ref 34–104)
ALT SERPL W P-5'-P-CCNC: 11 U/L (ref 7–52)
ANION GAP SERPL CALCULATED.3IONS-SCNC: 6 MMOL/L
AST SERPL W P-5'-P-CCNC: 13 U/L (ref 13–39)
BACTERIA UR QL AUTO: ABNORMAL /HPF
BASOPHILS # BLD AUTO: 0.05 THOUSANDS/ÂΜL (ref 0–0.1)
BASOPHILS NFR BLD AUTO: 1 % (ref 0–1)
BILIRUB SERPL-MCNC: 0.33 MG/DL (ref 0.2–1)
BILIRUB UR QL STRIP: NEGATIVE
BUN SERPL-MCNC: 23 MG/DL (ref 5–25)
CALCIUM SERPL-MCNC: 9.2 MG/DL (ref 8.4–10.2)
CHLORIDE SERPL-SCNC: 105 MMOL/L (ref 96–108)
CLARITY UR: ABNORMAL
CO2 SERPL-SCNC: 25 MMOL/L (ref 21–32)
COLOR UR: ABNORMAL
CREAT SERPL-MCNC: 1.11 MG/DL (ref 0.6–1.3)
EOSINOPHIL # BLD AUTO: 0.02 THOUSAND/ÂΜL (ref 0–0.61)
EOSINOPHIL NFR BLD AUTO: 0 % (ref 0–6)
ERYTHROCYTE [DISTWIDTH] IN BLOOD BY AUTOMATED COUNT: 12.8 % (ref 11.6–15.1)
GFR SERPL CREATININE-BSD FRML MDRD: 43 ML/MIN/1.73SQ M
GLUCOSE SERPL-MCNC: 87 MG/DL (ref 65–140)
GLUCOSE UR STRIP-MCNC: NEGATIVE MG/DL
HCT VFR BLD AUTO: 30.5 % (ref 34.8–46.1)
HGB BLD-MCNC: 9.9 G/DL (ref 11.5–15.4)
HGB UR QL STRIP.AUTO: ABNORMAL
IMM GRANULOCYTES # BLD AUTO: 0.02 THOUSAND/UL (ref 0–0.2)
IMM GRANULOCYTES NFR BLD AUTO: 0 % (ref 0–2)
KETONES UR STRIP-MCNC: NEGATIVE MG/DL
LEUKOCYTE ESTERASE UR QL STRIP: ABNORMAL
LYMPHOCYTES # BLD AUTO: 0.62 THOUSANDS/ÂΜL (ref 0.6–4.47)
LYMPHOCYTES NFR BLD AUTO: 12 % (ref 14–44)
MCH RBC QN AUTO: 34 PG (ref 26.8–34.3)
MCHC RBC AUTO-ENTMCNC: 32.5 G/DL (ref 31.4–37.4)
MCV RBC AUTO: 105 FL (ref 82–98)
MONOCYTES # BLD AUTO: 0.39 THOUSAND/ÂΜL (ref 0.17–1.22)
MONOCYTES NFR BLD AUTO: 8 % (ref 4–12)
NEUTROPHILS # BLD AUTO: 3.9 THOUSANDS/ÂΜL (ref 1.85–7.62)
NEUTS SEG NFR BLD AUTO: 79 % (ref 43–75)
NITRITE UR QL STRIP: NEGATIVE
NON-SQ EPI CELLS URNS QL MICRO: ABNORMAL /HPF
NRBC BLD AUTO-RTO: 0 /100 WBCS
PH UR STRIP.AUTO: 5.5 [PH]
PLATELET # BLD AUTO: 213 THOUSANDS/UL (ref 149–390)
PMV BLD AUTO: 9.8 FL (ref 8.9–12.7)
POTASSIUM SERPL-SCNC: 4.3 MMOL/L (ref 3.5–5.3)
PROT SERPL-MCNC: 6.6 G/DL (ref 6.4–8.4)
PROT UR STRIP-MCNC: ABNORMAL MG/DL
RBC # BLD AUTO: 2.91 MILLION/UL (ref 3.81–5.12)
RBC #/AREA URNS AUTO: ABNORMAL /HPF
SODIUM SERPL-SCNC: 136 MMOL/L (ref 135–147)
SP GR UR STRIP.AUTO: 1.02 (ref 1–1.03)
UROBILINOGEN UR STRIP-ACNC: <2 MG/DL
WBC # BLD AUTO: 5 THOUSAND/UL (ref 4.31–10.16)
WBC #/AREA URNS AUTO: ABNORMAL /HPF

## 2023-09-29 PROCEDURE — 99283 EMERGENCY DEPT VISIT LOW MDM: CPT

## 2023-09-29 PROCEDURE — 99284 EMERGENCY DEPT VISIT MOD MDM: CPT | Performed by: EMERGENCY MEDICINE

## 2023-09-29 PROCEDURE — 85025 COMPLETE CBC W/AUTO DIFF WBC: CPT

## 2023-09-29 PROCEDURE — 87077 CULTURE AEROBIC IDENTIFY: CPT

## 2023-09-29 PROCEDURE — 36415 COLL VENOUS BLD VENIPUNCTURE: CPT

## 2023-09-29 PROCEDURE — 80053 COMPREHEN METABOLIC PANEL: CPT

## 2023-09-29 PROCEDURE — 81001 URINALYSIS AUTO W/SCOPE: CPT

## 2023-09-29 PROCEDURE — 87186 SC STD MICRODIL/AGAR DIL: CPT

## 2023-09-29 PROCEDURE — 87086 URINE CULTURE/COLONY COUNT: CPT

## 2023-09-29 RX ORDER — SULFAMETHOXAZOLE AND TRIMETHOPRIM 800; 160 MG/1; MG/1
1 TABLET ORAL ONCE
Status: COMPLETED | OUTPATIENT
Start: 2023-09-29 | End: 2023-09-29

## 2023-09-29 RX ORDER — SULFAMETHOXAZOLE AND TRIMETHOPRIM 800; 160 MG/1; MG/1
1 TABLET ORAL 2 TIMES DAILY
Qty: 6 TABLET | Refills: 0 | Status: SHIPPED | OUTPATIENT
Start: 2023-09-29 | End: 2023-10-02

## 2023-09-29 RX ADMIN — SULFAMETHOXAZOLE AND TRIMETHOPRIM 1 TABLET: 800; 160 TABLET ORAL at 14:43

## 2023-09-29 NOTE — ED PROVIDER NOTES
History  Chief Complaint   Patient presents with   • Vaginal Bleeding     Pt c/o vaginal bleeding described as clots that started last night, soaking through pads. Denies dizziness     HPI    27-year-old female with history of colon CA, CKD presents with urinary, frequency, incontinence and hematuria. She reports increased frequency, urgency and episodes of incontinence over the past few days. Last night, she noticed 2 episodes of blood in her urine, with blood clots present after she used the commode. Denies prior episodes of this. Denies dysuria, vaginal discharge, vaginal pain, abdominal pain, flank pain, SOB, fever or chills, constipation, blood in stool. Prior to Admission Medications   Prescriptions Last Dose Informant Patient Reported? Taking?    Acetaminophen Extra Strength 500 MG tablet  Self Yes No   Linzess 290 MCG CAPS   No No   Sig: Take 1 capsule by mouth once daily   Nutritional Supplements (Ensure High Protein) LIQD  Self No No   Sig: Take 1 Can by mouth 2 (two) times a day   aspirin 81 MG tablet  Self Yes No   Sig: Take 81 mg by mouth   calcium carbonate-vitamin D (OSCAL-D) 500 mg-200 units per tablet  Self No No   Sig: Take 1 tablet by mouth daily with breakfast   cholecalciferol (VITAMIN D3) 1,000 units tablet  Self Yes No   fexofenadine (ALLEGRA) 180 MG tablet  Self Yes No   Sig: Take 180 mg by mouth daily   gabapentin (NEURONTIN) 100 mg capsule   No No   Sig: TAKE 1 CAPSULE BY MOUTH THREE TIMES DAILY   losartan (COZAAR) 25 mg tablet   No No   Sig: Take 1 tablet by mouth once daily   meclizine (ANTIVERT) 25 mg tablet   No No   Sig: TAKE 1 TABLET BY MOUTH EVERY 8 HOURS   metoprolol succinate (TOPROL-XL) 25 mg 24 hr tablet  Self No No   Sig: Take 1 tablet (25 mg total) by mouth daily   senna-docusate sodium (SENOKOT S) 8.6-50 mg per tablet  Self Yes No   Sig: Take 2 tablets by mouth daily      Facility-Administered Medications: None       Past Medical History:   Diagnosis Date   • Allergic rhinitis    • Disease of thyroid gland    • Dizziness    • Hyponatremia 8/30/2021   • Hypothyroidism 10/20/2015   • Malignant neoplasm of colon ([address])     last assessed: 10/20/2015   • Neuropathy    • Tinnitus        Past Surgical History:   Procedure Laterality Date   • ADENOIDECTOMY     • APPENDECTOMY      last assessed: 10/20/2015   • CATARACT EXTRACTION, BILATERAL     • CHOLECYSTECTOMY      last assessed: 10/20/2015   • COLECTOMY      last assessed: 10/20/2015; partial    • TONSILLECTOMY      last assessed: 10/20/2015       Family History   Problem Relation Age of Onset   • Heart disease Mother         cardiac disorder   • Alzheimer's disease Sister    • No Known Problems Father    • Leukemia Brother      I have reviewed and agree with the history as documented. E-Cigarette/Vaping   • E-Cigarette Use Never User      E-Cigarette/Vaping Substances   • Nicotine No    • THC No    • CBD No    • Flavoring No    • Other No    • Unknown No      Social History     Tobacco Use   • Smoking status: Former     Packs/day: 0.25     Types: Cigarettes   • Smokeless tobacco: Never   • Tobacco comments:     quit 50 yrs ago   Vaping Use   • Vaping Use: Never used   Substance Use Topics   • Alcohol use: No   • Drug use: Never        Review of Systems   Constitutional: Negative for chills and fever. HENT: Negative for ear pain and sore throat. Eyes: Negative for pain and visual disturbance. Respiratory: Negative for cough and shortness of breath. Cardiovascular: Negative for chest pain and palpitations. Gastrointestinal: Negative for abdominal pain, constipation, diarrhea, nausea and vomiting. Genitourinary: Positive for frequency, hematuria and urgency (+ incontinence). Negative for dysuria, flank pain and vaginal pain. Hematuria vs vaginal bleeding   Musculoskeletal: Positive for back pain and joint swelling (R ankle, chronic). Skin: Negative for color change and rash.    Neurological: Negative for dizziness, seizures and syncope. All other systems reviewed and are negative. Physical Exam  ED Triage Vitals [09/29/23 1009]   Temperature Pulse Respirations Blood Pressure SpO2   98 °F (36.7 °C) 61 20 140/67 98 %      Temp Source Heart Rate Source Patient Position - Orthostatic VS BP Location FiO2 (%)   Oral Monitor Sitting Right arm --      Pain Score       --             Orthostatic Vital Signs  Vitals:    09/29/23 1009 09/29/23 1238 09/29/23 1443   BP: 140/67 154/68 160/77   Pulse: 61 62 60   Patient Position - Orthostatic VS: Sitting Sitting Sitting       Physical Exam  Vitals and nursing note reviewed. Constitutional:       General: She is not in acute distress. Appearance: She is well-developed. HENT:      Head: Normocephalic and atraumatic. Eyes:      Conjunctiva/sclera: Conjunctivae normal.      Pupils: Pupils are equal, round, and reactive to light. Cardiovascular:      Rate and Rhythm: Normal rate. Rhythm irregular. Heart sounds: No murmur heard. Pulmonary:      Effort: Pulmonary effort is normal. No respiratory distress. Breath sounds: Normal breath sounds. Abdominal:      Palpations: Abdomen is soft. Tenderness: There is no abdominal tenderness. Genitourinary:     Vagina: No vaginal discharge. Comments: Vaginal atrophy, with fusion of labia bilaterally. No evidence of erythema, tenderness or bleeding. 1cm growth noted on L labia, not consistent with abscess. No bleeding in vaginal vault. Musculoskeletal:      Cervical back: Neck supple. Skin:     General: Skin is warm and dry. Capillary Refill: Capillary refill takes less than 2 seconds. Neurological:      General: No focal deficit present. Mental Status: She is alert and oriented to person, place, and time.    Psychiatric:         Mood and Affect: Mood normal.         ED Medications  Medications   sulfamethoxazole-trimethoprim (BACTRIM DS) 800-160 mg per tablet 1 tablet (1 tablet Oral Given 9/29/23 1443)       Diagnostic Studies  Results Reviewed     Procedure Component Value Units Date/Time    Urine Microscopic [[id number]]  (Abnormal) Collected: 09/29/23 1240    Lab Status: Final result Specimen: Urine, Other Updated: 09/29/23 1354     RBC, UA Innumerable /hpf      WBC, UA 30-50 /hpf      Epithelial Cells Occasional /hpf      Bacteria, UA Occasional /hpf     Urine culture [[id number]] Collected: 09/29/23 1240    Lab Status:  In process Specimen: Urine, Other Updated: 09/29/23 1354    UA w Reflex to Microscopic w Reflex to Culture [[id number]]  (Abnormal) Collected: 09/29/23 1240    Lab Status: Final result Specimen: Urine, Other Updated: 09/29/23 1323     Color, UA Light Orange     Clarity, UA Turbid     Specific Gravity, UA 1.017     pH, UA 5.5     Leukocytes, UA Trace     Nitrite, UA Negative     Protein,  (2+) mg/dl      Glucose, UA Negative mg/dl      Ketones, UA Negative mg/dl      Urobilinogen, UA <2.0 mg/dl      Bilirubin, UA Negative     Occult Blood, UA Large    Comprehensive metabolic panel [[id number]] Collected: 09/29/23 1240    Lab Status: Final result Specimen: Blood from Arm, Left Updated: 09/29/23 1315     Sodium 136 mmol/L      Potassium 4.3 mmol/L      Chloride 105 mmol/L      CO2 25 mmol/L      ANION GAP 6 mmol/L      BUN 23 mg/dL      Creatinine 1.11 mg/dL      Glucose 87 mg/dL      Calcium 9.2 mg/dL      AST 13 U/L      ALT 11 U/L      Alkaline Phosphatase 57 U/L      Total Protein 6.6 g/dL      Albumin 3.9 g/dL      Total Bilirubin 0.33 mg/dL      eGFR 43 ml/min/1.73sq m     Narrative:      [place] guidelines for Chronic Kidney Disease (CKD):   •  Stage 1 with normal or high GFR (GFR > 90 mL/min/1.73 square meters)  •  Stage 2 Mild CKD (GFR = 60-89 mL/min/1.73 square meters)  •  Stage 3A Moderate CKD (GFR = 45-59 mL/min/1.73 square meters)  •  Stage 3B Moderate CKD (GFR = 30-44 mL/min/1.73 square meters)  •  Stage 4 Severe CKD (GFR = 15-29 mL/min/1.73 square meters)  •  Stage 5 End Stage CKD (GFR <15 mL/min/1.73 square meters)  Note: GFR calculation is accurate only with a steady state creatinine    CBC and differential [[id number]]  (Abnormal) Collected: 09/29/23 1240    Lab Status: Final result Specimen: Blood from Arm, Left Updated: 09/29/23 1258     WBC 5.00 Thousand/uL      RBC 2.91 Million/uL      Hemoglobin 9.9 g/dL      Hematocrit 30.5 %       fL      MCH 34.0 pg      MCHC 32.5 g/dL      RDW 12.8 %      MPV 9.8 fL      Platelets 332 Thousands/uL      nRBC 0 /100 WBCs      Neutrophils Relative 79 %      Immat GRANS % 0 %      Lymphocytes Relative 12 %      Monocytes Relative 8 %      Eosinophils Relative 0 %      Basophils Relative 1 %      Neutrophils Absolute 3.90 Thousands/µL      Immature Grans Absolute 0.02 Thousand/uL      Lymphocytes Absolute 0.62 Thousands/µL      Monocytes Absolute 0.39 Thousand/µL      Eosinophils Absolute 0.02 Thousand/µL      Basophils Absolute 0.05 Thousands/µL                  No orders to display         Procedures  Procedures      ED Course  ED Course as of 09/29/23 1933   Fri Sep 29, 2023   1123 She reports she did not taker her medication this morning, as she was anxious. 200 Given hx and exam findings, differentials include: vaginal mass, postmenopausal uterine bleeding, hemorrhagic cystitis, UTI     1155 Based on history, it is unclear whether patient is having hematuria or vaginal bleeding. We will conduct a pelvic exam and order a UA to confirm. Given prior history of cancer, and patient's age, there is concern for malignancy, and will work to localize source of bleeding. 1317 Hemoglobin(!): 9.9  Hgb around baseline. 1318 Pelvic examination done. Urine in diaper was dark in color, likely with some blood mixed in. Pelvic exam showed fused labia minora and majora on each side, but no erythema, tenderness or irritation noted. No blood in the vaginal vault.  Cervix not visualized due to poor tolerance of exam by patient. Based on exam, pt's bleeding is likely due to hematuria, and less likely from vaginal bleeding. 1407 Urine Microscopic(!)  Innumerable RBC, 30-50 WBC, occasional bacteria. Findings consistent with UTI, specifically hemorrhagic cystitis given hematuria. Will start her on a course of antibiotics, and have her follow-up with her PCP, and possibly urologist if hematuria does not improve with medication. 1503 Pt stable to discharge at this time. MDM   See ED Course. Disposition  Final diagnoses:   Hemorrhagic cystitis   UTI (urinary tract infection)     Time reflects when diagnosis was documented in both MDM as applicable and the Disposition within this note     Time User Action Codes Description Comment    9/29/2023  2:19 PM Panda Nunez Add [N30.91] Hemorrhagic cystitis     9/29/2023  2:19 PM Maritza Ballard Add [N39.0] UTI (urinary tract infection)       ED Disposition     ED Disposition   Discharge    Condition   Stable    Date/Time   Fri Sep 29, 2023  2:57 PM    Comment   [facility] discharge to home/self care.                Follow-up Information     Follow up With Specialties Details Why Contact Info    Chucky Payne MD Family Medicine Schedule an appointment as soon as possible for a visit in 1 week  [address] Gustavo Gomez MD Obstetrics and Gynecology, Obstetrics, Gynecology   [facility]  [facility] [address]  [phone]            Discharge Medication List as of 9/29/2023  3:06 PM      START taking these medications    Details   sulfamethoxazole-trimethoprim (BACTRIM DS) 800-160 mg per tablet Take 1 tablet by mouth 2 (two) times a day for 3 days smx-tmp DS (BACTRIM) 800-160 mg tabs (1tab q12 D10), Starting Fri 9/29/2023, Until Mon 10/2/2023, Normal         CONTINUE these medications which have NOT CHANGED    Details   Acetaminophen Extra Strength 500 MG tablet Starting Tue 3/15/2022, Historical Med      aspirin 81 MG tablet Take 81 mg by mouth, Historical Med      calcium carbonate-vitamin D (OSCAL-D) 500 mg-200 units per tablet Take 1 tablet by mouth daily with breakfast, Starting Fri 9/3/2021, Normal      cholecalciferol (VITAMIN D3) 1,000 units tablet Starting Tue 3/15/2022, Historical Med      fexofenadine (ALLEGRA) 180 MG tablet Take 180 mg by mouth daily, Historical Med      gabapentin (NEURONTIN) 100 mg capsule TAKE 1 CAPSULE BY MOUTH THREE TIMES DAILY, Starting Mon 8/21/2023, Normal      Linzess 290 MCG CAPS Take 1 capsule by mouth once daily, Starting Mon 8/21/2023, Normal      losartan (COZAAR) 25 mg tablet Take 1 tablet by mouth once daily, Normal      meclizine (ANTIVERT) 25 mg tablet TAKE 1 TABLET BY MOUTH EVERY 8 HOURS, Normal      metoprolol succinate (TOPROL-XL) 25 mg 24 hr tablet Take 1 tablet (25 mg total) by mouth daily, Starting Tue 5/9/2023, Normal      Nutritional Supplements (Ensure High Protein) LIQD Take 1 Can by mouth 2 (two) times a day, Starting Mon 5/9/2022, Normal      senna-docusate sodium (SENOKOT S) 8.6-50 mg per tablet Take 2 tablets by mouth daily, Historical Med           No discharge procedures on file. PDMP Review       Value Time User    PDMP Reviewed  Yes 1/30/2023  2:08 PM Veronica Daniel MD           ED Provider  Attending physically available and evaluated Edelmira Tang. I managed the patient along with the ED Attending.     Electronically Signed by         Mel Mcdonald MD  09/29/23 5196

## 2023-09-29 NOTE — TELEPHONE ENCOUNTER
Reason for Disposition  • MODERATE vaginal bleeding (e.g., soaking pad or tampon per hour and present > 6 hours; 1 menstrual cup every 6 hours)    Answer Assessment - Initial Assessment Questions  1. AMOUNT: "Describe the bleeding that you are having."     - SPOTTING: spotting, or pinkish / brownish mucous discharge; does not fill panti-liner or pad     - MILD:  less than 1 pad / hour; less than patient's usual menstrual bleeding    - MODERATE: 1-2 pads / hour; 1 menstrual cup every 6 hours; small-medium blood clots (e.g., pea, grape, small coin)    - SEVERE: soaking 2 or more pads/hour for 2 or more hours; 1 menstrual cup every 2 hours; bleeding not contained by pads or continuous red blood from vagina; large blood clots (e.g., golf ball, large coin)       Moderate- Bright red colored. Blood clots. Says it was on her nighttime pad.     2. ONSET: "When did the bleeding begin?" "Is it continuing now?"      Since last night    3. MENSTRUAL PERIOD: "When was the last normal menstrual period?" "How is this different than your period?"      No    4. REGULARITY: "How regular are your periods?"      N/a    5. ABDOMINAL PAIN: "Do you have any pain?" "How bad is the pain?"  (e.g., Scale 1-10; mild, moderate, or severe)    - MILD (1-3): doesn't interfere with normal activities, abdomen soft and not tender to touch     - MODERATE (4-7): interferes with normal activities or awakens from sleep, tender to touch     - SEVERE (8-10): excruciating pain, doubled over, unable to do any normal activities       No pain    6. BLOOD THINNERS: "Do you take any blood thinners?" (e.g., Coumadin/warfarin, Pradaxa/dabigatran, aspirin)      No blood thinners    7. OTHER SYMPTOMS:     Pt states she does have lightheaded/dizzy spells that she has dealt with for a while.  States she did feel "wobbly" this morning- could not verify with pt if this was different then normal.    Protocols used: VAGINAL BLEEDING - ABNORMAL-ADULT-OH        Pt called in to her GI specialty office stating she is having vaginal bleeding. Recommended pt reach out to her OBGYN provider to discuss however pt asking for immediate care advise at this time. Pt triaged and given recommendation per protocol.

## 2023-09-29 NOTE — ED ATTENDING ATTESTATION
9/29/2023  IFreddy MD, saw and evaluated the patient. I have discussed the patient with the resident/non-physician practitioner and agree with the resident's/non-physician practitioner's findings, Plan of Care, and MDM as documented in the resident's/non-physician practitioner's note, except where noted. All available labs and Radiology studies were reviewed. I was present for key portions of any procedure(s) performed by the resident/non-physician practitioner and I was immediately available to provide assistance. At this point I agree with the current assessment done in the Emergency Department. I have conducted an independent evaluation of this patient a history and physical is as follows:    70-year-old female with remote history of colorectal cancer presenting for evaluation of 2 to 3 weeks of urinary urgency with frequency and urge incontinence. This morning she saw blood in her adult brief and reports passing some small blood clots. She thinks that the bleeding is occurring with urination but is unsure if it is vaginal bleeding. Denies history of postmenopausal vaginal bleeding. No blood in her stool or black tarry stools. Denies any abdominal pain or new back pain. No fevers or chills. No nausea or vomiting or change in her stool. Physical Exam  Constitutional:       General: She is not in acute distress. Appearance: She is well-developed. She is not ill-appearing, toxic-appearing or diaphoretic. HENT:      Head: Normocephalic and atraumatic. Right Ear: External ear normal.      Left Ear: External ear normal.      Nose: Nose normal.   Eyes:      Conjunctiva/sclera: Conjunctivae normal.   Cardiovascular:      Rate and Rhythm: Normal rate and regular rhythm. Heart sounds: Normal heart sounds. No murmur heard. No friction rub. No gallop. Pulmonary:      Effort: Pulmonary effort is normal. No respiratory distress. Breath sounds: Normal breath sounds.  No wheezing or rales. Abdominal:      General: Bowel sounds are normal. There is no distension. Palpations: Abdomen is soft. Tenderness: There is no abdominal tenderness (abdomen benign to deep palpation). There is no right CVA tenderness, left CVA tenderness or guarding. Genitourinary:     Comments: Atrophy of the external female genitalia, with fusion of the bilateral labia majora to the labia minora. 1 cm soft tissue lesion, non-tender without fluctuance to the L labia minora. No blood in the vaginal vault. Unable to visualize the cervix due to patient discomfort with exam.   Musculoskeletal:         General: No deformity. Normal range of motion. Cervical back: Normal range of motion and neck supple. Right lower leg: No edema. Left lower leg: No edema. Skin:     General: Skin is warm and dry. Neurological:      Mental Status: She is alert and oriented to person, place, and time. Motor: No abnormal muscle tone. Psychiatric:         Mood and Affect: Mood normal.             ED Course  ED Course as of 09/29/23 1510   Fri Sep 29, 2023   1259 Hgb 9.9, at patient's baseline. Urine dark in color, will send for UA and obtain pelvic exam. Patient denies any abdominal pain, change in stool, or vomiting, and abdomen is benign to deep palpation. 1319 Speculum exam performed. Exam had to be discontinued secondary to poor tolerance by the patient. Cervix was not visualized but there was no blood present within the vaginal vault. Vaginal area appears normal with some evidence of atrophy with the exception of a small approximately 1 cm soft tissue density to the left labia minora. Patient has noticed this over the last few weeks and has an appointment with her OB/GYN coming up for further evaluation. Lesion is not consistent with an abscess. Suspect that patient's bleeding is due to hematuria rather than postmenopausal vaginal bleeding.    1404 UA with large blood, innumerable red blood cells, 30-50 white cells, occasional bacteria. Patient does report urinary frequency. I discussed with the patient suspicion for urinary tract infection with hemorrhagic cystitis as the cause of her bleeding. We will treat with course of antibiotics. Patient was counseled regarding the importance of follow-up with her OB/GYN and with urology if bleeding persists because at that point she would need a cystoscopy to rule out bladder malignancy as the cause of her bleeding. Patient expressed understanding. Return precautions discussed.           Critical Care Time  Procedures

## 2023-10-01 LAB — BACTERIA UR CULT: ABNORMAL

## 2023-10-02 ENCOUNTER — PATIENT OUTREACH (OUTPATIENT)
Dept: CASE MANAGEMENT | Facility: OTHER | Age: 88
End: 2023-10-02

## 2023-10-02 NOTE — PROGRESS NOTES
Outreach TC to patient for RN care . Patient reports that she is feeling well. Patient denies any new or worsening  symptoms. Patient reports that she continues with her antibiotic therapy. Patient states that her urine is clear and pale yellow in appearance. Patient denies any frequency, dysuria, hematuria, fever, abdominal/flank pain , odor in the urine or purulent urine. We discussed adequate fluids to help flush the bladder. I  instructed patient on drinking cranberry juice. Patient reports that she doesn't want to drink cranberry juice. We discussed using lemon water. Patient will follow up with OB/GYN on 10/3/23. Patient states that she has not had any UTI's prior to this episode. Patient will also follow up with GI on 10/19/23. Laura Selene states that she does have transportation to her appointments. Labs completed on 9/29/23 revealed BUN/creat- WNL. eGFR-43 which stable and baseline for the patient. Patient denies any other changes. We did review adequate fluid intake, keeping SBP>130, no NSAID's, lab work to monitor kidney function. Patient verbalizes understanding and agrees to additional calls. Patient does complain about pain in her legs bilaterally. Patient is ambulating independently with her SPC. Patient uses gabapentin and also acetaminophen for pain relief. Patient does have a hx of arthritis and also lumbosacral spondylosis. Patient also has a rollator and a walker but chooses to use the [facility]. Patient denies any falls. We did review s/sx to report, future appointments, home medications and how/when to report symptoms to provider vs 911. Patient verbalizes understanding and agrees to additional calls.

## 2023-10-03 ENCOUNTER — OFFICE VISIT (OUTPATIENT)
Dept: OBGYN CLINIC | Facility: CLINIC | Age: 88
End: 2023-10-03
Payer: MEDICARE

## 2023-10-03 VITALS
SYSTOLIC BLOOD PRESSURE: 108 MMHG | DIASTOLIC BLOOD PRESSURE: 58 MMHG | WEIGHT: 133 LBS | HEIGHT: 60 IN | BODY MASS INDEX: 26.11 KG/M2

## 2023-10-03 DIAGNOSIS — N95.0 PMB (POSTMENOPAUSAL BLEEDING): Primary | ICD-10-CM

## 2023-10-03 DIAGNOSIS — L90.0 LICHEN SCLEROSUS: ICD-10-CM

## 2023-10-03 PROCEDURE — 99213 OFFICE O/P EST LOW 20 MIN: CPT | Performed by: OBSTETRICS & GYNECOLOGY

## 2023-10-03 NOTE — PROGRESS NOTES
Assessment/Plan:     There are no diagnoses linked to this encounter. 24-year-old female  History of colon cancer  Osteoporosis follow-up with PCP  Lichen sclerosis on clobetasol ointment  PMB  Plan  Continue clobetasol twice a week  Pelvic ultrasound   Female hygiene reviewed and discussed with patient  Return to office in 6 months for follow-up  Subjective:      Patient ID: Briseida Dias is a 80 y.o. female. HPI  24-year-old female present to the office today secondary to postmenopausal bleeding  This is new onset bleeding associated with mild cramping    Patient denies any more bleeding denies any urgency frequency or dysuria  Patient was seen in the hospital and diagnosed with cystitis treated denies any pelvic pain  Patient has lichen sclerosus using clobetasol twice weekly she is also using Vaseline in between intermittent itching on and off female hygiene again reviewed and discussed with patient importance of being compliant with medication discussed    Patient hygiene reviewed and discussed with patient in details  Different etiology of postmenopausal bleeding discussed I would recommend pelvic ultrasound evaluate endometrial lining  We will call patient with result if endometrial thickness more than 4 mm then I would recommend endometrial biopsy   Plan Discussed with patient all patient questions answered and patient was satisfied        The following portions of the patient's history were reviewed and updated as appropriate: allergies, current medications, past family history, past medical history, past social history, past surgical history and problem list.    Review of Systems      Objective:      /58 (BP Location: Left arm, Patient Position: Sitting, Cuff Size: Adult)   Ht 5' (1.524 m)   Wt 60.3 kg (133 lb)   BMI 25.97 kg/m²          Physical Exam       Physical Exam    Constitutional:       Appearance: She is well-developed. Abdominal:      General: There is no distension.    Palpations: Abdomen is soft.      Tenderness: There is no abdominal tenderness. Genitourinary:  Veronica Potters:         Right: No rash, tenderness or lesion.         Left: No rash, tenderness or lesion.       Vagina: No signs of injury. No vaginal discharge, erythema or tenderness.      Cervix: No cervical motion tenderness, discharge or friability.      Adnexa:         Right: No mass, tenderness or fullness.          Left: No mass, tenderness or fullness.            Comments: Vulvar atrophy fused labia minora with labia majora noted skin color is waxy white   typical for lichen sclerosis no discoloration or other abnormality noted no ulceration or VB  noted  Neurological:      Mental Status: She is alert and oriented to person, place, and time.    Psychiatric:         Behavior: Behavior normal.

## 2023-10-09 ENCOUNTER — NURSE TRIAGE (OUTPATIENT)
Age: 88
End: 2023-10-09

## 2023-10-09 NOTE — TELEPHONE ENCOUNTER
Patient calls in states she has severe bilateral hip pain. Denies a recent fall or injury. Denies swelling or bruising. Patient denies fever or other related symptoms. Patient states the pain comes on when she stands up and moves around. Patient requesting medication be sent in to help her pain. She is on Gabapentin and its not helping. Encouraged patient to take breaks and rest with legs elevated. Use Tynelol for discomfort. I recommended evaluation in   the office within 3 days but patient refused. She has transportation issues . Please call patient and advise if she can take anything else for discomfort. Patient would like to know ifPossible increase Gabapentin  would help?

## 2023-10-09 NOTE — TELEPHONE ENCOUNTER
Reason for Disposition  • MODERATE pain (e.g., interferes with normal activities, limping) and present > 3 days    Answer Assessment - Initial Assessment Questions  1. ONSET: "When did the pain start?"       Gabapebtin    2. LOCATION: "Where is the pain located?"             3. PAIN: "How bad is the pain?"    (Scale 1-10; or mild, moderate, severe)    -  MILD (1-3): doesn't interfere with normal activities     -  MODERATE (4-7): interferes with normal activities (e.g., work or school) or awakens from sleep, limping     -  SEVERE (8-10): excruciating pain, unable to do any normal activities, unable to walk           4. WORK OR EXERCISE: "Has there been any recent work or exercise that involved this part of the body?"         5. CAUSE: "What do you think is causing the leg pain?"           6. OTHER SYMPTOMS: "Do you have any other symptoms?" (e.g., chest pain, back pain, breathing difficulty, swelling, rash, fever, numbness, weakness)    Answer Assessment - Initial Assessment Questions  1. LOCATION and RADIATION: "Where is the pain located?"          Hip pain bilateral and down legs       2. QUALITY: "What does the pain feel like?"  (e.g., sharp, dull, aching, burning)        Severe sharp shooting    3. SEVERITY: "How bad is the pain?" "What does it keep you from doing?"   (Scale 1-10; or mild, moderate, severe)    -  MILD (1-3): doesn't interfere with normal activities     -  MODERATE (4-7): interferes with normal activities (e.g., work or school) or awakens from sleep, limping     -  SEVERE (8-10): excruciating pain, unable to do any normal activities, unable to walk          Severe hip pain       4. ONSET: "When did the pain start?" "Does it come and go, or is it there all the time?"        Over the week    5. WORK OR EXERCISE: "Has there been any recent work or exercise that involved this part of the body?"        Denies    6. CAUSE: "What do you think is causing the hip pain?"           Unsure      7. AGGRAVATING FACTORS: "What makes the hip pain worse?" (e.g., walking, climbing stairs, running)        Denies    8.  OTHER SYMPTOMS: "Do you have any other symptoms?" (e.g., back pain, pain shooting down leg,  fever, rash)        denies    Protocols used: HIP PAIN-ADULT-OH, LEG PAIN-ADULT-OH

## 2023-10-26 ENCOUNTER — PATIENT OUTREACH (OUTPATIENT)
Dept: CASE MANAGEMENT | Facility: OTHER | Age: 88
End: 2023-10-26

## 2023-10-26 NOTE — PROGRESS NOTES
Outreach TC to patient for CM assessment. No answer to call. Left message on voicemail requesting a [facility] from [facility]; Outpatient Care Manager @ [phone]. First unanswered call to patient. Chart review completed.

## 2023-10-30 ENCOUNTER — OFFICE VISIT (OUTPATIENT)
Dept: FAMILY MEDICINE CLINIC | Facility: CLINIC | Age: 88
End: 2023-10-30
Payer: MEDICARE

## 2023-10-30 ENCOUNTER — TELEPHONE (OUTPATIENT)
Dept: GASTROENTEROLOGY | Facility: CLINIC | Age: 88
End: 2023-10-30

## 2023-10-30 VITALS
RESPIRATION RATE: 16 BRPM | DIASTOLIC BLOOD PRESSURE: 76 MMHG | BODY MASS INDEX: 27.09 KG/M2 | OXYGEN SATURATION: 98 % | HEIGHT: 60 IN | HEART RATE: 61 BPM | WEIGHT: 138 LBS | SYSTOLIC BLOOD PRESSURE: 136 MMHG

## 2023-10-30 DIAGNOSIS — M16.12 ARTHRITIS OF LEFT HIP: ICD-10-CM

## 2023-10-30 DIAGNOSIS — M81.0 OSTEOPOROSIS, UNSPECIFIED OSTEOPOROSIS TYPE, UNSPECIFIED PATHOLOGICAL FRACTURE PRESENCE: ICD-10-CM

## 2023-10-30 DIAGNOSIS — N32.81 OAB (OVERACTIVE BLADDER): Primary | ICD-10-CM

## 2023-10-30 DIAGNOSIS — D50.9 IRON DEFICIENCY ANEMIA, UNSPECIFIED IRON DEFICIENCY ANEMIA TYPE: ICD-10-CM

## 2023-10-30 DIAGNOSIS — Z87.440 HISTORY OF UTI: ICD-10-CM

## 2023-10-30 DIAGNOSIS — G60.9 IDIOPATHIC PERIPHERAL NEUROPATHY: ICD-10-CM

## 2023-10-30 DIAGNOSIS — I10 PRIMARY HYPERTENSION: ICD-10-CM

## 2023-10-30 DIAGNOSIS — R26.2 AMBULATORY DYSFUNCTION: ICD-10-CM

## 2023-10-30 PROCEDURE — 99214 OFFICE O/P EST MOD 30 MIN: CPT | Performed by: FAMILY MEDICINE

## 2023-10-30 NOTE — PROGRESS NOTES
Assessment/Plan:  1. OAB (overactive bladder)  -     Mirabegron ER 25 MG TB24; Take 25 mg by mouth every morning    2. Arthritis of left hip    3. Primary hypertension    4. Osteoporosis, unspecified osteoporosis type, unspecified pathological fracture presence    5. Iron deficiency anemia, unspecified iron deficiency anemia type    6. Idiopathic peripheral neuropathy    7. Ambulatory dysfunction    8. History of UTI    9. BMI 26.0-26.9,adult      Hemorrhagic cystitis. We will perform a pelvic ultrasound. I suggest taking Myrbetriq for her urinary incontinence. I encouraged the patient to increase her fluid intake. She should get the hip replacement     Cont with lupe  And ASA   Cont with losartan and met suc 25mg     Linzess daily and senna as needed     Cont with heme/onc for infusions and labs     Cont with heme/onc for osteoporosis       Subjective:      Patient ID: Luis M Jha is a 80 y.o. female. Luis M Jha is a 80-year-old female who presents for a 2-month follow-up. She has a history of arthritis of her left hip, chronic kidney disease, irritable bowel, insomnia, idiopathic peripheral neuropathy, osteoporosis, hypertension, and chronic constipation. She consents to the use of MAGDALENO. The patient expresses feelings of anger. Hemorrhagic cystitis. She had hemorrhagic cystitis. She felt a passing of 2 clots upon urination and had presence of blood upon wiping. She had a follow-up with Dr. Rosaura Fuentes   She was given a topical clobetasol cream to be applied on the labia. She was not able to do her pelvic ultrasound. She does not feel safe upon going due to fear of urinary incontinence. She confirms using pads. She feels like her vagina is chapped and has a burning sensation upon wiping. She often uses the topical clobetasol cream and she often applies Vaseline to her vaginal area. She went to the bathroom 4 times last night.   She denies hematuria but has urinary incontinence which affects her lifestyle. She stated that her urine is initially cloudy upon urination. She has decreased fluid intake due to fear of incontinence. Constipation. She bears down upon defecation when she is constipated. Hives. She is developing hives in her skin and a lump appears on her skin upon scratching. She expresses anger due to uncertainty on where her hives came from. She was told to not take Benadryl due to its dizziness as side effects. Hip pain. She refuses to undergo hip replacement surgery. She does not hear a clicking sound in her hips and her cortisone injection is without benefit. She presumes that she has a pinched nerve. She did not receive her Prolia injections. She expressed frustration due to the heavy schedule of appointments. She is wearing compression stockings and keeps her legs elevated. She is unable to lift her leg when she goes to bed and cannot reach her toes. The following portions of the patient's history were reviewed and updated as appropriate: allergies, current medications, past family history, past medical history, past social history, past surgical history and problem list.    Review of Systems   Constitutional: Negative for fever and unexpected weight change. HENT: Negative for nosebleeds and trouble swallowing. Eyes: Negative for visual disturbance. Respiratory: Negative for chest tightness and shortness of breath. Cardiovascular: Negative for chest pain, palpitations, and leg swelling. Gastrointestinal: Negative for abdominal pain, constipation, diarrhea, and nausea. Endocrine: Negative for cold intolerance. Genitourinary: Positive for incontinence. Negative for dysuria and urgency. Musculoskeletal: Positive for hip pain. Negative for joint swelling and myalgias. Skin: Positive for rash. Neurological: Negative for tremors, seizures, and syncope. Hematological: Does not bruise/bleed easily.   Psychiatric/Behavioral: Negative for hallucinations and suicidal ideas. Objective:     /76 (BP Location: Left arm, Patient Position: Sitting, Cuff Size: Standard)   Pulse 61   Resp 16   Ht 5' (1.524 m)   Wt 62.6 kg (138 lb)   SpO2 98%   BMI 26.95 kg/m²    Physical Exam  Vitals and nursing note reviewed. Constitutional:     Appearance: Well-developed. HENT:     Head: Normocephalic and atraumatic. Cardiovascular:     Rate and Rhythm: Normal rate and regular rhythm. Heart sounds: Normal heart sounds. No murmur heard. Pulmonary:     Effort: Pulmonary effort is normal.     Breath sounds: Normal breath sounds. No wheezing or rales. Abdominal:     General: Bowel sounds are normal. There is no distension. Palpations: Abdomen is soft. Tenderness: There is no abdominal tenderness. Musculoskeletal:     General: No tenderness. Normal range of motion. Cervical back: Normal range of motion and neck supple. Lymphadenopathy:     Cervical: No cervical adenopathy. Skin:     General: Skin is warm and dry. Capillary Refill: Capillary refill takes less than 2 seconds. Findings: No rash. Neurological:     Mental Status: Alert and oriented to person, place, and time. Cranial Nerves: No cranial nerve deficit. Sensory: No sensory deficit. Motor: No abnormal muscle tone. Psychiatric:     Behavior: Behavior normal.     Thought Content: Thought content normal.     Judgment: Judgment normal.      No visits with results within 2 Week(s) from this visit.    Latest known visit with results is:   Admission on 09/29/2023, Discharged on 09/29/2023   Component Date Value   • Color, UA 09/29/2023 Light [place]    • Clarity, UA 09/29/2023 Turbid    • Specific Gravity, UA 09/29/2023 1.017    • pH, UA 09/29/2023 5.5    • Leukocytes, UA 09/29/2023 Trace (A)    • Nitrite, UA 09/29/2023 Negative    • Protein, UA 09/29/2023 100 (2+) (A)    • Glucose, UA 09/29/2023 Negative    • Ketones, UA 09/29/2023 Negative    • Urobilinogen, UA 09/29/2023 <2.0    • Bilirubin, UA 09/29/2023 Negative    • Occult Blood, UA 09/29/2023 Large (A)    • WBC 09/29/2023 5.00    • RBC 09/29/2023 2.91 (L)    • Hemoglobin 09/29/2023 9.9 (L)    • Hematocrit 09/29/2023 30.5 (L)    • MCV 09/29/2023 105 (H)    • MCH 09/29/2023 34.0    • MCHC 09/29/2023 32.5    • RDW 09/29/2023 12.8    • MPV 09/29/2023 9.8    • Platelets 84/98/9561 213    • nRBC 09/29/2023 0    • Neutrophils Relative 09/29/2023 79 (H)    • Immat GRANS % 09/29/2023 0    • Lymphocytes Relative 09/29/2023 12 (L)    • Monocytes Relative 09/29/2023 8    • Eosinophils Relative 09/29/2023 0    • Basophils Relative 09/29/2023 1    • Neutrophils Absolute 09/29/2023 3.90    • Immature Grans Absolute 09/29/2023 0.02    • Lymphocytes Absolute 09/29/2023 0.62    • Monocytes Absolute 09/29/2023 0.39    • Eosinophils Absolute 09/29/2023 0.02    • Basophils Absolute 09/29/2023 0.05    • Sodium 09/29/2023 136    • Potassium 09/29/2023 4.3    • Chloride 09/29/2023 105    • CO2 09/29/2023 25    • ANION GAP 09/29/2023 6    • BUN 09/29/2023 23    • Creatinine 09/29/2023 1.11    • Glucose 09/29/2023 87    • Calcium 09/29/2023 9.2    • AST 09/29/2023 13    • ALT 09/29/2023 11    • Alkaline Phosphatase 09/29/2023 57    • Total Protein 09/29/2023 6.6    • Albumin 09/29/2023 3.9    • Total Bilirubin 09/29/2023 0.33    • eGFR 09/29/2023 43    • RBC, UA 09/29/2023 Innumerable (A)    • WBC, UA 09/29/2023 30-50 (A)    • Epithelial Cells 09/29/2023 Occasional    • Bacteria, UA 09/29/2023 Occasional    • Urine Culture 09/29/2023 40,000-49,000 cfu/ml Klebsiella oxytoca (A)      I have personally reviewed results with the patient. Janny Dickerson MD   [address]     Transcribed for Janny Dickerson MD, by Dub Layer on 10/31/23 at 12:19 PM. Powered by [employer].

## 2023-10-31 ENCOUNTER — TELEPHONE (OUTPATIENT)
Age: 88
End: 2023-10-31

## 2023-10-31 NOTE — TELEPHONE ENCOUNTER
Patient called stating she is looking for an order or either an MRI or an Ultrasound of the lower abdomin. I don't see the order in her chart.  Please advise

## 2023-11-01 ENCOUNTER — TELEPHONE (OUTPATIENT)
Dept: OBGYN CLINIC | Facility: CLINIC | Age: 88
End: 2023-11-01

## 2023-11-01 NOTE — TELEPHONE ENCOUNTER
Patient called to confirm appointment for f/u with Dr. Roselyn Mcgrath. Noticed US was not complete. Patient was confused and thought she had it done yesterday. Called reading room to see if it was complete and was told patient completed lower abdomen for different provider. Informed patient to call and schedule with central scheduling to complete "US pelvis complete w transvaginal"  Patient states VB has stopped, not having any concerns. Pt to complete US and will f/u with results. Central scheduling number given to patient as well. Confirmed with Dr. Roselyn Mcgrath patient does not need come in until we get results unless pt has concerns.

## 2023-11-01 NOTE — TELEPHONE ENCOUNTER
I spoke to Thera Cabot at central scheduling, made aware ultrasound needs to be done. Patients appointment was cx'd. Thera Cabot is going to call patient back and schedule ultrasound, patient to wait for follow up after ultrasound has been resulted.

## 2023-11-02 ENCOUNTER — HOSPITAL ENCOUNTER (OUTPATIENT)
Dept: RADIOLOGY | Age: 88
Discharge: HOME | End: 2023-11-02
Payer: MEDICARE

## 2023-11-02 DIAGNOSIS — N95.0 PMB (POSTMENOPAUSAL BLEEDING): ICD-10-CM

## 2023-11-02 PROCEDURE — 76830 TRANSVAGINAL US NON-OB: CPT

## 2023-11-02 PROCEDURE — 76856 US EXAM PELVIC COMPLETE: CPT

## 2023-11-06 ENCOUNTER — TELEPHONE (OUTPATIENT)
Age: 88
End: 2023-11-06

## 2023-11-06 NOTE — TELEPHONE ENCOUNTER
I have message to Gian Campuzano asking if we have further documentation on Dr. Rey Sotomayor and Dr Rocio Oviedo that we could send to pt to help her make a decision. Will contact pt to inform once have a response.

## 2023-11-06 NOTE — TELEPHONE ENCOUNTER
Patients GI provider:  Dr. Kenya Willis    Number to return call: [phone]    Reason for call: Pt calling regarding the letter received for 's departure. Pt asking for further details on  and  be mailed to her home address to help her make a decision of which provider to chose. Reviewed bio's on the phone but the pt did have a hard time hearing and couldn't determine who she would like to see. Pt is in the schedule with Carter Braden in the mean time.      Scheduled procedure/appointment date if applicable: 00/32/7696 - Follow Up - Carter Braden

## 2023-11-06 NOTE — TELEPHONE ENCOUNTER
No further write ups available, only what is online or message.  I called and spoke to pt and that Mary Ann Goins can discuss this with her and can even recommend a IBS specialist.

## 2023-11-07 ENCOUNTER — PATIENT OUTREACH (OUTPATIENT)
Dept: CASE MANAGEMENT | Facility: OTHER | Age: 88
End: 2023-11-07

## 2023-11-07 NOTE — PROGRESS NOTES
Outreach TC to patient for RN care . Patient reports that she is feeling well except for chronic arthritic pain. Patient denies any new or worsening urinary symptoms. Patient was seen on 9/29/23 with a UTI. Patient presented to the ED secondary to the UTI. Patient denies any further complications. Patient reports that she continues with chronic arthritic pain in her L hip. Patient also reports that she has peripheral neuropathy bilaterally. Patient has tried topical agents such as Lidocaine and Voltaren. Patient has also tried cortisone injections but did not receive any relief. We did review s/sx to report, future appointments, home medications and how/when to report symptoms to provider vs 911. Patient verbalizes understanding. Patient expressing how lonely she is. Much of today's conversation was listening to patient and providing emotional support. Patient does not drive so she is very limited in her ability to socialize. Patient was originally admitted secondary STEVEN. Patient had a CMP completed on 9/29/23 which revealed that STEVEN has resolved. Patient does have CKD stage 3 at baseline. The episode has been resolved. I removed myself from the care team and the care coordination note has been updated.

## 2023-11-09 DIAGNOSIS — R42 VERTIGO: ICD-10-CM

## 2023-11-10 RX ORDER — MECLIZINE HYDROCHLORIDE 25 MG/1
TABLET ORAL
Qty: 90 TABLET | Refills: 0 | Status: SHIPPED | OUTPATIENT
Start: 2023-11-10

## 2023-12-04 DIAGNOSIS — I49.9 IRREGULARLY IRREGULAR PULSE RHYTHM: ICD-10-CM

## 2023-12-04 RX ORDER — METOPROLOL SUCCINATE 25 MG/1
25 TABLET, EXTENDED RELEASE ORAL DAILY
Qty: 90 TABLET | Refills: 0 | Status: SHIPPED | OUTPATIENT
Start: 2023-12-04

## 2023-12-09 DIAGNOSIS — K59.09 CHRONIC CONSTIPATION: ICD-10-CM

## 2023-12-11 RX ORDER — LINACLOTIDE 290 UG/1
CAPSULE, GELATIN COATED ORAL DAILY
Qty: 90 CAPSULE | Refills: 1 | Status: SHIPPED | OUTPATIENT
Start: 2023-12-11

## 2023-12-12 ENCOUNTER — RA CDI HCC (OUTPATIENT)
Dept: OTHER | Facility: HOSPITAL | Age: 88
End: 2023-12-12

## 2023-12-14 ENCOUNTER — OFFICE VISIT (OUTPATIENT)
Dept: GASTROENTEROLOGY | Facility: CLINIC | Age: 88
End: 2023-12-14
Payer: MEDICARE

## 2023-12-14 VITALS
WEIGHT: 133.6 LBS | HEIGHT: 60 IN | HEART RATE: 60 BPM | SYSTOLIC BLOOD PRESSURE: 144 MMHG | BODY MASS INDEX: 26.23 KG/M2 | DIASTOLIC BLOOD PRESSURE: 68 MMHG

## 2023-12-14 DIAGNOSIS — Z86.010 HX OF COLONIC POLYPS: ICD-10-CM

## 2023-12-14 DIAGNOSIS — K57.90 DIVERTICULOSIS: Primary | ICD-10-CM

## 2023-12-14 DIAGNOSIS — K58.1 IRRITABLE BOWEL SYNDROME WITH CONSTIPATION: ICD-10-CM

## 2023-12-14 DIAGNOSIS — Z90.49 HX OF RIGHT HEMICOLECTOMY: ICD-10-CM

## 2023-12-14 DIAGNOSIS — I10 ESSENTIAL HYPERTENSION: ICD-10-CM

## 2023-12-14 DIAGNOSIS — Z12.11 COLON CANCER SCREENING: ICD-10-CM

## 2023-12-14 PROCEDURE — 99213 OFFICE O/P EST LOW 20 MIN: CPT | Performed by: NURSE PRACTITIONER

## 2023-12-14 RX ORDER — LOSARTAN POTASSIUM 25 MG/1
TABLET ORAL
Qty: 90 TABLET | Refills: 1 | Status: SHIPPED | OUTPATIENT
Start: 2023-12-14

## 2023-12-14 NOTE — PATIENT INSTRUCTIONS
May use Miralax as needed in addition to [address] for constipation. Miralax 1 capful in 8 ounces water or juice.   High fiber diet  Drink plenty water  May continue Senna S daily at bedtime

## 2023-12-14 NOTE — PROGRESS NOTES
[facility] - Outpatient Follow-up Note  Mary Ann Gregg 80 y.o. female MRN: [id number]  Encounter: [id number]          ASSESSMENT AND PLAN:      1. Diverticulosis  Hx of diverticulosis. No lower abdomen pain, blood in stool, blood from rectal area or black tarry stool.   -High fiber diet    2. Irritable bowel syndrome with constipation  Patient has history of constipation. Patient reports constipation controlled with Linzess and senna S.   -Continue high-fiber diet  -Drink plenty of fluids  -Continue Linzess 290 mcg daily  -Continue fleets enemas daily  -Continue senna 2 tabs daily at bedtime  -May use Miralax as needed in addition to [address] as needed. 3. Colon cancer screening  Up to date. No further screening colonoscopy recommended due to age    3. Hx of colonic polyps  Patient has history of colon polyps. One small colon polyp seen on colonoscopy done 07/16/2020 biopsy positive for tubular adenoma. No further colonoscopy is recommended due to patient's age. 5.  History of right hemicolectomy  Patient has history of colon cancer status post right hemicolectomy. Colonoscopy done 07/16/2020 biopsy positive for tubular adenoma    Follow up 1 year    ______________________________________________________________________    SUBJECTIVE: This is a 22-year-old female who presents to office for follow-up. Patient has a history of chronic back pain and lower extremity swelling and neuropathy. Patient also has a history of a right hemicolectomy secondary to colon cancer. Patient currently denies any GI issues. She presents for follow-up for constipation. She reports her constipation is well-controlled on Linzess and senna S. Patient does take MiraLAX as needed when [address] is not being effective. Patient with some bloating but reports bloating has improved significantly. Patient denies nausea, vomiting, acid reflux, heartburn, epigastric or abdominal pain.   Patient denies blood in stool, blood from rectal area, or black tarry stool. Abdomen exam benign no abdominal tenderness or guarding. REVIEW OF SYSTEMS IS OTHERWISE NEGATIVE. Historical Information   Past Medical History:   Diagnosis Date    Allergic rhinitis     Disease of thyroid gland     Dizziness     Hyponatremia 8/30/2021    Hypothyroidism 10/20/2015    Malignant neoplasm of colon ([address])     last assessed: 10/20/2015    Neuropathy     Tinnitus      Past Surgical History:   Procedure Laterality Date    ADENOIDECTOMY      APPENDECTOMY      last assessed: 10/20/2015    CATARACT EXTRACTION, BILATERAL      CHOLECYSTECTOMY      last assessed: 10/20/2015    COLECTOMY      last assessed: 10/20/2015; partial     TONSILLECTOMY      last assessed: 10/20/2015     Social History   Social History     Substance and Sexual Activity   Alcohol Use No     Social History     Substance and Sexual Activity   Drug Use Never     Social History     Tobacco Use   Smoking Status Former    Current packs/day: 0.25    Types: Cigarettes   Smokeless Tobacco Never   Tobacco Comments    quit 50 yrs ago     Family History   Problem Relation Age of Onset    Heart disease Mother         cardiac disorder    Alzheimer's disease Sister     No Known Problems Father     Leukemia Brother        Meds/Allergies       Current Outpatient Medications:     Acetaminophen Extra Strength 500 MG tablet    aspirin 81 MG tablet    calcium carbonate-vitamin D (OSCAL-D) 500 mg-200 units per tablet    cholecalciferol (VITAMIN D3) 1,000 units tablet    fexofenadine (ALLEGRA) 180 MG tablet    gabapentin (NEURONTIN) 100 mg capsule    linaCLOtide (Linzess) 290 MCG CAPS    losartan (COZAAR) 25 mg tablet    meclizine (ANTIVERT) 25 mg tablet    metoprolol succinate (TOPROL-XL) 25 mg 24 hr tablet    Mirabegron ER 25 MG TB24    Nutritional Supplements (Ensure High Protein) LIQD    Allergies   Allergen Reactions    Penicillins Hives     Other reaction(s):  Other (See Comments)  hives           Objective     Blood pressure 144/68, pulse 60, height 5' (1.524 m), weight 60.6 kg (133 lb 9.6 oz). Body mass index is 26.09 kg/m². PHYSICAL EXAM:      General Appearance:   Alert, cooperative, no distress   HEENT:   Normocephalic, atraumatic, anicteric. Neck:  Supple, symmetrical, trachea midline   Lungs:   Clear to auscultation bilaterally; no rales, rhonchi or wheezing; respirations unlabored    Heart[de-identified]   Regular rate and rhythm; no murmur, rub, or gallop. Abdomen:   Soft, non-tender, non-distended; normal bowel sounds; no masses, no organomegaly    Genitalia:   Deferred    Rectal:   Deferred    Extremities:  No cyanosis, clubbing or edema    Pulses:  2+ and symmetric    Skin:  No jaundice, rashes, or lesions    Lymph nodes:  No palpable cervical lymphadenopathy        Lab Results:   No visits with results within 1 Day(s) from this visit.    Latest known visit with results is:   Admission on 09/29/2023, Discharged on 09/29/2023   Component Date Value    Color, UA 09/29/2023 Light [place]     Clarity, UA 09/29/2023 Turbid     Specific Gravity, UA 09/29/2023 1.017     pH, UA 09/29/2023 5.5     Leukocytes, UA 09/29/2023 Trace (A)     Nitrite, UA 09/29/2023 Negative     Protein, UA 09/29/2023 100 (2+) (A)     Glucose, UA 09/29/2023 Negative     Ketones, UA 09/29/2023 Negative     Urobilinogen, UA 09/29/2023 <2.0     Bilirubin, UA 09/29/2023 Negative     Occult Blood, UA 09/29/2023 Large (A)     WBC 09/29/2023 5.00     RBC 09/29/2023 2.91 (L)     Hemoglobin 09/29/2023 9.9 (L)     Hematocrit 09/29/2023 30.5 (L)     MCV 09/29/2023 105 (H)     MCH 09/29/2023 34.0     MCHC 09/29/2023 32.5     RDW 09/29/2023 12.8     MPV 09/29/2023 9.8     Platelets 48/39/4181 213     nRBC 09/29/2023 0     Neutrophils Relative 09/29/2023 79 (H)     Immat GRANS % 09/29/2023 0     Lymphocytes Relative 09/29/2023 12 (L)     Monocytes Relative 09/29/2023 8     Eosinophils Relative 09/29/2023 0     Basophils Relative 09/29/2023 1     Neutrophils Absolute 09/29/2023 3.90     Immature Grans Absolute 09/29/2023 0.02     Lymphocytes Absolute 09/29/2023 0.62     Monocytes Absolute 09/29/2023 0.39     Eosinophils Absolute 09/29/2023 0.02     Basophils Absolute 09/29/2023 0.05     Sodium 09/29/2023 136     Potassium 09/29/2023 4.3     Chloride 09/29/2023 105     CO2 09/29/2023 25     ANION GAP 09/29/2023 6     BUN 09/29/2023 23     Creatinine 09/29/2023 1.11     Glucose 09/29/2023 87     Calcium 09/29/2023 9.2     AST 09/29/2023 13     ALT 09/29/2023 11     Alkaline Phosphatase 09/29/2023 57     Total Protein 09/29/2023 6.6     Albumin 09/29/2023 3.9     Total Bilirubin 09/29/2023 0.33     eGFR 09/29/2023 43     RBC, UA 09/29/2023 Innumerable (A)     WBC, UA 09/29/2023 30-50 (A)     Epithelial Cells 09/29/2023 Occasional     Bacteria, UA 09/29/2023 Occasional     Urine Culture 09/29/2023 40,000-49,000 cfu/ml Klebsiella oxytoca (A)          Radiology Results:   No results found.

## 2023-12-15 ENCOUNTER — OFFICE VISIT (OUTPATIENT)
Dept: FAMILY MEDICINE CLINIC | Facility: CLINIC | Age: 88
End: 2023-12-15
Payer: MEDICARE

## 2023-12-15 VITALS
OXYGEN SATURATION: 98 % | RESPIRATION RATE: 16 BRPM | BODY MASS INDEX: 26.7 KG/M2 | DIASTOLIC BLOOD PRESSURE: 76 MMHG | HEIGHT: 60 IN | SYSTOLIC BLOOD PRESSURE: 118 MMHG | HEART RATE: 85 BPM | WEIGHT: 136 LBS

## 2023-12-15 DIAGNOSIS — F41.9 ANXIETY: ICD-10-CM

## 2023-12-15 DIAGNOSIS — R26.2 AMBULATORY DYSFUNCTION: ICD-10-CM

## 2023-12-15 DIAGNOSIS — L57.0 AK (ACTINIC KERATOSIS): Primary | ICD-10-CM

## 2023-12-15 DIAGNOSIS — G60.9 IDIOPATHIC PERIPHERAL NEUROPATHY: ICD-10-CM

## 2023-12-15 DIAGNOSIS — M16.12 ARTHRITIS OF LEFT HIP: ICD-10-CM

## 2023-12-15 PROCEDURE — 99214 OFFICE O/P EST MOD 30 MIN: CPT | Performed by: FAMILY MEDICINE

## 2023-12-15 NOTE — PROGRESS NOTES
Assessment/Plan:  1. AK (actinic keratosis)  -     Ambulatory Referral to Dermatology; Future    2. Anxiety    3. Ambulatory dysfunction    4. Arthritis of left hip    5. Idiopathic peripheral neuropathy      Follow up wellness. I sent a new prescription for Linzess to [facility] in [facility]. She is on losartan and metoprolol for her hypertension. Recommended she takes duloxetine 20 mg to address her depression. No growth detected in her ear. Cerumen was removed from her ear today. She was recommended to follow up with a dermatologist to evaluate the lesion on her hand. She was advised to use Bengay or Blue-Emu for her discomfort. The patient will follow up in 2 months. Subjective:      Patient ID: Lindsey Becerra is a 80 y.o. female who for a follow-up. She consents to the use of MAGDALENO technology. She previously established care with Dr. Brett Elizabeth. She saw Dr. Donavon Moe yesterday for the first time. She was given a couple of boxes of Linzess. She was informed to follow up once a year. She expresses her frustration as she was previously having a difficult time acquiring Linzess. She reports that the upper part of her left ear is slightly flaky. She feels that there is growth in her ear and expresses her concern about it. She does state that it does not interfere with her hearing but reports that she experiences tinnitus. She usually uses Q-tip to clean her ears. She has an eye appointment. She does not have tonsils. She inquires about the improvement of her legs. She inquires about any adverse effects of duloxetine. With her constant intake of her medications, she inquires if her condition would improve. She still works and expresses concern that her workload has been piling up.     The following portions of the patient's history were reviewed and updated as appropriate: allergies, current medications, past family history, past medical history, past social history, past surgical history and problem list.    Review of Systems   Constitutional: Negative for fever and unexpected weight change. HENT: Positive for tinnitus. Negative for nosebleeds and trouble swallowing. Eyes: Negative for visual disturbance. Respiratory: Negative for chest tightness and shortness of breath. Cardiovascular: Negative for chest pain, palpitations and leg swelling. Gastrointestinal: Negative for abdominal pain, constipation, diarrhea and nausea. Endocrine: Negative for cold intolerance. Genitourinary: Negative for dysuria and urgency. Musculoskeletal: Negative for joint swelling and myalgias. Skin: Negative for rash. Neurological: Negative for tremors, seizures and syncope. Hematological: Does not bruise/bleed easily. Psychiatric/Behavioral: Negative for hallucinations and suicidal ideas. Objective:     /76 (BP Location: Left arm, Patient Position: Sitting, Cuff Size: Standard)   Pulse 85   Resp 16   Ht 5' (1.524 m)   Wt 61.7 kg (136 lb)   SpO2 98%   BMI 26.56 kg/m²    Physical Exam  Vitals and nursing note reviewed. Constitutional:     Appearance: Well-developed. HENT:     Head: Normocephalic and atraumatic. Left ear: Dry cerumen detected. No blockage of the tube. Right ear: Significant cerumen detected. Cardiovascular:     Rate and Rhythm: Normal rate and regular rhythm. Heart sounds: Normal heart sounds. No murmur heard. Pulmonary:     Effort: Pulmonary effort is normal.     Breath sounds: Normal breath sounds. No wheezing or rales. Abdominal:     General: Bowel sounds are normal. There is no distension. Palpations: Abdomen is soft. Tenderness: There is no abdominal tenderness. Musculoskeletal:     General: No tenderness. Normal range of motion. Cervical back: Normal range of motion and neck supple. Lymphadenopathy:     Cervical: No cervical adenopathy. Skin:     General: Skin is warm and dry.      Capillary Refill: Capillary refill takes less than 2 seconds. Findings: No rash. Neurological:     Mental Status: Alert and oriented to person, place, and time. Cranial Nerves: No cranial nerve deficit. Sensory: No sensory deficit. Motor: No abnormal muscle tone. Psychiatric:     Behavior: Behavior normal.     Thought Content: Thought content normal.     Judgment: Judgment normal.      No visits with results within 2 Week(s) from this visit.    Latest known visit with results is:   Admission on 09/29/2023, Discharged on 09/29/2023   Component Date Value   • Color, UA 09/29/2023 Light [place]    • Clarity, UA 09/29/2023 Turbid    • Specific Gravity, UA 09/29/2023 1.017    • pH, UA 09/29/2023 5.5    • Leukocytes, UA 09/29/2023 Trace (A)    • Nitrite, UA 09/29/2023 Negative    • Protein, UA 09/29/2023 100 (2+) (A)    • Glucose, UA 09/29/2023 Negative    • Ketones, UA 09/29/2023 Negative    • Urobilinogen, UA 09/29/2023 <2.0    • Bilirubin, UA 09/29/2023 Negative    • Occult Blood, UA 09/29/2023 Large (A)    • WBC 09/29/2023 5.00    • RBC 09/29/2023 2.91 (L)    • Hemoglobin 09/29/2023 9.9 (L)    • Hematocrit 09/29/2023 30.5 (L)    • MCV 09/29/2023 105 (H)    • MCH 09/29/2023 34.0    • MCHC 09/29/2023 32.5    • RDW 09/29/2023 12.8    • MPV 09/29/2023 9.8    • Platelets 23/06/6393 213    • nRBC 09/29/2023 0    • Neutrophils Relative 09/29/2023 79 (H)    • Immat GRANS % 09/29/2023 0    • Lymphocytes Relative 09/29/2023 12 (L)    • Monocytes Relative 09/29/2023 8    • Eosinophils Relative 09/29/2023 0    • Basophils Relative 09/29/2023 1    • Neutrophils Absolute 09/29/2023 3.90    • Immature Grans Absolute 09/29/2023 0.02    • Lymphocytes Absolute 09/29/2023 0.62    • Monocytes Absolute 09/29/2023 0.39    • Eosinophils Absolute 09/29/2023 0.02    • Basophils Absolute 09/29/2023 0.05    • Sodium 09/29/2023 136    • Potassium 09/29/2023 4.3    • Chloride 09/29/2023 105    • CO2 09/29/2023 25    • ANION GAP 09/29/2023 6    • BUN 09/29/2023 23    • Creatinine 09/29/2023 1.11    • Glucose 09/29/2023 87    • Calcium 09/29/2023 9.2    • AST 09/29/2023 13    • ALT 09/29/2023 11    • Alkaline Phosphatase 09/29/2023 57    • Total Protein 09/29/2023 6.6    • Albumin 09/29/2023 3.9    • Total Bilirubin 09/29/2023 0.33    • eGFR 09/29/2023 43    • RBC, UA 09/29/2023 Innumerable (A)    • WBC, UA 09/29/2023 30-50 (A)    • Epithelial Cells 09/29/2023 Occasional    • Bacteria, UA 09/29/2023 Occasional    • Urine Culture 09/29/2023 40,000-49,000 cfu/ml Klebsiella oxytoca (A)      I have personally reviewed results with the patient. Cleopatra Smallwood MD   [facility]     Transcribed for Cleopatra Smallwood MD, by [employer] on 12/16/23 at 8:40 PM. Powered by [employer].

## 2023-12-21 ENCOUNTER — NEW PATIENT COMPREHENSIVE (OUTPATIENT)
Dept: URBAN - METROPOLITAN AREA CLINIC 6 | Facility: CLINIC | Age: 88
End: 2023-12-21

## 2023-12-21 DIAGNOSIS — Z96.1: ICD-10-CM

## 2023-12-21 DIAGNOSIS — H01.135: ICD-10-CM

## 2023-12-21 DIAGNOSIS — H01.132: ICD-10-CM

## 2023-12-21 PROCEDURE — 92004 COMPRE OPH EXAM NEW PT 1/>: CPT

## 2023-12-21 ASSESSMENT — VISUAL ACUITY
OS_CC: 20/50+1
OU_CC: J1
OD_CC: 20/70
OS_PH: 20/30+2
OD_PH: 20/40

## 2023-12-21 ASSESSMENT — TONOMETRY
OD_IOP_MMHG: 10
OS_IOP_MMHG: 12

## 2023-12-26 DIAGNOSIS — R42 VERTIGO: ICD-10-CM

## 2023-12-27 RX ORDER — MECLIZINE HYDROCHLORIDE 25 MG/1
TABLET ORAL
Qty: 90 TABLET | Refills: 0 | Status: SHIPPED | OUTPATIENT
Start: 2023-12-27

## 2024-02-12 PROBLEM — Z12.11 COLON CANCER SCREENING: Status: RESOLVED | Noted: 2022-09-13 | Resolved: 2024-02-12

## 2024-04-01 DIAGNOSIS — I49.9 IRREGULARLY IRREGULAR PULSE RHYTHM: ICD-10-CM

## 2024-04-01 DIAGNOSIS — R42 VERTIGO: ICD-10-CM

## 2024-04-01 RX ORDER — METOPROLOL SUCCINATE 25 MG/1
25 TABLET, EXTENDED RELEASE ORAL DAILY
Qty: 90 TABLET | Refills: 1 | Status: SHIPPED | OUTPATIENT
Start: 2024-04-01

## 2024-04-01 RX ORDER — MECLIZINE HYDROCHLORIDE 25 MG/1
TABLET ORAL
Qty: 90 TABLET | Refills: 1 | Status: SHIPPED | OUTPATIENT
Start: 2024-04-01

## 2024-04-01 RX ORDER — METOPROLOL SUCCINATE 25 MG/1
25 TABLET, EXTENDED RELEASE ORAL DAILY
Qty: 90 TABLET | Refills: 0 | OUTPATIENT
Start: 2024-04-01

## 2024-04-01 RX ORDER — MECLIZINE HYDROCHLORIDE 25 MG/1
25 TABLET ORAL EVERY 8 HOURS
Qty: 90 TABLET | Refills: 0 | OUTPATIENT
Start: 2024-04-01

## 2024-04-02 ENCOUNTER — OFFICE VISIT (OUTPATIENT)
Dept: OBGYN CLINIC | Facility: CLINIC | Age: 89
End: 2024-04-02
Payer: MEDICARE

## 2024-04-02 VITALS
DIASTOLIC BLOOD PRESSURE: 90 MMHG | SYSTOLIC BLOOD PRESSURE: 130 MMHG | BODY MASS INDEX: 26.82 KG/M2 | HEIGHT: 60 IN | WEIGHT: 136.6 LBS

## 2024-04-02 DIAGNOSIS — L90.0 LICHEN SCLEROSUS: Primary | ICD-10-CM

## 2024-04-02 PROCEDURE — 99213 OFFICE O/P EST LOW 20 MIN: CPT | Performed by: OBSTETRICS & GYNECOLOGY

## 2024-04-02 RX ORDER — CLOBETASOL PROPIONATE 0.5 MG/G
OINTMENT TOPICAL 2 TIMES DAILY
Qty: 45 G | Refills: 0 | Status: SHIPPED | OUTPATIENT
Start: 2024-04-02

## 2024-04-02 NOTE — PROGRESS NOTES
Assessment/Plan:     Diagnoses and all orders for this visit:    Lichen sclerosus  -     clobetasol (TEMOVATE) 0.05 % ointment; Apply topically 2 (two) times a day        89-year-old female  History of colon cancer  Osteoporosis follow-up with PCP  Lichen sclerosis on clobetasol ointment  PMB 1 episode normal endometrial thickness  Plan  Continue clobetasol twice a week  Pelvic ultrasound result reviewed and discussed with patient  Female hygiene reviewed and discussed with patient  Return to office in 6 months for follow-up    Subjective:      Patient ID: Ida Vuong is a 89 y.o. female.    HPI  Patient seen evaluated present to the office today follow-up on lichen sclerosus  Patient was taking clobetasol ointment that helping with her symptoms  Patient ran out of the medication and did not call for refill  Patient has severe incontinence need to wear adult pad/diapers continuously  Declined management at her age  Denies any vaginal itching odor or discharge  Denies any urgency or dysuria  Denies any vulvar irritation  Patient is feeling some vulvar burning sensation occasionally after wiping herself management option and female hygiene reviewed and discussed with patient recommend to restart clobetasol twice weekly as well as she can use Vaseline in a.m. to create barrier as she is wearing pads continuously all patient questions answered and patient was satisfied              The following portions of the patient's history were reviewed and updated as appropriate: allergies, current medications, past family history, past medical history, past social history, past surgical history and problem list.    Review of Systems      Objective:      /90 (BP Location: Left arm, Patient Position: Sitting, Cuff Size: Adult)   Ht 5' (1.524 m)   Wt 62 kg (136 lb 9.6 oz)   BMI 26.68 kg/m²          Physical Exam      Physical Exam    Constitutional:       Appearance: She is well-developed.   Abdominal:      General:  There is no distension.      Palpations: Abdomen is soft.      Tenderness: There is no abdominal tenderness.   Genitourinary:     Labia:         Right: No rash, tenderness or lesion.         Left: No rash, tenderness or lesion.       Vagina: No signs of injury. No vaginal discharge, erythema or tenderness.      Cervix: No cervical motion tenderness, discharge or friability.      Adnexa:         Right: No mass, tenderness or fullness.          Left: No mass, tenderness or fullness.            Comments: Vulvar atrophy fused labia minora with labia majora noted skin color is waxy white   typical for lichen sclerosis no discoloration or other abnormality noted no ulceration or VB  noted  Fused anterior fourchette hidden clitoris  Neurological:      Mental Status: She is alert and oriented to person, place, and time.   Psychiatric:         Behavior: Behavior normal.

## 2024-04-03 DIAGNOSIS — N28.9: Primary | ICD-10-CM

## 2024-04-03 DIAGNOSIS — E46: Primary | ICD-10-CM

## 2024-04-05 ENCOUNTER — HOSPITAL ENCOUNTER (OUTPATIENT)
Dept: RADIOLOGY | Facility: HOSPITAL | Age: 89
Discharge: HOME | End: 2024-04-05
Attending: RADIOLOGY | Admitting: RADIOLOGY
Payer: MEDICARE

## 2024-04-05 DIAGNOSIS — N28.9: ICD-10-CM

## 2024-04-05 DIAGNOSIS — E46: ICD-10-CM

## 2024-04-05 PROCEDURE — C1751 CATH, INF, PER/CENT/MIDLINE: HCPCS

## 2024-04-05 PROCEDURE — NC001 PR NO CHARGE: Performed by: RADIOLOGY

## 2024-04-05 PROCEDURE — 36573 INSJ PICC RS&I 5 YR+: CPT

## 2024-04-05 RX ORDER — LIDOCAINE WITH 8.4% SOD BICARB 0.9%(10ML)
SYRINGE (ML) INJECTION AS NEEDED
Status: DISCONTINUED | OUTPATIENT
Start: 2024-04-05 | End: 2024-04-06 | Stop reason: HOSPADM

## 2024-04-05 RX ADMIN — Medication 3 ML: at 13:10

## 2024-04-05 NOTE — SEDATION DOCUMENTATION
Procedure completed by RT Andrei. Pt tolerated without issues. Education provided to pt prior to and throughout procedure, questions answered as offered. Chlorhexidine dressing to site.

## 2024-04-05 NOTE — PROCEDURES
Venous Access Line Insertion    Date/Time: 4/5/2024 1:19 PM    Performed by: Shahida Cabrera  Authorized by: Annette Mraes MD    Patient location:  IR  Consent:     Consent obtained:  Written    Consent given by:  Patient    Risks discussed:  Bleeding and infection    Alternatives discussed:  No treatment  Universal protocol:     Procedure explained and questions answered to patient or proxy's satisfaction: yes      Immediately prior to procedure, a time out was called: yes      Relevant documents present and verified: yes      Test results available and properly labeled: yes      Radiology Images displayed and confirmed.  If images not available, report reviewed: yes      Required blood products, implants, devices, and special equipment available: yes      Site/side marked: yes      Patient identity confirmed:  Verbally with patient and provided demographic data  Pre-procedure details:     Hand hygiene: Hand hygiene performed prior to insertion      Sterile barrier technique: All elements of maximal sterile technique followed      Skin preparation:  2% chlorhexidine    Skin preparation agent: Skin preparation agent completely dried prior to procedure    Procedure details:     Complex Venous Access Line Type: PICC      Complex Venous Access Line Indications: medications requiring central line      Catheter tip vessel location: superior vena cava      Orientation:  Right    Location:  Basilic    Procedural supplies:  Single lumen    Catheter size:  4 Fr    Total catheter length (cm):  40    Catheter out on skin (cm):  0    Arm circumference:  26    Patient evaluated for contraindications to access (i.e. fistula, thrombosis, etc): Yes      Approach: percutaneous technique used      Patient position:  Flat    Ultrasound image availability:  Images available in PACS    Sterile ultrasound techniques: Sterile gel and sterile probe covers were used      Number of attempts:  1    Successful placement: yes      Landmarks  identified: yes      Vessel of catheter tip end:  [place] 3CG confirmed  Anesthesia (see MAR for exact dosages):     Anesthesia method:  Local infiltration    Local anesthetic:  Lidocaine 1% WITH epi  Post-procedure details:     Post-procedure:  Dressing applied    Assessment:  Blood return through all ports and placement verified by x-ray    Post-procedure complications: none      Patient tolerance of procedure:  Tolerated well, no immediate complications    Observer: Yes      Observer name:  Zainab Bautista RN

## 2024-04-05 NOTE — DISCHARGE INSTRUCTIONS
Peripherally Inserted Central Catheter     WHAT YOU NEED TO KNOW:   A PICC is an IV placed into a large blood vessel near your heart. It is usually inserted through a blood vessel in your arm. Your PICC may have multiple ports. Ports are tubes where you can inject medicine. A PICC can stay in place for several weeks or months. You may need a PICC to get nutrition, medicine, or fluids. Blood samples can be removed from your PICC and sent to the lab for tests.   DISCHARGE INSTRUCTIONS:    [facility],    Contact Interventional Radiology at [phone]   HERCULES PATIENTS: Contact Interventional Radiology at [phone]   [facility]ATHAR[facility] PATIENTS: Contact Interventional Radiology at [phone] if:  Blood soaks through your bandage.    Your arm or leg feels warm, tender, and painful. It may look swollen and red.   You have trouble moving your arm.    Your catheter falls out.  You have a fever or swelling, redness, pain, or pus where the catheter was inserted.  Persistent nausea or vomiting.    You cannot flush your catheter, or you feel pain when you flush your catheter.    You see a hole or crack in the tubing of your catheter.    You see fluid leaking from the insertion site.    You run out of supplies to care for your catheter.    You have questions or concerns about your condition or care.

## 2024-04-08 RX ORDER — AMLODIPINE BESYLATE 5 MG/1
5 TABLET ORAL DAILY
COMMUNITY
Start: 2024-03-25

## 2024-04-08 RX ORDER — NEOMYCIN SULFATE, POLYMYXIN B SULFATE, AND DEXAMETHASONE 3.5; 10000; 1 MG/G; [USP'U]/G; MG/G
OINTMENT OPHTHALMIC
COMMUNITY
Start: 2024-01-17

## 2024-04-09 ENCOUNTER — TELEPHONE (OUTPATIENT)
Age: 89
End: 2024-04-09

## 2024-04-09 ENCOUNTER — OFFICE VISIT (OUTPATIENT)
Dept: FAMILY MEDICINE CLINIC | Facility: CLINIC | Age: 89
End: 2024-04-09

## 2024-04-09 VITALS
WEIGHT: 138 LBS | HEIGHT: 60 IN | OXYGEN SATURATION: 97 % | BODY MASS INDEX: 27.09 KG/M2 | HEART RATE: 58 BPM | RESPIRATION RATE: 16 BRPM | DIASTOLIC BLOOD PRESSURE: 80 MMHG | SYSTOLIC BLOOD PRESSURE: 122 MMHG

## 2024-04-09 DIAGNOSIS — M16.12 ARTHRITIS OF LEFT HIP: ICD-10-CM

## 2024-04-09 DIAGNOSIS — N28.9: Primary | ICD-10-CM

## 2024-04-09 DIAGNOSIS — I70.203 ATHEROSCLEROSIS OF NATIVE ARTERY OF BOTH LOWER EXTREMITIES, WITH UNSPECIFIED PRESENCE OF CLINICAL MANIFESTATION (HCC): ICD-10-CM

## 2024-04-09 DIAGNOSIS — G60.9 IDIOPATHIC PERIPHERAL NEUROPATHY: ICD-10-CM

## 2024-04-09 DIAGNOSIS — D64.9 ANEMIA, UNSPECIFIED TYPE: ICD-10-CM

## 2024-04-09 DIAGNOSIS — K59.09 CHRONIC CONSTIPATION: ICD-10-CM

## 2024-04-09 DIAGNOSIS — L98.9 SKIN LESION OF HAND: ICD-10-CM

## 2024-04-09 DIAGNOSIS — Z87.81 HISTORY OF COMPRESSION FRACTURE OF SPINE: ICD-10-CM

## 2024-04-09 DIAGNOSIS — I10 ESSENTIAL HYPERTENSION: ICD-10-CM

## 2024-04-09 DIAGNOSIS — E46: Primary | ICD-10-CM

## 2024-04-09 DIAGNOSIS — Z00.00 WELL ADULT EXAM: ICD-10-CM

## 2024-04-09 DIAGNOSIS — R26.2 AMBULATORY DYSFUNCTION: ICD-10-CM

## 2024-04-09 DIAGNOSIS — N18.31 STAGE 3A CHRONIC KIDNEY DISEASE (HCC): ICD-10-CM

## 2024-04-09 NOTE — ASSESSMENT & PLAN NOTE
Lab Results   Component Value Date    EGFR 45 (L) 03/08/2024    EGFR 38 (L) 03/08/2024    EGFR 40 (L) 03/07/2024    CREATININE 1.14 (H) 03/08/2024    CREATININE 1.32 (H) 03/08/2024    CREATININE 1.26 (H) 03/07/2024   Hydration with IVF now

## 2024-04-09 NOTE — PATIENT INSTRUCTIONS
Medicare Preventive Visit Patient Instructions  Thank you for completing your Welcome to Medicare Visit or Medicare Annual Wellness Visit today. Your next wellness visit will be due in one year (4/10/2025).  The screening/preventive services that you may require over the next 5-10 years are detailed below. Some tests may not apply to you based off risk factors and/or age. Screening tests ordered at today's visit but not completed yet may show as past due. Also, please note that scanned in results may not display below.  Preventive Screenings:  Service Recommendations Previous Testing/Comments   Colorectal Cancer Screening  * Colonoscopy    * Fecal Occult Blood Test (FOBT)/Fecal Immunochemical Test (FIT)  * Fecal DNA/Cologuard Test  * Flexible Sigmoidoscopy Age: 45-75 years old   Colonoscopy: every 10 years (may be performed more frequently if at higher risk)  OR  FOBT/FIT: every 1 year  OR  Cologuard: every 3 years  OR  Sigmoidoscopy: every 5 years  Screening may be recommended earlier than age 45 if at higher risk for colorectal cancer. Also, an individualized decision between you and your healthcare provider will decide whether screening between the ages of 76-85 would be appropriate. Colonoscopy: 07/16/2020  FOBT/FIT: 09/02/2022  Cologuard: Not on file  Sigmoidoscopy: Not on file    Screening Not Indicated     Breast Cancer Screening Age: 40+ years old  Frequency: every 1-2 years  Not required if history of left and right mastectomy Mammogram: Not on file    Screening Not Indicated   Cervical Cancer Screening Between the ages of 21-29, pap smear recommended once every 3 years.   Between the ages of 30-65, can perform pap smear with HPV co-testing every 5 years.   Recommendations may differ for women with a history of total hysterectomy, cervical cancer, or abnormal pap smears in past. Pap Smear: 05/02/2022    Screening Not Indicated   Hepatitis C Screening Once for adults born between 1945 and 1965  More  frequently in patients at high risk for Hepatitis C Hep C Antibody: Not on file        Diabetes Screening 1-2 times per year if you're at risk for diabetes or have pre-diabetes Fasting glucose: 75 mg/dL (11/3/2021)  A1C: 5.0 % (3/5/2024)  Screening Current   Cholesterol Screening Once every 5 years if you don't have a lipid disorder. May order more often based on risk factors. Lipid panel: 03/05/2024    Screening Current     Other Preventive Screenings Covered by Medicare:  Abdominal Aortic Aneurysm (AAA) Screening: covered once if your at risk. You're considered to be at risk if you have a family history of AAA.  Lung Cancer Screening: covers low dose CT scan once per year if you meet all of the following conditions: (1) Age 55-77; (2) No signs or symptoms of lung cancer; (3) Current smoker or have quit smoking within the last 15 years; (4) You have a tobacco smoking history of at least 20 pack years (packs per day multiplied by number of years you smoked); (5) You get a written order from a healthcare provider.  Glaucoma Screening: covered annually if you're considered high risk: (1) You have diabetes OR (2) Family history of glaucoma OR (3)  aged 50 and older OR (4)  American aged 65 and older  Osteoporosis Screening: covered every 2 years if you meet one of the following conditions: (1) You're estrogen deficient and at risk for osteoporosis based off medical history and other findings; (2) Have a vertebral abnormality; (3) On glucocorticoid therapy for more than 3 months; (4) Have primary hyperparathyroidism; (5) On osteoporosis medications and need to assess response to drug therapy.   Last bone density test (DXA Scan): 07/10/2023.  HIV Screening: covered annually if you're between the age of 15-65. Also covered annually if you are younger than 15 and older than 65 with risk factors for HIV infection. For pregnant patients, it is covered up to 3 times per  pregnancy.    Immunizations:  Immunization Recommendations   Influenza Vaccine Annual influenza vaccination during flu season is recommended for all persons aged >= 6 months who do not have contraindications   Pneumococcal Vaccine   * Pneumococcal conjugate vaccine = PCV13 (Prevnar 13), PCV15 (Vaxneuvance), PCV20 (Prevnar 20)  * Pneumococcal polysaccharide vaccine = PPSV23 (Pneumovax) Adults 19-65 yo with certain risk factors or if 65+ yo  If never received any pneumonia vaccine: recommend Prevnar 20 (PCV20)  Give PCV20 if previously received 1 dose of PCV13 or PPSV23   Hepatitis B Vaccine 3 dose series if at intermediate or high risk (ex: diabetes, end stage renal disease, liver disease)   Respiratory syncytial virus (RSV) Vaccine - COVERED BY MEDICARE PART D  * RSVPreF3 (Arexvy) CDC recommends that adults 60 years of age and older may receive a single dose of RSV vaccine using shared clinical decision-making (SCDM)   Tetanus (Td) Vaccine - COST NOT COVERED BY MEDICARE PART B Following completion of primary series, a booster dose should be given every 10 years to maintain immunity against tetanus. Td may also be given as tetanus wound prophylaxis.   Tdap Vaccine - COST NOT COVERED BY MEDICARE PART B Recommended at least once for all adults. For pregnant patients, recommended with each pregnancy.   Shingles Vaccine (Shingrix) - COST NOT COVERED BY MEDICARE PART B  2 shot series recommended in those 19 years and older who have or will have weakened immune systems or those 50 years and older     Health Maintenance Due:      Topic Date Due   • Breast Cancer Screening: Mammogram  08/10/2099 (Originally 12/11/1970)   • Cervical Cancer Screening  10/25/2099 (Originally 12/11/1951)   • DXA SCAN  07/10/2026     Immunizations Due:      Topic Date Due   • Pneumococcal Vaccine: 65+ Years (1 of 2 - PCV) Never done   • COVID-19 Vaccine (4 - 2023-24 season) 09/01/2023     Advance Directives   What are advance directives?  Advance  directives are legal documents that state your wishes and plans for medical care. These plans are made ahead of time in case you lose your ability to make decisions for yourself. Advance directives can apply to any medical decision, such as the treatments you want, and if you want to donate organs.   What are the types of advance directives?  There are many types of advance directives, and each state has rules about how to use them. You may choose a combination of any of the following:  Living will:  This is a written record of the treatment you want. You can also choose which treatments you do not want, which to limit, and which to stop at a certain time. This includes surgery, medicine, IV fluid, and tube feedings.   Durable power of  for healthcare (DPAHC):  This is a written record that states who you want to make healthcare choices for you when you are unable to make them for yourself. This person, called a proxy, is usually a family member or a friend. You may choose more than 1 proxy.  Do not resuscitate (DNR) order:  A DNR order is used in case your heart stops beating or you stop breathing. It is a request not to have certain forms of treatment, such as CPR. A DNR order may be included in other types of advance directives.  Medical directive:  This covers the care that you want if you are in a coma, near death, or unable to make decisions for yourself. You can list the treatments you want for each condition. Treatment may include pain medicine, surgery, blood transfusions, dialysis, IV or tube feedings, and a ventilator (breathing machine).  Values history:  This document has questions about your views, beliefs, and how you feel and think about life. This information can help others choose the care that you would choose.  Why are advance directives important?  An advance directive helps you control your care. Although spoken wishes may be used, it is better to have your wishes written down. Spoken  wishes can be misunderstood, or not followed. Treatments may be given even if you do not want them. An advance directive may make it easier for your family to make difficult choices about your care.   Urinary Incontinence   Urinary incontinence (UI)  is when you lose control of your bladder. UI develops because your bladder cannot store or empty urine properly. The 3 most common types of UI are stress incontinence, urge incontinence, or both.  Medicines:   May be given to help strengthen your bladder control. Report any side effects of medication to your healthcare provider.  Do pelvic muscle exercises often:  Your pelvic muscles help you stop urinating. Squeeze these muscles tight for 5 seconds, then relax for 5 seconds. Gradually work up to squeezing for 10 seconds. Do 3 sets of 15 repetitions a day, or as directed. This will help strengthen your pelvic muscles and improve bladder control.  Train your bladder:  Go to the bathroom at set times, such as every 2 hours, even if you do not feel the urge to go. You can also try to hold your urine when you feel the urge to go. For example, hold your urine for 5 minutes when you feel the urge to go. As that becomes easier, hold your urine for 10 minutes.   Self-care:   Keep a UI record.  Write down how often you leak urine and how much you leak. Make a note of what you were doing when you leaked urine.  Drink liquids as directed. You may need to limit the amount of liquid you drink to help control your urine leakage. Do not drink any liquid right before you go to bed. Limit or do not have drinks that contain caffeine or alcohol.   Prevent constipation.  Eat a variety of high-fiber foods. Good examples are high-fiber cereals, beans, vegetables, and whole-grain breads. Walking is the best way to trigger your intestines to have a bowel movement.  Exercise regularly and maintain a healthy weight.  Weight loss and exercise will decrease pressure on your bladder and help you  control your leakage.   Use a catheter as directed  to help empty your bladder. A catheter is a tiny, plastic tube that is put into your bladder to drain your urine.   Go to behavior therapy as directed.  Behavior therapy may be used to help you learn to control your urge to urinate.    Weight Management   Why it is important to manage your weight:  Being overweight increases your risk of health conditions such as heart disease, high blood pressure, type 2 diabetes, and certain types of cancer. It can also increase your risk for osteoarthritis, sleep apnea, and other respiratory problems. Aim for a slow, steady weight loss. Even a small amount of weight loss can lower your risk of health problems.  How to lose weight safely:  A safe and healthy way to lose weight is to eat fewer calories and get regular exercise. You can lose up about 1 pound a week by decreasing the number of calories you eat by 500 calories each day.   Healthy meal plan for weight management:  A healthy meal plan includes a variety of foods, contains fewer calories, and helps you stay healthy. A healthy meal plan includes the following:  Eat whole-grain foods more often.  A healthy meal plan should contain fiber. Fiber is the part of grains, fruits, and vegetables that is not broken down by your body. Whole-grain foods are healthy and provide extra fiber in your diet. Some examples of whole-grain foods are whole-wheat breads and pastas, oatmeal, brown rice, and bulgur.  Eat a variety of vegetables every day.  Include dark, leafy greens such as spinach, kale, tess greens, and mustard greens. Eat yellow and orange vegetables such as carrots, sweet potatoes, and winter squash.   Eat a variety of fruits every day.  Choose fresh or canned fruit (canned in its own juice or light syrup) instead of juice. Fruit juice has very little or no fiber.  Eat low-fat dairy foods.  Drink fat-free (skim) milk or 1% milk. Eat fat-free yogurt and low-fat cottage  cheese. Try low-fat cheeses such as mozzarella and other reduced-fat cheeses.  Choose meat and other protein foods that are low in fat.  Choose beans or other legumes such as split peas or lentils. Choose fish, skinless poultry (chicken or turkey), or lean cuts of red meat (beef or pork). Before you cook meat or poultry, cut off any visible fat.   Use less fat and oil.  Try baking foods instead of frying them. Add less fat, such as margarine, sour cream, regular salad dressing and mayonnaise to foods. Eat fewer high-fat foods. Some examples of high-fat foods include french fries, doughnuts, ice cream, and cakes.  Eat fewer sweets.  Limit foods and drinks that are high in sugar. This includes candy, cookies, regular soda, and sweetened drinks.  Exercise:  Exercise at least 30 minutes per day on most days of the week. Some examples of exercise include walking, biking, dancing, and swimming. You can also fit in more physical activity by taking the stairs instead of the elevator or parking farther away from stores. Ask your healthcare provider about the best exercise plan for you.      © Copyright [employer] 2018 Information is for End User's use only and may not be sold, redistributed or otherwise used for commercial purposes. All illustrations and images included in CareNotes® are the copyrighted property of [employer] or [employer]

## 2024-04-09 NOTE — TELEPHONE ENCOUNTER
Patient care manager calling regarding patient home health orders patient has a new pick like so they need new orders to reflect how they will be assisting patient any questions call Wally [place]

## 2024-04-09 NOTE — PROGRESS NOTES
Assessment and Plan:     Problem List Items Addressed This Visit    None      Depression Screening and Follow-up Plan: Patient was screened for depression during today's encounter. They screened negative with a PHQ-2 score of 0.    1. 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 Atherosclerosis of native artery of both lower extremities, with unspecified presence of clinical manifestation (HCC)  Assessment & Plan:  -stable/controlled, continue same medication. Will evaluate again next visit   No ac due to fall risk       3. Stage 3a chronic kidney disease (HCC)  Assessment & Plan:  Lab Results   Component Value Date    EGFR 45 (L) 03/08/2024    EGFR 38 (L) 03/08/2024    EGFR 40 (L) 03/07/2024    CREATININE 1.14 (H) 03/08/2024    CREATININE 1.32 (H) 03/08/2024    CREATININE 1.26 (H) 03/07/2024   Hydration with IVF now       4. Anemia, unspecified type    5. History of compression fracture of spine    6. Chronic constipation    7. Ambulatory dysfunction    8. Arthritis of left hip    9. Idiopathic peripheral neuropathy    10. Essential hypertension    11. Skin lesion of hand  -     Ambulatory Referral to Dermatology; Future        Preventive health issues were discussed with patient, and age appropriate screening tests were ordered as noted in patient's After Visit Summary.  Personalized health advice and appropriate referrals for health education or preventive services given if needed, as noted in patient's After Visit Summary.     History of Present Illness:     Patient presents for a Medicare Wellness Visit    HPI   History of Present Illness  The patient presents to the office for Medicare visit.    The patient is seeking a bone density test, an eye examination, and acquisition of new eyeglasses. She has a history of malnutrition due to renal disease, failure to thrive, hallucinations, COVID-19 infection approximately a month ago, and atrial fibrillation. A brain bleed was  suspected, however, no heart leakage was detected. A PICC line was inserted last week by Dr. Zamudio. Despite this, she reports feeling unwell and suspects her medications may be contributing to her illness. Her vertigo, although less severe than before, persists. She is under the care of Dr. Epperson and was previously diagnosed with liver cirrhosis. She experiences left lower abdominal pain, which disrupted her sleep last night. She also reports leg swelling, which is exacerbated by bending over. She has experienced two falls at home, one of which resulted in a chair slide, and another at home. She maintains a healthy diet, which has significantly improved her constipation. She takes MiraLAX and Linzess for constipation management. She has difficulty leaving her house due to leg pain, making it difficult to get in and out of bed and bed. She uses a Life Alert device. She has several growths on her hand, which she frequently picks at.    The patient reports leg pain, which is exacerbated by lying flat. She wears compression stockings, which provide some relief. She attempts to elevate her feet, which do not provide relief. She is currently receiving infusions from Dr. Wall. She has previously tried over-the-counter medications for her leg pain.      Patient Care Team:  Ron Wadsworth MD as PCP - General (Family Medicine)  Osmani Butt MD     Review of Systems:     Review of Systems   Constitutional:  Positive for activity change, appetite change and fatigue. Negative for fever and unexpected weight change.   HENT:  Negative for nosebleeds and trouble swallowing.    Eyes:  Negative for visual disturbance.   Respiratory:  Negative for chest tightness and shortness of breath.    Cardiovascular:  Negative for chest pain, palpitations and leg swelling.   Gastrointestinal:  Negative for abdominal pain, constipation, diarrhea and nausea.   Endocrine: Negative for cold intolerance.   Genitourinary:  Negative for dysuria  and urgency.   Musculoskeletal:  Positive for back pain and gait problem. Negative for joint swelling and myalgias.   Skin:  Negative for rash.   Neurological:  Positive for dizziness, weakness, light-headedness and numbness. Negative for tremors, seizures and syncope.   Hematological:  Does not bruise/bleed easily.   Psychiatric/Behavioral:  Negative for hallucinations and suicidal ideas.         Problem List:     Patient Active Problem List   Diagnosis    Allergic rhinitis    Atherosclerosis of native artery of extremity ([facility])    Benign positional vertigo    Gastroparesis    IBS (irritable bowel syndrome)    Idiopathic peripheral neuropathy    Insomnia    Lumbosacral spondylosis without myelopathy    Osteoporosis    Primary hypertension    Low back pain    CKD (chronic kidney disease) stage 3, GFR 30-59 ml/min ([facility])    Iron deficiency anemia    Anxiety    Ambulatory dysfunction    Dizziness    Megaloblastic anemia    Anemia    Lower extremity edema    Hx of colonic polyps    Diverticulosis    Arthritis of left hip    Malnutrition due to renal disease  ([facility])    History of compression fracture of spine    Hx of right hemicolectomy      Past Medical and Surgical History:     Past Medical History:   Diagnosis Date    Allergic rhinitis     Disease of thyroid gland     Dizziness     Hyponatremia 8/30/2021    Hypothyroidism 10/20/2015    Malignant neoplasm of colon (HCC)     last assessed: 10/20/2015    Neuropathy     Tinnitus      Past Surgical History:   Procedure Laterality Date    ADENOIDECTOMY      APPENDECTOMY      last assessed: 10/20/2015    CATARACT EXTRACTION, BILATERAL      CHOLECYSTECTOMY      last assessed: 10/20/2015    COLECTOMY      last assessed: 10/20/2015; partial     IR PICC PLACEMENT SINGLE LUMEN  4/5/2024    TONSILLECTOMY      last assessed: 10/20/2015      Family History:     Family History   Problem Relation Age of Onset    Heart disease Mother         cardiac disorder    Alzheimer's disease  Sister     No Known Problems Father     Leukemia Brother       Social History:     Social History     Socioeconomic History    Marital status:      Spouse name: Not on file    Number of children: Not on file    Years of education: 12    Highest education level: Not on file   Occupational History    Occupation: Retired - [employer]    Tobacco Use    Smoking status: Former     Current packs/day: 0.25     Types: Cigarettes    Smokeless tobacco: Never    Tobacco comments:     quit 50 yrs ago   Vaping Use    Vaping status: Never Used   Substance and Sexual Activity    Alcohol use: No    Drug use: Never    Sexual activity: Not Currently   Other Topics Concern    Not on file   Social History Narrative    · E-cigarette/vape status:   Never used electronic cigarettes      · Do you currently or have you served in the [employer]:   No      · Were you activated, into active duty, as a member of the [employer] or as a Reservist:   No      · Exercise level:   Occasional walks daily - when weather is nice     · Diet:   Regular      · General stress level:   High      · Caffeine intake:   Moderate      · Seat belts used routinely:   Yes      · Sunscreen used routinely:   Yes      · Smoke alarm in home:   Yes      · Advance directive:   Yes      · Salt Intake:   minimal      Social Determinants of Health     Financial Resource Strain: Low Risk  (3/4/2024)    Received from [facility]    Overall Financial Resource Strain (CARDIA)     Difficulty of Paying Living Expenses: Not very hard   Food Insecurity: No Food Insecurity (3/4/2024)    Received from [facility]    Hunger Vital Sign     Worried About Running Out of Food in the Last Year: Never true     Ran Out of Food in the Last Year: Never true   Transportation Needs: No Transportation Needs (3/4/2024)    Received from [facility]    PRAPARE - Transportation     Lack of Transportation (Medical): No     Lack of  Transportation (Non-Medical): No   Physical Activity: Not on file   Stress: Not on file   Social Connections: Not on file   Intimate Partner Violence: Not At Risk (3/4/2024)    Received from [facility]    Humiliation, Afraid, Rape, and Kick questionnaire     Fear of Current or Ex-Partner: No     Emotionally Abused: No     Physically Abused: No     Sexually Abused: No   Housing Stability: Low Risk  (3/4/2024)    Received from [facility]    Housing Stability Vital Sign     Unable to Pay for Housing in the Last Year: No     Number of Places Lived in the Last Year: 1     Unstable Housing in the Last Year: No      Medications and Allergies:     Current Outpatient Medications   Medication Sig Dispense Refill    amLODIPine (NORVASC) 5 mg tablet Take 5 mg by mouth daily      neomycin-polymyxin-dexamethasone (MAXITROL) 0.35%-10,000 units/g-0.1% APPLY A SMALL AMOUNT INTO BOTH EYES TWICE DAILY      Acetaminophen Extra Strength 500 MG tablet  (Patient not taking: Reported on 4/2/2024)      aspirin 81 MG tablet Take 81 mg by mouth      calcium carbonate-vitamin D (OSCAL-D) 500 mg-200 units per tablet Take 1 tablet by mouth daily with breakfast 30 tablet 0    cholecalciferol (VITAMIN D3) 1,000 units tablet       clobetasol (TEMOVATE) 0.05 % ointment Apply topically 2 (two) times a day 45 g 0    fexofenadine (ALLEGRA) 180 MG tablet Take 180 mg by mouth daily      gabapentin (NEURONTIN) 100 mg capsule TAKE 1 CAPSULE BY MOUTH THREE TIMES DAILY 270 capsule 0    linaCLOtide (Linzess) 290 MCG CAPS Take 1 capsule by mouth once daily 90 capsule 1    losartan (COZAAR) 25 mg tablet Take 1 tablet by mouth once daily (Patient not taking: Reported on 4/2/2024) 90 tablet 1    meclizine (ANTIVERT) 25 mg tablet TAKE 1 TABLET BY MOUTH EVERY 8 HOURS 90 tablet 1    metoprolol succinate (TOPROL-XL) 25 mg 24 hr tablet Take 1 tablet by mouth once daily 90 tablet 1    Mirabegron ER 25 MG TB24 Take 25 mg by mouth  every morning 30 tablet 0    Nutritional Supplements (Ensure High Protein) LIQD Take 1 Can by mouth 2 (two) times a day (Patient not taking: Reported on 4/2/2024) 237 mL 3     No current facility-administered medications for this visit.     Allergies   Allergen Reactions    Penicillins Hives     Other reaction(s): Other (See Comments)  hives      Immunizations:     Immunization History   Administered Date(s) Administered    COVID-19 MODERNA VACC 0.5 ML IM 01/26/2021, 02/22/2021, 03/15/2022      Health Maintenance:         Topic Date Due    Breast Cancer Screening: Mammogram  08/10/2099 (Originally 12/11/1970)    Cervical Cancer Screening  10/25/2099 (Originally 12/11/1951)    DXA SCAN  07/10/2026         Topic Date Due    Pneumococcal Vaccine: 65+ Years (1 of 2 - PCV) Never done    COVID-19 Vaccine (4 - 2023-24 season) 09/01/2023      Medicare Screening Tests and Risk Assessments:     Ida Lee is here for her Subsequent Wellness visit. Last Medicare Wellness visit information reviewed, patient interviewed and updates made to the record today.      Health Risk Assessment:   Patient rates overall health as fair. Patient feels that their physical health rating is much worse. Patient is very satisfied with their life. Eyesight was rated as same. Hearing was rated as same. Patient feels that their emotional and mental health rating is same. Patients states they are never, rarely angry. Patient states they are never, rarely unusually tired/fatigued. Pain experienced in the last 7 days has been none. Patient states that she has experienced no weight loss or gain in last 6 months.     Depression Screening:   PHQ-2 Score: 0      Fall Risk Screening:   In the past year, patient has experienced: no history of falling in past year      Urinary Incontinence Screening:   Patient has not leaked urine accidently in the last six months.     Home Safety:  Patient does not have trouble with stairs inside or outside of their home.  Patient has working smoke alarms and has working carbon monoxide detector. Home safety hazards include: none.     Nutrition:   Current diet is Regular.     Medications:   Patient is currently taking over-the-counter supplements. OTC medications include: see medication list. Patient is able to manage medications.     Activities of Daily Living (ADLs)/Instrumental Activities of Daily Living (IADLs):   Walk and transfer into and out of bed and chair?: Yes  Dress and groom yourself?: Yes    Bathe or shower yourself?: Yes    Feed yourself? Yes  Do your laundry/housekeeping?: Yes  Manage your money, pay your bills and track your expenses?: Yes  Make your own meals?: Yes    Do your own shopping?: Yes    Previous Hospitalizations:   Any hospitalizations or ED visits within the last 12 months?: Yes    How many hospitalizations have you had in the last year?: 3-4    Advance Care Planning:   Living will: Yes    Durable POA for healthcare: Yes    Advanced directive: Yes    Five wishes given: No    End of Life Decisions reviewed with patient: Yes    Provider agrees with end of life decisions: Yes      Cognitive Screening:   Provider or family/friend/caregiver concerned regarding cognition?: No    PREVENTIVE SCREENINGS      Cardiovascular Screening:    General: Screening Current      Diabetes Screening:     General: Screening Current      Colorectal Cancer Screening:     General: Screening Not Indicated      Breast Cancer Screening:     General: Screening Not Indicated      Cervical Cancer Screening:    General: Screening Not Indicated      Osteoporosis Screening:    General: Screening Not Indicated and History Osteoporosis      Abdominal Aortic Aneurysm (AAA) Screening:        General: Screening Not Indicated      Lung Cancer Screening:     General: Screening Not Indicated      Hepatitis C Screening:      Hep C Screening Accepted: No     Screening, Brief Intervention, and Referral to Treatment (SBIRT)    Screening  Typical  number of drinks in a day: 0  Typical number of drinks in a week: 0  Interpretation: Low risk drinking behavior.    Single Item Drug Screening:  How often have you used an illegal drug (including marijuana) or a prescription medication for non-medical reasons in the past year? never    Single Item Drug Screen Score: 0  Interpretation: Negative screen for possible drug use disorder    Brief Intervention  Alcohol & drug use screenings were reviewed. No concerns regarding substance use disorder identified.     No results found.     Physical Exam:     There were no vitals taken for this visit.    Physical Exam  Vitals and nursing note reviewed.   Constitutional:       Appearance: She is well-developed.      Comments: Cane     HENT:      Head: Normocephalic and atraumatic.      Right Ear: External ear normal.      Left Ear: External ear normal.      Nose: Nose normal.   Eyes:      Conjunctiva/sclera: Conjunctivae normal.      Pupils: Pupils are equal, round, and reactive to light.   Cardiovascular:      Rate and Rhythm: Normal rate and regular rhythm.      Heart sounds: Normal heart sounds. No murmur heard.  Pulmonary:      Effort: Pulmonary effort is normal.      Breath sounds: Normal breath sounds. No wheezing.   Abdominal:      General: Bowel sounds are normal.      Palpations: Abdomen is soft.   Musculoskeletal:         General: No tenderness. Normal range of motion.      Cervical back: Normal range of motion and neck supple.      Right lower leg: Edema present.      Left lower leg: Edema present.      Comments: compresion stockings  +3 edema    Lymphadenopathy:      Cervical: No cervical adenopathy.   Skin:     General: Skin is warm and dry.      Capillary Refill: Capillary refill takes less than 2 seconds.   Neurological:      Mental Status: She is alert and oriented to person, place, and time.   Psychiatric:         Behavior: Behavior normal.         Thought Content: Thought content normal.         Judgment:  Judgment normal.        Ron Wadsworth MD

## 2024-04-09 NOTE — ASSESSMENT & PLAN NOTE
-stable/controlled, continue same medication. Will evaluate again next visit   No ac due to fall risk

## 2024-04-10 NOTE — TELEPHONE ENCOUNTER
I have nothing to do with the picc line   I didn't order it   I believe dr sunshine her hematologist did   They can ask him I guess

## 2024-04-10 NOTE — TELEPHONE ENCOUNTER
Fay is calling to update that she spoke with Dr. Polo and he expressed that they would not need to do anything with her Picc line, the infusion center will take care of it.    She would now like to know if services are needed in any other capacity to assist the patient and, if so, a new referral would need to be placed

## 2024-04-11 NOTE — TELEPHONE ENCOUNTER
Just aid type services - like cleaning cooking but not sure if they do that or just nursing things

## 2024-04-13 DIAGNOSIS — G60.9 IDIOPATHIC PERIPHERAL NEUROPATHY: ICD-10-CM

## 2024-04-15 RX ORDER — GABAPENTIN 100 MG/1
100 CAPSULE ORAL 3 TIMES DAILY
Qty: 270 CAPSULE | Refills: 1 | Status: SHIPPED | OUTPATIENT
Start: 2024-04-15

## 2024-04-18 ENCOUNTER — APPOINTMENT (OUTPATIENT)
Dept: VASCULAR ULTRASOUND | Facility: HOSPITAL | Age: 89
DRG: 309 | End: 2024-04-18
Payer: MEDICARE

## 2024-04-18 ENCOUNTER — APPOINTMENT (EMERGENCY)
Dept: RADIOLOGY | Facility: HOSPITAL | Age: 89
DRG: 309 | End: 2024-04-18
Payer: MEDICARE

## 2024-04-18 ENCOUNTER — HOSPITAL ENCOUNTER (INPATIENT)
Facility: HOSPITAL | Age: 89
LOS: 5 days | Discharge: NURSING, REHAB OR LONG-TERM CARE | DRG: 309 | End: 2024-04-24
Attending: EMERGENCY MEDICINE | Admitting: INTERNAL MEDICINE
Payer: MEDICARE

## 2024-04-18 ENCOUNTER — APPOINTMENT (EMERGENCY)
Dept: CT IMAGING | Facility: HOSPITAL | Age: 89
DRG: 309 | End: 2024-04-18
Payer: MEDICARE

## 2024-04-18 DIAGNOSIS — S82.831D CLOSED FRACTURE OF DISTAL END OF RIGHT FIBULA WITH ROUTINE HEALING, UNSPECIFIED FRACTURE MORPHOLOGY, SUBSEQUENT ENCOUNTER: ICD-10-CM

## 2024-04-18 DIAGNOSIS — R41.89 COGNITIVE IMPAIRMENT: ICD-10-CM
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
## 2024-04-18 DIAGNOSIS — R74.8 ELEVATED CK: ICD-10-CM

## 2024-04-18 DIAGNOSIS — E46: ICD-10-CM

## 2024-04-18 DIAGNOSIS — T68.XXXA HYPOTHERMIA, INITIAL ENCOUNTER: ICD-10-CM

## 2024-04-18 DIAGNOSIS — R79.89 ELEVATED LACTIC ACID LEVEL: ICD-10-CM

## 2024-04-18 DIAGNOSIS — M79.676 GREAT TOE PAIN: ICD-10-CM

## 2024-04-18 DIAGNOSIS — N28.9: ICD-10-CM

## 2024-04-18 PROBLEM — R65.10 SIRS (SYSTEMIC INFLAMMATORY RESPONSE SYNDROME) (HCC): Status: ACTIVE | Noted: 2024-04-18

## 2024-04-18 PROBLEM — R40.20 LOSS OF CONSCIOUSNESS (HCC): Status: ACTIVE | Noted: 2024-04-18

## 2024-04-18 PROBLEM — R65.10 SIRS (SYSTEMIC INFLAMMATORY RESPONSE SYNDROME) (HCC): Status: RESOLVED | Noted: 2024-04-18 | Resolved: 2024-04-18

## 2024-04-18 PROBLEM — I50.30 (HFPEF) HEART FAILURE WITH PRESERVED EJECTION FRACTION (HCC): Status: ACTIVE | Noted: 2024-04-18

## 2024-04-18 LAB
ALBUMIN SERPL BCP-MCNC: 3.8 G/DL (ref 3.5–5)
ALP SERPL-CCNC: 82 U/L (ref 34–104)
ALT SERPL W P-5'-P-CCNC: 13 U/L (ref 7–52)
ANION GAP SERPL CALCULATED.3IONS-SCNC: 8 MMOL/L (ref 4–13)
APTT PPP: 29 SECONDS (ref 23–37)
AST SERPL W P-5'-P-CCNC: 34 U/L (ref 13–39)
ATRIAL RATE: 63 BPM
BACTERIA UR QL AUTO: NORMAL /HPF
BASOPHILS # BLD AUTO: 0.07 THOUSANDS/ÂΜL (ref 0–0.1)
BASOPHILS NFR BLD AUTO: 1 % (ref 0–1)
BILIRUB SERPL-MCNC: 0.6 MG/DL (ref 0.2–1)
BILIRUB UR QL STRIP: NEGATIVE
BNP SERPL-MCNC: 1558 PG/ML (ref 0–100)
BUN SERPL-MCNC: 32 MG/DL (ref 5–25)
CALCIUM SERPL-MCNC: 9.1 MG/DL (ref 8.4–10.2)
CHLORIDE SERPL-SCNC: 105 MMOL/L (ref 96–108)
CK SERPL-CCNC: 590 U/L (ref 26–192)
CLARITY UR: CLEAR
CO2 SERPL-SCNC: 20 MMOL/L (ref 21–32)
COLOR UR: YELLOW
CREAT SERPL-MCNC: 1.19 MG/DL (ref 0.6–1.3)
EOSINOPHIL # BLD AUTO: 0.03 THOUSAND/ÂΜL (ref 0–0.61)
EOSINOPHIL NFR BLD AUTO: 0 % (ref 0–6)
ERYTHROCYTE [DISTWIDTH] IN BLOOD BY AUTOMATED COUNT: 12.8 % (ref 11.6–15.1)
GFR SERPL CREATININE-BSD FRML MDRD: 39 ML/MIN/1.73SQ M
GLUCOSE SERPL-MCNC: 114 MG/DL (ref 65–140)
GLUCOSE SERPL-MCNC: 115 MG/DL (ref 65–140)
GLUCOSE UR STRIP-MCNC: NEGATIVE MG/DL
HCT VFR BLD AUTO: 40.4 % (ref 34.8–46.1)
HGB BLD-MCNC: 13 G/DL (ref 11.5–15.4)
HGB UR QL STRIP.AUTO: NEGATIVE
IMM GRANULOCYTES # BLD AUTO: 0.06 THOUSAND/UL (ref 0–0.2)
IMM GRANULOCYTES NFR BLD AUTO: 1 % (ref 0–2)
INR PPP: 1.03 (ref 0.84–1.19)
KETONES UR STRIP-MCNC: ABNORMAL MG/DL
LACTATE SERPL-SCNC: 1.1 MMOL/L (ref 0.5–2)
LACTATE SERPL-SCNC: 3.6 MMOL/L (ref 0.5–2)
LEUKOCYTE ESTERASE UR QL STRIP: NEGATIVE
LYMPHOCYTES # BLD AUTO: 0.53 THOUSANDS/ÂΜL (ref 0.6–4.47)
LYMPHOCYTES NFR BLD AUTO: 5 % (ref 14–44)
MAGNESIUM SERPL-MCNC: 2.2 MG/DL (ref 1.9–2.7)
MCH RBC QN AUTO: 33.7 PG (ref 26.8–34.3)
MCHC RBC AUTO-ENTMCNC: 32.2 G/DL (ref 31.4–37.4)
MCV RBC AUTO: 105 FL (ref 82–98)
MONOCYTES # BLD AUTO: 0.81 THOUSAND/ÂΜL (ref 0.17–1.22)
MONOCYTES NFR BLD AUTO: 8 % (ref 4–12)
NEUTROPHILS # BLD AUTO: 8.43 THOUSANDS/ÂΜL (ref 1.85–7.62)
NEUTS SEG NFR BLD AUTO: 85 % (ref 43–75)
NITRITE UR QL STRIP: NEGATIVE
NON-SQ EPI CELLS URNS QL MICRO: NORMAL /HPF
NRBC BLD AUTO-RTO: 0 /100 WBCS
P AXIS: 83 DEGREES
PH UR STRIP.AUTO: 6 [PH]
PLATELET # BLD AUTO: 284 THOUSANDS/UL (ref 149–390)
PMV BLD AUTO: 10.6 FL (ref 8.9–12.7)
POTASSIUM SERPL-SCNC: 4.3 MMOL/L (ref 3.5–5.3)
PR INTERVAL: 192 MS
PROCALCITONIN SERPL-MCNC: 0.11 NG/ML
PROT SERPL-MCNC: 6.7 G/DL (ref 6.4–8.4)
PROT UR STRIP-MCNC: ABNORMAL MG/DL
PROTHROMBIN TIME: 14.2 SECONDS (ref 11.6–14.5)
QRS AXIS: -45 DEGREES
QRSD INTERVAL: 92 MS
QT INTERVAL: 528 MS
QTC INTERVAL: 540 MS
RBC # BLD AUTO: 3.86 MILLION/UL (ref 3.81–5.12)
RBC #/AREA URNS AUTO: NORMAL /HPF
SODIUM SERPL-SCNC: 133 MMOL/L (ref 135–147)
SP GR UR STRIP.AUTO: 1.03 (ref 1–1.03)
T WAVE AXIS: 178 DEGREES
TSH SERPL DL<=0.05 MIU/L-ACNC: 4.28 UIU/ML (ref 0.45–4.5)
UROBILINOGEN UR STRIP-ACNC: <2 MG/DL
VENTRICULAR RATE: 63 BPM
WBC # BLD AUTO: 9.93 THOUSAND/UL (ref 4.31–10.16)
WBC #/AREA URNS AUTO: NORMAL /HPF

## 2024-04-18 PROCEDURE — 72170 X-RAY EXAM OF PELVIS: CPT

## 2024-04-18 PROCEDURE — 83605 ASSAY OF LACTIC ACID: CPT

## 2024-04-18 PROCEDURE — 99223 1ST HOSP IP/OBS HIGH 75: CPT | Performed by: INTERNAL MEDICINE

## 2024-04-18 PROCEDURE — 96365 THER/PROPH/DIAG IV INF INIT: CPT

## 2024-04-18 PROCEDURE — 83735 ASSAY OF MAGNESIUM: CPT

## 2024-04-18 PROCEDURE — 99285 EMERGENCY DEPT VISIT HI MDM: CPT

## 2024-04-18 PROCEDURE — 93005 ELECTROCARDIOGRAM TRACING: CPT

## 2024-04-18 PROCEDURE — 85610 PROTHROMBIN TIME: CPT

## 2024-04-18 PROCEDURE — 84145 PROCALCITONIN (PCT): CPT

## 2024-04-18 PROCEDURE — 85025 COMPLETE CBC W/AUTO DIFF WBC: CPT

## 2024-04-18 PROCEDURE — 99285 EMERGENCY DEPT VISIT HI MDM: CPT | Performed by: EMERGENCY MEDICINE

## 2024-04-18 PROCEDURE — 83880 ASSAY OF NATRIURETIC PEPTIDE: CPT

## 2024-04-18 PROCEDURE — 87040 BLOOD CULTURE FOR BACTERIA: CPT

## 2024-04-18 PROCEDURE — 80053 COMPREHEN METABOLIC PANEL: CPT

## 2024-04-18 PROCEDURE — 93970 EXTREMITY STUDY: CPT

## 2024-04-18 PROCEDURE — 82550 ASSAY OF CK (CPK): CPT

## 2024-04-18 PROCEDURE — 71045 X-RAY EXAM CHEST 1 VIEW: CPT

## 2024-04-18 PROCEDURE — 93010 ELECTROCARDIOGRAM REPORT: CPT | Performed by: INTERNAL MEDICINE

## 2024-04-18 PROCEDURE — 96367 TX/PROPH/DG ADDL SEQ IV INF: CPT

## 2024-04-18 PROCEDURE — 85730 THROMBOPLASTIN TIME PARTIAL: CPT

## 2024-04-18 PROCEDURE — 70450 CT HEAD/BRAIN W/O DYE: CPT

## 2024-04-18 PROCEDURE — 80307 DRUG TEST PRSMV CHEM ANLYZR: CPT

## 2024-04-18 PROCEDURE — 72125 CT NECK SPINE W/O DYE: CPT

## 2024-04-18 PROCEDURE — 82948 REAGENT STRIP/BLOOD GLUCOSE: CPT

## 2024-04-18 PROCEDURE — 84443 ASSAY THYROID STIM HORMONE: CPT

## 2024-04-18 PROCEDURE — 36415 COLL VENOUS BLD VENIPUNCTURE: CPT

## 2024-04-18 PROCEDURE — 81001 URINALYSIS AUTO W/SCOPE: CPT

## 2024-04-18 RX ORDER — CALCIUM CARBONATE 500(1250)
1 TABLET ORAL
Status: DISCONTINUED | OUTPATIENT
Start: 2024-04-19 | End: 2024-04-24 | Stop reason: HOSPADM

## 2024-04-18 RX ORDER — ENOXAPARIN SODIUM 100 MG/ML
30 INJECTION SUBCUTANEOUS
Status: DISCONTINUED | OUTPATIENT
Start: 2024-04-19 | End: 2024-04-24 | Stop reason: HOSPADM

## 2024-04-18 RX ORDER — ACETAMINOPHEN 325 MG/1
975 TABLET ORAL ONCE
Status: COMPLETED | OUTPATIENT
Start: 2024-04-18 | End: 2024-04-18

## 2024-04-18 RX ORDER — POLYETHYLENE GLYCOL 3350 17 G/17G
17 POWDER, FOR SOLUTION ORAL DAILY
Status: DISCONTINUED | OUTPATIENT
Start: 2024-04-19 | End: 2024-04-24 | Stop reason: HOSPADM

## 2024-04-18 RX ORDER — METOPROLOL SUCCINATE 25 MG/1
25 TABLET, EXTENDED RELEASE ORAL DAILY
Status: DISCONTINUED | OUTPATIENT
Start: 2024-04-19 | End: 2024-04-21

## 2024-04-18 RX ORDER — AMLODIPINE BESYLATE 5 MG/1
5 TABLET ORAL DAILY
Status: DISCONTINUED | OUTPATIENT
Start: 2024-04-19 | End: 2024-04-19

## 2024-04-18 RX ORDER — MAGNESIUM SULFATE HEPTAHYDRATE 40 MG/ML
2 INJECTION, SOLUTION INTRAVENOUS ONCE
Status: COMPLETED | OUTPATIENT
Start: 2024-04-18 | End: 2024-04-18

## 2024-04-18 RX ORDER — LUBIPROSTONE 8 UG/1
8 CAPSULE ORAL 2 TIMES DAILY
Status: DISCONTINUED | OUTPATIENT
Start: 2024-04-18 | End: 2024-04-19

## 2024-04-18 RX ORDER — ACETAMINOPHEN 325 MG/1
650 TABLET ORAL EVERY 6 HOURS PRN
Status: DISCONTINUED | OUTPATIENT
Start: 2024-04-18 | End: 2024-04-19

## 2024-04-18 RX ORDER — ENOXAPARIN SODIUM 100 MG/ML
40 INJECTION SUBCUTANEOUS DAILY
Status: DISCONTINUED | OUTPATIENT
Start: 2024-04-19 | End: 2024-04-18

## 2024-04-18 RX ORDER — GABAPENTIN 100 MG/1
100 CAPSULE ORAL 3 TIMES DAILY
Status: DISCONTINUED | OUTPATIENT
Start: 2024-04-18 | End: 2024-04-19

## 2024-04-18 RX ORDER — LORATADINE 10 MG/1
10 TABLET ORAL DAILY
Status: DISCONTINUED | OUTPATIENT
Start: 2024-04-19 | End: 2024-04-19

## 2024-04-18 RX ORDER — CIPROFLOXACIN 2 MG/ML
400 INJECTION, SOLUTION INTRAVENOUS ONCE
Status: DISCONTINUED | OUTPATIENT
Start: 2024-04-18 | End: 2024-04-18

## 2024-04-18 RX ADMIN — ACETAMINOPHEN 650 MG: 325 TABLET, FILM COATED ORAL at 20:36

## 2024-04-18 RX ADMIN — CEFTRIAXONE SODIUM 1000 MG: 10 INJECTION, POWDER, FOR SOLUTION INTRAVENOUS at 14:42

## 2024-04-18 RX ADMIN — MAGNESIUM SULFATE HEPTAHYDRATE 2 G: 40 INJECTION, SOLUTION INTRAVENOUS at 15:25

## 2024-04-18 RX ADMIN — GABAPENTIN 100 MG: 100 CAPSULE ORAL at 17:43

## 2024-04-18 RX ADMIN — ACETAMINOPHEN 975 MG: 325 TABLET, FILM COATED ORAL at 14:46

## 2024-04-18 RX ADMIN — SODIUM CHLORIDE 1000 ML: 0.9 INJECTION, SOLUTION INTRAVENOUS at 14:43

## 2024-04-18 RX ADMIN — GABAPENTIN 100 MG: 100 CAPSULE ORAL at 20:36

## 2024-04-18 RX ADMIN — LUBIPROSTONE 8 MCG: 8 CAPSULE, GELATIN COATED ORAL at 21:44

## 2024-04-18 NOTE — ED PROVIDER NOTES
"Emergency Department Trauma Note  Ida Vuong 89 y.o. female MRN: [id number]  Unit/Bed#: S /S -01 Encounter: [id number]      Trauma Alert: Trauma Acuity: C  Model of Arrival:   via    Trauma Team: Current Providers  Attending Provider: Octavio King DO  Attending Provider: Hasmukh Bradford MD  Registered Nurse: Erin Perdue RN  Registered Nurse: Jennifer Chance RN  Resident: Carlos Cardona DO  Resident: Kyle Brunner, MD  Patient Care Assistant: Damari Starr  Graduate Nurse: Betsy Garcia  Consulting Physician: Marvin Goodwin DPM  Registered Nurse: Maria M Garrido RN  Nursing Student: Patt Mendes  Registered Nurse: Wilbur Nathan  Patient Care Assistant: Nicol Velásquez  Consultants:     None      History of Present Illness     Chief Complaint:   Chief Complaint   Patient presents with    Fall     Has had two falls within the past few days +headstrike +baby aspirin      HPI:  Ida Vuong is a 89 y.o. female who presents with mild confusion and cold extremities.  Mechanism:Details of Incident: fall, (+)HS/ASA          Patient is a 89-year-old female with history of CKD stage III that presents to the emergency department after 2 falls in the last few days.  She is not sure if her fall was today or yesterday but does know that her last fall was a few days ago.  She reports that for both falls she was attempting to  a piece of paper on the floor when she lost her balance and \"took a tumble\".  She reports that she did strike her head and she takes 81 mg of aspirin daily.  She denies any other anticoagulation medications.  She is denying headache or injuries but reports that her \"left side hurts\".  She is reporting that her lower legs hurt bilaterally.  Upon initial evaluation, patient's skin is very cold and when asked if she has been exposed to the elements she reports that she was sitting outside for \"a little while\".  Patient unclear exactly what " happened today but reports that she has been okay lately.  She denies any other recent illnesses or injuries.      Review of Systems   Constitutional:  Negative for chills and fever.   Respiratory:  Negative for cough and shortness of breath.    Cardiovascular:  Positive for leg swelling. Negative for chest pain and palpitations.   Gastrointestinal:  Negative for abdominal pain, constipation, diarrhea, nausea and vomiting.   Musculoskeletal:  Negative for arthralgias and back pain.        Leg pain bilaterally   Skin:  Negative for color change and rash.   Neurological:  Negative for dizziness, seizures, syncope, light-headedness and headaches.   All other systems reviewed and are negative.      Historical Information     Immunizations:   Immunization History   Administered Date(s) Administered    COVID-19 MODERNA VACC 0.5 ML IM 01/26/2021, 02/22/2021, 03/15/2022       Past Medical History:   Diagnosis Date    Allergic rhinitis     Disease of thyroid gland     Dizziness     Hyponatremia 8/30/2021    Hypothyroidism 10/20/2015    Malignant neoplasm of colon (HCC)     last assessed: 10/20/2015    Neuropathy     Tinnitus        Family History   Problem Relation Age of Onset    Heart disease Mother         cardiac disorder    Alzheimer's disease Sister     No Known Problems Father     Leukemia Brother      Past Surgical History:   Procedure Laterality Date    ADENOIDECTOMY      APPENDECTOMY      last assessed: 10/20/2015    CATARACT EXTRACTION, BILATERAL      CHOLECYSTECTOMY      last assessed: 10/20/2015    COLECTOMY      last assessed: 10/20/2015; partial     IR PICC PLACEMENT SINGLE LUMEN  4/5/2024    TONSILLECTOMY      last assessed: 10/20/2015     Social History     Tobacco Use    Smoking status: Former     Current packs/day: 0.25     Types: Cigarettes    Smokeless tobacco: Never    Tobacco comments:     quit 50 yrs ago   Vaping Use    Vaping status: Never Used   Substance Use Topics    Alcohol use: No    Drug use:  Never     E-Cigarette/Vaping    E-Cigarette Use Never User      E-Cigarette/Vaping Substances    Nicotine No     THC No     CBD No     Flavoring No     Other No     Unknown No        Family History:   Family History   Problem Relation Age of Onset    Heart disease Mother         cardiac disorder    Alzheimer's disease Sister     No Known Problems Father     Leukemia Brother        Meds/Allergies   Prior to Admission Medications   Prescriptions Last Dose Informant Patient Reported? Taking?   amLODIPine (NORVASC) 5 mg tablet   Yes No   Sig: Take 5 mg by mouth daily   aspirin 81 MG tablet  Self Yes No   Sig: Take 81 mg by mouth   calcium carbonate-vitamin D (OSCAL-D) 500 mg-200 units per tablet  Self No No   Sig: Take 1 tablet by mouth daily with breakfast   cholecalciferol (VITAMIN D3) 1,000 units tablet  Self Yes No   clobetasol (TEMOVATE) 0.05 % ointment   No No   Sig: Apply topically 2 (two) times a day   fexofenadine (ALLEGRA) 180 MG tablet  Self Yes No   Sig: Take 180 mg by mouth daily   gabapentin (NEURONTIN) 100 mg capsule   No No   Sig: TAKE 1 CAPSULE BY MOUTH THREE TIMES DAILY   linaCLOtide (Linzess) 290 MCG CAPS  Self No No   Sig: Take 1 capsule by mouth once daily   meclizine (ANTIVERT) 25 mg tablet   No No   Sig: TAKE 1 TABLET BY MOUTH EVERY 8 HOURS   metoprolol succinate (TOPROL-XL) 25 mg 24 hr tablet   No No   Sig: Take 1 tablet by mouth once daily   neomycin-polymyxin-dexamethasone (MAXITROL) 0.35%-10,000 units/g-0.1%   Yes No   Sig: APPLY A SMALL AMOUNT INTO BOTH EYES TWICE DAILY      Facility-Administered Medications: None       Allergies   Allergen Reactions    Penicillins Hives     Other reaction(s): Other (See Comments)  hives       PHYSICAL EXAM      Objective   Vitals:   First set: Temperature: (!) 92.3 °F (33.5 °C) (04/18/24 1324)  Pulse: 65 (04/18/24 1319)  Respirations: 18 (04/18/24 1321)  Blood Pressure: 120/62 (04/18/24 1322)  SpO2: 98 % (04/18/24 1319)    Primary Survey:   (A) Airway:  Patent  (B) Breathing: Breath sounds equal bilaterally  (C) Circulation: Pulses:   normal  (D) Disabliity:  GCS Total:  15  (E) Expose:  Completed    Secondary Survey: (Click on Physical Exam tab above)  Physical Exam  Vitals and nursing note reviewed.   Constitutional:       General: She is not in acute distress.     Appearance: Normal appearance. She is well-developed. She is not ill-appearing.   HENT:      Head: Normocephalic and atraumatic.      Right Ear: External ear normal.      Left Ear: External ear normal.      Mouth/Throat:      Pharynx: Oropharynx is clear. No oropharyngeal exudate or posterior oropharyngeal erythema.   Eyes:      General:         Right eye: No discharge.         Left eye: No discharge.      Extraocular Movements: Extraocular movements intact.      Conjunctiva/sclera: Conjunctivae normal.   Cardiovascular:      Rate and Rhythm: Normal rate and regular rhythm.      Pulses: Normal pulses.      Heart sounds: Normal heart sounds. No murmur heard.  Pulmonary:      Effort: Pulmonary effort is normal. No respiratory distress.      Breath sounds: Normal breath sounds. No wheezing, rhonchi or rales.   Abdominal:      General: Abdomen is flat.      Palpations: Abdomen is soft.      Tenderness: There is no abdominal tenderness. There is no guarding or rebound.   Musculoskeletal:         General: No swelling or deformity. Normal range of motion.      Cervical back: Neck supple. No tenderness.      Right lower leg: Edema present.      Left lower leg: Edema present.   Skin:     General: Skin is dry.      Capillary Refill: Capillary refill takes less than 2 seconds.      Comments: Extremities cool   Neurological:      Mental Status: She is alert.      GCS: GCS eye subscore is 4. GCS verbal subscore is 5. GCS motor subscore is 6.      Comments: Mild confusion         Cervical spine cleared by clinical criteria? No (imaging required)      Invasive Devices       Peripherally Inserted Central Catheter Line   Duration             PICC Line 04/05/24 Right Basilic 13 days              Peripheral Intravenous Line  Duration             Peripheral IV 04/18/24 Left Antecubital <1 day                    Lab Results:   Results Reviewed       Procedure Component Value Units Date/Time    Blood culture #2 [[id number]] Collected: 04/18/24 1441    Lab Status: Preliminary result Specimen: Blood from Arm, Left Updated: 04/18/24 2002     Blood Culture Received in Microbiology Lab. Culture in Progress.    B-Type Natriuretic Peptide(BNP) [[id number]]  (Abnormal) Collected: 04/18/24 1338    Lab Status: Final result Specimen: Blood from Arm, Left Updated: 04/18/24 1755     BNP 1,558 pg/mL     Lactic acid 2 Hours [[id number]]  (Normal) Collected: 04/18/24 1718    Lab Status: Final result Specimen: Blood from Central Venous Line Updated: 04/18/24 1741     LACTIC ACID 1.1 mmol/L     Narrative:      Result may be elevated if tourniquet was used during collection.    Blood culture #1 [[id number]] Collected: 04/18/24 1338    Lab Status: Preliminary result Specimen: Blood from Arm, Left Updated: 04/18/24 1701     Blood Culture Received in Microbiology Lab. Culture in Progress.    Urine Microscopic [[id number]]  (Normal) Collected: 04/18/24 1524    Lab Status: Final result Specimen: Urine, Clean Catch Updated: 04/18/24 1539     RBC, UA 1-2 /hpf      WBC, UA 1-2 /hpf      Epithelial Cells Occasional /hpf      Bacteria, UA None Seen /hpf     UA w Reflex to Microscopic w Reflex to Culture [[id number]]  (Abnormal) Collected: 04/18/24 1524    Lab Status: Final result Specimen: Urine, Clean Catch Updated: 04/18/24 1537     Color, UA Yellow     Clarity, UA Clear     Specific Gravity, UA 1.026     pH, UA 6.0     Leukocytes, UA Negative     Nitrite, UA Negative     Protein, UA 50 (1+) mg/dl      Glucose, UA Negative mg/dl      Ketones, UA 10 (1+) mg/dl      Urobilinogen, UA <2.0 mg/dl      Bilirubin, UA Negative     Occult Blood, UA Negative    Magnesium  [[id number]]  (Normal) Collected: 04/18/24 1338    Lab Status: Final result Specimen: Blood from Arm, Left Updated: 04/18/24 1451     Magnesium 2.2 mg/dL     TSH [[id number]]  (Normal) Collected: 04/18/24 1338    Lab Status: Final result Specimen: Blood from Arm, Left Updated: 04/18/24 1421     TSH 3RD GENERATON 4.281 uIU/mL     Procalcitonin [[id number]]  (Normal) Collected: 04/18/24 1338    Lab Status: Final result Specimen: Blood from Arm, Left Updated: 04/18/24 1416     Procalcitonin 0.11 ng/ml     Lactic acid [[id number]]  (Abnormal) Collected: 04/18/24 1338    Lab Status: Final result Specimen: Blood from Arm, Left Updated: 04/18/24 1415     LACTIC ACID 3.6 mmol/L     Narrative:      Result may be elevated if tourniquet was used during collection.    Protime-INR [[id number]]  (Normal) Collected: 04/18/24 1338    Lab Status: Final result Specimen: Blood from Arm, Left Updated: 04/18/24 1412     Protime 14.2 seconds      INR 1.03    APTT [[id number]]  (Normal) Collected: 04/18/24 1338    Lab Status: Final result Specimen: Blood from Arm, Left Updated: 04/18/24 1412     PTT 29 seconds     Comprehensive metabolic panel [[id number]]  (Abnormal) Collected: 04/18/24 1338    Lab Status: Final result Specimen: Blood from Arm, Left Updated: 04/18/24 1409     Sodium 133 mmol/L      Potassium 4.3 mmol/L      Chloride 105 mmol/L      CO2 20 mmol/L      ANION GAP 8 mmol/L      BUN 32 mg/dL      Creatinine 1.19 mg/dL      Glucose 114 mg/dL      Calcium 9.1 mg/dL      AST 34 U/L      ALT 13 U/L      Alkaline Phosphatase 82 U/L      Total Protein 6.7 g/dL      Albumin 3.8 g/dL      Total Bilirubin 0.60 mg/dL      eGFR 39 ml/min/1.73sq m     Narrative:      National Kid[employer] guidelines for Chronic Kidney Disease (CKD):     Stage 1 with normal or high GFR (GFR > 90 mL/min/1.73 square meters)    Stage 2 Mild CKD (GFR = 60-89 mL/min/1.73 square meters)    Stage 3A Moderate CKD (GFR = 45-59 mL/min/1.73 square meters)     Stage 3B Moderate CKD (GFR = 30-44 mL/min/1.73 square meters)    Stage 4 Severe CKD (GFR = 15-29 mL/min/1.73 square meters)    Stage 5 End Stage CKD (GFR <15 mL/min/1.73 square meters)  Note: GFR calculation is accurate only with a steady state creatinine    CK [[id number]]  (Abnormal) Collected: 04/18/24 1338    Lab Status: Final result Specimen: Blood from Arm, Left Updated: 04/18/24 1409     Total  U/L     CBC and differential [[id number]]  (Abnormal) Collected: 04/18/24 1338    Lab Status: Final result Specimen: Blood from Arm, Left Updated: 04/18/24 1355     WBC 9.93 Thousand/uL      RBC 3.86 Million/uL      Hemoglobin 13.0 g/dL      Hematocrit 40.4 %       fL      MCH 33.7 pg      MCHC 32.2 g/dL      RDW 12.8 %      MPV 10.6 fL      Platelets 284 Thousands/uL      nRBC 0 /100 WBCs      Segmented % 85 %      Immature Grans % 1 %      Lymphocytes % 5 %      Monocytes % 8 %      Eosinophils Relative 0 %      Basophils Relative 1 %      Absolute Neutrophils 8.43 Thousands/µL      Absolute Immature Grans 0.06 Thousand/uL      Absolute Lymphocytes 0.53 Thousands/µL      Absolute Monocytes 0.81 Thousand/µL      Eosinophils Absolute 0.03 Thousand/µL      Basophils Absolute 0.07 Thousands/µL                    Imaging Studies:   Direct to CT: Yes  XR Trauma pelvis ap only 1 or 2 vw   Final Result by E. Alec Schoenberger, MD (04/18 1413)      No acute osseous abnormality. Bilateral hip osteoarthritis, left more severe than right.      Workstation performed: [id number]         XR Trauma chest portable   Final Result by E. Alec Schoenberger, MD (04/18 1413)      No acute cardiopulmonary disease.            Workstation performed: [id number]         TRAUMA - CT head wo contrast   Final Result by E. Alec Schoenberger, MD (04/18 7879)      No acute intracranial abnormality. Chronic microangiopathy                  Workstation performed: [id number]         TRAUMA - CT spine cervical wo contrast   Final Result by ODESSA  Alec Schoenberger, MD (04/18 4372)      No cervical spine fracture or traumatic malalignment.                  Workstation performed: [id number]          VAS VENOUS DUPLEX - LOWER LIMB BILATERAL    (Results Pending)         Procedures  ECG 12 Lead Documentation Only    Date/Time: 4/18/2024 2:35 PM    Performed by: Carlos Cardona DO  Authorized by: aCrlos Cardona DO    Indications / Diagnosis:  Fall  ECG reviewed by me, the ED Provider: yes    Patient location:  ED  Previous ECG:     Previous ECG:  Compared to current    Comparison ECG info:  QTc now prolonged, now with T wave inversions in lead V5 and V6    Similarity:  Changes noted  Interpretation:     Interpretation: abnormal    Quality:     Tracing quality:  Limited by artifact  Rate:     ECG rate:  63    ECG rate assessment: normal    Rhythm:     Rhythm: sinus rhythm    Ectopy:     Ectopy: none    QRS:     QRS axis:  Normal    QRS intervals:  Normal  Conduction:     Conduction: normal    ST segments:     ST segments:  Normal  T waves:     T waves: inverted      Inverted:  V5, V6 and aVF  Other findings:     Other findings: prolonged qTc interval    Comments:      QTc 540           ED Course  ED Course as of 04/18/24 2056   Thu Apr 18, 2024   1335 Patient is temperature noted to be 92.3 Fahrenheit [facility] applied           Medical Decision Making  89F here after being found down.  Reportedly fell and struck her head.  Patient takes aspirin.  On exam, patient's extremities are cool to the touch and she has bilateral lower extremity edema, however rest of patient's exam normal.  She is mildly confused regarding the events surrounding today's fall.    DDx including but not limited to: metabolic abnormality, intracranial etiology, cardiac etiology, substance abuse, infectious etiology including UTI, thyroid disease, cold exposure, frostnip, frostbite, hypothermia    Will obtain CBC, CMP, CK, coagulation studies, lactic acid, procalcitonin, TSH, magnesium,  blood culture, EKG, UA, trauma CT head without contrast, trauma CT cervical spine without contrast, chest x-ray, and x-ray of the pelvis.  Will treat prolonged QTc on EKG with magnesium and pain control with Tylenol.  Will apply Seferino hugger for cold extremities.    Given elevated lactic acid, elevated CK, and hypothermia, will admit under the care of [facility] internal medicine for further evaluation and treatment    Amount and/or Complexity of Data Reviewed  Labs: ordered.  Radiology: ordered.    Risk  OTC drugs.  Prescription drug management.  Decision regarding hospitalization.    Given elevated lactic, elevated CK            Disposition  [facility] Transfer: No  Final diagnoses:   Fall, initial encounter   Elevated lactic acid level   Elevated CK   Hypothermia, initial encounter     Time reflects when diagnosis was documented in both MDM as applicable and the Disposition within this note       Time User Action Codes Description Comment    4/18/2024  3:49 PM Carlos Cardona [W19.XXXA] Fall, initial encounter     4/18/2024  3:49 PM Carlos Cardona [S09.90XA] Closed head injury, initial encounter     4/18/2024  3:49 PM Carlos Cardona [S09.90XA] Closed head injury, initial encounter     4/18/2024  3:49 PM Carlos Cardona [R79.89] Elevated lactic acid level     4/18/2024  3:50 PM Carlos Cardona [R74.8] Elevated CK     4/18/2024  3:50 PM Carlos Cardona [T68.XXXA] Hypothermia, initial encounter     4/18/2024  5:42 PM Farideh Mcnulty [M79.676] Great toe pain           ED Disposition       ED Disposition   Admit    Condition   Stable    Date/Time   u Apr 18, 2024  3:49 PM    Comment   Case was discussed with LEBRON and the patient's admission status was agreed to be Admission Status: observation status to the service of Dr. Bradford.               Follow-up Information    None       Current Discharge Medication List        CONTINUE these medications which have NOT CHANGED    Details   amLODIPine  (NORVASC) 5 mg tablet Take 5 mg by mouth daily      aspirin 81 MG tablet Take 81 mg by mouth      calcium carbonate-vitamin D (OSCAL-D) 500 mg-200 units per tablet Take 1 tablet by mouth daily with breakfast  Qty: 30 tablet, Refills: 0    Associated Diagnoses: Iron deficiency anemia, unspecified iron deficiency anemia type      cholecalciferol (VITAMIN D3) 1,000 units tablet       clobetasol (TEMOVATE) 0.05 % ointment Apply topically 2 (two) times a day  Qty: 45 g, Refills: 0    Associated Diagnoses: Lichen sclerosus      fexofenadine (ALLEGRA) 180 MG tablet Take 180 mg by mouth daily      gabapentin (NEURONTIN) 100 mg capsule TAKE 1 CAPSULE BY MOUTH THREE TIMES DAILY  Qty: 270 capsule, Refills: 1    Associated Diagnoses: Idiopathic peripheral neuropathy      linaCLOtide (Linzess) 290 MCG CAPS Take 1 capsule by mouth once daily  Qty: 90 capsule, Refills: 1    Associated Diagnoses: Chronic constipation      meclizine (ANTIVERT) 25 mg tablet TAKE 1 TABLET BY MOUTH EVERY 8 HOURS  Qty: 90 tablet, Refills: 1    Associated Diagnoses: Vertigo      metoprolol succinate (TOPROL-XL) 25 mg 24 hr tablet Take 1 tablet by mouth once daily  Qty: 90 tablet, Refills: 1    Associated Diagnoses: Irregularly irregular pulse rhythm      neomycin-polymyxin-dexamethasone (MAXITROL) 0.35%-10,000 units/g-0.1% APPLY A SMALL AMOUNT INTO BOTH EYES TWICE DAILY           No discharge procedures on file.    PDMP Review         Value Time User    PDMP Reviewed  Yes 1/30/2023  2:08 PM Ron Wadsworth MD            ED Provider  Electronically Signed by           Carlos Cardona DO  04/18/24 2056

## 2024-04-18 NOTE — SEPSIS NOTE
Sepsis Note   Ida Vuong 89 y.o. female MRN: [id number]  Unit/Bed#: ED-07 Encounter: [id number]       Initial Sepsis Screening       Row Name 04/18/24 1420                Is the patient's history suggestive of a new or worsening infection? Yes (Proceed)  -AH        Suspected source of infection suspect infection, source unknown  -AH        Indicate SIRS criteria Hyperthemia > 38.3C (100.9F) OR Hypothermia <36C (96.8F)  -AH        Are two or more of the above signs & symptoms of infection both present and new to the patient? No  -AH                  User Key  (r) = Recorded By, (t) = Taken By, (c) = Cosigned By      Initials Name Provider Type     Carlos Cardona DO Physician                        Body mass index is 25.92 kg/m².  Wt Readings from Last 1 Encounters:   04/18/24 60.2 kg (132 lb 11.5 oz)     IBW (Ideal Body Weight): 45.5 kg    Ideal body weight: 45.5 kg (100 lb 4.9 oz)  Adjusted ideal body weight: 51.4 kg (113 lb 4.4 oz)

## 2024-04-18 NOTE — ASSESSMENT & PLAN NOTE
Lab Results   Component Value Date    EGFR 39 04/18/2024    EGFR 45 (L) 03/08/2024    EGFR 38 (L) 03/08/2024    CREATININE 1.19 04/18/2024    CREATININE 1.14 (H) 03/08/2024    CREATININE 1.32 (H) 03/08/2024   Baseline creatinine 1.1.  Avoid nephrotoxins   English

## 2024-04-18 NOTE — ASSESSMENT & PLAN NOTE
Assessment:  Unsure about etiology however past records show that she had a questionable infection for which she had a PICC line placed    Plan:  Lower extremity ultrasound to rule out DVT.  Place compression stockings after.  Elevate legs

## 2024-04-18 NOTE — ASSESSMENT & PLAN NOTE
Assessment:  Uncertain etiology and patient's has unreliable history.  Noted to have 2 falls with head strike while on aspirin.  Admission CT head imaging, spine, chest, pelvis negative for any acute fractures  EKG shows mildly prolonged QTc    Plan:  Will initiate syncope workup and place on 24-hour telemetry.  Continue exam collateral history  Repeat echo  Fall precautions  PT/OT

## 2024-04-18 NOTE — ASSESSMENT & PLAN NOTE
Etiology uncertain although may be due to environment combined with patient's malnutrition  Warm blankets and use Seferino hugger temperature persistently low

## 2024-04-18 NOTE — ASSESSMENT & PLAN NOTE
Wt Readings from Last 3 Encounters:   04/18/24 60.2 kg (132 lb 11.5 oz)   04/09/24 62.6 kg (138 lb)   04/02/24 62 kg (136 lb 9.6 oz)     Echo from 8/2023 shows EF 55%.  Mildly abnormal diastolic function  No home diuretic.  Takes metoprolol 25 mg at home  BNP on admission 1263    Plan:  Daily I's and O's, 2 g sodium diet, elevate HOB  Can consider diuresing with Lasix

## 2024-04-18 NOTE — LETTER
FAX      To:     [facility]   Company:  Phone:       Fax:        Email:        From:   Shahida Story  Phone:       Fax:    Email:      ________________________________________________    Transport confirmed for 3:15pm. AVS attached. RN to call report, but can be reached at: [phone] if needed.     Thanks!      NOTICE:  This communication, including attachments, may contain information that is confidential and protected by the -client or other privilege(s).

## 2024-04-18 NOTE — ED PROCEDURE NOTE
Procedure  POC FAST US    Date/Time: 4/18/2024 2:48 PM    Performed by: Erin Colin DO  Authorized by: Erin Colin DO    Patient location:  ED  Other Assisting Provider: Yes (comment) (Gregor Carrion MD)    Procedure details:     Exam Type:  Educational    Indications: blunt abdominal trauma and blunt chest trauma      Assess for:  Hemothorax, intra-abdominal fluid, pericardial effusion and pneumothorax    Technique: extended FAST      Views obtained:  Heart - Pericardial sac, RUQ - Lugo's Pouch, LUQ - Splenorenal space, Suprapubic - Pouch of Flex, Left thorax and Right thorax    Image quality: limited diagnostic      Image availability:  Video obtained  FAST Findings:     RUQ (Hepatorenal) free fluid: absent      LUQ (Splenorenal) free fluid: absent      Suprapubic free fluid: absent      Cardiac wall motion: identified      Pericardial effusion: absent    extended FAST (Pulmonary) findings:     Left lung sliding: Present      Right lung sliding: Present      Left pleural effusion: Absent      Right pleural effusion: Absent    Interpretation:     Impressions: negative    POC Cardiac US    Date/Time: 4/18/2024 2:49 PM    Performed by: Erin Colin DO  Authorized by: Erin Colin DO    Patient location:  ED  Procedure details:     Exam Type:  Educational    Indications: suspected volume depletion and trauma      Assessment / Evaluation for: cardiac function, pericardial effusion, inferior vena cava for fluid responsiveness and intravascular volume status      Exam Type: initial exam      Image quality: limited diagnostic      Image availability:  Video obtained  Patient Details:     Cardiac Rhythm:  Regular  Cardiac findings:     Echo technique: limited 2D      Views obtained: parasternal long axis, parasternal short axis, subcostal and apical      Pericardial effusion: absent      Tamponade physiology: absent      Wall motion:  hypodynamic      LV systolic function: depressed      RV dilation: none    Pulmonary findings:     Left Lung Findings: left lung sliding      Right lung findings: right lung sliding      B-lines: no B-lines present    IVC findings:     IVC Size: small      IVC Inspiratory Collapse: partial    Interpretation:     Fluid Status:  Hypovolemic                   Erin Colin, DO  04/18/24 4766     no

## 2024-04-18 NOTE — H&P
[facility]  H&P  Name: Ida Vuong 89 y.o. female I MRN: [id number]  Unit/Bed#: W -01 I Date of Admission: 4/18/2024   Date of Service: 4/18/2024 I Hospital Day: 0      Assessment/Plan   * Loss of consciousness (HCC)  Assessment & Plan  Assessment:  Uncertain etiology and patient's has unreliable history.  Noted to have 2 falls with head strike while on aspirin.  Admission CT head imaging, spine, chest, pelvis negative for any acute fractures  EKG shows mildly prolonged QTc    Plan:  Will initiate syncope workup and place on 24-hour telemetry.  Continue exam collateral history  Repeat echo  Fall precautions  PT/OT    (HFpEF) heart failure with preserved ejection fraction (HCC)  Assessment & Plan  Wt Readings from Last 3 Encounters:   04/18/24 60.2 kg (132 lb 11.5 oz)   04/09/24 62.6 kg (138 lb)   04/02/24 62 kg (136 lb 9.6 oz)     Echo from 8/2023 shows EF 55%.  Mildly abnormal diastolic function  No home diuretic.  Takes metoprolol 25 mg at home  BNP on admission 3038    Plan:  Daily I's and O's, 2 g sodium diet, elevate HOB  Can consider diuresing with Lasix        Hypothermia  Assessment & Plan  Etiology uncertain although may be due to environment combined with patient's malnutrition  Warm blankets and use Seferino hugger temperature persistently low    Elevated CK  Assessment & Plan  Assessment:  Admission .  Uncertain etiology unsure if patient has some type of poly/dermatomyositis or postviral myositis.  Also complained about lower extremity pain.  Creatinine within normal limits    Plan:  Fluids and symptomatic pain management      Bilateral lower extremity edema  Assessment & Plan  Assessment:  Unsure about etiology however past records show that she had a questionable infection for which she had a PICC line placed    Plan:  Lower extremity ultrasound to rule out DVT.  Place compression stockings after.  Elevate legs      CKD (chronic kidney disease) stage 3, GFR  "30-59 ml/min (HCC)  Assessment & Plan  Lab Results   Component Value Date    EGFR 39 04/18/2024    EGFR 45 (L) 03/08/2024    EGFR 38 (L) 03/08/2024    CREATININE 1.19 04/18/2024    CREATININE 1.14 (H) 03/08/2024    CREATININE 1.32 (H) 03/08/2024   Baseline creatinine 1.1.  Avoid nephrotoxins    Essential hypertension  Assessment & Plan  Continue home amlodipine 5 mg and metoprolol 25 mg    IBS (irritable bowel syndrome)  Assessment & Plan  Continue home Linzess           VTE Pharmacologic Prophylaxis: VTE Score: 4 Moderate Risk (Score 3-4) - Pharmacological DVT Prophylaxis Ordered: enoxaparin (Lovenox).  Code Status: Level 3 - DNAR and DNI   Discussion with family: Updated  (daughter in law and friend) via phone.    Anticipated Length of Stay: Patient will be admitted on an observation basis with an anticipated length of stay of less than 2 midnights secondary to fall.    Chief Complaint: \"I don't know\"    History of Present Illness:  Ida Vuong is a 89 y.o. female with a PMH of CKD stage III, malnutrition, history of spinal compression fracture, lichen sclerosus, hypertension, p A-fib, recent COVID-19 infection who presents with evaluation after 2 falls with head strike while on aspirin.  Patient is a questionable to uncooperative historian, but notes state she has unsteady balance reported pain on her left side.  Also endorses pain of her lower extremities.  Unsure how patient presented to the hospital, but she states a friend brought her here.  Patient lives by herself and manages her own medications.    Attempted to call patient's friend multiple times but unable to establish contact.  Spoke with patient's neighbor and daughter-in-law who provided collateral history.  Patient had a PICC line placed recently and has been receiving IV antibiotics for lower leg infection.  Patient had a recent hospitalization for COVID in which he went to rehab and was released in normal state of mind.  Her " neighbor does admit that she is particularly private about her personal life.    Per patient's daughter-in-law, she has had 5 different primary care providers in the past month.  She had a PICC line placed on 4/3/2024 by her hematologist Dr. Jarvis?  Antibiotics for lower extremities.  She was supposed to get it removed day of admission.    NAD, patient was noted to have hypothermia requiring Seferino hugger.  Lactate elevated 3.5.  CK elevated 350.  Blood cultures were drawn.  Trauma imaging was negative for any acute fracture or intracranial abnormality.  Patient was admitted for further management and evaluation.    Review of Systems:  Review of Systems   Constitutional:  Negative for chills and fever.   HENT:  Negative for ear pain and sore throat.    Eyes:  Negative for pain and visual disturbance.   Respiratory:  Negative for cough and shortness of breath.    Cardiovascular:  Negative for chest pain and palpitations.   Gastrointestinal:  Negative for abdominal pain and vomiting.   Genitourinary:  Positive for frequency. Negative for dysuria and hematuria.   Musculoskeletal:  Negative for arthralgias and back pain.   Skin:  Negative for color change and rash.   Neurological:  Positive for syncope. Negative for seizures.   All other systems reviewed and are negative.      Past Medical and Surgical History:   Past Medical History:   Diagnosis Date    Allergic rhinitis     Disease of thyroid gland     Dizziness     Hyponatremia 8/30/2021    Hypothyroidism 10/20/2015    Malignant neoplasm of colon (HCC)     last assessed: 10/20/2015    Neuropathy     Tinnitus        Past Surgical History:   Procedure Laterality Date    ADENOIDECTOMY      APPENDECTOMY      last assessed: 10/20/2015    CATARACT EXTRACTION, BILATERAL      CHOLECYSTECTOMY      last assessed: 10/20/2015    COLECTOMY      last assessed: 10/20/2015; partial     IR PICC PLACEMENT SINGLE LUMEN  4/5/2024    TONSILLECTOMY      last assessed: 10/20/2015        Meds/Allergies:  Prior to Admission medications    Medication Sig Start Date End Date Taking? Authorizing Provider   Acetaminophen Extra Strength 500 MG tablet  3/15/22   Historical Provider, MD   amLODIPine (NORVASC) 5 mg tablet Take 5 mg by mouth daily 3/25/24   Historical Provider, MD   aspirin 81 MG tablet Take 81 mg by mouth    Historical Provider, MD   calcium carbonate-vitamin D (OSCAL-D) 500 mg-200 units per tablet Take 1 tablet by mouth daily with breakfast 9/3/21   Briana Guerrero MD   cholecalciferol (VITAMIN D3) 1,000 units tablet  3/15/22   Historical Provider, MD   clobetasol (TEMOVATE) 0.05 % ointment Apply topically 2 (two) times a day 4/2/24   Howard Avalos MD   fexofenadine (ALLEGRA) 180 MG tablet Take 180 mg by mouth daily    Historical Provider, MD   gabapentin (NEURONTIN) 100 mg capsule TAKE 1 CAPSULE BY MOUTH THREE TIMES DAILY 4/15/24   Ron Wadsworth MD   linaCLOtide (Linzess) 290 MCG CAPS Take 1 capsule by mouth once daily 12/11/23   Ron Wadsworth MD   meclizine (ANTIVERT) 25 mg tablet TAKE 1 TABLET BY MOUTH EVERY 8 HOURS 4/1/24   Ron Wadsworth MD   metoprolol succinate (TOPROL-XL) 25 mg 24 hr tablet Take 1 tablet by mouth once daily 4/1/24   Ron Wadsworth MD   neomycin-polymyxin-dexamethasone (MAXITROL) 0.35%-10,000 units/g-0.1% APPLY A SMALL AMOUNT INTO BOTH EYES TWICE DAILY 1/17/24   Historical Provider, MD MCCAIN have reviewed home medications using recent Epic encounter.    Allergies:   Allergies   Allergen Reactions    Penicillins Hives     Other reaction(s): Other (See Comments)  hives       Social History:  Marital Status:    Occupation: Retired  Patient Pre-hospital Living Situation: Home  Patient Pre-hospital Level of Mobility: walks  Patient Pre-hospital Diet Restrictions: Unknown  Substance Use History:   Social History     Substance and Sexual Activity   Alcohol Use No     Social History     Tobacco Use   Smoking Status Former    Current packs/day: 0.25     Types: Cigarettes   Smokeless Tobacco Never   Tobacco Comments    quit 50 yrs ago     Social History     Substance and Sexual Activity   Drug Use Never       Family History:  Family History   Problem Relation Age of Onset    Heart disease Mother         cardiac disorder    Alzheimer's disease Sister     No Known Problems Father     Leukemia Brother        Physical Exam:     Vitals:   Blood Pressure: 121/63 (04/18/24 1644)  Pulse: 66 (04/18/24 1707)  Temperature: (!) 96.8 °F (36 °C) (04/18/24 1707)  Temp Source: Rectal (04/18/24 1707)  Respirations: 17 (04/18/24 1530)  Height: 5' (152.4 cm) (04/18/24 1320)  Weight - Scale: 60.2 kg (132 lb 11.5 oz) (04/18/24 1320)  SpO2: 97 % (04/18/24 1644)    Physical Exam  Vitals and nursing note reviewed.   Constitutional:       General: She is not in acute distress.     Appearance: She is well-developed.   HENT:      Head: Normocephalic and atraumatic.      Mouth/Throat:      Mouth: Mucous membranes are dry.   Eyes:      General: No scleral icterus.     Conjunctiva/sclera: Conjunctivae normal.   Cardiovascular:      Rate and Rhythm: Normal rate and regular rhythm.      Heart sounds: No murmur heard.  Pulmonary:      Effort: Pulmonary effort is normal. No respiratory distress.      Breath sounds: Normal breath sounds. No wheezing or rhonchi.   Abdominal:      Palpations: Abdomen is soft.      Tenderness: There is no abdominal tenderness.   Musculoskeletal:         General: No swelling.      Cervical back: Neck supple.      Right lower leg: Edema present.      Left lower leg: Edema present.      Comments: Swelling of the lower extremities.  Erythema noted.  No obvious bleeding.   Skin:     General: Skin is dry.      Capillary Refill: Capillary refill takes less than 2 seconds.      Coloration: Skin is pale.      Comments: Cool and pale   Neurological:      Mental Status: She is alert and oriented to person, place, and time.      Cranial Nerves: No dysarthria.      Motor: Motor  function is intact. No weakness, abnormal muscle tone or pronator drift.      Comments: Patient is alert and oriented to person, place, month, year.  Not oriented to situation although question if she is uncooperative.   Psychiatric:         Attention and Perception: She is inattentive.         Behavior: Behavior is uncooperative and slowed.         Cognition and Memory: Memory is impaired.         Judgment: Judgment is inappropriate.          Additional Data:     Lab Results:  Results from last 7 days   Lab Units 04/18/24  1338   WBC Thousand/uL 9.93   HEMOGLOBIN g/dL 13.0   HEMATOCRIT % 40.4   PLATELETS Thousands/uL 284   SEGS PCT % 85*   LYMPHO PCT % 5*   MONO PCT % 8   EOS PCT % 0     Results from last 7 days   Lab Units 04/18/24  1338   SODIUM mmol/L 133*   POTASSIUM mmol/L 4.3   CHLORIDE mmol/L 105   CO2 mmol/L 20*   BUN mg/dL 32*   CREATININE mg/dL 1.19   ANION GAP mmol/L 8   CALCIUM mg/dL 9.1   ALBUMIN g/dL 3.8   TOTAL BILIRUBIN mg/dL 0.60   ALK PHOS U/L 82   ALT U/L 13   AST U/L 34   GLUCOSE RANDOM mg/dL 114     Results from last 7 days   Lab Units 04/18/24  1338   INR  1.03     Results from last 7 days   Lab Units 04/18/24  1831   POC GLUCOSE mg/dl 115         Results from last 7 days   Lab Units 04/18/24  1718 04/18/24  1338   LACTIC ACID mmol/L 1.1 3.6*   PROCALCITONIN ng/ml  --  0.11       Lines/Drains:  Invasive Devices       Peripherally Inserted Central Catheter Line  Duration             PICC Line 04/05/24 Right Basilic 13 days              Peripheral Intravenous Line  Duration             Peripheral IV 04/18/24 Left Antecubital <1 day                    Central Line:  Goal for removal:  Was using for antibiotics           Imaging: Reviewed radiology reports from this admission including: chest xray  XR Trauma pelvis ap only 1 or 2 vw   Final Result by E. Alec Schoenberger, MD (04/18 0448)      No acute osseous abnormality. Bilateral hip osteoarthritis, left more severe than right.      Workstation  performed: [id number]         XR Trauma chest portable   Final Result by E. Alec Schoenberger, MD (04/18 1413)      No acute cardiopulmonary disease.            Workstation performed: [id number]         TRAUMA - CT head wo contrast   Final Result by E. Alec Schoenberger, MD (04/18 3138)      No acute intracranial abnormality. Chronic microangiopathy                  Workstation performed: [id number]         TRAUMA - CT spine cervical wo contrast   Final Result by E. Alec Schoenberger, MD (04/18 5147)      No cervical spine fracture or traumatic malalignment.                  Workstation performed: [id number]          VAS VENOUS DUPLEX - LOWER LIMB BILATERAL    (Results Pending)       EKG and Other Studies Reviewed on Admission:   EKG:  prolonged qtc.    ** Please Note: This note has been constructed using a voice recognition system. **

## 2024-04-18 NOTE — ASSESSMENT & PLAN NOTE
Assessment:  Admission .  Uncertain etiology unsure if patient has some type of poly/dermatomyositis or postviral myositis.  Also complained about lower extremity pain.  Creatinine within normal limits    Plan:  Fluids and symptomatic pain management

## 2024-04-19 ENCOUNTER — APPOINTMENT (INPATIENT)
Dept: RADIOLOGY | Facility: HOSPITAL | Age: 89
DRG: 309 | End: 2024-04-19
Payer: MEDICARE

## 2024-04-19 ENCOUNTER — APPOINTMENT (OUTPATIENT)
Dept: NON INVASIVE DIAGNOSTICS | Facility: HOSPITAL | Age: 89
DRG: 309 | End: 2024-04-19
Payer: MEDICARE

## 2024-04-19 PROBLEM — R41.89 COGNITIVE IMPAIRMENT: Status: ACTIVE | Noted: 2024-04-19

## 2024-04-19 PROBLEM — Z91.89 AT RISK FOR DELIRIUM: Status: ACTIVE | Noted: 2024-04-19

## 2024-04-19 LAB
AMPHETAMINES SERPL QL SCN: NEGATIVE
ANION GAP SERPL CALCULATED.3IONS-SCNC: 6 MMOL/L (ref 4–13)
AORTIC ROOT: 3.4 CM
AORTIC VALVE MEAN VELOCITY: 6.6 M/S
APICAL FOUR CHAMBER EJECTION FRACTION: 51 %
ASCENDING AORTA: 3.6 CM
AV LVOT MEAN GRADIENT: 2 MMHG
AV LVOT PEAK GRADIENT: 4 MMHG
AV MEAN GRADIENT: 2 MMHG
AV PEAK GRADIENT: 5 MMHG
AV REGURGITATION PRESSURE HALF TIME: 674 MS
AV VELOCITY RATIO: 0.95
BARBITURATES UR QL: NEGATIVE
BENZODIAZ UR QL: NEGATIVE
BSA FOR ECHO PROCEDURE: 1.56 M2
BUN SERPL-MCNC: 38 MG/DL (ref 5–25)
CALCIUM SERPL-MCNC: 7.8 MG/DL (ref 8.4–10.2)
CHLORIDE SERPL-SCNC: 108 MMOL/L (ref 96–108)
CK SERPL-CCNC: 245 U/L (ref 26–192)
CO2 SERPL-SCNC: 24 MMOL/L (ref 21–32)
COCAINE UR QL: NEGATIVE
CREAT SERPL-MCNC: 1.18 MG/DL (ref 0.6–1.3)
DOP CALC AO PEAK VEL: 1.09 M/S
DOP CALC AO VTI: 18.45 CM
DOP CALC LVOT PEAK VEL VTI: 20.33 CM
DOP CALC LVOT PEAK VEL: 1.04 M/S
E WAVE DECELERATION TIME: 289 MS
E/A RATIO: 0.59
FENTANYL UR QL SCN: NEGATIVE
FRACTIONAL SHORTENING: 44 (ref 28–44)
GFR SERPL CREATININE-BSD FRML MDRD: 39 ML/MIN/1.73SQ M
GLUCOSE P FAST SERPL-MCNC: 76 MG/DL (ref 65–99)
GLUCOSE SERPL-MCNC: 116 MG/DL (ref 65–140)
GLUCOSE SERPL-MCNC: 122 MG/DL (ref 65–140)
GLUCOSE SERPL-MCNC: 76 MG/DL (ref 65–140)
GLUCOSE SERPL-MCNC: 80 MG/DL (ref 65–140)
GLUCOSE SERPL-MCNC: 81 MG/DL (ref 65–140)
GLUCOSE SERPL-MCNC: 83 MG/DL (ref 65–140)
GLUCOSE SERPL-MCNC: 84 MG/DL (ref 65–140)
HYDROCODONE UR QL SCN: NEGATIVE
INTERVENTRICULAR SEPTUM IN DIASTOLE (PARASTERNAL SHORT AXIS VIEW): 1.8 CM
INTERVENTRICULAR SEPTUM: 1.8 CM (ref 0.6–1.1)
LAAS-AP2: 20.8 CM2
LAAS-AP4: 16.8 CM2
LEFT ATRIUM SIZE: 3.5 CM
LEFT ATRIUM VOLUME (MOD BIPLANE): 59 ML
LEFT ATRIUM VOLUME INDEX (MOD BIPLANE): 37.8 ML/M2
LEFT INTERNAL DIMENSION IN SYSTOLE: 1.4 CM (ref 2.1–4)
LEFT VENTRICULAR INTERNAL DIMENSION IN DIASTOLE: 2.5 CM (ref 3.5–6)
LEFT VENTRICULAR POSTERIOR WALL IN END DIASTOLE: 1.3 CM
LEFT VENTRICULAR STROKE VOLUME: 18 ML
LVSV (TEICH): 18 ML
MAGNESIUM SERPL-MCNC: 2.5 MG/DL (ref 1.9–2.7)
METHADONE UR QL: NEGATIVE
MV E'TISSUE VEL-SEP: 4 CM/S
MV PEAK A VEL: 0.81 M/S
MV PEAK E VEL: 48 CM/S
MV STENOSIS PRESSURE HALF TIME: 84 MS
MV VALVE AREA P 1/2 METHOD: 2.62
OPIATES UR QL SCN: POSITIVE
OXYCODONE+OXYMORPHONE UR QL SCN: NEGATIVE
PCP UR QL: NEGATIVE
POTASSIUM SERPL-SCNC: 4 MMOL/L (ref 3.5–5.3)
RIGHT ATRIUM AREA SYSTOLE A4C: 14 CM2
RIGHT VENTRICLE ID DIMENSION: 4.8 CM
SL CV AV DECELERATION TIME RETROGRADE: 2324 MS
SL CV AV PEAK GRADIENT RETROGRADE: 57 MMHG
SL CV LEFT ATRIUM LENGTH A2C: 5.5 CM
SL CV LV EF: 65
SL CV PED ECHO LEFT VENTRICLE DIASTOLIC VOLUME (MOD BIPLANE) 2D: 22 ML
SL CV PED ECHO LEFT VENTRICLE SYSTOLIC VOLUME (MOD BIPLANE) 2D: 5 ML
SODIUM SERPL-SCNC: 138 MMOL/L (ref 135–147)
THC UR QL: NEGATIVE
TR MAX PG: 35 MMHG
TR PEAK VELOCITY: 3 M/S
TRICUSPID ANNULAR PLANE SYSTOLIC EXCURSION: 1.8 CM
TRICUSPID VALVE PEAK REGURGITATION VELOCITY: 2.96 M/S
VIT B12 SERPL-MCNC: 764 PG/ML (ref 180–914)

## 2024-04-19 PROCEDURE — 83735 ASSAY OF MAGNESIUM: CPT

## 2024-04-19 PROCEDURE — 82607 VITAMIN B-12: CPT | Performed by: FAMILY MEDICINE

## 2024-04-19 PROCEDURE — 93306 TTE W/DOPPLER COMPLETE: CPT

## 2024-04-19 PROCEDURE — 99213 OFFICE O/P EST LOW 20 MIN: CPT | Performed by: PODIATRIST

## 2024-04-19 PROCEDURE — 2W3SXYZ IMMOBILIZATION OF RIGHT FOOT USING OTHER DEVICE: ICD-10-PCS | Performed by: PODIATRIST

## 2024-04-19 PROCEDURE — 97167 OT EVAL HIGH COMPLEX 60 MIN: CPT

## 2024-04-19 PROCEDURE — 73590 X-RAY EXAM OF LOWER LEG: CPT

## 2024-04-19 PROCEDURE — 82948 REAGENT STRIP/BLOOD GLUCOSE: CPT

## 2024-04-19 PROCEDURE — 99232 SBSQ HOSP IP/OBS MODERATE 35: CPT | Performed by: INTERNAL MEDICINE

## 2024-04-19 PROCEDURE — 97530 THERAPEUTIC ACTIVITIES: CPT

## 2024-04-19 PROCEDURE — 80048 BASIC METABOLIC PNL TOTAL CA: CPT

## 2024-04-19 PROCEDURE — 97163 PT EVAL HIGH COMPLEX 45 MIN: CPT

## 2024-04-19 PROCEDURE — 99222 1ST HOSP IP/OBS MODERATE 55: CPT | Performed by: FAMILY MEDICINE

## 2024-04-19 PROCEDURE — 82550 ASSAY OF CK (CPK): CPT

## 2024-04-19 PROCEDURE — 93970 EXTREMITY STUDY: CPT | Performed by: SURGERY

## 2024-04-19 PROCEDURE — 93306 TTE W/DOPPLER COMPLETE: CPT | Performed by: INTERNAL MEDICINE

## 2024-04-19 PROCEDURE — 97116 GAIT TRAINING THERAPY: CPT

## 2024-04-19 RX ORDER — GABAPENTIN 100 MG/1
100 CAPSULE ORAL 2 TIMES DAILY
Status: DISCONTINUED | OUTPATIENT
Start: 2024-04-19 | End: 2024-04-24 | Stop reason: HOSPADM

## 2024-04-19 RX ORDER — SENNOSIDES 8.6 MG
2 TABLET ORAL 2 TIMES DAILY
Status: DISCONTINUED | OUTPATIENT
Start: 2024-04-19 | End: 2024-04-24 | Stop reason: HOSPADM

## 2024-04-19 RX ORDER — FUROSEMIDE 10 MG/ML
40 INJECTION INTRAMUSCULAR; INTRAVENOUS ONCE
Status: COMPLETED | OUTPATIENT
Start: 2024-04-19 | End: 2024-04-19

## 2024-04-19 RX ORDER — ACETAMINOPHEN 325 MG/1
975 TABLET ORAL 3 TIMES DAILY
Status: DISCONTINUED | OUTPATIENT
Start: 2024-04-19 | End: 2024-04-24 | Stop reason: HOSPADM

## 2024-04-19 RX ADMIN — Medication 1 TABLET: at 08:00

## 2024-04-19 RX ADMIN — SENNOSIDES 17.2 MG: 8.6 TABLET, FILM COATED ORAL at 17:13

## 2024-04-19 RX ADMIN — GABAPENTIN 100 MG: 100 CAPSULE ORAL at 17:13

## 2024-04-19 RX ADMIN — POLYETHYLENE GLYCOL 3350 17 G: 17 POWDER, FOR SOLUTION ORAL at 08:44

## 2024-04-19 RX ADMIN — AMLODIPINE BESYLATE 5 MG: 5 TABLET ORAL at 08:32

## 2024-04-19 RX ADMIN — GABAPENTIN 100 MG: 100 CAPSULE ORAL at 08:32

## 2024-04-19 RX ADMIN — LORATADINE 10 MG: 10 TABLET ORAL at 08:32

## 2024-04-19 RX ADMIN — METOPROLOL SUCCINATE 25 MG: 25 TABLET, EXTENDED RELEASE ORAL at 08:44

## 2024-04-19 RX ADMIN — ENOXAPARIN SODIUM 30 MG: 30 INJECTION SUBCUTANEOUS at 08:32

## 2024-04-19 RX ADMIN — FUROSEMIDE 40 MG: 10 INJECTION, SOLUTION INTRAMUSCULAR; INTRAVENOUS at 14:29

## 2024-04-19 RX ADMIN — ACETAMINOPHEN 975 MG: 325 TABLET, FILM COATED ORAL at 17:13

## 2024-04-19 RX ADMIN — Medication 1000 UNITS: at 08:32

## 2024-04-19 RX ADMIN — FUROSEMIDE 40 MG: 10 INJECTION, SOLUTION INTRAMUSCULAR; INTRAVENOUS at 08:30

## 2024-04-19 RX ADMIN — LUBIPROSTONE 8 MCG: 8 CAPSULE, GELATIN COATED ORAL at 09:47

## 2024-04-19 NOTE — PHYSICAL THERAPY NOTE
"   PHYSICAL THERAPY EVALUATION & TREATMENT  DATE: 04/19/24  TIME: 0927-1010    NAME:  Ida Vuong  AGE:   89 y.o.  Mrn:   [id number]  Length Of Stay: 0    ADMIT DX:  Elevated CK [R74.8]  Elevated lactic acid level [R79.89]  Encounter for post fall examination [Z04.3]  Hypothermia, initial encounter [T68.XXXA]  Unspecified multiple injuries, initial encounter [T07.XXXA]    Past Medical History:   Diagnosis Date    Allergic rhinitis     Disease of thyroid gland     Dizziness     Hyponatremia 8/30/2021    Hypothyroidism 10/20/2015    Malignant neoplasm of colon (HCC)     last assessed: 10/20/2015    Neuropathy     Tinnitus      Past Surgical History:   Procedure Laterality Date    ADENOIDECTOMY      APPENDECTOMY      last assessed: 10/20/2015    CATARACT EXTRACTION, BILATERAL      CHOLECYSTECTOMY      last assessed: 10/20/2015    COLECTOMY      last assessed: 10/20/2015; partial     IR PICC PLACEMENT SINGLE LUMEN  4/5/2024    TONSILLECTOMY      last assessed: 10/20/2015       Performed at least 2 patient identifiers during session: Name, Birthday, ID bracelet, and Epic photo     04/19/24 0927   PT Last Visit   PT Visit Date 04/19/24   Note Type   Note type Evaluation  (& treatment)   Pain Assessment   Pain Assessment Tool 0-10   Pain Score 10 - Worst Possible Pain  (\"15/10\")   Pain Location/Orientation Orientation: Bilateral;Orientation: Lower;Location: Leg  (& foot (R>L))   Pain Onset/Description Onset: Ongoing;Descriptor: Aching;Descriptor: Discomfort   Effect of Pain on Daily Activities limits comfort and positioning, limits independence and speed of mobility, limits tolerance of standing and ambulation   Patient's Stated Pain Goal No pain   Hospital Pain Intervention(s) Repositioned;Ambulation/increased activity;Elevated;Emotional support   Multiple Pain Sites No   Restrictions/Precautions   Weight Bearing Precautions Per Order No   Other Precautions Cognitive;Chair Alarm;Bed Alarm;Telemetry;Fall " "Risk;Pain;Hard of hearing   Home Living   Type of Home House   Home Layout Two level;Performs ADLs on one level;Able to live on main level with bedroom/bathroom;Laundry in basement;Stairs to enter with rails  (3 REJI, FFSU)   Bathroom Shower/Tub Tub/shower unit   Bathroom Toilet Standard   Bathroom Equipment Grab bars in shower   Bathroom Accessibility Accessible   Home Equipment Walker;Cane;Other (Comment)  (rollator walker)   Additional Comments EMR reports pt previously ambulating with RW vs SPC.   Prior Function   Level of [place] Independent with ADLs;Independent with functional mobility;Needs assistance with IADLS   Lives With (S)  Alone   Receives Help From Neighbor  (\"they're more trouble than good\"; EMR reports that neighbor previously assisted with laundry)   IADLs Independent with medication management   Falls in the last 6 months 1 to 4  (at least 2 falls leading to current hospitalization; pt unable to recall additional)   Vocational Retired   Comments Pt is a VERY poor historian. All above information obtained from EMR/previous therapy notes.   General   Additional Pertinent History Pt is a 89 yr old female admitted under observation 4/18/24 with x2 falls w/ headstrike, AMS. CT head/neck (-).  Of note, 3/4-3/8 hospitalized at [facility] for failure to thrive after welfare check as pt's son was able to get ahold of her for a few days. Was d/c'd to [facility]. Unknown how long pt was home from [facility] prior to current hospitalization.   Family/Caregiver Present No   Cognition   Overall Cognitive Status (S)  Impaired   Arousal/Participation Cooperative   Orientation Level (S)  Disoriented to person;Disoriented to place;Disoriented to time;Disoriented to situation   Memory Decreased recall of biographical information;Decreased short term memory;Decreased recall of recent events;Decreased recall of precautions   Following Commands Follows one step commands with increased time or repetition   Comments Pt highly " "distracted t/o session, difficult to engage in therapy tasks at times. T/o session pt frequently referencing us actively being on a TV show and being recorded; frequently being asked to be taken \"off siddharth\" and to \"stop the cameras\". Pt also noted be to guarded and slightly paranoid during therapist questions regarding home setup and PLOF.   Subjective   Subjective \"You need to find a different job. One that you could at least afford a loaf of bread.\" \"My foot hurts. that's the one I kicked the football with.\" \"What is show is this? I have a good name: 'It's a hell of a mess'.\"   RUE Assessment   RUE Assessment WFL   LUE Assessment   LUE Assessment WFL   RLE Assessment   RLE Assessment X  (significant edema and redness to B LEs, strength grossly 2-/5 to 3-/5 MMT throughout, limited d/t pain)   LLE Assessment   LLE Assessment X  (significant edema and redness to B LEs, strength grossly 2-/5 to 3-/5 MMT throughout, limited d/t pain)   Vision-Basic Assessment   Current Vision Wears glasses all the time   Coordination   Movements are Fluid and Coordinated 1   Sensation WFL   Bed Mobility   Supine to Sit 2  Maximal assistance   Additional items Assist x 2;HOB elevated;Bedrails;Increased time required;Verbal cues;LE management  (trunk management)   Sit to Supine   (NT as pt was left seated OOB in recliner chair at end of session, alarm engaged)   Transfers   Sit to Stand 3  Moderate assistance   Additional items Assist x 2;Bedrails;Increased time required;Verbal cues   Stand to Sit 3  Moderate assistance   Additional items Assist x 2;Increased time required;Verbal cues   Additional Comments Pt needing extensive cues for body mechanics, safe transfer methods, and initiation of mobility. Once in supported standing, pt able to maintain supported standing with RW and MODA. Pt unable to tolerate >30 seconds of functional supported standing prior to need for functional seated rest break.   Balance   Static Sitting Fair   Dynamic " Sitting Fair -   Static Standing Poor  (w/ RW)   Dynamic Standing Poor  (w/ RW)   Ambulatory Poor  (w/ RW)   Endurance Deficit   Endurance Deficit Yes   Activity Tolerance   Activity Tolerance Patient limited by fatigue;Patient limited by pain;Other (Comment)  (limited due to impaired cognition)   Medical Staff Made Aware Spoke with CM OT, RN, SLIM   Assessment   Prognosis Fair   Problem List Decreased strength;Decreased range of motion;Decreased endurance;Impaired balance;Decreased mobility;Decreased cognition;Impaired judgement;Decreased safety awareness;Impaired hearing;Obesity;Decreased skin integrity;Pain   Assessment Pt seen for PT evaluation for mobility assessment & discharge needs. Activity orders: Up and OOB as tolerated. Pt admitted 4/18/2024 s/p x2 falls, AMS dx Loss of consciousness (HCC). Comorbidities affecting pt's fnxl performance include: Neuropathy, falls, heart failure, CKD, hallucinations, colon CA, Liver cirrhosis, anxiety, ambulatory dysfunction, vertigo, CKD, HTN. During PT IE, pt requires MAXA 2 person for bed mobility, MODA 2 person for STS transfers w/ RW, pt needing functional rest. During addt'l treatment time pt progresses to complete transfers with MODA 1 and ambulate 8ft with RW and MODA + RW. Pt displays above outlined functional impairments & limitations, and presents below her baseline level of functional mobility. The AM-PAC & Barthel Index outcome tools were used to assist in determining pt safety w/ mobility/self care & appropriate d/c recommendations, see above for scores. Pt is at risk of falls d/t multiple comorbidities, impulsivity, h/o falls, impaired balance, impaired cognition, impaired insight/safety awareness, use of ambulatory aid, varying levels of pain , advanced age, acuity of medical illness, ongoing medical treatment of primary dx, and polypharmacy. Pt's clinical presentation is currently unstable/unpredictable as seen in pt's presentation of changing level of  pain, varying levels of cognitive performance, increased fall risk, new onset of impairment of functional mobility, decreased endurance, and new onset of weakness. Pt will benefit from continued PT services in order to address impairments, decrease risk of falls, maximize independence w/ fnxl mobility, & ensure safety w/ mobility for transition to next level of care. Based on pt presentation & impairments, pt would most appropriately benefit from Level II (moderate PT intensity) resources upon d/c.   Barriers to Discharge Decreased caregiver support  (pt with little to no social supports)   Goals   Patient Goals no rehab goals stated on this date; to maximize functional independence   STG Expiration Date 05/03/24   Short Term Goal #1 Patient PT goals established in order to maximize functional independence. Pt will: complete all bed mobility in hospital bed with S in order to promote increased OOB functional mobility and simulate home environment; complete all transfers with RW and S in order to increase safety with functional mobility; ambulate >50ft with RW and S in order to increase safety with household distance functional mobility; negotiate 2-3 stairs with HR assist and KORIN in order to facilitate safe access to her home; improve B LE strength to >/= 3+/5 MMT t/o in order to increase safety with functional mobility and decrease risk of falls; demonstrate understanding and independence with LE strengthening HEP; improve ambulatory balance to >/= good grade with RW in order to promote safety and increased independence with mobility; tolerate >3hrs OOB in upright position, in order to improve muscular endurance and respiratory status; improve AM-PAC score to >/= 16/24 in order to increase independence with mobility and decrease burden of care; improve Barthel Index score to >/= 35/100 in order to increase independence and decrease risk of falls.   PT Treatment Day 1   Plan   Treatment/Interventions Functional  transfer training;LE strengthening/ROM;Elevations;Therapeutic exercise;Endurance training;Cognitive reorientation;Patient/family training;Equipment eval/education;Bed mobility;Gait training;Compensatory technique education;Spoke to MD;Spoke to nursing;Spoke to case management   PT Frequency 3-5x/wk   Discharge Recommendation   Rehab Resource Intensity Level, PT II (Moderate Resource Intensity)   AM-PAC Basic Mobility Inpatient   Turning in Flat Bed Without Bedrails 2   Lying on Back to Sitting on Edge of Flat Bed Without Bedrails 2   Moving Bed to Chair 2   Standing Up From Chair Using Arms 2   Walk in Room 2   Climb 3-5 Stairs With Railing 1   Basic Mobility Inpatient Raw Score 11   Basic Mobility Standardized Score 30.25   [facility] Highest Level Of Mobility   -[facility] Goal 4: Move to chair/commode   -[facility] Achieved 4: Move to chair/commode   Modified [place] Scale   Modified [place] Scale 4   Barthel Index   Feeding 5   Bathing 0   Grooming Score 0   Dressing Score 5   Bladder Score 0   Bowels Score 5   Toilet Use Score 5   Transfers (Bed/Chair) Score 5   Mobility (Level Surface) Score 0   Stairs Score 0   Barthel Index Score 25   Additional Treatment Session   Start Time 0940   End Time 1010   Treatment Assessment Pt is agreeable to participate in additional mobility training post IE. Pt continues to require MODA 2 person + RW for STS from EOB, is able to progress to ambulate 8ft with RW and MODA, however requires functional seated rest break d/t fatigue, LE weakness, and LE pain. Pt then continues to require MODA 2 person for STS x2 reps from recliner chair, MODA +RW to maintain supported standing during dynamic tasks, then MODA 1 for safe and controlled return to sit in recliner. Regarding functional mobility, pt remains below her baseline level of functional mobility. Is overall limited d/t weakness, pain, decreased endurance, impaired balance, impaired cognition, limited insight to functional deficits, highly  unsafe to be home alone. Continue to recommend Level II (moderate PT intensity) resources once medically cleared for d/c from the acute care setting. Will continue skilled PT POC as able and appropriate to address functional impairments and progress towards therapy goals. Next session, plan for intervention of increased ambulation training as able.   Equipment Use Ax2, RW, BSC   Additional Treatment Day 1   End of Consult   Patient Position at End of Consult Bedside chair;Bed/Chair alarm activated;All needs within reach   End of Consult Comments (S)  RECOMMEND GERIATRICS CONSULT AS PATIENT WITH HIGHLY IMPAIRED COGNITION AND LIVES HOME ALONE WITH LITTLE TO NO SOCIAL SUPPORTS.       This session, pt required and most appropriately benefited from partial or full skilled PT/OT co-eval due to extensive physical assistance of SKILLED therapists, cognitive-communication impairments, cognitive-behavioral deficits, significant regression from baseline level of mobility, decreased activity tolerance, and unpredictable medical and/or functional status. PT and OT goals were addressed separately as seen in documentation.    Based on patient's [facility] Highest Level of Mobility scores today, patient currently has a goal of [facility] Levels: 4: MOVE TO CHAIR/COMMODE, to be completed with RN staffing each shift, in order to improve overall activity tolerance and mobility, combat hospital related deconditioning, and maximize outcomes for d/c from the acute care setting.     The patient's AM-PAC Basic Mobility Inpatient Short Form Raw Score is 11. A Raw score of less than or equal to 16 suggests the patient may benefit from discharge to post-acute rehabilitation services. Please also refer to the recommendation of the Physical Therapist for safe discharge planning.      Rocio Key, PT, DPT   Available via [employer]  [facility] # [id number]  [place] License - [id number]  4/19/2024

## 2024-04-19 NOTE — ASSESSMENT & PLAN NOTE
Wt Readings from Last 3 Encounters:   04/19/24 59.6 kg (131 lb 6.3 oz)   04/09/24 62.6 kg (138 lb)   04/02/24 62 kg (136 lb 9.6 oz)     Echo from 8/2023 shows EF 55%.  Mildly abnormal diastolic function  No home diuretic.  Takes metoprolol 25 mg at home  BNP on admission 5190    Plan:  Daily I's and O's, 2 g sodium diet, elevate HOB  Diuresis as needed with Lasix 40 given 4/19

## 2024-04-19 NOTE — PLAN OF CARE
Problem: OCCUPATIONAL THERAPY ADULT  Goal: Performs self-care activities at highest level of function for planned discharge setting.  See evaluation for individualized goals.  Description: Treatment Interventions: ADL retraining, Functional transfer training, Cognitive reorientation, Patient/family training, Equipment evaluation/education, Compensatory technique education, Continued evaluation, Activityengagement          See flowsheet documentation for full assessment, interventions and recommendations.   Note: Limitation: Decreased ADL status, Decreased Safe judgement during ADL, Decreased cognition, Decreased self-care trans, Decreased high-level ADLs  Prognosis: Good  Assessment: Pt is a 89 y.o. female admitted to [facility] due to a fall (+) for headstrike and (+) for LOC. Presented w/ mild confusion, hypothermia, and LE edema 2* LE infection being treated previously. PMH includes: IBS, CKD, Idiopathic peripheral neuropathy, insomnia, lumbrosacral spondylosis, osteoporosis, anemia, arthritis of L hip, hx of hallucinations. Pt currently lives in a one level house ALONE. Pt reports PLOF (I) for ADLs, (A) for IADLs, and mod(I) w/ RW for functional mobility. Based upon this evaluation, pt is showing deficits in the following areas: decreased performance in ADLs/functional mobility, cognition, dynamic sitting/standing balance, functional reach, attention, safety awareness, task initiation, ability to sequence. Limiting personal and enviornmental factors include: inaccessible bathroom environment, inaccessible home environment, lack of social support, inaccessible access to community. Supporting factors include: availability of a FF and supportive neighbors/family. Pt will benefit from continued therapy interventions to address the above deficits. Following sessions will be focused on increasing performance for ADLs/ADL transfers/functional mobility, dynamic sitting/standing tolerance, and continued cognitive  assessment/reorientation.  Recommendation: (S) Geriatric Consult, Psych Consult (questionable hallucinations?)  Rehab Resource Intensity Level, OT: II (Moderate Resource Intensity)     MARY Berumen

## 2024-04-19 NOTE — PROGRESS NOTES
[facility]  Progress Note  Name: Ida Vuong I  MRN: [id number]  Unit/Bed#: S -01 I Date of Admission: 4/18/2024   Date of Service: 4/19/2024 I Hospital Day: 0    Assessment/Plan   * Loss of consciousness (HCC)  Assessment & Plan  Assessment:  Uncertain etiology and patient's has unreliable history.  Noted to have 2 falls with head strike while on aspirin.  Admission CT head imaging, spine, chest, pelvis negative for any acute fractures  EKG shows mildly prolonged QTc    Plan:  Will initiate syncope workup and place on 24-hour telemetry.  Continue exam collateral history  Repeat echo pending  Fall precautions  PT/OT    (HFpEF) heart failure with preserved ejection fraction (HCC)  Assessment & Plan  Wt Readings from Last 3 Encounters:   04/19/24 59.6 kg (131 lb 6.3 oz)   04/09/24 62.6 kg (138 lb)   04/02/24 62 kg (136 lb 9.6 oz)     Echo from 8/2023 shows EF 55%.  Mildly abnormal diastolic function  No home diuretic.  Takes metoprolol 25 mg at home  BNP on admission 1558    Plan:  Daily I's and O's, 2 g sodium diet, elevate HOB  Diuresis as needed with Lasix 40 given 4/19    Hypothermia  Assessment & Plan  Etiology uncertain although may be due to environment combined with patient's malnutrition  Warm blankets and use Seferino hugger temperature persistently low    Elevated CK  Assessment & Plan  Assessment:  Admission .  Uncertain etiology unsure if patient has some type of poly/dermatomyositis or postviral myositis.  Also complained about lower extremity pain.  Creatinine within normal limits    Plan:  Fluids and symptomatic pain management      Fall  Assessment & Plan  Rehab per PT/OT    Bilateral lower extremity edema  Assessment & Plan  Assessment:  Unsure about etiology however past records show that she had a questionable infection for which she had a PICC line placed  Lower extremity ultrasound negative for DVT    Plan:  Place compression stockings after.  Elevate  legs      CKD (chronic kidney disease) stage 3, GFR 30-59 ml/min ([facility])  Assessment & Plan  Lab Results   Component Value Date    EGFR 39 2024    EGFR 39 2024    EGFR 45 (L) 2024    CREATININE 1.18 2024    CREATININE 1.19 2024    CREATININE 1.14 (H) 2024   Baseline creatinine 1.1.  Avoid nephrotoxins    Essential hypertension  Assessment & Plan  Continue home amlodipine 5 mg and metoprolol 25 mg    IBS (irritable bowel syndrome)  Assessment & Plan  Continue home Linzess             VTE Pharmacologic Prophylaxis: VTE Score: 4 Moderate Risk (Score 3-4) - Pharmacological DVT Prophylaxis Ordered: enoxaparin (Lovenox).    Mobility:   Basic Mobility Inpatient Raw Score: 11  JH-HLM Goal: 4: Move to chair/commode  JH-HLM Achieved: 4: Move to chair/commode  JH-HLM Goal achieved. Continue to encourage appropriate mobility.    Patient Centered Rounds: I performed bedside rounds with nursing staff today.  Discussions with Specialists or Other Care Team Provider:     Education and Discussions with Family / Patient:  We will update family.     Current Length of Stay: 0 day(s)  Current Patient Status: Inpatient   Discharge Plan: Anticipate discharge in 24-48 hrs to discharge location to be determined pending rehab evaluations.    Code Status: Level 3 - DNAR and DNI    Subjective:   Patient was seen at bedside and again was limited in her answering questions.  Unsure about patient's current mental status.  Did state that her lower legs still are sore.  Evaluated by PT and OT today.    Objective:     Vitals:   Temp (24hrs), Av °F (35.6 °C), Min:92.3 °F (33.5 °C), Max:98.7 °F (37.1 °C)    Temp:  [92.3 °F (33.5 °C)-98.7 °F (37.1 °C)] 98.7 °F (37.1 °C)  HR:  [48-72] 72  Resp:  [17-19] 17  BP: (103-147)/(50-71) 113/56  SpO2:  [94 %-98 %] 94 %  Body mass index is 25.66 kg/m².     Input and Output Summary (last 24 hours):     Intake/Output Summary (Last 24 hours) at 2024 2956  Last data filed at  4/18/2024 1512  Gross per 24 hour   Intake 50 ml   Output --   Net 50 ml       Physical Exam:   Physical Exam  Vitals and nursing note reviewed.   Constitutional:       General: She is not in acute distress.     Appearance: She is well-developed.   HENT:      Head: Normocephalic and atraumatic.      Mouth/Throat:      Mouth: Mucous membranes are moist.   Eyes:      General: No scleral icterus.     Conjunctiva/sclera: Conjunctivae normal.   Cardiovascular:      Rate and Rhythm: Normal rate and regular rhythm.      Heart sounds: No murmur heard.  Pulmonary:      Effort: No respiratory distress.      Breath sounds: No wheezing or rhonchi.      Comments: Increased work of breathing  Abdominal:      Palpations: Abdomen is soft.      Tenderness: There is no abdominal tenderness.   Musculoskeletal:         General: No swelling.      Cervical back: Neck supple.      Right lower leg: Edema present.      Left lower leg: Edema present.      Comments: Swelling of the lower extremities.  Erythema noted.  No obvious bleeding.   Skin:     General: Skin is dry.      Capillary Refill: Capillary refill takes less than 2 seconds.      Coloration: Skin is pale.      Comments: Cool and pale   Neurological:      Mental Status: She is alert and oriented to person, place, and time.      Cranial Nerves: No dysarthria.      Motor: Motor function is intact. No weakness, abnormal muscle tone or pronator drift.      Comments: Patient is alert and oriented to person, place, month, year.  Not oriented to situation although question if she is uncooperative.   Psychiatric:         Attention and Perception: She is inattentive.         Behavior: Behavior is uncooperative and slowed.         Cognition and Memory: Memory is impaired.         Judgment: Judgment is inappropriate.          Additional Data:     Labs:  Results from last 7 days   Lab Units 04/18/24  1338   WBC Thousand/uL 9.93   HEMOGLOBIN g/dL 13.0   HEMATOCRIT % 40.4   PLATELETS  Thousands/uL 284   SEGS PCT % 85*   LYMPHO PCT % 5*   MONO PCT % 8   EOS PCT % 0     Results from last 7 days   Lab Units 04/19/24  0456 04/18/24  1338   SODIUM mmol/L 138 133*   POTASSIUM mmol/L 4.0 4.3   CHLORIDE mmol/L 108 105   CO2 mmol/L 24 20*   BUN mg/dL 38* 32*   CREATININE mg/dL 1.18 1.19   ANION GAP mmol/L 6 8   CALCIUM mg/dL 7.8* 9.1   ALBUMIN g/dL  --  3.8   TOTAL BILIRUBIN mg/dL  --  0.60   ALK PHOS U/L  --  82   ALT U/L  --  13   AST U/L  --  34   GLUCOSE RANDOM mg/dL 76 114     Results from last 7 days   Lab Units 04/18/24  1338   INR  1.03     Results from last 7 days   Lab Units 04/19/24  1140 04/19/24  1106 04/19/24  0721 04/19/24  0407 04/19/24  0007 04/18/24  1831   POC GLUCOSE mg/dl 80 81 83 84 116 115         Results from last 7 days   Lab Units 04/18/24  1718 04/18/24  1338   LACTIC ACID mmol/L 1.1 3.6*   PROCALCITONIN ng/ml  --  0.11       Lines/Drains:  Invasive Devices       Peripherally Inserted Central Catheter Line  Duration             PICC Line 04/05/24 Right Basilic 13 days              Peripheral Intravenous Line  Duration             Peripheral IV 04/18/24 Left Antecubital <1 day                    Central Line:  Goal for removal:  Will discuss with patient's hematologist regarding PICC line         Telemetry:  Telemetry Orders (From admission, onward)               24 Hour Telemetry Monitoring  Continuous x 24 Hours (Telem)        Expiring   Question:  Reason for 24 Hour Telemetry  Answer:  Syncope suspected to be cardiac in origin                     Telemetry Reviewed: Normal Sinus Rhythm  Indication for Continued Telemetry Use: No indication for continued use. Will discontinue.              Imaging: Reviewed radiology reports from this admission including: CT head and xray(s)    Recent Cultures (last 7 days):   Results from last 7 days   Lab Units 04/18/24  1441 04/18/24  1338   BLOOD CULTURE  Received in Microbiology Lab. Culture in Progress. Received in Microbiology Lab.  Culture in Progress.       Last 24 Hours Medication List:   Current Facility-Administered Medications   Medication Dose Route Frequency Provider Last Rate    acetaminophen  650 mg Oral Q6H PRN Kyle Brunner, MD      amLODIPine  5 mg Oral Daily Kyle Brunner, MD      calcium carbonate  1 tablet Oral Daily With Breakfast Kyle Brunner, MD      Cholecalciferol  1,000 Units Oral Daily Kyle Brunner, MD      enoxaparin  30 mg Subcutaneous Q24H KATHARINA Kyle Brunner, MD      gabapentin  100 mg Oral TID Kyle Brunner, MD      loratadine  10 mg Oral Daily Kyle Brunner, MD      lubiprostone  8 mcg Oral BID Kyle Brunner, MD      metoprolol succinate  25 mg Oral Daily Kyle Brunner, MD      polyethylene glycol  17 g Oral Daily Kyle Brunner, MD      trimethobenzamide  200 mg Intramuscular Q6H PRN Kyle Brunner, MD          Today, Patient Was Seen By: Kyle Brunner, MD    **Please Note: This note may have been constructed using a voice recognition system.**

## 2024-04-19 NOTE — ASSESSMENT & PLAN NOTE
Lab Results   Component Value Date    EGFR 41 04/21/2024    EGFR 35 04/20/2024    EGFR 39 04/19/2024    CREATININE 1.14 04/21/2024    CREATININE 1.31 (H) 04/20/2024    CREATININE 1.18 04/19/2024     Creatinine stable.  Will avoid nephrotoxic medication.  Encourage po hydration.  Will monitor BMP.

## 2024-04-19 NOTE — OCCUPATIONAL THERAPY NOTE
"    Occupational Therapy Evaluation     Patient Name: Ida Vuong  Today's Date: 4/19/2024  Problem List  Principal Problem:    Loss of consciousness (HCC)  Active Problems:    IBS (irritable bowel syndrome)    Essential hypertension    CKD (chronic kidney disease) stage 3, GFR 30-59 ml/min (HCC)    Bilateral lower extremity edema    Fall    Elevated CK    Hypothermia    (HFpEF) heart failure with preserved ejection fraction (HCC)    Past Medical History  Past Medical History:   Diagnosis Date    Allergic rhinitis     Disease of thyroid gland     Dizziness     Hyponatremia 8/30/2021    Hypothyroidism 10/20/2015    Malignant neoplasm of colon (HCC)     last assessed: 10/20/2015    Neuropathy     Tinnitus      Past Surgical History  Past Surgical History:   Procedure Laterality Date    ADENOIDECTOMY      APPENDECTOMY      last assessed: 10/20/2015    CATARACT EXTRACTION, BILATERAL      CHOLECYSTECTOMY      last assessed: 10/20/2015    COLECTOMY      last assessed: 10/20/2015; partial     IR PICC PLACEMENT SINGLE LUMEN  4/5/2024    TONSILLECTOMY      last assessed: 10/20/2015         04/19/24 0928   OT Last Visit   OT Visit Date 04/19/24   Note Type   Note type Evaluation   Pain Assessment   Pain Assessment Tool 0-10   Pain Score 10 - Worst Possible Pain  (\"15\")   Pain Location/Orientation Orientation: Bilateral;Orientation: Lower;Location: Leg;Location: Foot  (L>R)   Pain Onset/Description Frequency: Intermittent;Descriptor: Discomfort   Effect of Pain on Daily Activities limits activity tolerance, standing tolerance   Patient's Stated Pain Goal No pain   Hospital Pain Intervention(s) Repositioned;Ambulation/increased activity;Elevated  (heels offloaded)   Multiple Pain Sites No   Restrictions/Precautions   Weight Bearing Precautions Per Order No   Other Precautions Cognitive;Chair Alarm;Bed Alarm;Telemetry;Fall Risk;Pain   Home Living   Type of Home House   Home Layout One level;Laundry in basement;Stairs to " "enter with rails;Able to live on main level with bedroom/bathroom;Performs ADLs on one level  (3 REJI)   Bathroom Shower/Tub Tub/shower unit   Bathroom Toilet Standard   Bathroom Equipment Grab bars in shower   Bathroom Accessibility Accessible   Home Equipment Walker;Wheelchair-manual;Grab bars;Cane  (rollator)   Additional Comments Pt is a poor historian, information obtained from EMR   Prior Function   Level of [place] Independent with ADLs;Independent with functional mobility;Needs assistance with IADLS  (Per EMR neighbor previously helped w/ laundry)   Lives With (S)  Alone   Receives Help From Neighbor;Family  (son calls)   IADLs Independent with medication management  (Pt poor historian)   Falls in the last 6 months 1 to 4  (2 in the last few days per EMR)   Vocational Retired   Comments Pt poor historian and questionably paranoid over providing information. Pt questionably hallucinating during questioning, unable to provide answers. Majority of information gathered via EMR. Pt reports using RW baseline for mobility.   Lifestyle   Autonomy Pt lives in one level house ALONE. Pt reports PLOF (I) for ADLs, (A) for IADLs, mod(I) w/ RW for functional mobility. (+) for falls. Information collected from EMR.   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 CT head: No acute intracranial abnormality. Chronic microangiopathy. Presented w/ mild confusion, hypothermia, and LE edema 2* LE infection being treated previously. PMH: IBS, CKD, Idiopathic peripheral neuropathy, insomnia, lumbrosacral spondylosis, osteoporosis, anemia, arthritis of L hip, hx of hallucinations.   Family/Caregiver Present No   Subjective   Subjective \"I'll only tell you when you turn that microphone off. No cameras.\" "   ADL   Where Assessed Chair   Eating Assistance 5  Supervision/Setup   Eating Deficit Setup;Beverage management  (setup for breakfast in recliner)   Grooming Assistance 4  Minimal Assistance   UB Bathing Assistance 4  Minimal Assistance   LB Bathing Assistance 2  Maximal Assistance   UB Dressing Assistance 4  Minimal Assistance   LB Dressing Assistance 2  Maximal Assistance   LB Dressing Deficit Setup;Steadying;Requires assistive device for steadying;Verbal cueing;Supervision/safety;Increased time to complete;Don/doff R sock;Don/doff L sock;Thread RLE into underwear;Thread LLE into underwear;Pull up over hips  (Unable to don/doff socks. Pt lift LE, therapist thread b/l legs through underwear. Therapist (A) w/ pull up above knee, pt (A) until mid thigh. Mod(A) steadying from RW/ therapist. Therapist (A) to pull underwear up in back)   Toileting Assistance  2  Maximal Assistance   Toileting Deficit Setup;Perineal hygiene;Steadying;Verbal cueing;Supervison/safety;Increased time to complete  (pt found grossly incontinent of bladder upon arrival; max A for hygiene in stance)   Bed Mobility   Supine to Sit 2  Maximal assistance   Additional items Assist x 2;HOB elevated;Bedrails;Increased time required;Verbal cues;LE management  (v/c to intiate grabbing bed rails, x1 for trunk support, x1 for LE management)   Sit to Supine Unable to assess   Additional Comments Pt lethargic supine in bed, increased alertness once sitting EOB. Pt left OOB in bedside chair at end of session.   Transfers   Sit to Stand 3  Moderate assistance   Additional items Assist x 2;Armrests;Increased time required;Verbal cues  (vc for hand placement/task initiation)   Stand to Sit 3  Moderate assistance   Additional items Assist x 1;Armrests;Increased time required;Verbal cues  (vc for hand initiation, task initiation. Therapist (A) to keep controlled descent. Therapist (A) to boost in chair.)   Additional Comments Countdown method used for attention  "to task, pt states \"You're counting faster than I can stand\"; highly retropulsive   Functional Mobility   Functional Mobility 3  Moderate assistance   Additional Comments Increased time required and max effort from pt to sequence steps, stating \"left, right, left, right\" for task focus to ambulate few steps to bedside recliner. VC needed to initiate steps.   Additional items Rolling walker   Balance   Static Sitting Fair   Dynamic Sitting Fair -   Static Standing Poor +   Dynamic Standing Poor   Activity Tolerance   Activity Tolerance Patient limited by fatigue;Patient limited by pain;Other (Comment)  (cognition, hallucinations??)   Medical Staff Made Aware Care coordinated w/ PT Olga   Nurse Made Aware Yes, RN Franca carter to see pt.   RUE Assessment   RUE Assessment WFL   LUE Assessment   LUE Assessment WFL   Hand Function   Gross Motor Coordination Functional  (able to don glasses after setup)   Fine Motor Coordination Functional   Sensation   Light Touch No apparent deficits   Vision-Basic Assessment   Current Vision Wears glasses all the time   Cognition   Overall Cognitive Status (S)  Impaired   Arousal/Participation Lethargic;Cooperative;Persistent stimuli required;Arousable  (Falling asleep while in bed needing constant stimuli to focus, increased responsiveness after movement)   Attention Attends with cues to redirect  (significant cueing to intiate tasks/sequence to participate in session)   Orientation Level (S)  Disoriented X4  (pt reported [place] Day w/ prompting, report does not improve w/ prompting/clues. Repors her name is Belén, unable to state , disoriented to situation- reporting \"I was on the sick list\", unable to describe symptoms, did not recall falling.)   Memory Decreased long term memory;Decreased recall of biographical information;Decreased short term memory;Decreased recall of recent events;Decreased recall of precautions  (Pt provided conflicting information regarding her children, home " setup, PLOF, etc.)   Following Commands Follows one step commands with increased time or repetition  (Very delayed processing, need multiple VC to initiate task and prompting for sequencing each step.)   Comments (S)  Pleasantly confused. Significantly disoriented, unable to reorient despite assistance. Questionably hallucinating throughout entirety of session, pt hinted to being on set of TV show multiple times. No awareness to being in a hospital and actively recieving therapy, thought we were all being recorded. Very guarded for offering personal information, paranoid about being recorded in the microphone. Required max VC for task initiation/sustained attention. Highly Tangential, often does not appropriately respond to questions, frequently uses scripted phrases. Need for further assessment due to pt living ALONE. Short Blessed Test completed on March 5th, 2024 per EMR scoring 15, (+) for impairment consistent w/ dementia. Hx of hallucinations per EMR.   Assessment   Limitation Decreased ADL status;Decreased Safe judgement during ADL;Decreased cognition;Decreased self-care trans;Decreased high-level ADLs   Prognosis Good   Assessment Pt is a 89 y.o. female admitted to [facility] due to a fall (+) for headstrike and (+) for LOC. Presented w/ mild confusion, hypothermia, and LE edema 2* LE infection being treated previously. PMH includes: IBS, CKD, Idiopathic peripheral neuropathy, insomnia, lumbrosacral spondylosis, osteoporosis, anemia, arthritis of L hip, hx of hallucinations. Pt currently lives in a one level house ALONE. Pt reports PLOF (I) for ADLs, (A) for IADLs, and mod(I) w/ RW for functional mobility. Based upon this evaluation, pt is showing deficits in the following areas: decreased performance in ADLs/functional mobility, cognition, dynamic sitting/standing balance, functional reach, attention, safety awareness, task initiation, ability to sequence. Limiting personal and enviornmental factors  include: inaccessible bathroom environment, inaccessible home environment, lack of social support, inaccessible access to community. Supporting factors include: availability of a FFSU and supportive neighbors/family. Pt will benefit from continued therapy interventions to address the above deficits. Following sessions will be focused on increasing performance for ADLs/ADL transfers/functional mobility, dynamic sitting/standing tolerance, and continued cognitive assessment/reorientation.   Goals   Patient Goals none states at this time due to cognition, will continue to evaluate   LTG Time Frame 7-10   Long Term Goal See goals below.   Plan   Treatment Interventions ADL retraining;Functional transfer training;Cognitive reorientation;Patient/family training;Equipment evaluation/education;Compensatory technique education;Continued evaluation;Activityengagement   Goal Expiration Date 04/29/24   OT Treatment Day 0   OT Frequency 3-5x/wk   Discharge Recommendation   Recommendation (S)  Geriatric Consult;Psych Consult  (questionable hallucinations?)   Rehab Resource Intensity Level, OT II (Moderate Resource Intensity)   Additional Comments  Pt seen as a co-eval with PT due to the patient's co-morbidities and clinically unstable presentation indicated by chart review. During session, pt benefited from two skilled therapists due to extensive physical assistance to achieve transitional movements and pt's decreased activity tolerance.   Additional Comments 2 The patient's raw score on the AM-PAC Daily Activity Inpatient Short Form is 13 . A raw score of less than 19 suggests the patient may benefit from discharge to post-acute rehabilitation services. Please refer to the recommendation of the Occupational Therapist for safe discharge planning.   AM-PAC Daily Activity Inpatient   Lower Body Dressing 2   Bathing 2   Toileting 2   Upper Body Dressing 2   Grooming 2   Eating 3   Daily Activity Raw Score 13   Daily Activity  Standardized Score (Calc for Raw Score >=11) 32.03   AM-PAC Applied Cognition Inpatient   Following a Speech/Presentation 1   Understanding Ordinary Conversation 2   Taking Medications 1   Remembering Where Things Are Placed or Put Away 2   Remembering List of 4-5 Errands 1   Taking Care of Complicated Tasks 1   Applied Cognition Raw Score 8   Applied Cognition Standardized Score 19.32   End of Consult   Education Provided Yes  (Educated on elevating feet while OOB, cognitive re-orientation attempted multiple times t/o session)   Patient Position at End of Consult Bedside chair;Bed/Chair alarm activated;All needs within reach   Nurse Communication Nurse aware of consult     Goals:  Pt will complete oral hygiene routine while standing at sink (S) to increase safety/independence w/ dynamic standing tasks      Pt will complete LB dressing (w/ or w/o AE) mod(A) to increase independence in ADL completion      Pt will complete LB bathing (w/ or w/o AE) w/ mod(A) to increase independence in ADL completion      Pt will complete toileting activity w/ mod(A) to increase independence in ADL completion      Pt will complete ADL transfers w/ min(A) for increased safety and independence in ADLs.    Pt will complete bed mobility transfers w/ mod(A) x1 for increased safety and independence w/ initiating OOB tasks.     Pt will increase activity tolerance to 10 minutes to increase ability to complete ADL tasks w/o evidence of exertion.     Pt will increase standing tolerance to 3-5 minutes to improve performance in hygiene routine.     Pt will be able to ambulate short household distances min(A) w/ RW for increased independence w/ toileting routine.     Pt will increase dynamic standing balance to fair- to promote greater safety/performance for standing hygiene tasks.     Patient will engage in continued cognitive assessment to assist in safe d/c planning.      Patient will consistently be oriented x2 (name, situation) 4/5 times to  improve general awareness.     Patient will be able to attend for 5 minutes w/o no more than minimal VC for redirection to improve active participation in purposeful tasks.    Lilaim Cho, OTS

## 2024-04-19 NOTE — ASSESSMENT & PLAN NOTE
Lab Results   Component Value Date    EGFR 39 04/19/2024    EGFR 39 04/18/2024    EGFR 45 (L) 03/08/2024    CREATININE 1.18 04/19/2024    CREATININE 1.19 04/18/2024    CREATININE 1.14 (H) 03/08/2024   Baseline creatinine 1.1.  Avoid nephrotoxins

## 2024-04-19 NOTE — CONSULTS
Consultation - Geriatric Medicine   Ida Lee Yevgeniy 89 y.o. female MRN: [id number]  Unit/Bed#: S -01 Encounter: [id number]      Assessment/Plan     At risk for delirium  Assessment & Plan    -Patient is high risk of delirium due to cognitive impairment at baseline, hospitalization  -Initiate delirium precautions  -maintain normal sleep/wake cycle  -minimize overnight interruptions, group overnight vitals/labs/nursing checks as possible  -dim lights, close blinds and turn off tv to minimize stimulation and encourage sleep environment in evenings  -ensure that pain is well controlled start Tylenol 975mg Q8H scheduled   -monitor for fecal and urinary retention which may precipitate delirium  -encourage early mobilization and ambulation  -provide frequent reorientation and redirection  -encourage family and friends at the bedside to help calm patient if anxious  -avoid medications which may precipitate or worsen delirium such as tramadol, benzodiazepine, anticholinergics, and antihistaminics  -encourage hydration and nutrition , assist with feeding if needed  -redirect unwanted behaviors as first line, avoid physical restraints.   -Decrease gabapentin to 100 mg p.o. twice daily due to impaired kidney function  -Hold Amitiza, consider senna for constipation  -Avoid use of antipsychotics due to elevated Qtc, to be 540 on admission    Cognitive impairment  Assessment & Plan  Patient was alert oriented x 1 at the time of encounter  Needs assistance with IADLs at baseline  Scored 2/5 on mini cog  CT showed microangiopathic changes  Will continue to provide supportive care, reorient as needed.  Patient is at high risk for delirium, will monitor closely and place on delirium precautions.  Maintain sleep/wake cycle.  Optimize pain regimen.  Monitor for constipation and urinary retention and manage as needed.  Check B12 level and TSH  Encourage family to visit.  Encourage to wear glasses and hearing aids while  awake.  Encourage po intake, assist with feeding if needed.     (HFpEF) heart failure with preserved ejection fraction (HCC)  Assessment & Plan  Wt Readings from Last 3 Encounters:   04/19/24 59.6 kg (131 lb 6.3 oz)   04/09/24 62.6 kg (138 lb)   04/02/24 62 kg (136 lb 9.6 oz)     Started on Lasix  continue to monitor ins and outs and daily weights  monitor electrolytes          Fall  Assessment & Plan  Patient uses a cane for ambulation at baseline  she reports recent fall  Associated symptoms -dizziness.  Patient is a poor historian not able to give details about the fall  Monitor orthostatic vital signs  Encourage p.o. hydration  Avoid hypotension and hypoglycemia   Continue PT/OT    CKD (chronic kidney disease) stage 3, GFR 30-59 ml/min ([facility])  Assessment & Plan  Lab Results   Component Value Date    EGFR 39 04/19/2024    EGFR 39 04/18/2024    EGFR 45 (L) 03/08/2024    CREATININE 1.18 04/19/2024    CREATININE 1.19 04/18/2024    CREATININE 1.14 (H) 03/08/2024     Creatinine stable.  Will avoid nephrotoxic medication.  Encourage po hydration.  Will monitor BMP.     Essential hypertension  Assessment & Plan  Blood pressure in the lower side  Hold amlodipine for now  continue metoprolol with holding parameters  Monitor orthostatic vital signs and avoid hypotension              History of Present Illness   Physician Requesting Consult: Ibis Mclaughlin MD  Reason for Consult / Principal Problem: fall, change in mental status  Hx and PE limited by: Cognitive impairment  Additional history obtained from: Patient presents Danni and her  who are present at bedside      HPI: Ida Vuong is a 89 y.o. year old female who presented to the hospital status post fall.  Patient asked her friends to bring her to the hospital due to feeling weak and more confused.  She had couple of falls but she does not remember details.  She was also complaining of bilateral lower extremity pain.  Her friends found the patient  outside not wearing appropriate clothes for the weather.  At the time of encounter patient denies chest pain shortness of breath cough.  Reports intermittent dizziness.  Does not remember how she got to the hospital.  Reports chronic joint pain.  She lives at home alone and she uses a cane for ambulation.  States that she is managing medication on her own but lately she is speaking and choosing what she wants to take, as that she is taking too many pills.  Patient states that her appetite is preserved.  Denies abdominal pain dysuria hematuria.  Reports constipation.  No nausea or vomiting.  Denies fever or chills  As per her friends patient is more confused and restless for the past few months.  They believe that she has poor p.o. intake and most likely she is not sleeping well at night.  Patient needs assistance with IADLs    Inpatient consult to Gerontology  Consult performed by: Aida Pérez MD  Consult ordered by: Kyle Brunner, MD          Review of Systems   Constitutional:  Negative for chills and fever.   HENT:  Negative for congestion and rhinorrhea.    Eyes:  Positive for visual disturbance.   Respiratory:  Negative for cough, shortness of breath and wheezing.    Cardiovascular:  Positive for leg swelling. Negative for chest pain and palpitations.   Gastrointestinal:  Positive for constipation. Negative for abdominal pain.   Endocrine: Negative for cold intolerance.   Genitourinary:  Negative for difficulty urinating, dysuria and hematuria.   Musculoskeletal:  Positive for arthralgias and gait problem.   Skin:  Negative for wound.   Allergic/Immunologic: Negative for environmental allergies.   Neurological:  Positive for dizziness. Negative for seizures.   Hematological:  Does not bruise/bleed easily.   Psychiatric/Behavioral:  Negative for sleep disturbance.            Historical Information   Past Medical History:   Diagnosis Date    Allergic rhinitis     Disease of thyroid gland     Dizziness      Hyponatremia 8/30/2021    Hypothyroidism 10/20/2015    Malignant neoplasm of colon (HCC)     last assessed: 10/20/2015    Neuropathy     Tinnitus      Past Surgical History:   Procedure Laterality Date    ADENOIDECTOMY      APPENDECTOMY      last assessed: 10/20/2015    CATARACT EXTRACTION, BILATERAL      CHOLECYSTECTOMY      last assessed: 10/20/2015    COLECTOMY      last assessed: 10/20/2015; partial     IR PICC PLACEMENT SINGLE LUMEN  4/5/2024    TONSILLECTOMY      last assessed: 10/20/2015     Social History   Social History     Substance and Sexual Activity   Alcohol Use No     Social History     Substance and Sexual Activity   Drug Use Never     Social History     Tobacco Use   Smoking Status Former    Current packs/day: 0.25    Types: Cigarettes   Smokeless Tobacco Never   Tobacco Comments    quit 50 yrs ago     Family History:   Family History   Problem Relation Age of Onset    Heart disease Mother         cardiac disorder    Alzheimer's disease Sister     No Known Problems Father     Leukemia Brother        Meds/Allergies   current meds:   Current Facility-Administered Medications   Medication Dose Route Frequency    acetaminophen (TYLENOL) tablet 975 mg  975 mg Oral TID    calcium carbonate (OYSTER SHELL,OSCAL) 500 mg tablet 1 tablet  1 tablet Oral Daily With Breakfast    Cholecalciferol (VITAMIN D3) tablet 1,000 Units  1,000 Units Oral Daily    enoxaparin (LOVENOX) subcutaneous injection 30 mg  30 mg Subcutaneous Q24H [facility]    gabapentin (NEURONTIN) capsule 100 mg  100 mg Oral BID    metoprolol succinate (TOPROL-XL) 24 hr tablet 25 mg  25 mg Oral Daily    polyethylene glycol (MIRALAX) packet 17 g  17 g Oral Daily    senna (SENOKOT) tablet 17.2 mg  2 tablet Oral BID    trimethobenzamide (TIGAN) IM injection 200 mg  200 mg Intramuscular Q6H PRN     Home medications verified with [facility] on [address] in [place]  Gabapentin 100 mg p.o. 3 times daily  Amitiza  24 mcg daily  meclizine 25 mg every  8 hours  Metoprolol 25 mg daily  Amlodipine 5 mg daily  Aspirin 81 mg daily  Tylenol as needed for pain      Allergies   Allergen Reactions    Penicillins Hives     Other reaction(s): Other (See Comments)  hives       Objective     Intake/Output Summary (Last 24 hours) at 4/19/2024 1454  Last data filed at 4/19/2024 1251  Gross per 24 hour   Intake 290 ml   Output --   Net 290 ml     Invasive Devices       Peripherally Inserted Central Catheter Line  Duration             PICC Line 04/05/24 Right Basilic 14 days              Peripheral Intravenous Line  Duration             Peripheral IV 04/18/24 Left Antecubital 1 day                    Physical Exam  Vitals and nursing note reviewed.   Constitutional:       General: She is not in acute distress.     Appearance: She is well-developed.   HENT:      Head: Normocephalic and atraumatic.      Ears:      Comments: Hard Of hearing     Mouth/Throat:      Mouth: Mucous membranes are dry.   Eyes:      Conjunctiva/sclera: Conjunctivae normal.   Cardiovascular:      Rate and Rhythm: Normal rate. Rhythm irregular.      Heart sounds: Heart sounds are distant. No murmur heard.  Pulmonary:      Effort: Pulmonary effort is normal. No respiratory distress.      Breath sounds: Normal breath sounds.   Abdominal:      Palpations: Abdomen is soft.      Tenderness: There is no abdominal tenderness.   Musculoskeletal:         General: No swelling.      Cervical back: Neck supple.      Right lower leg: Edema present.      Left lower leg: Edema present.   Skin:     General: Skin is warm and dry.      Capillary Refill: Capillary refill takes less than 2 seconds.   Neurological:      Mental Status: She is alert. She is disoriented.      Comments: AAOx1   Psychiatric:         Mood and Affect: Mood normal.         Lab Results:   I have personally reviewed pertinent lab results including the following:  - [facility] CMP    I have personally reviewed the following imaging study reports in PACS:  - CT  head      Therapies:   PT: Patient will benefit of discharge to postacute rehab    VTE Prophylaxis: Enoxaparin (Lovenox)    Code Status: Level 3 - DNAR and DNI  Advance Directive and Living Will:      Power of :    POLST:      Family and Social Support: lives alone        Thank you for allowing me to participate in your patients' care. Please do not hesitate to call with any additional questions.  Aida Pérez MD

## 2024-04-19 NOTE — ASSESSMENT & PLAN NOTE
Wt Readings from Last 3 Encounters:   04/19/24 59.6 kg (131 lb 6.3 oz)   04/09/24 62.6 kg (138 lb)   04/02/24 62 kg (136 lb 9.6 oz)     Received Lasix on admission  continue to monitor ins and outs and daily weights  monitor electrolytes  Continue low-sodium diet and fluid restriction

## 2024-04-19 NOTE — ASSESSMENT & PLAN NOTE
Assessment:  Unsure about etiology however past records show that she had a questionable infection for which she had a PICC line placed  Lower extremity ultrasound negative for DVT    Plan:  Place compression stockings after.  Elevate legs

## 2024-04-19 NOTE — ASSESSMENT & PLAN NOTE
Patient uses a cane for ambulation at baseline  she reports recent fall  Associated symptoms -dizziness.    Monitor orthostatic vital signs  Encourage p.o. hydration  Avoid hypotension and hypoglycemia   Continue PT/OT

## 2024-04-19 NOTE — PLAN OF CARE
Problem: PAIN - ADULT  Goal: Verbalizes/displays adequate comfort level or baseline comfort level  Description: Interventions:  - Encourage patient to monitor pain and request assistance  - Assess pain using appropriate pain scale  - Administer analgesics based on type and severity of pain and evaluate response  - Implement non-pharmacological measures as appropriate and evaluate response  - Consider cultural and social influences on pain and pain management  - Notify physician/advanced practitioner if interventions unsuccessful or patient reports new pain  Outcome: Progressing     Problem: INFECTION - ADULT  Goal: Absence or prevention of progression during hospitalization  Description: INTERVENTIONS:  - Assess and monitor for signs and symptoms of infection  - Monitor lab/diagnostic results  - Monitor all insertion sites, i.e. indwelling lines, tubes, and drains  - Monitor endotracheal if appropriate and nasal secretions for changes in amount and color  - [place] appropriate cooling/warming therapies per order  - Administer medications as ordered  - Instruct and encourage patient and family to use good hand hygiene technique  - Identify and instruct in appropriate isolation precautions for identified infection/condition  Outcome: Progressing  Goal: Absence of fever/infection during neutropenic period  Description: INTERVENTIONS:  - Monitor WBC    Outcome: Progressing     Problem: SAFETY ADULT  Goal: Patient will remain free of falls  Description: INTERVENTIONS:  - Educate patient/family on patient safety including physical limitations  - Instruct patient to call for assistance with activity   - Consult OT/PT to assist with strengthening/mobility   - Keep Call bell within reach  - Keep bed low and locked with side rails adjusted as appropriate  - Keep care items and personal belongings within reach  - Initiate and maintain comfort rounds  - Make Fall Risk Sign visible to staff  - Offer Toileting every  Hours,  in advance of need  - Initiate/Maintain alarm  - Obtain necessary fall risk management equipment:   - Apply yellow socks and bracelet for high fall risk patients  - Consider moving patient to room near nurses station  Outcome: Progressing  Goal: Maintain or return to baseline ADL function  Description: INTERVENTIONS:  -  Assess patient's ability to carry out ADLs; assess patient's baseline for ADL function and identify physical deficits which impact ability to perform ADLs (bathing, care of mouth/teeth, toileting, grooming, dressing, etc.)  - Assess/evaluate cause of self-care deficits   - Assess range of motion  - Assess patient's mobility; develop plan if impaired  - Assess patient's need for assistive devices and provide as appropriate  - Encourage maximum independence but intervene and supervise when necessary  - Involve family in performance of ADLs  - Assess for home care needs following discharge   - Consider OT consult to assist with ADL evaluation and planning for discharge  - Provide patient education as appropriate  Outcome: Progressing  Goal: Maintains/Returns to pre admission functional level  Description: INTERVENTIONS:  - Perform AM-PAC 6 Click Basic Mobility/ Daily Activity assessment daily.  - Set and communicate daily mobility goal to care team and patient/family/caregiver.   - Collaborate with rehabilitation services on mobility goals if consulted  - Perform Range of Motion  times a day.  - Reposition patient every  hours.  - Dangle patient  times a day  - Stand patient imes a day  - Ambulate patient  times a day  - Out of bed to chair  times a day   - Out of bed for meals  times a day  - Out of bed for toileting  - Record patient progress and toleration of activity level   Outcome: Progressing     Problem: DISCHARGE PLANNING  Goal: Discharge to home or other facility with appropriate resources  Description: INTERVENTIONS:  - Identify barriers to discharge w/patient and caregiver  - Arrange for  needed discharge resources and transportation as appropriate  - Identify discharge learning needs (meds, wound care, etc.)  - Arrange for interpretive services to assist at discharge as needed  - Refer to Case Management Department for coordinating discharge planning if the patient needs post-hospital services based on physician/advanced practitioner order or complex needs related to functional status, cognitive ability, or social support system  Outcome: Progressing     Problem: Knowledge Deficit  Goal: Patient/family/caregiver demonstrates understanding of disease process, treatment plan, medications, and discharge instructions  Description: Complete learning assessment and assess knowledge base.  Interventions:  - Provide teaching at level of understanding  - Provide teaching via preferred learning methods  Outcome: Progressing

## 2024-04-19 NOTE — ASSESSMENT & PLAN NOTE
Patient was alert oriented x 1 on admission to the hospital, today she was alert oriented x 3  Needs assistance with IADLs at baseline  Scored 2/5 on mini cog  CT showed microangiopathic changes  Will continue to provide supportive care, reorient as needed.  Patient is at high risk for delirium, will monitor closely and place on delirium precautions.  Maintain sleep/wake cycle.  Optimize pain regimen.  Monitor for constipation and urinary retention and manage as needed.  TSH and B12 level within normal limits  Encourage family to visit.  Encourage to wear glasses and hearing aids while awake.  Encourage po intake, assist with feeding if needed.

## 2024-04-19 NOTE — CONSULTS
Consultation - Neuropsychology/Psychology Department  Ida Vuong 89 y.o. female MRN: [id number]  Unit/Bed#: S -01 Encounter: [id number]        Reason for Consultation:  Ida Vuong is a 89 y.o. year old female who was referred for a Neuropsychological Exam to assess cognitive functioning and comment on capacity to make informed medical decisions.      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)    Benign positional vertigo    Gastroparesis    IBS (irritable bowel syndrome)    Idiopathic peripheral neuropathy    Insomnia    Lumbosacral spondylosis without myelopathy    Osteoporosis    Essential hypertension    Low back pain    CKD (chronic kidney disease) stage 3, GFR 30-59 ml/min (HCC)    Iron deficiency anemia    Anxiety    Ambulatory dysfunction    Dizziness    Megaloblastic anemia    Anemia    Bilateral lower extremity edema    Hx of colonic polyps    Diverticulosis    Arthritis of left hip    Malnutrition due to renal disease  (HCC)    History of compression fracture of spine    Hx of right hemicolectomy    Fall    Elevated CK    Hypothermia    Loss of consciousness (HCC)    (HFpEF) heart failure with preserved ejection fraction (HCC)         Historical Information   Past Medical History:   Diagnosis Date    Allergic rhinitis     Disease of thyroid gland     Dizziness     Hyponatremia 8/30/2021    Hypothyroidism 10/20/2015    Malignant neoplasm of colon (HCC)     last assessed: 10/20/2015    Neuropathy     Tinnitus      Past Surgical History:   Procedure Laterality Date    ADENOIDECTOMY      APPENDECTOMY      last assessed: 10/20/2015    CATARACT EXTRACTION, BILATERAL      CHOLECYSTECTOMY      last assessed: 10/20/2015    COLECTOMY      last assessed: 10/20/2015; partial     IR  PICC PLACEMENT SINGLE LUMEN  4/5/2024    TONSILLECTOMY      last assessed: 10/20/2015     Social History   Social History     Substance and Sexual Activity   Alcohol Use No     Social History     Substance and Sexual Activity   Drug Use Never     Social History     Tobacco Use   Smoking Status Former    Current packs/day: 0.25    Types: Cigarettes   Smokeless Tobacco Never   Tobacco Comments    quit 50 yrs ago     Family History:   Family History   Problem Relation Age of Onset    Heart disease Mother         cardiac disorder    Alzheimer's disease Sister     No Known Problems Father     Leukemia Brother        Meds/Allergies   current meds:   Current Facility-Administered Medications   Medication Dose Route Frequency    acetaminophen (TYLENOL) tablet 975 mg  975 mg Oral TID    amLODIPine (NORVASC) tablet 5 mg  5 mg Oral Daily    calcium carbonate (OYSTER SHELL,OSCAL) 500 mg tablet 1 tablet  1 tablet Oral Daily With Breakfast    Cholecalciferol (VITAMIN D3) tablet 1,000 Units  1,000 Units Oral Daily    enoxaparin (LOVENOX) subcutaneous injection 30 mg  30 mg Subcutaneous Q24H [facility]    furosemide (LASIX) injection 40 mg  40 mg Intravenous Once    gabapentin (NEURONTIN) capsule 100 mg  100 mg Oral TID    lubiprostone (AMITIZA) capsule 8 mcg  8 mcg Oral BID    metoprolol succinate (TOPROL-XL) 24 hr tablet 25 mg  25 mg Oral Daily    polyethylene glycol (MIRALAX) packet 17 g  17 g Oral Daily    trimethobenzamide (TIGAN) IM injection 200 mg  200 mg Intramuscular Q6H PRN       Allergies   Allergen Reactions    Penicillins Hives     Other reaction(s): Other (See Comments)  hives         Family and Social Support:   No data recorded    Behavioral Observations: Patient was alert, UNABLE to accurately state the season, month, day/week, day/month, name of hospital; cooperative; affect appeared pleasant and patient admitted to depressed mood and anxiety; thoughts at times appeared tangential; when asked reason for hospitalization  "patient stated, \"It was a mistake, I didn't come here on my own, I just woke up here\". Patient was unable to provide medical history and does not know what she is being treated for in hospital. Patient admitted to difficulty with memory functioning.    Cognitive Examination    General Cognitive Functioning MMSE = [id number];     Attention/Concentration Auditory Selective Attention = Average; Auditory Vigilance = Impaired; Information Processing Speed = Within Normal Limits    Frontal Systems/Executive Functioning Mental Flexibility/Cognitive Control = Within Normal Limits; Working Memory = Impaired Abstract Reasoning = Impaired; Generative Ability = Impaired,     Language Functioning Phonemic Fluency = Impaired; Semantic Retrieval = Impaired; Comprehension of Complex Ideational Material = Impaired; Praxis = Within Normal Limits; Repetition = Within Normal Limits; Basic Reading = Impaired; Following Commands = Impaired    Memory Functioning Narrative Recall - Short Delay = Impaired; Long Delay Narrative Recall = Impaired; Three word recall = Impaired 1/3    Visuo-Spatial Abilities Not Assessed    Functional Knowledge  Health & Safety Knowledge = Impaired;     Summary/Impression:  Results of Neuropsychological Exam revealed diffuse cognitive dysfunction and on a measure assessing awareness of personal health status and ability to evaluate health problems, handle medical emergencies and take safety precautions, patient performed in the IMPAIRED range of functioning. During this encounter, patient does not appear to have capacity to make fully informed medical decisions.      "

## 2024-04-19 NOTE — CASE MANAGEMENT
Case Management Assessment & Discharge Planning Note    Patient name Ida Vuong  Location S /S -01 MRN [id number]  : 1930 Date 2024       Current Admission Date: 2024  Current Admission Diagnosis:Loss of consciousness (HCC)   Patient Active Problem List    Diagnosis Date Noted    Cognitive impairment 2024    At risk for delirium 2024    Fall 2024    Elevated CK 2024    Hypothermia 2024    Loss of consciousness (HCC) 2024    (HFpEF) heart failure with preserved ejection fraction (HCC) 2024    Hx of right hemicolectomy 2023    History of compression fracture of spine 2023    Malnutrition due to renal disease  (HCC) 2023    Arthritis of left hip 10/25/2022    Hx of colonic polyps 2022    Diverticulosis 2022    Anemia 2022    Bilateral lower extremity edema 2022    Megaloblastic anemia 2021    Ambulatory dysfunction 2021    Dizziness 2021    Anxiety 2021    Iron deficiency anemia 2021    CKD (chronic kidney disease) stage 3, GFR 30-59 ml/min ([facility]) 2020    Lumbosacral spondylosis without myelopathy 2018    Essential hypertension 2018    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) 2018    Idiopathic peripheral neuropathy 2018    Benign positional vertigo 2017    Allergic rhinitis 2016    Insomnia 2016    Gastroparesis 2016    IBS (irritable bowel syndrome) 10/20/2015    Osteoporosis 10/20/2015    Low back pain 10/08/2015      LOS (days): 0  Geometric Mean LOS (GMLOS) (days): 2.4  Days to GMLOS:2.1     OBJECTIVE:    Risk of Unplanned Readmission Score: 14.6         Current admission status: Inpatient       Preferred Pharmacy:   [facility] [place] [place] [address] [address]  Phone: [phone] Fax: [phone]    Primary Care Provider: Ron Wadsworth  MD    Primary Insurance: MEDICARE  Secondary Insurance: BLUE CROSS    ASSESSMENT:  Active Health Care Proxies    There are no active Health Care Proxies on file.                      Patient Information  Admitted from:: Home  Mental Status: Confused  Assessment information provided by:: Son Luciana, by phone)  Primary Caregiver: Self  Support Systems: Self, Family members, Friends/neighbors  County of Residence: [place]  What city do you live in?: [place]  Type of Current Residence: [facility]  Living Arrangements: Lives Alone  Is patient a ?: No    Activities of Daily Living Prior to Admission  Functional Status: Independent  Completes ADLs independently?: Yes  Does the patient have a history of Short-Term Rehab?: Yes ([facility])  Does patient have a history of HHC?: No  Does patient currently have HHC?: No         Patient Information Continued  Does patient have prescription coverage?: Yes  Does patient receive dialysis treatments?: No  Does patient have a history of substance abuse?: No         Means of Transportation  Means of Transport to Appts:: Friends      Social Determinants of Health (SDOH)      Flowsheet Row Most Recent Value   Housing Stability    In the last 12 months, was there a time when you were not able to pay the mortgage or rent on time? N   In the last 12 months, how many places have you lived? 1   In the last 12 months, was there a time when you did not have a steady place to sleep or slept in a shelter (including now)? N   Transportation Needs    In the past 12 months, has lack of transportation kept you from medical appointments or from getting medications? no   In the past 12 months, has lack of transportation kept you from meetings, work, or from getting things needed for daily living? No   Food Insecurity    Within the past 12 months, you worried that your food would run out before you got the money to buy more. Never true   Within the past 12 months, the food you bought just  didn't last and you didn't have money to get more. Never true   Utilities    In the past 12 months has the electric, gas, oil, or water company threatened to shut off services in your home? No            DISCHARGE DETAILS:    Discharge planning discussed with:: patient's son, Andrzej, by phone and daughter-in-lawMisa by second call  [place] of Choice: Yes (re: facility)  Comments - Freedom of Choice: in agreement with referrals being sent for rehab/long term placement - preference for [facility] as patient recently there  CM contacted family/caregiver?: Yes  Were Treatment Team discharge recommendations reviewed with patient/caregiver?: Yes  Did patient/caregiver verbalize understanding of patient care needs?: N/A- going to facility  Were patient/caregiver advised of the risks associated with not following Treatment Team discharge recommendations?: Yes    Contacts  Patient Contacts: valeria Donnelly  Relationship to Patient:: Family  Contact Method: Phone  Phone Number: [phone]  Reason/Outcome: Continuity of Care, Emergency Contact, Discharge Planning, Referral    Requested Home Health Care         Is the patient interested in HHC at discharge?: No    DME Referral Provided  Referral made for DME?: No    Other Referral/Resources/Interventions Provided:  Interventions: SNF  Referral Comments: Patient admitted following falls at home. Capacity eval completed this afternoon and patient found not to have capacity. Call made to patient's son, Andrzej, to complete assessment. Son reports that he and his wife live in [place], and have been a little removed from patient's care. Patient's neighbors, Link and Magaly, had been primary caregivers and assisted patient to and from doctors appointments and with errands, grocery shopping, etc. Son aware that patient has PICC line, but reports he's not sure why it was placed. Per records appears to have been placed on 4/5 and patient following with out-patient infusion  "services for care- son believes it was for abx, but was not sure what they were treating. Son reports patient had COVID recently and was sent to [facility] for STR after hospitalization at [facility]; patient was then transferred home without any services (to his knowledge) as patient refused long term care and refused any in-home help. Son did state that he had to call a welfare check recently for patient and after that time he had gotten a call from the [facility] re: services, but he's unsure if it was a protective service  or not. Son aware of result of capacity evaluation and that facility placement will likely be needed. Relays preference for [facility] if possible, but in agreement for blanket referrals to be sent. Son reports that he does have a \"springing POA\" which was designed to be effective once patient unable to make decisions on her own. In agreement for rehab transfer at this time, with likely plan for long-term care, either at a facility vs assisted living. Reports that he may have spouse, Misa, call to follow-up on conversation as well, but in agreement for this writer to send referrals and then follow-up next week with responses. Call then received from patient's daughter-in-law, Misa, and reviewed same information provided to son. Reviewed likely need for some financial information to be provided to facilities secondary to possible need for long-term care, however plan may be transition to assisted living if patient able to improve mobility (daughter in law states patient was ambulating greater than 200' and independent with ADLs upon d/c from [place] a few weeks ago). Referrals sent in [facility] for rehab facilities and family likely to come up this weekend to visit patient - live in [place]. Will continue to follow for likely STR/long-term care.    Would you like to participate in our [facility] service program?  : No - Declined    Treatment Team Recommendation: " SNF  Discharge Destination Plan:: SNF

## 2024-04-19 NOTE — PLAN OF CARE
Problem: PHYSICAL THERAPY ADULT  Goal: Performs mobility at highest level of function for planned discharge setting.  See evaluation for individualized goals.  Description: Treatment/Interventions: Functional transfer training, LE strengthening/ROM, Elevations, Therapeutic exercise, Endurance training, Cognitive reorientation, Patient/family training, Equipment eval/education, Bed mobility, Gait training, Compensatory technique education, Spoke to MD, Spoke to nursing, Spoke to case management    See flowsheet documentation for full assessment, interventions and recommendations.  Outcome: Progressing  Note: Prognosis: Fair  Problem List: Decreased strength, Decreased range of motion, Decreased endurance, Impaired balance, Decreased mobility, Decreased cognition, Impaired judgement, Decreased safety awareness, Impaired hearing, Obesity, Decreased skin integrity, Pain  Assessment: Pt seen for PT evaluation for mobility assessment & discharge needs. Activity orders: Up and OOB as tolerated. Pt admitted 4/18/2024 s/p x2 falls, AMS dx Loss of consciousness (HCC). Comorbidities affecting pt's fnxl performance include: Neuropathy, falls, heart failure, CKD, hallucinations, colon CA, Liver cirrhosis, anxiety, ambulatory dysfunction, vertigo, CKD, HTN. During PT IE, pt requires MAXA 2 person for bed mobility, MODA 2 person for STS transfers w/ RW, pt needing functional rest. During addt'l treatment time pt progresses to complete transfers with MODA 1 and ambulate 8ft with RW and MODA + RW. Pt displays above outlined functional impairments & limitations, and presents below her baseline level of functional mobility. The AM-PAC & Barthel Index outcome tools were used to assist in determining pt safety w/ mobility/self care & appropriate d/c recommendations, see above for scores. Pt is at risk of falls d/t multiple comorbidities, impulsivity, h/o falls, impaired balance, impaired cognition, impaired insight/safety awareness,  use of ambulatory aid, varying levels of pain , advanced age, acuity of medical illness, ongoing medical treatment of primary dx, and polypharmacy. Pt's clinical presentation is currently unstable/unpredictable as seen in pt's presentation of changing level of pain, varying levels of cognitive performance, increased fall risk, new onset of impairment of functional mobility, decreased endurance, and new onset of weakness. Pt will benefit from continued PT services in order to address impairments, decrease risk of falls, maximize independence w/ fnxl mobility, & ensure safety w/ mobility for transition to next level of care. Based on pt presentation & impairments, pt would most appropriately benefit from Level II (moderate PT intensity) resources upon d/c.  Barriers to Discharge: Decreased caregiver support (pt with little to no social supports)     Rehab Resource Intensity Level, PT: II (Moderate Resource Intensity)    See flowsheet documentation for full assessment.

## 2024-04-19 NOTE — ASSESSMENT & PLAN NOTE
Assessment:  Uncertain etiology and patient's has unreliable history.  Noted to have 2 falls with head strike while on aspirin.  Admission CT head imaging, spine, chest, pelvis negative for any acute fractures  EKG shows mildly prolonged QTc    Plan:  Will initiate syncope workup and place on 24-hour telemetry.  Continue exam collateral history  Repeat echo pending  Fall precautions  PT/OT

## 2024-04-19 NOTE — CONSULTS
Podiatry - Consultation    Patient Information:   Ida Vuong 89 y.o. female MRN: [id number]  Unit/Bed#: S -01 Encounter: [id number]  PCP: Ron Wadsworth MD  Date of Admission:  4/18/2024  Date of Consultation: 04/19/24  Requesting Physician: Ibis Mclaughlin MD      ASSESSMENT:    Ida Vuong is a 89 y.o. female with:    Bilateral lower extremity pain history of fall    PLAN:    No pain in the foot today on examination bilaterally.  I do not see any acute issues she does have bunion deformities however they do not cause her significant pain currently she does also fungal nails which the nails do not cause her any pain or discomfort currently no open lesions or ulcerations.    She does have pain in the lower leg with some bruising noted to these areas will obtain x-ray of lower leg for evaluation.    Patient should work with physical therapy for ambulation.      Weightbearing status: Weightbearing as tolerated    SUBJECTIVE:    History of Present Illness:    Ida Vuong is a 89 y.o. female who is originally admitted 4/18/2024 due to Patient was admitted due to loss of consciousness of uncertain etiology.  Patient has history of bilateral lower extremity edema as well patient has reported unsteady balance on her left side.  She had pain of her lower extremities.  Unsure how the patient actually did present to the hospital but states that her friend did bring her to the hospital.  She lives by herself and manages her own medications.  Patient did have a PICC line placed recently was receiving IV antibiotics for lower leg infection she also had a recent hospitalization for COVID.  Podiatry was consulted for evaluation of great toe pain.  Currently imaging studies show venous duplex which was no evidence of acute or chronic deep venous thrombosis..     Podiatry is consulted for toe pain    Review of Systems:    Constitutional: Negative.    HENT: Negative.    Eyes: Negative.    Respiratory:  Negative.    Cardiovascular: Negative.    Gastrointestinal: Negative.    Musculoskeletal: Lower leg pain bilateral  Skin: Bruises noted lower legs  Neurological: Intact  Psych: Confused.     Past Medical and Surgical History:     Past Medical History:   Diagnosis Date    Allergic rhinitis     Disease of thyroid gland     Dizziness     Hyponatremia 8/30/2021    Hypothyroidism 10/20/2015    Malignant neoplasm of colon (HCC)     last assessed: 10/20/2015    Neuropathy     Tinnitus        Past Surgical History:   Procedure Laterality Date    ADENOIDECTOMY      APPENDECTOMY      last assessed: 10/20/2015    CATARACT EXTRACTION, BILATERAL      CHOLECYSTECTOMY      last assessed: 10/20/2015    COLECTOMY      last assessed: 10/20/2015; partial     IR PICC PLACEMENT SINGLE LUMEN  4/5/2024    TONSILLECTOMY      last assessed: 10/20/2015       Meds/Allergies:    Medications Prior to Admission   Medication    amLODIPine (NORVASC) 5 mg tablet    aspirin 81 MG tablet    calcium carbonate-vitamin D (OSCAL-D) 500 mg-200 units per tablet    cholecalciferol (VITAMIN D3) 1,000 units tablet    clobetasol (TEMOVATE) 0.05 % ointment    fexofenadine (ALLEGRA) 180 MG tablet    gabapentin (NEURONTIN) 100 mg capsule    linaCLOtide (Linzess) 290 MCG CAPS    meclizine (ANTIVERT) 25 mg tablet    metoprolol succinate (TOPROL-XL) 25 mg 24 hr tablet    neomycin-polymyxin-dexamethasone (MAXITROL) 0.35%-10,000 units/g-0.1%       Allergies   Allergen Reactions    Penicillins Hives     Other reaction(s): Other (See Comments)  hives       Social History:     Marital Status:     Substance Use History:   Social History     Substance and Sexual Activity   Alcohol Use No     Social History     Tobacco Use   Smoking Status Former    Current packs/day: 0.25    Types: Cigarettes   Smokeless Tobacco Never   Tobacco Comments    quit 50 yrs ago     Social History     Substance and Sexual Activity   Drug Use Never       Family History:    Family History    Problem Relation Age of Onset    Heart disease Mother         cardiac disorder    Alzheimer's disease Sister     No Known Problems Father     Leukemia Brother          OBJECTIVE:    Vitals:   Blood Pressure: 113/56 (04/19/24 0721)  Pulse: 72 (04/19/24 0721)  Temperature: 98.7 °F (37.1 °C) (04/19/24 0721)  Temp Source: Rectal (04/18/24 1707)  Respirations: 17 (04/19/24 0721)  Height: 5' (152.4 cm) (04/18/24 1320)  Weight - Scale: 59.6 kg (131 lb 6.3 oz) (04/19/24 0600)  SpO2: 94 % (04/19/24 0721)    Physical Exam:    General Appearance: Alert, cooperative, no distress.  HEENT: Head normocephalic, atraumatic, without obvious abnormality.  Heart: Normal rate and rhythm.  Lungs: Non-labored breathing. No respiratory distress.  Abdomen: Without distension.  Psychiatric: AAOx3  Lower Extremity:  Vascular:   Pedal pulses are palpable bilaterally.  There is some swelling is noted to the lower extremities bilaterally.    Musculoskeletal:  No pain on palpation noted to the foot bilaterally no tenderness with range of motion of the ankle.  There is some discomfort noted on palpation lateral lower legs bilaterally.  There is no other areas of pain or discomfort on examination.    Dermatological:  No open lesions or ulcerations noted there is some mild bruising and ecchymosis noted to the lower legs laterally.    Neurological:  Gross sensation is intact. Protective sensation is intact.       Additional data:     Lab Results: I have personally reviewed pertinent labs including:    Results from last 7 days   Lab Units 04/18/24  1338   WBC Thousand/uL 9.93   HEMOGLOBIN g/dL 13.0   HEMATOCRIT % 40.4   PLATELETS Thousands/uL 284   SEGS PCT % 85*   LYMPHO PCT % 5*   MONO PCT % 8   EOS PCT % 0     Results from last 7 days   Lab Units 04/19/24  0456 04/18/24  1338   POTASSIUM mmol/L 4.0 4.3   CHLORIDE mmol/L 108 105   CO2 mmol/L 24 20*   BUN mg/dL 38* 32*   CREATININE mg/dL 1.18 1.19   CALCIUM mg/dL 7.8* 9.1   ALK PHOS U/L  --  82  "  ALT U/L  --  13   AST U/L  --  34     Results from last 7 days   Lab Units 04/18/24  1338   INR  1.03       Cultures: I have personally reviewed pertinent cultures including:    Results from last 7 days   Lab Units 04/18/24  1441 04/18/24  1338   BLOOD CULTURE  Received in Microbiology Lab. Culture in Progress. Received in Microbiology Lab. Culture in Progress.           Imaging: I have personally reviewed pertinent reports in PACS.  EKG, Pathology, and Other Studies: I have personally reviewed pertinent reports.        ** Please Note: Portions of the record may have been created with voice recognition software. Occasional wrong word or \"sound a like\" substitutions may have occurred due to the inherent limitations of voice recognition software. Read the chart carefully and recognize, using context, where substitutions have occurred. **    "

## 2024-04-19 NOTE — ASSESSMENT & PLAN NOTE
- mental Status improved today patient was alert oriented x 3  -She reported intermittent visual hallucinations  -Patient is high risk of delirium due to cognitive impairment at baseline, hospitalization  -continue delirium precautions  -maintain normal sleep/wake cycle  -minimize overnight interruptions, group overnight vitals/labs/nursing checks as possible  -dim lights, close blinds and turn off tv to minimize stimulation and encourage sleep environment in evenings  -ensure that pain is well controlled start Tylenol 975mg Q8H scheduled   -monitor for fecal and urinary retention which may precipitate delirium  -encourage early mobilization and ambulation  -provide frequent reorientation and redirection  -encourage family and friends at the bedside to help calm patient if anxious  -avoid medications which may precipitate or worsen delirium such as tramadol, benzodiazepine, anticholinergics, and antihistaminics  -encourage hydration and nutrition , assist with feeding if needed  -redirect unwanted behaviors as first line, avoid physical restraints.   -continue  gabapentin to 100 mg p.o. twice daily due to impaired kidney function  -continue senna for constipation, use MiraLAX and Dulcolax suppository as needed  -Avoid use of antipsychotics due to elevated Qtc, to be 540 on admission  -Melatonin 3 mg nightly

## 2024-04-19 NOTE — ASSESSMENT & PLAN NOTE
Blood pressure in the lower side  Hold amlodipine for now  continue metoprolol with holding parameters, dose decreased due to bradycardia  Monitor orthostatic vital signs and avoid hypotension

## 2024-04-20 PROBLEM — S82.839D: Status: ACTIVE | Noted: 2024-04-20

## 2024-04-20 PROBLEM — T68.XXXA HYPOTHERMIA: Status: RESOLVED | Noted: 2024-04-18 | Resolved: 2024-04-20

## 2024-04-20 PROBLEM — R74.8 ELEVATED CK: Status: RESOLVED | Noted: 2024-04-18 | Resolved: 2024-04-20

## 2024-04-20 LAB
ANION GAP SERPL CALCULATED.3IONS-SCNC: 6 MMOL/L (ref 4–13)
BASOPHILS # BLD AUTO: 0.05 THOUSANDS/ÂΜL (ref 0–0.1)
BASOPHILS NFR BLD AUTO: 1 % (ref 0–1)
BUN SERPL-MCNC: 42 MG/DL (ref 5–25)
CALCIUM SERPL-MCNC: 8 MG/DL (ref 8.4–10.2)
CHLORIDE SERPL-SCNC: 105 MMOL/L (ref 96–108)
CO2 SERPL-SCNC: 26 MMOL/L (ref 21–32)
CREAT SERPL-MCNC: 1.31 MG/DL (ref 0.6–1.3)
EOSINOPHIL # BLD AUTO: 0.49 THOUSAND/ÂΜL (ref 0–0.61)
EOSINOPHIL NFR BLD AUTO: 11 % (ref 0–6)
ERYTHROCYTE [DISTWIDTH] IN BLOOD BY AUTOMATED COUNT: 13.3 % (ref 11.6–15.1)
GFR SERPL CREATININE-BSD FRML MDRD: 35 ML/MIN/1.73SQ M
GLUCOSE SERPL-MCNC: 84 MG/DL (ref 65–140)
HCT VFR BLD AUTO: 30 % (ref 34.8–46.1)
HGB BLD-MCNC: 9.7 G/DL (ref 11.5–15.4)
IMM GRANULOCYTES # BLD AUTO: 0.02 THOUSAND/UL (ref 0–0.2)
IMM GRANULOCYTES NFR BLD AUTO: 0 % (ref 0–2)
LYMPHOCYTES # BLD AUTO: 0.95 THOUSANDS/ÂΜL (ref 0.6–4.47)
LYMPHOCYTES NFR BLD AUTO: 21 % (ref 14–44)
MCH RBC QN AUTO: 33.9 PG (ref 26.8–34.3)
MCHC RBC AUTO-ENTMCNC: 32.3 G/DL (ref 31.4–37.4)
MCV RBC AUTO: 105 FL (ref 82–98)
MONOCYTES # BLD AUTO: 0.51 THOUSAND/ÂΜL (ref 0.17–1.22)
MONOCYTES NFR BLD AUTO: 11 % (ref 4–12)
NEUTROPHILS # BLD AUTO: 2.59 THOUSANDS/ÂΜL (ref 1.85–7.62)
NEUTS SEG NFR BLD AUTO: 56 % (ref 43–75)
NRBC BLD AUTO-RTO: 0 /100 WBCS
PLATELET # BLD AUTO: 208 THOUSANDS/UL (ref 149–390)
PLATELET BLD QL SMEAR: ADEQUATE
PMV BLD AUTO: 10.9 FL (ref 8.9–12.7)
POTASSIUM SERPL-SCNC: 3.7 MMOL/L (ref 3.5–5.3)
RBC # BLD AUTO: 2.86 MILLION/UL (ref 3.81–5.12)
RBC MORPH BLD: NORMAL
SODIUM SERPL-SCNC: 137 MMOL/L (ref 135–147)
WBC # BLD AUTO: 4.61 THOUSAND/UL (ref 4.31–10.16)

## 2024-04-20 PROCEDURE — 85025 COMPLETE CBC W/AUTO DIFF WBC: CPT | Performed by: INTERNAL MEDICINE

## 2024-04-20 PROCEDURE — 80048 BASIC METABOLIC PNL TOTAL CA: CPT | Performed by: INTERNAL MEDICINE

## 2024-04-20 PROCEDURE — 99232 SBSQ HOSP IP/OBS MODERATE 35: CPT | Performed by: INTERNAL MEDICINE

## 2024-04-20 RX ORDER — POTASSIUM CHLORIDE 20 MEQ/1
40 TABLET, EXTENDED RELEASE ORAL ONCE
Status: COMPLETED | OUTPATIENT
Start: 2024-04-20 | End: 2024-04-20

## 2024-04-20 RX ADMIN — GABAPENTIN 100 MG: 100 CAPSULE ORAL at 17:15

## 2024-04-20 RX ADMIN — SENNOSIDES 17.2 MG: 8.6 TABLET, FILM COATED ORAL at 17:15

## 2024-04-20 RX ADMIN — METOPROLOL SUCCINATE 25 MG: 25 TABLET, EXTENDED RELEASE ORAL at 08:30

## 2024-04-20 RX ADMIN — POTASSIUM CHLORIDE 40 MEQ: 1500 TABLET, EXTENDED RELEASE ORAL at 08:29

## 2024-04-20 RX ADMIN — Medication 1 TABLET: at 08:29

## 2024-04-20 RX ADMIN — DICLOFENAC SODIUM 2 G: 10 GEL TOPICAL at 21:10

## 2024-04-20 RX ADMIN — DICLOFENAC SODIUM 2 G: 10 GEL TOPICAL at 14:26

## 2024-04-20 RX ADMIN — Medication 1000 UNITS: at 08:29

## 2024-04-20 RX ADMIN — GABAPENTIN 100 MG: 100 CAPSULE ORAL at 08:30

## 2024-04-20 RX ADMIN — ENOXAPARIN SODIUM 30 MG: 30 INJECTION SUBCUTANEOUS at 08:29

## 2024-04-20 RX ADMIN — ACETAMINOPHEN 975 MG: 325 TABLET, FILM COATED ORAL at 17:15

## 2024-04-20 RX ADMIN — DICLOFENAC SODIUM 2 G: 10 GEL TOPICAL at 17:15

## 2024-04-20 NOTE — PLAN OF CARE
Problem: PAIN - ADULT  Goal: Verbalizes/displays adequate comfort level or baseline comfort level  Description: Interventions:  - Encourage patient to monitor pain and request assistance  - Assess pain using appropriate pain scale  - Administer analgesics based on type and severity of pain and evaluate response  - Implement non-pharmacological measures as appropriate and evaluate response  - Consider cultural and social influences on pain and pain management  - Notify physician/advanced practitioner if interventions unsuccessful or patient reports new pain  Outcome: Progressing     Problem: INFECTION - ADULT  Goal: Absence or prevention of progression during hospitalization  Description: INTERVENTIONS:  - Assess and monitor for signs and symptoms of infection  - Monitor lab/diagnostic results  - Monitor all insertion sites, i.e. indwelling lines, tubes, and drains  - Monitor endotracheal if appropriate and nasal secretions for changes in amount and color  - [place] appropriate cooling/warming therapies per order  - Administer medications as ordered  - Instruct and encourage patient and family to use good hand hygiene technique  - Identify and instruct in appropriate isolation precautions for identified infection/condition  Outcome: Progressing  Goal: Absence of fever/infection during neutropenic period  Description: INTERVENTIONS:  - Monitor WBC    Outcome: Progressing     Problem: SAFETY ADULT  Goal: Patient will remain free of falls  Description: INTERVENTIONS:  - Educate patient/family on patient safety including physical limitations  - Instruct patient to call for assistance with activity   - Consult OT/PT to assist with strengthening/mobility   - Keep Call bell within reach  - Keep bed low and locked with side rails adjusted as appropriate  - Keep care items and personal belongings within reach  - Initiate and maintain comfort rounds  - Make Fall Risk Sign visible to staff  - Offer Toileting every 2 Hours,  in advance of need  - Initiate/Maintain bed alarm  - Obtain necessary fall risk management equipment: bed alarm  - Apply yellow socks and bracelet for high fall risk patients  - Consider moving patient to room near nurses station  Outcome: Progressing  Goal: Maintain or return to baseline ADL function  Description: INTERVENTIONS:  -  Assess patient's ability to carry out ADLs; assess patient's baseline for ADL function and identify physical deficits which impact ability to perform ADLs (bathing, care of mouth/teeth, toileting, grooming, dressing, etc.)  - Assess/evaluate cause of self-care deficits   - Assess range of motion  - Assess patient's mobility; develop plan if impaired  - Assess patient's need for assistive devices and provide as appropriate  - Encourage maximum independence but intervene and supervise when necessary  - Involve family in performance of ADLs  - Assess for home care needs following discharge   - Consider OT consult to assist with ADL evaluation and planning for discharge  - Provide patient education as appropriate  Outcome: Progressing  Goal: Maintains/Returns to pre admission functional level  Description: INTERVENTIONS:  - Perform AM-PAC 6 Click Basic Mobility/ Daily Activity assessment daily.  - Set and communicate daily mobility goal to care team and patient/family/caregiver.   - Collaborate with rehabilitation services on mobility goals if consulted  - Perform Range of Motion 3 times a day.  - Reposition patient every 2 hours.  - Dangle patient 3 times a day  - Stand patient 3 times a day  - Ambulate patient 3 times a day  - Out of bed to chair 3 times a day   - Out of bed for meals 3 times a day  - Out of bed for toileting  - Record patient progress and toleration of activity level   Outcome: Progressing     Problem: DISCHARGE PLANNING  Goal: Discharge to home or other facility with appropriate resources  Description: INTERVENTIONS:  - Identify barriers to discharge w/patient and  caregiver  - Arrange for needed discharge resources and transportation as appropriate  - Identify discharge learning needs (meds, wound care, etc.)  - Arrange for interpretive services to assist at discharge as needed  - Refer to Case Management Department for coordinating discharge planning if the patient needs post-hospital services based on physician/advanced practitioner order or complex needs related to functional status, cognitive ability, or social support system  Outcome: Progressing     Problem: Knowledge Deficit  Goal: Patient/family/caregiver demonstrates understanding of disease process, treatment plan, medications, and discharge instructions  Description: Complete learning assessment and assess knowledge base.  Interventions:  - Provide teaching at level of understanding  - Provide teaching via preferred learning methods  Outcome: Progressing

## 2024-04-20 NOTE — ASSESSMENT & PLAN NOTE
Wt Readings from Last 3 Encounters:   04/20/24 59.4 kg (130 lb 15.3 oz)   04/09/24 62.6 kg (138 lb)   04/02/24 62 kg (136 lb 9.6 oz)     Echo from 8/2023 shows EF 55%.  Mildly abnormal diastolic function  No home diuretic.  Takes metoprolol 25 mg at home  BNP on admission 1558  Status post Lasix 40 mg x 2 on 4/19    Plan:  Daily I's and O's, 2 g sodium diet, elevate HOB  Will hold off on further diuretics as patient had mild increase in creatinine on 4/20

## 2024-04-20 NOTE — PLAN OF CARE
Problem: PAIN - ADULT  Goal: Verbalizes/displays adequate comfort level or baseline comfort level  Description: Interventions:  - Encourage patient to monitor pain and request assistance  - Assess pain using appropriate pain scale  - Administer analgesics based on type and severity of pain and evaluate response  - Implement non-pharmacological measures as appropriate and evaluate response  - Consider cultural and social influences on pain and pain management  - Notify physician/advanced practitioner if interventions unsuccessful or patient reports new pain  Outcome: Progressing     Problem: INFECTION - ADULT  Goal: Absence or prevention of progression during hospitalization  Description: INTERVENTIONS:  - Assess and monitor for signs and symptoms of infection  - Monitor lab/diagnostic results  - Monitor all insertion sites, i.e. indwelling lines, tubes, and drains  - Monitor endotracheal if appropriate and nasal secretions for changes in amount and color  - [place] appropriate cooling/warming therapies per order  - Administer medications as ordered  - Instruct and encourage patient and family to use good hand hygiene technique  - Identify and instruct in appropriate isolation precautions for identified infection/condition  Outcome: Progressing  Goal: Absence of fever/infection during neutropenic period  Description: INTERVENTIONS:  - Monitor WBC    Outcome: Progressing     Problem: SAFETY ADULT  Goal: Patient will remain free of falls  Description: INTERVENTIONS:  - Educate patient/family on patient safety including physical limitations  - Instruct patient to call for assistance with activity   - Consult OT/PT to assist with strengthening/mobility   - Keep Call bell within reach  - Keep bed low and locked with side rails adjusted as appropriate  - Keep care items and personal belongings within reach  - Initiate and maintain comfort rounds  - Make Fall Risk Sign visible to staff  - Offer Toileting every 2 Hours,  in advance of need  - Initiate/Maintain bed/chair alarm  - Obtain necessary fall risk management equipment  - Apply yellow socks and bracelet for high fall risk patients  - Consider moving patient to room near nurses station  Outcome: Progressing  Goal: Maintain or return to baseline ADL function  Description: INTERVENTIONS:  -  Assess patient's ability to carry out ADLs; assess patient's baseline for ADL function and identify physical deficits which impact ability to perform ADLs (bathing, care of mouth/teeth, toileting, grooming, dressing, etc.)  - Assess/evaluate cause of self-care deficits   - Assess range of motion  - Assess patient's mobility; develop plan if impaired  - Assess patient's need for assistive devices and provide as appropriate  - Encourage maximum independence but intervene and supervise when necessary  - Involve family in performance of ADLs  - Assess for home care needs following discharge   - Consider OT consult to assist with ADL evaluation and planning for discharge  - Provide patient education as appropriate  Outcome: Progressing  Goal: Maintains/Returns to pre admission functional level  Description: INTERVENTIONS:  - Perform AM-PAC 6 Click Basic Mobility/ Daily Activity assessment daily.  - Set and communicate daily mobility goal to care team and patient/family/caregiver.   - Collaborate with rehabilitation services on mobility goals if consulted  - Perform Range of Motion   - Reposition patient   - Dangle patient  - Stand patient   - Ambulate patient   - Out of bed to chair    - Out of bed for meals  - Out of bed for toileting  - Record patient progress and toleration of activity level   Outcome: Progressing     Problem: DISCHARGE PLANNING  Goal: Discharge to home or other facility with appropriate resources  Description: INTERVENTIONS:  - Identify barriers to discharge w/patient and caregiver  - Arrange for needed discharge resources and transportation as appropriate  - Identify  discharge learning needs (meds, wound care, etc.)  - Arrange for interpretive services to assist at discharge as needed  - Refer to Case Management Department for coordinating discharge planning if the patient needs post-hospital services based on physician/advanced practitioner order or complex needs related to functional status, cognitive ability, or social support system  Outcome: Progressing     Problem: Knowledge Deficit  Goal: Patient/family/caregiver demonstrates understanding of disease process, treatment plan, medications, and discharge instructions  Description: Complete learning assessment and assess knowledge base.  Interventions:  - Provide teaching at level of understanding  - Provide teaching via preferred learning methods  Outcome: Progressing     Problem: Prexisting or High Potential for Compromised Skin Integrity  Goal: Skin integrity is maintained or improved  Description: INTERVENTIONS:  - Identify patients at risk for skin breakdown  - Assess and monitor skin integrity  - Assess and monitor nutrition and hydration status  - Monitor labs   - Assess for incontinence   - Turn and reposition patient  - Assist with mobility/ambulation  - Relieve pressure over bony prominences  - Avoid friction and shearing  - Provide appropriate hygiene as needed including keeping skin clean and dry  - Evaluate need for skin moisturizer/barrier cream  - Collaborate with interdisciplinary team   - Patient/family teaching  - Consider wound care consult   Outcome: Progressing

## 2024-04-20 NOTE — ASSESSMENT & PLAN NOTE
Assessment:  Uncertain etiology and patient's has unreliable history.  Per patient's neighbor, she was [place] in [place].  Has had at least 2 falls with head strike while on aspirin.  Admission CT head imaging, spine, chest, pelvis, lower extremities only revealed nondisplaced healing R distal fibula fracture. EKG shows mildly prolonged Qtc  TTE shows EF 65 with abnormal diastolic function redemonstrated from prior  Urine drug screen is positive for opiates.  Patient did not receive any narcotics in the ED or during this admission  Suspect the patient may have underlying dementia.  Neuropsych evaluation states the patient does not have capacity make her own decisions    Plan:  Fall precautions and placement

## 2024-04-20 NOTE — ASSESSMENT & PLAN NOTE
Lab Results   Component Value Date    EGFR 35 04/20/2024    EGFR 39 04/19/2024    EGFR 39 04/18/2024    CREATININE 1.31 (H) 04/20/2024    CREATININE 1.18 04/19/2024    CREATININE 1.19 04/18/2024   Baseline creatinine 1.1.  Avoid nephrotoxins

## 2024-04-20 NOTE — ASSESSMENT & PLAN NOTE
Assessment:  Unsure about etiology however past records show that she had a questionable infection for which she had a PICC line placed by her hematologist Dr. Polo.  Lower extremity ultrasound negative for DVT    Plan:  Place compression stockings after.  Elevate legs.  Voltaren gel for pain

## 2024-04-20 NOTE — PROGRESS NOTES
[facility]  Progress Note  Name: Ida Vuong I  MRN: [id number]  Unit/Bed#: S -01 I Date of Admission: 4/18/2024   Date of Service: 4/20/2024 I Hospital Day: 1    Assessment/Plan   * Loss of consciousness (HCC)  Assessment & Plan  Assessment:  Uncertain etiology and patient's has unreliable history.  Per patient's neighbor, she was [place] in [place].  Has had at least 2 falls with head strike while on aspirin.  Admission CT head imaging, spine, chest, pelvis, lower extremities only revealed nondisplaced healing R distal fibula fracture. EKG shows mildly prolonged Qtc  TTE shows EF 65 with abnormal diastolic function redemonstrated from prior  Urine drug screen is positive for opiates.  Patient did not receive any narcotics in the ED or during this admission  Suspect the patient may have underlying dementia.  Neuropsych evaluation states the patient does not have capacity make her own decisions    Plan:  Fall precautions and placement    Fracture of distal end of fibula with routine healing  Assessment & Plan  Assessment:  Patient reporting lower extremity pain and came in after fall  4/20 x-ray showed likely healing nondisplaced distal fibular fracture on the right    Plan:  Continue supportive care and physical therapy.  No indication for surgical intervention at this time    Cognitive impairment  Assessment & Plan  Conditions per geriatrics  Delirium precautions    (HFpEF) heart failure with preserved ejection fraction (HCC)  Assessment & Plan  Wt Readings from Last 3 Encounters:   04/20/24 59.4 kg (130 lb 15.3 oz)   04/09/24 62.6 kg (138 lb)   04/02/24 62 kg (136 lb 9.6 oz)     Echo from 8/2023 shows EF 55%.  Mildly abnormal diastolic function  No home diuretic.  Takes metoprolol 25 mg at home  BNP on admission 1558  Status post Lasix 40 mg x 2 on 4/19    Plan:  Daily I's and O's, 2 g sodium diet, elevate HOB  Will hold off on  further diuretics as patient had mild increase in creatinine on 4/20    Fall  Assessment & Plan  Rehab per PT/OT    Bilateral lower extremity edema  Assessment & Plan  Assessment:  Unsure about etiology however past records show that she had a questionable infection for which she had a PICC line placed by her hematologist Dr. Polo.  Lower extremity ultrasound negative for DVT    Plan:  Place compression stockings after.  Elevate legs.  Voltaren gel for pain      CKD (chronic kidney disease) stage 3, GFR 30-59 ml/min ([facility])  Assessment & Plan  Lab Results   Component Value Date    EGFR 35 04/20/2024    EGFR 39 04/19/2024    EGFR 39 04/18/2024    CREATININE 1.31 (H) 04/20/2024    CREATININE 1.18 04/19/2024    CREATININE 1.19 04/18/2024   Baseline creatinine 1.1.  Avoid nephrotoxins    Essential hypertension  Assessment & Plan  Continue home amlodipine 5 mg and metoprolol 25 mg    IBS (irritable bowel syndrome)  Assessment & Plan  Continue home Linzess    Hypothermia-resolved as of 4/20/2024  Assessment & Plan  Etiology uncertain although may be due to environment combined with patient's malnutrition  Warm blankets and use Seferino hugger temperature persistently low    Elevated CK-resolved as of 4/20/2024  Assessment & Plan  Assessment:  Admission .  Uncertain etiology unsure if patient has some type of poly/dermatomyositis or postviral myositis.  Also complained about lower extremity pain.  Creatinine within normal limits    Plan:  Fluids and symptomatic pain management             VTE Pharmacologic Prophylaxis: VTE Score: 4 Moderate Risk (Score 3-4) - Pharmacological DVT Prophylaxis Ordered: enoxaparin (Lovenox).    Mobility:   Basic Mobility Inpatient Raw Score: 11  JH-HLM Goal: 4: Move to chair/commode  JH-HLM Achieved: 4: Move to chair/commode  JH-HLM Goal achieved. Continue to encourage appropriate mobility.    Patient Centered Rounds: I performed bedside rounds with nursing staff today.  Discussions with  Specialists or Other Care Team Provider: Geriatrics    Education and Discussions with Family / Patient: Attempted to update  (son) via phone. Unable to contact.    Current Length of Stay: 1 day(s)  Current Patient Status: Inpatient   Discharge Plan: Anticipate discharge in >72 hrs to rehab facility.    Code Status: Level 3 - DNAR and DNI    Subjective:   Evaluated patient this morning and she was awake.  Continue to talk about pictures and making statements that are not congruent with reality.  Patient continues to give none answers to questions.    I spoke with patient's neighbors/friends Link and Magaly who gave me further information regarding patient's current condition.  She is a very private individual and does not tend to share details about her medical life.  Link drives her to her doctors appointments and drove her to have her PICC line.  He is unsure why she needed it and only knows that she told him she is receiving antibiotics through it.    Continue to attempt to reach Dr. Sharpe office.  Unable to get an answer.  Her  will have Dr. Polo give us a message at his earliest convenience.  Will continue the PICC line until then.    Objective:     Vitals:   Temp (24hrs), Av °F (36.7 °C), Min:98 °F (36.7 °C), Max:98 °F (36.7 °C)    Temp:  [98 °F (36.7 °C)] 98 °F (36.7 °C)  HR:  [57-72] 57  Resp:  [17-18] 18  BP: (106-131)/(50-66) 131/66  SpO2:  [95 %-96 %] 96 %  Body mass index is 25.58 kg/m².     Input and Output Summary (last 24 hours):     Intake/Output Summary (Last 24 hours) at 2024 1236  Last data filed at 2024 1056  Gross per 24 hour   Intake 960 ml   Output 300 ml   Net 660 ml       Physical Exam:   Physical Exam  Vitals and nursing note reviewed.   Constitutional:       General: She is not in acute distress.     Appearance: She is well-developed.   HENT:      Head: Normocephalic and atraumatic.      Mouth/Throat:      Mouth: Mucous membranes are moist.    Eyes:      General: No scleral icterus.     Conjunctiva/sclera: Conjunctivae normal.   Cardiovascular:      Rate and Rhythm: Normal rate and regular rhythm.      Heart sounds: No murmur heard.  Pulmonary:      Effort: No respiratory distress.      Breath sounds: No wheezing or rhonchi.   Abdominal:      Palpations: Abdomen is soft.      Tenderness: There is no abdominal tenderness.   Musculoskeletal:         General: Signs of injury (Right lower extremity) present. No swelling.      Cervical back: Neck supple.      Right lower leg: Edema present.      Left lower leg: Edema present.      Comments: Swelling of the lower extremities.  Erythema noted.  No obvious bleeding.   Skin:     General: Skin is dry.      Capillary Refill: Capillary refill takes less than 2 seconds.      Coloration: Skin is pale.      Comments: Cool and pale   Neurological:      Mental Status: She is alert and oriented to person, place, and time.      Cranial Nerves: No dysarthria.      Motor: No weakness or abnormal muscle tone.      Comments: Patient is alert and oriented to person, place but not time.  Not oriented to situation.  Not cooperative with questioning.   Psychiatric:         Attention and Perception: She is inattentive.         Speech: Speech is tangential.         Behavior: Behavior is uncooperative.         Cognition and Memory: Memory is impaired.         Judgment: Judgment is inappropriate.          Additional Data:     Labs:  Results from last 7 days   Lab Units 04/20/24  0331   WBC Thousand/uL 4.61   HEMOGLOBIN g/dL 9.7*   HEMATOCRIT % 30.0*   PLATELETS Thousands/uL 208   SEGS PCT % 56   LYMPHO PCT % 21   MONO PCT % 11   EOS PCT % 11*     Results from last 7 days   Lab Units 04/20/24  0331 04/19/24  0456 04/18/24  1338   SODIUM mmol/L 137   < > 133*   POTASSIUM mmol/L 3.7   < > 4.3   CHLORIDE mmol/L 105   < > 105   CO2 mmol/L 26   < > 20*   BUN mg/dL 42*   < > 32*   CREATININE mg/dL 1.31*   < > 1.19   ANION GAP mmol/L 6   < >  8   CALCIUM mg/dL 8.0*   < > 9.1   ALBUMIN g/dL  --   --  3.8   TOTAL BILIRUBIN mg/dL  --   --  0.60   ALK PHOS U/L  --   --  82   ALT U/L  --   --  13   AST U/L  --   --  34   GLUCOSE RANDOM mg/dL 84   < > 114    < > = values in this interval not displayed.     Results from last 7 days   Lab Units 04/18/24  1338   INR  1.03     Results from last 7 days   Lab Units 04/19/24  2048 04/19/24  1140 04/19/24  1106 04/19/24  0721 04/19/24  0407 04/19/24  0007 04/18/24  1831   POC GLUCOSE mg/dl 122 80 81 83 84 116 115         Results from last 7 days   Lab Units 04/18/24  1718 04/18/24  1338   LACTIC ACID mmol/L 1.1 3.6*   PROCALCITONIN ng/ml  --  0.11       Lines/Drains:  Invasive Devices       Peripherally Inserted Central Catheter Line  Duration             PICC Line 04/05/24 Right Basilic 14 days              Peripheral Intravenous Line  Duration             Peripheral IV 04/18/24 Left Antecubital 1 day                    Central Line:  Goal for removal:  Will remove once confirmed why she has a PICC line by her hematologist office         Telemetry:  Telemetry Orders (From admission, onward)               24 Hour Telemetry Monitoring  Continuous x 24 Hours (Telem)        Question:  Reason for 24 Hour Telemetry  Answer:  Syncope suspected to be cardiac in origin                     Telemetry Reviewed: Normal Sinus Rhythm  Indication for Continued Telemetry Use: No indication for continued use. Will discontinue.              Imaging: Reviewed radiology reports from this admission including: CT head and xray(s)    Recent Cultures (last 7 days):   Results from last 7 days   Lab Units 04/18/24  1441 04/18/24  1338   BLOOD CULTURE  No Growth at 24 hrs. No Growth at 24 hrs.       Last 24 Hours Medication List:   Current Facility-Administered Medications   Medication Dose Route Frequency Provider Last Rate    acetaminophen  975 mg Oral TID Aida Pérez MD      calcium carbonate  1 tablet Oral Daily With Breakfast Praful  Brunner, MD      Cholecalciferol  1,000 Units Oral Daily Kyle Brunner, MD      enoxaparin  30 mg Subcutaneous Q24H KATHARINA Kyle Brunner, MD      gabapentin  100 mg Oral BID Aida Pérez MD      metoprolol succinate  25 mg Oral Daily Kyle Brunner, MD      polyethylene glycol  17 g Oral Daily Kyle Brunner, MD      senna  2 tablet Oral BID Aida Pérez MD      trimethobenzamide  200 mg Intramuscular Q6H PRN Kyle Brunner, MD          Today, Patient Was Seen By: Kyle Brunner, MD    **Please Note: This note may have been constructed using a voice recognition system.**

## 2024-04-20 NOTE — ASSESSMENT & PLAN NOTE
Assessment:  Patient reporting lower extremity pain and came in after fall  4/20 x-ray showed likely healing nondisplaced distal fibular fracture on the right    Plan:  Continue supportive care and physical therapy.  No indication for surgical intervention at this time

## 2024-04-21 LAB
ANION GAP SERPL CALCULATED.3IONS-SCNC: 8 MMOL/L (ref 4–13)
BASOPHILS # BLD AUTO: 0.05 THOUSANDS/ÂΜL (ref 0–0.1)
BASOPHILS NFR BLD AUTO: 1 % (ref 0–1)
BUN SERPL-MCNC: 36 MG/DL (ref 5–25)
CALCIUM SERPL-MCNC: 8.1 MG/DL (ref 8.4–10.2)
CHLORIDE SERPL-SCNC: 104 MMOL/L (ref 96–108)
CO2 SERPL-SCNC: 27 MMOL/L (ref 21–32)
CREAT SERPL-MCNC: 1.14 MG/DL (ref 0.6–1.3)
EOSINOPHIL # BLD AUTO: 0.51 THOUSAND/ÂΜL (ref 0–0.61)
EOSINOPHIL NFR BLD AUTO: 13 % (ref 0–6)
ERYTHROCYTE [DISTWIDTH] IN BLOOD BY AUTOMATED COUNT: 13.2 % (ref 11.6–15.1)
GFR SERPL CREATININE-BSD FRML MDRD: 41 ML/MIN/1.73SQ M
GLUCOSE SERPL-MCNC: 77 MG/DL (ref 65–140)
HCT VFR BLD AUTO: 29.9 % (ref 34.8–46.1)
HGB BLD-MCNC: 9.7 G/DL (ref 11.5–15.4)
IMM GRANULOCYTES # BLD AUTO: 0.02 THOUSAND/UL (ref 0–0.2)
IMM GRANULOCYTES NFR BLD AUTO: 1 % (ref 0–2)
LYMPHOCYTES # BLD AUTO: 0.88 THOUSANDS/ÂΜL (ref 0.6–4.47)
LYMPHOCYTES NFR BLD AUTO: 22 % (ref 14–44)
MCH RBC QN AUTO: 33.8 PG (ref 26.8–34.3)
MCHC RBC AUTO-ENTMCNC: 32.4 G/DL (ref 31.4–37.4)
MCV RBC AUTO: 104 FL (ref 82–98)
MONOCYTES # BLD AUTO: 0.45 THOUSAND/ÂΜL (ref 0.17–1.22)
MONOCYTES NFR BLD AUTO: 11 % (ref 4–12)
NEUTROPHILS # BLD AUTO: 2.18 THOUSANDS/ÂΜL (ref 1.85–7.62)
NEUTS SEG NFR BLD AUTO: 52 % (ref 43–75)
NRBC BLD AUTO-RTO: 0 /100 WBCS
PLATELET # BLD AUTO: 193 THOUSANDS/UL (ref 149–390)
PMV BLD AUTO: 10.7 FL (ref 8.9–12.7)
POTASSIUM SERPL-SCNC: 4 MMOL/L (ref 3.5–5.3)
RBC # BLD AUTO: 2.87 MILLION/UL (ref 3.81–5.12)
SODIUM SERPL-SCNC: 139 MMOL/L (ref 135–147)
WBC # BLD AUTO: 4.09 THOUSAND/UL (ref 4.31–10.16)

## 2024-04-21 PROCEDURE — 99232 SBSQ HOSP IP/OBS MODERATE 35: CPT | Performed by: INTERNAL MEDICINE

## 2024-04-21 PROCEDURE — 80048 BASIC METABOLIC PNL TOTAL CA: CPT

## 2024-04-21 PROCEDURE — 85025 COMPLETE CBC W/AUTO DIFF WBC: CPT

## 2024-04-21 RX ORDER — METOPROLOL SUCCINATE 25 MG/1
12.5 TABLET, EXTENDED RELEASE ORAL DAILY
Status: DISCONTINUED | OUTPATIENT
Start: 2024-04-22 | End: 2024-04-23

## 2024-04-21 RX ADMIN — Medication 1 TABLET: at 08:28

## 2024-04-21 RX ADMIN — POLYETHYLENE GLYCOL 3350 17 G: 17 POWDER, FOR SOLUTION ORAL at 08:26

## 2024-04-21 RX ADMIN — SENNOSIDES 17.2 MG: 8.6 TABLET, FILM COATED ORAL at 18:01

## 2024-04-21 RX ADMIN — DICLOFENAC SODIUM 2 G: 10 GEL TOPICAL at 20:29

## 2024-04-21 RX ADMIN — Medication 1000 UNITS: at 08:27

## 2024-04-21 RX ADMIN — ENOXAPARIN SODIUM 30 MG: 30 INJECTION SUBCUTANEOUS at 08:27

## 2024-04-21 RX ADMIN — METOPROLOL SUCCINATE 25 MG: 25 TABLET, EXTENDED RELEASE ORAL at 08:27

## 2024-04-21 RX ADMIN — DICLOFENAC SODIUM 2 G: 10 GEL TOPICAL at 18:01

## 2024-04-21 RX ADMIN — ACETAMINOPHEN 975 MG: 325 TABLET, FILM COATED ORAL at 20:28

## 2024-04-21 RX ADMIN — DICLOFENAC SODIUM 2 G: 10 GEL TOPICAL at 12:24

## 2024-04-21 RX ADMIN — DICLOFENAC SODIUM 2 G: 10 GEL TOPICAL at 08:28

## 2024-04-21 RX ADMIN — GABAPENTIN 100 MG: 100 CAPSULE ORAL at 18:01

## 2024-04-21 RX ADMIN — ACETAMINOPHEN 975 MG: 325 TABLET, FILM COATED ORAL at 08:27

## 2024-04-21 RX ADMIN — ACETAMINOPHEN 975 MG: 325 TABLET, FILM COATED ORAL at 18:01

## 2024-04-21 RX ADMIN — GABAPENTIN 100 MG: 100 CAPSULE ORAL at 08:28

## 2024-04-21 NOTE — PROGRESS NOTES
[facility]  Progress Note  Name: Ida Vuong I  MRN: [id number]  Unit/Bed#: S -01 I Date of Admission: 4/18/2024   Date of Service: 4/21/2024 I Hospital Day: 2    Assessment/Plan   * Loss of consciousness (HCC)  Assessment & Plan  Assessment:  Uncertain etiology and patient's has unreliable history.  Per patient's neighbor, she was [place] in [place].  Has had at least 2 falls with head strike while on aspirin.  Admission CT head imaging, spine, chest, pelvis, lower extremities only revealed nondisplaced healing R distal fibula fracture. EKG shows mildly prolonged Qtc  TTE shows EF 65 with abnormal diastolic function redemonstrated from prior  Urine drug screen is positive for opiates.  Patient did not receive any narcotics in the ED or during this admission  Suspect the patient may have underlying dementia.  Neuropsych evaluation states the patient does not have capacity make her own decisions    Plan:  Fall precautions and placement    (HFpEF) heart failure with preserved ejection fraction (HCC)  Assessment & Plan  Wt Readings from Last 3 Encounters:   04/21/24 60.7 kg (133 lb 12.8 oz)   04/09/24 62.6 kg (138 lb)   04/02/24 62 kg (136 lb 9.6 oz)     Echo from 8/2023 shows EF 55%.  Mildly abnormal diastolic function  No home diuretic.  Takes metoprolol 25 mg at home. Will decrease to 12.5 mg due to bradycardia.  BNP on admission 1558  Status post Lasix 40 mg x 2 on 4/19    Plan:  Daily I's and O's, 2 g sodium diet, elevate HOB  Will hold off on further diuretics as patient had mild increase in creatinine on 4/20    Fall  Assessment & Plan  Rehab per PT/OT    Fracture of distal end of fibula with routine healing  Assessment & Plan  Assessment:  Patient reporting lower extremity pain and came in after fall  4/20 x-ray showed likely healing nondisplaced distal fibular fracture on the right    Plan:  Continue supportive care and physical  therapy.  No indication for surgical intervention at this time    Cognitive impairment  Assessment & Plan  Conditions per geriatrics  Delirium precautions    Bilateral lower extremity edema  Assessment & Plan  Assessment:  Unsure about etiology however past records show that she had a questionable infection for which she had a PICC line placed by her hematologist Dr. Polo.  Lower extremity ultrasound negative for DVT    Plan:  Place compression stockings after.  Elevate legs.  Voltaren gel for pain      CKD (chronic kidney disease) stage 3, GFR 30-59 ml/min ([facility])  Assessment & Plan  Lab Results   Component Value Date    EGFR 41 04/21/2024    EGFR 35 04/20/2024    EGFR 39 04/19/2024    CREATININE 1.14 04/21/2024    CREATININE 1.31 (H) 04/20/2024    CREATININE 1.18 04/19/2024   Baseline creatinine 1.1.  Avoid nephrotoxins    Essential hypertension  Assessment & Plan  Continue home amlodipine 5 mg and metoprolol 25 mg    IBS (irritable bowel syndrome)  Assessment & Plan  Continue home Linzess           VTE Pharmacologic Prophylaxis: VTE Score: 4 Moderate Risk (Score 3-4) - Pharmacological DVT Prophylaxis Ordered: enoxaparin (Lovenox).    Mobility:   Basic Mobility Inpatient Raw Score: 11  JH-HLM Goal: 4: Move to chair/commode  JH-HLM Achieved: 4: Move to chair/commode  JH-HLM Goal achieved. Continue to encourage appropriate mobility.    Patient Centered Rounds: I performed bedside rounds with nursing staff today.  Discussions with Specialists or Other Care Team Provider: No    Education and Discussions with Family / Patient: Updated  (son and daughter in law) at bedside.    Current Length of Stay: 2 day(s)  Current Patient Status: Inpatient   Discharge Plan: Anticipate discharge in 24-48 hrs to rehab facility.    Code Status: Level 3 - DNAR and DNI    Subjective:   Patient today has improved mentation. She remains confused about her hospitalization but overall her memory is improved and family at bedside  states she is more herself. She states her lower legs hurt but she has no other complaints.    Objective:     Vitals:   Temp (24hrs), Av.3 °F (36.8 °C), Min:97.7 °F (36.5 °C), Max:99.1 °F (37.3 °C)    Temp:  [97.7 °F (36.5 °C)-99.1 °F (37.3 °C)] 98.2 °F (36.8 °C)  HR:  [38-61] 52  Resp:  [18] 18  BP: (117-132)/(49-62) 132/62  SpO2:  [94 %-96 %] 94 %  Body mass index is 26.13 kg/m².     Input and Output Summary (last 24 hours):     Intake/Output Summary (Last 24 hours) at 2024 1226  Last data filed at 2024 0900  Gross per 24 hour   Intake 240 ml   Output --   Net 240 ml       Physical Exam:   Physical Exam  Constitutional:       Appearance: Normal appearance.   Cardiovascular:      Rate and Rhythm: Normal rate and regular rhythm.      Pulses: Normal pulses.      Heart sounds: Normal heart sounds.   Pulmonary:      Effort: Pulmonary effort is normal.      Breath sounds: Normal breath sounds.   Abdominal:      General: Bowel sounds are normal. There is no distension.      Tenderness: There is no abdominal tenderness.   Musculoskeletal:      Right lower leg: Edema present.      Left lower leg: Edema present.   Neurological:      Mental Status: She is alert and oriented to person, place, and time.          Additional Data:     Labs:  Results from last 7 days   Lab Units 24  0513   WBC Thousand/uL 4.09*   HEMOGLOBIN g/dL 9.7*   HEMATOCRIT % 29.9*   PLATELETS Thousands/uL 193   SEGS PCT % 52   LYMPHO PCT % 22   MONO PCT % 11   EOS PCT % 13*     Results from last 7 days   Lab Units 24  0513 24  0456 24  1338   SODIUM mmol/L 139   < > 133*   POTASSIUM mmol/L 4.0   < > 4.3   CHLORIDE mmol/L 104   < > 105   CO2 mmol/L 27   < > 20*   BUN mg/dL 36*   < > 32*   CREATININE mg/dL 1.14   < > 1.19   ANION GAP mmol/L 8   < > 8   CALCIUM mg/dL 8.1*   < > 9.1   ALBUMIN g/dL  --   --  3.8   TOTAL BILIRUBIN mg/dL  --   --  0.60   ALK PHOS U/L  --   --  82   ALT U/L  --   --  13   AST U/L  --   --  34    GLUCOSE RANDOM mg/dL 77   < > 114    < > = values in this interval not displayed.     Results from last 7 days   Lab Units 24  1338   INR  1.03     Results from last 7 days   Lab Units 24  2048 24  1140 24  1106 24  0721 24  0407 24  0007 24  1831   POC GLUCOSE mg/dl 122 80 81 83 84 116 115         Results from last 7 days   Lab Units 24  1718 24  1338   LACTIC ACID mmol/L 1.1 3.6*   PROCALCITONIN ng/ml  --  0.11       Lines/Drains:  Invasive Devices       Peripheral Intravenous Line  Duration             Peripheral IV 24 Left Antecubital 2 days                      Telemetry:  Telemetry Orders (From admission, onward)               24 Hour Telemetry Monitoring  Continuous x 24 Hours (Telem)           Question:  Reason for 24 Hour Telemetry  Answer:  Syncope suspected to be cardiac in origin                     Telemetry Reviewed: Normal Sinus Rhythm  Indication for Continued Telemetry Use: No indication for continued use. Will discontinue.              Imaging: Reviewed radiology reports from this admission including: chest xray, chest CT scan, abdominal/pelvic CT, and xray(s)    Recent Cultures (last 7 days):   Results from last 7 days   Lab Units 24  1441 24  1338   BLOOD CULTURE  No Growth at 48 hrs. No Growth at 48 hrs.       Last 24 Hours Medication List:   Current Facility-Administered Medications   Medication Dose Route Frequency Provider Last Rate    acetaminophen  975 mg Oral TID Aida Pérez MD      calcium carbonate  1 tablet Oral Daily With Breakfast Kyle Brunner, MD      Cholecalciferol  1,000 Units Oral Daily Kyle Brunner, MD      Diclofenac Sodium  2 g Topical 4x Daily Kyle Brunner, MD      enoxaparin  30 mg Subcutaneous Q24H KATHARINA Kyle Brunner, MD      gabapentin  100 mg Oral BID Aida Pérez MD      [START ON 2024] metoprolol succinate  12.5 mg Oral Daily Dave Ricketts DO      polyethylene glycol  17 g  Oral Daily Kyle Brunner, MD      senna  2 tablet Oral BID Aida Pérez MD      trimethobenzamide  200 mg Intramuscular Q6H PRN Kyle Brunner, MD          Today, Patient Was Seen By: Dave Ricketts DO    **Please Note: This note may have been constructed using a voice recognition system.**

## 2024-04-21 NOTE — PLAN OF CARE
Problem: PAIN - ADULT  Goal: Verbalizes/displays adequate comfort level or baseline comfort level  Description: Interventions:  - Encourage patient to monitor pain and request assistance  - Assess pain using appropriate pain scale  - Administer analgesics based on type and severity of pain and evaluate response  - Implement non-pharmacological measures as appropriate and evaluate response  - Consider cultural and social influences on pain and pain management  - Notify physician/advanced practitioner if interventions unsuccessful or patient reports new pain  Outcome: Progressing     Problem: INFECTION - ADULT  Goal: Absence or prevention of progression during hospitalization  Description: INTERVENTIONS:  - Assess and monitor for signs and symptoms of infection  - Monitor lab/diagnostic results  - Monitor all insertion sites, i.e. indwelling lines, tubes, and drains  - Monitor endotracheal if appropriate and nasal secretions for changes in amount and color  - [place] appropriate cooling/warming therapies per order  - Administer medications as ordered  - Instruct and encourage patient and family to use good hand hygiene technique  - Identify and instruct in appropriate isolation precautions for identified infection/condition  Outcome: Progressing  Goal: Absence of fever/infection during neutropenic period  Description: INTERVENTIONS:  - Monitor WBC    Outcome: Progressing     Problem: SAFETY ADULT  Goal: Patient will remain free of falls  Description: INTERVENTIONS:  - Educate patient/family on patient safety including physical limitations  - Instruct patient to call for assistance with activity   - Consult OT/PT to assist with strengthening/mobility   - Keep Call bell within reach  - Keep bed low and locked with side rails adjusted as appropriate  - Keep care items and personal belongings within reach  - Initiate and maintain comfort rounds  - Make Fall Risk Sign visible to staff  - Offer Toileting every ***  Hours, in advance of need  - Initiate/Maintain ***alarm  - Obtain necessary fall risk management equipment: ***  - Apply yellow socks and bracelet for high fall risk patients  - Consider moving patient to room near nurses station  Outcome: Progressing  Goal: Maintain or return to baseline ADL function  Description: INTERVENTIONS:  -  Assess patient's ability to carry out ADLs; assess patient's baseline for ADL function and identify physical deficits which impact ability to perform ADLs (bathing, care of mouth/teeth, toileting, grooming, dressing, etc.)  - Assess/evaluate cause of self-care deficits   - Assess range of motion  - Assess patient's mobility; develop plan if impaired  - Assess patient's need for assistive devices and provide as appropriate  - Encourage maximum independence but intervene and supervise when necessary  - Involve family in performance of ADLs  - Assess for home care needs following discharge   - Consider OT consult to assist with ADL evaluation and planning for discharge  - Provide patient education as appropriate  Outcome: Progressing  Goal: Maintains/Returns to pre admission functional level  Description: INTERVENTIONS:  - Perform AM-PAC 6 Click Basic Mobility/ Daily Activity assessment daily.  - Set and communicate daily mobility goal to care team and patient/family/caregiver.   - Collaborate with rehabilitation services on mobility goals if consulted  - Perform Range of Motion *** times a day.  - Reposition patient every *** hours.  - Dangle patient *** times a day  - Stand patient *** times a day  - Ambulate patient *** times a day  - Out of bed to chair *** times a day   - Out of bed for meals *** times a day  - Out of bed for toileting  - Record patient progress and toleration of activity level   Outcome: Progressing     Problem: DISCHARGE PLANNING  Goal: Discharge to home or other facility with appropriate resources  Description: INTERVENTIONS:  - Identify barriers to discharge  w/patient and caregiver  - Arrange for needed discharge resources and transportation as appropriate  - Identify discharge learning needs (meds, wound care, etc.)  - Arrange for interpretive services to assist at discharge as needed  - Refer to Case Management Department for coordinating discharge planning if the patient needs post-hospital services based on physician/advanced practitioner order or complex needs related to functional status, cognitive ability, or social support system  Outcome: Progressing     Problem: Knowledge Deficit  Goal: Patient/family/caregiver demonstrates understanding of disease process, treatment plan, medications, and discharge instructions  Description: Complete learning assessment and assess knowledge base.  Interventions:  - Provide teaching at level of understanding  - Provide teaching via preferred learning methods  Outcome: Progressing     Problem: Prexisting or High Potential for Compromised Skin Integrity  Goal: Skin integrity is maintained or improved  Description: INTERVENTIONS:  - Identify patients at risk for skin breakdown  - Assess and monitor skin integrity  - Assess and monitor nutrition and hydration status  - Monitor labs   - Assess for incontinence   - Turn and reposition patient  - Assist with mobility/ambulation  - Relieve pressure over bony prominences  - Avoid friction and shearing  - Provide appropriate hygiene as needed including keeping skin clean and dry  - Evaluate need for skin moisturizer/barrier cream  - Collaborate with interdisciplinary team   - Patient/family teaching  - Consider wound care consult   Outcome: Progressing     Problem: Nutrition/Hydration-ADULT  Goal: Nutrient/Hydration intake appropriate for improving, restoring or maintaining nutritional needs  Description: Monitor and assess patient's nutrition/hydration status for malnutrition. Collaborate with interdisciplinary team and initiate plan and interventions as ordered.  Monitor patient's  weight and dietary intake as ordered or per policy. Utilize nutrition screening tool and intervene as necessary. Determine patient's food preferences and provide high-protein, high-caloric foods as appropriate.     INTERVENTIONS:  - Monitor oral intake, urinary output, labs, and treatment plans  - Assess nutrition and hydration status and recommend course of action  - Evaluate amount of meals eaten  - Assist patient with eating if necessary   - Allow adequate time for meals  - Recommend/ encourage appropriate diets, oral nutritional supplements, and vitamin/mineral supplements  - Order, calculate, and assess calorie counts as needed  - Recommend, monitor, and adjust tube feedings and TPN/PPN based on assessed needs  - Assess need for intravenous fluids  - Provide specific nutrition/hydration education as appropriate  - Include patient/family/caregiver in decisions related to nutrition  Outcome: Progressing

## 2024-04-21 NOTE — PLAN OF CARE
Problem: PAIN - ADULT  Goal: Verbalizes/displays adequate comfort level or baseline comfort level  Description: Interventions:  - Encourage patient to monitor pain and request assistance  - Assess pain using appropriate pain scale  - Administer analgesics based on type and severity of pain and evaluate response  - Implement non-pharmacological measures as appropriate and evaluate response  - Consider cultural and social influences on pain and pain management  - Notify physician/advanced practitioner if interventions unsuccessful or patient reports new pain  4/21/2024 1309 by Leighann Greenberg RN  Outcome: Progressing  4/21/2024 1309 by Leighann Greenberg RN  Outcome: Progressing     Problem: INFECTION - ADULT  Goal: Absence or prevention of progression during hospitalization  Description: INTERVENTIONS:  - Assess and monitor for signs and symptoms of infection  - Monitor lab/diagnostic results  - Monitor all insertion sites, i.e. indwelling lines, tubes, and drains  - Monitor endotracheal if appropriate and nasal secretions for changes in amount and color  - [place] appropriate cooling/warming therapies per order  - Administer medications as ordered  - Instruct and encourage patient and family to use good hand hygiene technique  - Identify and instruct in appropriate isolation precautions for identified infection/condition  4/21/2024 1309 by Leighann Greenberg RN  Outcome: Progressing  4/21/2024 1309 by Leighann Greenberg RN  Outcome: Progressing  Goal: Absence of fever/infection during neutropenic period  Description: INTERVENTIONS:  - Monitor WBC    4/21/2024 1309 by Leighann Greenberg RN  Outcome: Progressing  4/21/2024 1309 by Leighann Greenberg RN  Outcome: Progressing     Problem: SAFETY ADULT  Goal: Patient will remain free of falls  Description: INTERVENTIONS:  - Educate patient/family on patient safety including physical limitations  - Instruct patient to call for assistance with activity   -  Consult OT/PT to assist with strengthening/mobility   - Keep Call bell within reach  - Keep bed low and locked with side rails adjusted as appropriate  - Keep care items and personal belongings within reach  - Initiate and maintain comfort rounds  - Make Fall Risk Sign visible to staff  - Offer Toileting every 2 Hours, in advance of need  - Initiate/Maintain bed alarm  - Obtain necessary fall risk management equipment: bed alarm  - Apply yellow socks and bracelet for high fall risk patients  - Consider moving patient to room near nurses station  4/21/2024 1309 by Leighann Greenberg RN  Outcome: Progressing  4/21/2024 1309 by Leighann Greenberg RN  Outcome: Progressing  Goal: Maintain or return to baseline ADL function  Description: INTERVENTIONS:  -  Assess patient's ability to carry out ADLs; assess patient's baseline for ADL function and identify physical deficits which impact ability to perform ADLs (bathing, care of mouth/teeth, toileting, grooming, dressing, etc.)  - Assess/evaluate cause of self-care deficits   - Assess range of motion  - Assess patient's mobility; develop plan if impaired  - Assess patient's need for assistive devices and provide as appropriate  - Encourage maximum independence but intervene and supervise when necessary  - Involve family in performance of ADLs  - Assess for home care needs following discharge   - Consider OT consult to assist with ADL evaluation and planning for discharge  - Provide patient education as appropriate  4/21/2024 1309 by Leighann Greenberg RN  Outcome: Progressing  4/21/2024 1309 by Leighann Greenberg RN  Outcome: Progressing  Goal: Maintains/Returns to pre admission functional level  Description: INTERVENTIONS:  - Perform AM-PAC 6 Click Basic Mobility/ Daily Activity assessment daily.  - Set and communicate daily mobility goal to care team and patient/family/caregiver.   - Collaborate with rehabilitation services on mobility goals if consulted  - Perform  Range of Motion 3 times a day.  - Reposition patient every 2 hours.  - Dangle patient 3 times a day  - Stand patient 3 times a day  - Ambulate patient 3 times a day  - Out of bed to chair 3 times a day   - Out of bed for meals 3 times a day  - Out of bed for toileting  - Record patient progress and toleration of activity level   4/21/2024 1309 by Leighann Greenberg RN  Outcome: Progressing  4/21/2024 1309 by Leighann Greenberg RN  Outcome: Progressing     Problem: DISCHARGE PLANNING  Goal: Discharge to home or other facility with appropriate resources  Description: INTERVENTIONS:  - Identify barriers to discharge w/patient and caregiver  - Arrange for needed discharge resources and transportation as appropriate  - Identify discharge learning needs (meds, wound care, etc.)  - Arrange for interpretive services to assist at discharge as needed  - Refer to Case Management Department for coordinating discharge planning if the patient needs post-hospital services based on physician/advanced practitioner order or complex needs related to functional status, cognitive ability, or social support system  4/21/2024 1309 by Leighann Greenberg RN  Outcome: Progressing  4/21/2024 1309 by Leighann Greenberg RN  Outcome: Progressing     Problem: Knowledge Deficit  Goal: Patient/family/caregiver demonstrates understanding of disease process, treatment plan, medications, and discharge instructions  Description: Complete learning assessment and assess knowledge base.  Interventions:  - Provide teaching at level of understanding  - Provide teaching via preferred learning methods  4/21/2024 1309 by Leighann Greenberg RN  Outcome: Progressing  4/21/2024 1309 by Leighann Greenberg RN  Outcome: Progressing     Problem: Nutrition/Hydration-ADULT  Goal: Nutrient/Hydration intake appropriate for improving, restoring or maintaining nutritional needs  Description: Monitor and assess patient's nutrition/hydration status for malnutrition.  Collaborate with interdisciplinary team and initiate plan and interventions as ordered.  Monitor patient's weight and dietary intake as ordered or per policy. Utilize nutrition screening tool and intervene as necessary. Determine patient's food preferences and provide high-protein, high-caloric foods as appropriate.     INTERVENTIONS:  - Monitor oral intake, urinary output, labs, and treatment plans  - Assess nutrition and hydration status and recommend course of action  - Evaluate amount of meals eaten  - Assist patient with eating if necessary   - Allow adequate time for meals  - Recommend/ encourage appropriate diets, oral nutritional supplements, and vitamin/mineral supplements  - Order, calculate, and assess calorie counts as needed  - Recommend, monitor, and adjust tube feedings and TPN/PPN based on assessed needs  - Assess need for intravenous fluids  - Provide specific nutrition/hydration education as appropriate  - Include patient/family/caregiver in decisions related to nutrition  4/21/2024 1309 by Leighann Greenberg, RN  Outcome: Progressing  4/21/2024 1309 by Leighann Greenberg, RN  Outcome: Progressing

## 2024-04-21 NOTE — ASSESSMENT & PLAN NOTE
Lab Results   Component Value Date    EGFR 41 04/21/2024    EGFR 35 04/20/2024    EGFR 39 04/19/2024    CREATININE 1.14 04/21/2024    CREATININE 1.31 (H) 04/20/2024    CREATININE 1.18 04/19/2024   Baseline creatinine 1.1.  Avoid nephrotoxins

## 2024-04-21 NOTE — ASSESSMENT & PLAN NOTE
Assessment:  Uncertain etiology and patient's has unreliable history.  Per patient's neighbor, she was [place] in [place].  Has had at least 2 falls with head strike while on aspirin.  Admission CT head imaging, spine, chest, pelvis, lower extremities only revealed nondisplaced healing R distal fibula fracture. EKG shows mildly prolonged Qtc  TTE shows EF 65 with abnormal diastolic function redemonstrated from prior  Urine drug screen is positive for opiates.  Patient did not receive any narcotics in the ED or during this admission  Suspect the patient may have underlying dementia.  Neuropsych evaluation states the patient does not have capacity make her own decisions    Plan:  Fall precautions and placement  Patient has improved mentation but will obtain Neuropsych consult per family request to determine if patient still lacks capacity  Plan to discharge to rehab tomorrow following neuropsych tomorrow.

## 2024-04-21 NOTE — ASSESSMENT & PLAN NOTE
Wt Readings from Last 3 Encounters:   04/21/24 60.7 kg (133 lb 12.8 oz)   04/09/24 62.6 kg (138 lb)   04/02/24 62 kg (136 lb 9.6 oz)     Echo from 8/2023 shows EF 55%.  Mildly abnormal diastolic function  No home diuretic.  Takes metoprolol 25 mg at home. Will decrease to 12.5 mg due to bradycardia.  BNP on admission 1558  Status post Lasix 40 mg x 2 on 4/19    Plan:  Daily I's and O's, 2 g sodium diet, elevate HOB  Will hold off on further diuretics as patient had mild increase in creatinine on 4/20

## 2024-04-21 NOTE — ASSESSMENT & PLAN NOTE
Wt Readings from Last 3 Encounters:   04/21/24 60.7 kg (133 lb 12.8 oz)   04/09/24 62.6 kg (138 lb)   04/02/24 62 kg (136 lb 9.6 oz)     Echo from 8/2023 shows EF 55%.  Mildly abnormal diastolic function  No home diuretic.  Takes metoprolol 25 mg at home  BNP on admission 1558  Status post Lasix 40 mg x 2 on 4/19    Plan:  Daily I's and O's, 2 g sodium diet, elevate HOB  Will hold off on further diuretics as patient had mild increase in creatinine on 4/20

## 2024-04-22 LAB
FLUAV RNA RESP QL NAA+PROBE: NEGATIVE
FLUBV RNA RESP QL NAA+PROBE: NEGATIVE
RSV RNA RESP QL NAA+PROBE: NEGATIVE
SARS-COV-2 RNA RESP QL NAA+PROBE: NEGATIVE

## 2024-04-22 PROCEDURE — 99232 SBSQ HOSP IP/OBS MODERATE 35: CPT | Performed by: INTERNAL MEDICINE

## 2024-04-22 PROCEDURE — 97760 ORTHOTIC MGMT&TRAING 1ST ENC: CPT

## 2024-04-22 PROCEDURE — 99232 SBSQ HOSP IP/OBS MODERATE 35: CPT | Performed by: FAMILY MEDICINE

## 2024-04-22 PROCEDURE — 99231 SBSQ HOSP IP/OBS SF/LOW 25: CPT | Performed by: PODIATRIST

## 2024-04-22 PROCEDURE — 0241U HB NFCT DS VIR RESP RNA 4 TRGT: CPT

## 2024-04-22 PROCEDURE — 97530 THERAPEUTIC ACTIVITIES: CPT

## 2024-04-22 RX ORDER — LANOLIN ALCOHOL/MO/W.PET/CERES
3 CREAM (GRAM) TOPICAL
Status: DISCONTINUED | OUTPATIENT
Start: 2024-04-22 | End: 2024-04-24 | Stop reason: HOSPADM

## 2024-04-22 RX ORDER — BISACODYL 10 MG
10 SUPPOSITORY, RECTAL RECTAL DAILY PRN
Status: DISCONTINUED | OUTPATIENT
Start: 2024-04-22 | End: 2024-04-24 | Stop reason: HOSPADM

## 2024-04-22 RX ADMIN — Medication 1000 UNITS: at 09:17

## 2024-04-22 RX ADMIN — DICLOFENAC SODIUM 2 G: 10 GEL TOPICAL at 20:21

## 2024-04-22 RX ADMIN — GABAPENTIN 100 MG: 100 CAPSULE ORAL at 17:19

## 2024-04-22 RX ADMIN — GABAPENTIN 100 MG: 100 CAPSULE ORAL at 09:17

## 2024-04-22 RX ADMIN — Medication 1 TABLET: at 09:17

## 2024-04-22 RX ADMIN — Medication 3 MG: at 20:20

## 2024-04-22 RX ADMIN — SENNOSIDES 17.2 MG: 8.6 TABLET, FILM COATED ORAL at 17:19

## 2024-04-22 RX ADMIN — SENNOSIDES 17.2 MG: 8.6 TABLET, FILM COATED ORAL at 09:17

## 2024-04-22 RX ADMIN — ACETAMINOPHEN 975 MG: 325 TABLET, FILM COATED ORAL at 09:17

## 2024-04-22 RX ADMIN — POLYETHYLENE GLYCOL 3350 17 G: 17 POWDER, FOR SOLUTION ORAL at 09:17

## 2024-04-22 RX ADMIN — ACETAMINOPHEN 975 MG: 325 TABLET, FILM COATED ORAL at 17:19

## 2024-04-22 RX ADMIN — METOPROLOL SUCCINATE 12.5 MG: 25 TABLET, EXTENDED RELEASE ORAL at 09:17

## 2024-04-22 RX ADMIN — ACETAMINOPHEN 975 MG: 325 TABLET, FILM COATED ORAL at 20:19

## 2024-04-22 RX ADMIN — ENOXAPARIN SODIUM 30 MG: 30 INJECTION SUBCUTANEOUS at 09:17

## 2024-04-22 NOTE — PHYSICAL THERAPY NOTE
"   ORTHOTIC MANAGEMENT and TRAINING/PT TREATMENT     24 1416   PT Last Visit   PT Visit Date 24   Note Type   Note type Orthotic Management/Training   Type of Brace   Brace Applied   (High Tide Cam Walker;RLE; Size - Med)   Patient Position When Brace Applied Seated   Bracing Recommendations Recommendation (comment)  (when weight bearing/transfers/ambulation)   Education   Education Provided Yes  (don/doff;when it should be worn - with weight bearing)   End of Consult   Patient Position at End of Consult Bedside chair;Bed/Chair alarm activated;All needs within reach   Nurse Communication Nurse aware of consult, application of brace   Pain Assessment   Pain Assessment Tool 0-10   Pain Score 6   Pain Location/Orientation Orientation: Bilateral;Location: Leg   Pain Onset/Description Onset: Ongoing   Effect of Pain on Daily Activities limits comfort and activity tolerance   Patient's Stated Pain Goal No pain   Hospital Pain Intervention(s) Repositioned;Ambulation/increased activity;Emotional support;Rest   Restrictions/Precautions   Weight Bearing Precautions Per Order Yes   RLE Weight Bearing Per Order (S)  WBAT  (in CAM Boot)   Braces or Orthoses CAM Boot  (RLE;High tide;Size - Med)   Other Precautions Pain;Fall Risk;Bed Alarm;Chair Alarm;Cognitive;Hard of hearing  (Bigg on room air)   Cognition   Overall Cognitive Status Impaired   Arousal/Participation Cooperative   Attention Attends with cues to redirect   Orientation Level Oriented to person   Memory Decreased recall of precautions;Decreased recall of recent events;Decreased short term memory   Following Commands Follows one step commands with increased time or repetition   Comments At least 2 pt identifiers including name and    Subjective   Subjective \"Both my legs hurt, the left one more than the right one\"   Bed Mobility   Additional Comments Pt received OOB in chair   Transfers   Sit to Stand 3  Moderate assistance   Additional items " Armrests;Assist x 1;Verbal cues;Increased time required   Stand to Sit 3  Moderate assistance   Additional items Armrests;Assist x 1;Verbal cues;Increased time required   Additional Comments Pt sit to stand 3x with rest inbetween trials and stood with RW and mod A + 1. Pt stands with fwd flexion, retropulsion - has difficulty moving weight fwd over feet and having increased difficulty with CAM boot RLE - uneven leg length but unable to fit shoe on L foot due to LE swelling. Pt stood each time for ~30 seconds, cues for upright posturing   Balance   Static Sitting Fair   Static Standing Poor +  (w RW)   Endurance Deficit   Endurance Deficit Yes   Endurance Deficit Description limited activity endurance   Activity Tolerance   Activity Tolerance Patient limited by fatigue;Patient limited by pain;Treatment limited secondary to medical complications (Comment)  (impaired cognition;weakness and deconditioning)   Nurse Made Aware JAEL Pisano   Assessment   Problem List Decreased strength;Decreased endurance;Impaired balance;Decreased mobility;Decreased coordination;Decreased cognition;Impaired judgement;Decreased safety awareness;Orthopedic restrictions;Pain   Assessment Pt is s/p x-ray L and R fibula with new finding of non-displaced healing fibular fx. Pt ordered for high tide Cam Walker and is WBAT per Podiatry and Podiatry consult. This writer reached out to Dr. Mayo prior to working with pt, and pt okay for PT WBAT RLE with Cam Walker fitted. Pt agreeable to fitting of CAM Boot and PT session for sit to stand transfers. Repeated education re: reason for CAM boot, who ordered and why, wear schedule and don/doff. Pt is noted with improved transfers from mod A + 2 to mod A + 1, however, unable to amb/take steps today. Pt will cont to benefit from skilled PT services while admitted per PT POC. Pt remains appropriate for Level II Moderate Resource Intensity when medically stable for dc. Goal re:Cam Boot added to previous  goals.    The patient's AM-PAC Basic Mobility Inpatient Short Form Raw Score is 10. A Raw score of less than or equal to 16 suggests the patient may benefit from discharge to post-acute rehabilitation services. Please also refer to the recommendation of the Physical Therapist for safe discharge planning.   Goals   STG Expiration Date 05/03/24   Short Term Goal #1 Patient PT goals established in order to maximize functional independence. Pt will: complete all bed mobility in hospital bed with S in order to promote increased OOB functional mobility and simulate home environment; complete all transfers with RW and S in order to increase safety with functional mobility; ambulate >50ft with RW and S in order to increase safety with household distance functional mobility; Pt will be able to don/doff CAM Walker brace w/ modx1 in order to demonstrate understanding and increase chance for compliance; negotiate 2-3 stairs with HR assist and KORIN in order to facilitate safe access to her home; improve B LE strength to >/= 3+/5 MMT t/o in order to increase safety with functional mobility and decrease risk of falls; demonstrate understanding and independence with LE strengthening HEP; improve ambulatory balance to >/= good grade with RW in order to promote safety and increased independence with mobility; tolerate >3hrs OOB in upright position, in order to improve muscular endurance and respiratory status; improve AM-PAC score to >/= 16/24 in order to increase independence with mobility and decrease burden of care; improve Barthel Index score to >/= 35/100 in order to increase independence and decrease risk of falls.   Plan   Treatment/Interventions ADL retraining;Functional transfer training;LE strengthening/ROM;Therapeutic exercise;Endurance training;Cognitive reorientation;Patient/family training;Equipment eval/education;Bed mobility;Gait training;Compensatory technique education;Spoke to nursing;Spoke to case management;Spoke  to MD   PT Frequency 3-5x/wk   Discharge Recommendation   Rehab Resource Intensity Level, PT II (Moderate Resource Intensity)   AM-PAC Basic Mobility Inpatient   Turning in Flat Bed Without Bedrails 3   Lying on Back to Sitting on Edge of Flat Bed Without Bedrails 3   Moving Bed to Chair 1   Standing Up From Chair Using Arms 1   Walk in Room 1   Climb 3-5 Stairs With Railing 1   Basic Mobility Inpatient Raw Score 10   Turning Head Towards Sound 3   Follow Simple Instructions 3   Low Function Basic Mobility Raw Score  16   Low Function Basic Mobility Standardized Score  25.72   [facility] Highest Level Of Mobility   -[facility] Goal 4: Move to chair/commode   -HL Achieved 4: Move to chair/commode   End of Consult   Patient Position at End of Consult Bed/Chair alarm activated;Bedside chair;All needs within reach   Licensure   [place] License Number  Bella Rich, PT

## 2024-04-22 NOTE — PLAN OF CARE
Problem: PHYSICAL THERAPY ADULT  Goal: Performs mobility at highest level of function for planned discharge setting.  See evaluation for individualized goals.  Description: Treatment/Interventions: Functional transfer training, LE strengthening/ROM, Elevations, Therapeutic exercise, Endurance training, Cognitive reorientation, Patient/family training, Equipment eval/education, Bed mobility, Gait training, Compensatory technique education, Spoke to MD, Spoke to nursing, Spoke to case management    See flowsheet documentation for full assessment, interventions and recommendations.  Note: Prognosis: Fair  Problem List: Decreased strength, Decreased endurance, Impaired balance, Decreased mobility, Decreased coordination, Decreased cognition, Impaired judgement, Decreased safety awareness, Orthopedic restrictions, Pain  Assessment: Pt is s/p x-ray L and R fibula with new finding of non-displaced healing fibular fx. Pt ordered for high tide Cam Walker and is WBAT per Podiatry and Podiatry consult. This writer reached out to Dr. Mayo prior to working with pt, and pt okay for PT WBAT RLE with Cam Walker fitted. Pt agreeable to fitting of CAM Boot and PT session for sit to stand transfers. Repeated education re: reason for CAM boot, who ordered and why, wear schedule and don/doff. Pt is noted with improved transfers from mod A + 2 to mod A + 1, however, unable to amb/take steps today. Pt will cont to benefit from skilled PT services while admitted per PT POC. Pt remains appropriate for Level II Moderate Resource Intensity when medically stable for dc. Goal re:Cam Boot added to previous goals.  Barriers to Discharge: Decreased caregiver support (pt with little to no social supports)     Rehab Resource Intensity Level, PT: II (Moderate Resource Intensity)    See flowsheet documentation for full assessment.

## 2024-04-22 NOTE — PROGRESS NOTES
Critical access hospital  Progress Note  Name: Ida Vuong I  MRN: [id number]  Unit/Bed#: S -01 I Date of Admission: 4/18/2024   Date of Service: 4/22/2024 I Hospital Day: 3    Assessment/Plan   * Loss of consciousness (HCC)  Assessment & Plan  Assessment:  Uncertain etiology and patient's has unreliable history.  Per patient's neighbor, she was noted [place] in [place].  Has had at least 2 falls with head strike while on aspirin.  Admission CT head imaging, spine, chest, pelvis, lower extremities only revealed nondisplaced healing R distal fibula fracture. EKG shows mildly prolonged Qtc  TTE shows EF 65 with abnormal diastolic function redemonstrated from prior  Urine drug screen is positive for opiates.  Patient did not receive any narcotics in the ED or during this admission  Suspect the patient may have underlying dementia.  Neuropsych evaluation states the patient does not have capacity make her own decisions    Plan:  Fall precautions and placement  Patient has improved mentation but will obtain Neuropsych consult per family request to determine if patient still lacks capacity  Plan to discharge to rehab tomorrow following neuropsych tomorrow.     (HFpEF) heart failure with preserved ejection fraction (HCC)  Assessment & Plan  Wt Readings from Last 3 Encounters:   04/21/24 60.7 kg (133 lb 12.8 oz)   04/09/24 62.6 kg (138 lb)   04/02/24 62 kg (136 lb 9.6 oz)     Echo from 8/2023 shows EF 55%.  Mildly abnormal diastolic function  No home diuretic.  Takes metoprolol 25 mg at home. Will decrease to 12.5 mg due to bradycardia.  BNP on admission 1558  Status post Lasix 40 mg x 2 on 4/19    Plan:  Daily I's and O's, 2 g sodium diet, elevate HOB  Will hold off on further diuretics as patient had mild increase in creatinine on 4/20    Fall  Assessment & Plan  Rehab per PT/OT    Fracture of distal end of fibula with routine healing  Assessment &  Plan  Assessment:  Patient reporting lower extremity pain and came in after fall  4/20 x-ray showed likely healing nondisplaced distal fibular fracture on the right    Plan:  Continue supportive care and physical therapy.  No indication for surgical intervention at this time    Cognitive impairment  Assessment & Plan  Conditions per geriatrics  Delirium precautions    Bilateral lower extremity edema  Assessment & Plan  Assessment:  Unsure about etiology however past records show that she had a questionable infection for which she had a PICC line placed by her hematologist Dr. Polo.  Lower extremity ultrasound negative for DVT    Plan:  Place compression stockings after.  Elevate legs.  Voltaren gel for pain      CKD (chronic kidney disease) stage 3, GFR 30-59 ml/min ([facility])  Assessment & Plan  Lab Results   Component Value Date    EGFR 41 04/21/2024    EGFR 35 04/20/2024    EGFR 39 04/19/2024    CREATININE 1.14 04/21/2024    CREATININE 1.31 (H) 04/20/2024    CREATININE 1.18 04/19/2024   Baseline creatinine 1.1.  Avoid nephrotoxins    Essential hypertension  Assessment & Plan  Continue home amlodipine 5 mg and metoprolol 25 mg    IBS (irritable bowel syndrome)  Assessment & Plan  Continue home Linzess             VTE Pharmacologic Prophylaxis: VTE Score: 4 Moderate Risk (Score 3-4) - Pharmacological DVT Prophylaxis Ordered: enoxaparin (Lovenox).    Mobility:   Basic Mobility Inpatient Raw Score: 11  JH-HLM Goal: 4: Move to chair/commode  JH-HLM Achieved: 4: Move to chair/commode  JH-HLM Goal achieved. Continue to encourage appropriate mobility.    Patient Centered Rounds: I performed bedside rounds with nursing staff today.  Discussions with Specialists or Other Care Team Provider: None    Education and Discussions with Family / Patient: Updated  (son) via phone.    Current Length of Stay: 3 day(s)  Current Patient Status: Inpatient   Discharge Plan: Anticipate discharge tomorrow to rehab  facility.    Code Status: Level 3 - DNAR and DNI    Subjective:   She states that she is eating well but had poor sleep last night. She endorses pain in b/l lower legs but has no other aches or pains. She did not remember the discussion yesterday that it was recommended that she go to rehab.     Objective:     Vitals:   Temp (24hrs), Av °F (36.7 °C), Min:97.7 °F (36.5 °C), Max:98.2 °F (36.8 °C)    Temp:  [97.7 °F (36.5 °C)-98.2 °F (36.8 °C)] 97.7 °F (36.5 °C)  HR:  [45-46] 45  Resp:  [18] 18  BP: (113-140)/(52-62) 140/62  SpO2:  [95 %-96 %] 96 %  Body mass index is 26.1 kg/m².     Input and Output Summary (last 24 hours):     Intake/Output Summary (Last 24 hours) at 2024 1200  Last data filed at 2024 0745  Gross per 24 hour   Intake 960 ml   Output 500 ml   Net 460 ml       Physical Exam:   Physical Exam  Constitutional:       Appearance: Normal appearance.   Cardiovascular:      Rate and Rhythm: Normal rate and regular rhythm.      Pulses: Normal pulses.      Heart sounds: Normal heart sounds.   Pulmonary:      Effort: Pulmonary effort is normal.      Breath sounds: Normal breath sounds.   Abdominal:      General: Abdomen is flat. Bowel sounds are normal. There is no distension.      Tenderness: There is no abdominal tenderness.   Neurological:      Mental Status: She is alert.          Additional Data:     Labs:  Results from last 7 days   Lab Units 24  0513   WBC Thousand/uL 4.09*   HEMOGLOBIN g/dL 9.7*   HEMATOCRIT % 29.9*   PLATELETS Thousands/uL 193   SEGS PCT % 52   LYMPHO PCT % 22   MONO PCT % 11   EOS PCT % 13*     Results from last 7 days   Lab Units 24  0513 24  0456 24  1338   SODIUM mmol/L 139   < > 133*   POTASSIUM mmol/L 4.0   < > 4.3   CHLORIDE mmol/L 104   < > 105   CO2 mmol/L 27   < > 20*   BUN mg/dL 36*   < > 32*   CREATININE mg/dL 1.14   < > 1.19   ANION GAP mmol/L 8   < > 8   CALCIUM mg/dL 8.1*   < > 9.1   ALBUMIN g/dL  --   --  3.8   TOTAL BILIRUBIN mg/dL   --   --  0.60   ALK PHOS U/L  --   --  82   ALT U/L  --   --  13   AST U/L  --   --  34   GLUCOSE RANDOM mg/dL 77   < > 114    < > = values in this interval not displayed.     Results from last 7 days   Lab Units 24  1338   INR  1.03     Results from last 7 days   Lab Units 24  2048 24  1140 24  1106 24  0721 24  0407 24  0007 24  1831   POC GLUCOSE mg/dl 122 80 81 83 84 116 115         Results from last 7 days   Lab Units 24  1718 24  1338   LACTIC ACID mmol/L 1.1 3.6*   PROCALCITONIN ng/ml  --  0.11       Lines/Drains:  Invasive Devices       Peripheral Intravenous Line  Duration             Peripheral IV 24 Left Antecubital 3 days                      Telemetry:  Telemetry Orders (From admission, onward)               24 Hour Telemetry Monitoring  Continuous x 24 Hours (Telem)           Question:  Reason for 24 Hour Telemetry  Answer:  Syncope suspected to be cardiac in origin                     Telemetry Reviewed: Normal Sinus Rhythm  Indication for Continued Telemetry Use: No indication for continued use. Will discontinue.              Imaging: Reviewed radiology reports from this admission including: xray(s)    Recent Cultures (last 7 days):   Results from last 7 days   Lab Units 24  1441 24  1338   BLOOD CULTURE  No Growth at 72 hrs. No Growth at 72 hrs.       Last 24 Hours Medication List:   Current Facility-Administered Medications   Medication Dose Route Frequency Provider Last Rate    acetaminophen  975 mg Oral TID Aida Pérez MD      calcium carbonate  1 tablet Oral Daily With Breakfast Kyle Brunner, MD      Cholecalciferol  1,000 Units Oral Daily Kyle Brunner, MD      Diclofenac Sodium  2 g Topical 4x Daily Kyle Brunner, MD      enoxaparin  30 mg Subcutaneous Q24H KATHARINA Kyle Brunner, MD      gabapentin  100 mg Oral BID Aida Pérez MD      metoprolol succinate  12.5 mg Oral Daily DO bal Davalos  glycol  17 g Oral Daily Kyle Brunner, MD      senna  2 tablet Oral BID Aida Pérez MD      trimethobenzamide  200 mg Intramuscular Q6H PRN Kyle Brunner, MD          Today, Patient Was Seen By: Dave Ricketts DO    **Please Note: This note may have been constructed using a voice recognition system.**

## 2024-04-22 NOTE — CASE MANAGEMENT
Case Management Discharge Planning Note    Patient name Ida Vuong  Location S /S -01 MRN [id number]  : 1930 Date 2024       Current Admission Date: 2024  Current Admission Diagnosis:Loss of consciousness (HCC)   Patient Active Problem List    Diagnosis Date Noted    Fracture of distal end of fibula with routine healing 2024    Cognitive impairment 2024    At risk for delirium 2024    Fall 2024    Loss of consciousness (HCC) 2024    (HFpEF) heart failure with preserved ejection fraction (HCC) 2024    Hx of right hemicolectomy 2023    History of compression fracture of spine 2023    Malnutrition due to renal disease  (HCC) 2023    Arthritis of left hip 10/25/2022    Hx of colonic polyps 2022    Diverticulosis 2022    Anemia 2022    Bilateral lower extremity edema 2022    Megaloblastic anemia 2021    Ambulatory dysfunction 2021    Dizziness 2021    Anxiety 2021    Iron deficiency anemia 2021    CKD (chronic kidney disease) stage 3, GFR 30-59 ml/min ([facility]) 2020    Lumbosacral spondylosis without myelopathy 2018    Essential hypertension 2018    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) 2018    Idiopathic peripheral neuropathy 2018    Benign positional vertigo 2017    Allergic rhinitis 2016    Insomnia 2016    Gastroparesis 2016    IBS (irritable bowel syndrome) 10/20/2015    Osteoporosis 10/20/2015    Low back pain 10/08/2015      LOS (days): 3  Geometric Mean LOS (GMLOS) (days): 2.4  Days to GMLOS:-0.6     OBJECTIVE:  Risk of Unplanned Readmission Score: 16.3         Current admission status: Inpatient   Preferred Pharmacy:   [facility] [place], [place] [address] [address]  Phone: [phone] Fax: [phone]    Primary Care Provider: Ron Wadsworth  MD    Primary Insurance: MEDICARE  Secondary Insurance: BLUE CROSS    DISCHARGE DETAILS:    Discharge planning discussed with:: patient's sonAndrzej, by phone  Freedom of Choice: Yes (re: facility placement)  Comments - Freedom of Choice: options reviewed by phone and son confirmed preference remains for [facility]; agency reserved in AIDIN  CM contacted family/caregiver?: Yes  Were Treatment Team discharge recommendations reviewed with patient/caregiver?: Yes  Did patient/caregiver verbalize understanding of patient care needs?: N/A- going to facility  Were patient/caregiver advised of the risks associated with not following Treatment Team discharge recommendations?: Yes    Contacts  Patient Contacts: valeria Donnelly  Relationship to Patient:: Family  Contact Method: Phone  Phone Number: [phone]  Reason/Outcome: Continuity of Care, Emergency Contact, Discharge Planning, Referral    Requested Home Health Care         Is the patient interested in HHC at discharge?: No    DME Referral Provided  Referral made for DME?: No    Other Referral/Resources/Interventions Provided:  Interventions: SNF  Referral Comments: Call received from patient's sonAndrzej, following-up on conversation from Friday re: capacity evaluation. Reports that he had been in on Sunday and patient seemed to be more-or-less back to her baseline. He, however, also agrees that she should be sent to a skilled facility, as he does not believe she's capable of taking care of herself at home any longer. Son requesting contact from MD to comment on capacity, as he states he plans to pursue guardianship through court and needs confirmation that doctors still feel patient lacks capacity at this time. Reviewed list of facilities that had offered beds for patient, and son still confirms preference for [facility] at this time. Loco reserved in AIDIN and message sent to relay possible admission for today - facility requesting COVID test - MD aware  of same. Will continue to follow for possible rehab transfer.    Would you like to participate in our [facility] service program?  : No - Declined    Treatment Team Recommendation: SNF  Discharge Destination Plan:: SNF

## 2024-04-22 NOTE — CASE MANAGEMENT
Case Management Progress Note    Patient name Ida Vuong  Location S /S -01 MRN [id number]  : 1930 Date 2024       LOS (days): 3  Geometric Mean LOS (GMLOS) (days): 2.4  Days to GMLOS:-0.9        OBJECTIVE:        Current admission status: Inpatient  Preferred Pharmacy:   [facility] [place], [place] [address] [address]  Phone: [phone] Fax: [phone]    Primary Care Provider: Ron Wadsworth MD    Primary Insurance: MEDICARE  Secondary Insurance: BLUE CROSS    PROGRESS NOTE:    COVID test result negative - forwarded to facility for their records.

## 2024-04-22 NOTE — PROGRESS NOTES
Progress Note - Geriatric Medicine   Ida Lee Yevgeniy 89 y.o. female MRN: [id number]  Unit/Bed#: S -01 Encounter: [id number]      Assessment/Plan:  At risk for delirium  Assessment & Plan  - mental Status improved today patient was alert oriented x 3  -She reported intermittent visual hallucinations  -Patient is high risk of delirium due to cognitive impairment at baseline, hospitalization  -continue delirium precautions  -maintain normal sleep/wake cycle  -minimize overnight interruptions, group overnight vitals/labs/nursing checks as possible  -dim lights, close blinds and turn off tv to minimize stimulation and encourage sleep environment in evenings  -ensure that pain is well controlled start Tylenol 975mg Q8H scheduled   -monitor for fecal and urinary retention which may precipitate delirium  -encourage early mobilization and ambulation  -provide frequent reorientation and redirection  -encourage family and friends at the bedside to help calm patient if anxious  -avoid medications which may precipitate or worsen delirium such as tramadol, benzodiazepine, anticholinergics, and antihistaminics  -encourage hydration and nutrition , assist with feeding if needed  -redirect unwanted behaviors as first line, avoid physical restraints.   -continue  gabapentin to 100 mg p.o. twice daily due to impaired kidney function  -continue senna for constipation, use MiraLAX and Dulcolax suppository as needed  -Avoid use of antipsychotics due to elevated Qtc, to be 540 on admission  -Melatonin 3 mg nightly    Cognitive impairment  Assessment & Plan  Patient was alert oriented x 1 on admission to the hospital, today she was alert oriented x 3  Needs assistance with IADLs at baseline  Scored 2/5 on mini cog  CT showed microangiopathic changes  Will continue to provide supportive care, reorient as needed.  Patient is at high risk for delirium, will monitor closely and place on delirium precautions.  Maintain sleep/wake  cycle.  Optimize pain regimen.  Monitor for constipation and urinary retention and manage as needed.  TSH and B12 level within normal limits  Encourage family to visit.  Encourage to wear glasses and hearing aids while awake.  Encourage po intake, assist with feeding if needed.     (HFpEF) heart failure with preserved ejection fraction ([facility])  Assessment & Plan  Wt Readings from Last 3 Encounters:   04/19/24 59.6 kg (131 lb 6.3 oz)   04/09/24 62.6 kg (138 lb)   04/02/24 62 kg (136 lb 9.6 oz)     Received Lasix on admission  continue to monitor ins and outs and daily weights  monitor electrolytes  Continue low-sodium diet and fluid restriction          Fall  Assessment & Plan  Patient uses a cane for ambulation at baseline  she reports recent fall  Associated symptoms -dizziness.  Patient is a poor historian not able to give details about the fall  Monitor orthostatic vital signs  Encourage p.o. hydration  Avoid hypotension and hypoglycemia   Continue PT/OT    Anemia  Assessment & Plan  Hemoglobin trending down, last checked on 4/21 was 9.7  Continue to monitor CBC and transfuse if hemoglobin less than 7.0    CKD (chronic kidney disease) stage 3, GFR 30-59 ml/min ([facility])  Assessment & Plan  Lab Results   Component Value Date    EGFR 41 04/21/2024    EGFR 35 04/20/2024    EGFR 39 04/19/2024    CREATININE 1.14 04/21/2024    CREATININE 1.31 (H) 04/20/2024    CREATININE 1.18 04/19/2024     Creatinine stable.  Will avoid nephrotoxic medication.  Encourage po hydration.  Will monitor BMP.     Essential hypertension  Assessment & Plan  Blood pressure in the lower side  Hold amlodipine for now  continue metoprolol with holding parameters, dose decreased due to bradycardia  Monitor orthostatic vital signs and avoid hypotension       Subjective:   Patient seen and examined at bedside.  States she feels well at the time of encounter.  Reported insomnia pain in left knee with ambulation.  She reports good appetite no nausea or  vomiting.  Complains of constipation.  No dysuria or hematuria.  Dizziness is improved    Review of Systems   Constitutional:  Negative for chills and fever.   HENT:  Positive for hearing loss. Negative for congestion and rhinorrhea.    Eyes:  Positive for visual disturbance.   Respiratory:  Negative for cough, shortness of breath and wheezing.    Cardiovascular:  Positive for leg swelling. Negative for chest pain and palpitations.   Gastrointestinal:  Negative for abdominal pain and constipation.   Endocrine: Negative for cold intolerance.   Genitourinary:  Negative for difficulty urinating, dysuria and hematuria.   Musculoskeletal:  Positive for arthralgias and gait problem.   Skin:  Negative for wound.   Neurological:  Positive for dizziness. Negative for seizures.   Hematological:  Does not bruise/bleed easily.   Psychiatric/Behavioral:  Positive for hallucinations and sleep disturbance. Negative for behavioral problems.          Objective:     Vitals: Blood pressure 140/62, pulse (!) 45, temperature 97.7 °F (36.5 °C), resp. rate 18, height 5' (1.524 m), weight 60.6 kg (133 lb 10.3 oz), SpO2 96%.,Body mass index is 26.1 kg/m².      Intake/Output Summary (Last 24 hours) at 4/22/2024 1507  Last data filed at 4/22/2024 1300  Gross per 24 hour   Intake 720 ml   Output 200 ml   Net 520 ml       Current Medications: Reviewed    Physical Exam:   Physical Exam  Vitals and nursing note reviewed.   Constitutional:       General: She is not in acute distress.     Appearance: She is well-developed.   HENT:      Head: Normocephalic and atraumatic.      Ears:      Comments: [place]  Eyes:      Conjunctiva/sclera: Conjunctivae normal.   Cardiovascular:      Rate and Rhythm: Normal rate. Rhythm irregular.      Heart sounds: Murmur heard.   Pulmonary:      Effort: Pulmonary effort is normal. No respiratory distress.      Breath sounds: Normal breath sounds.   Abdominal:      Palpations: Abdomen is soft.      Tenderness: There is no  abdominal tenderness.   Musculoskeletal:         General: No swelling.      Cervical back: Neck supple.      Right lower leg: Edema present.      Left lower leg: Edema present.   Skin:     General: Skin is warm and dry.      Capillary Refill: Capillary refill takes less than 2 seconds.   Neurological:      Mental Status: She is alert and oriented to person, place, and time.   Psychiatric:         Mood and Affect: Mood normal.          Invasive Devices       Peripheral Intravenous Line  Duration             Peripheral IV 04/18/24 Left Antecubital 4 days                    Lab, Imaging and other studies: I have personally reviewed pertinent reports.

## 2024-04-22 NOTE — ASSESSMENT & PLAN NOTE
Hemoglobin trending down, last checked on 4/21 was 9.7  Continue to monitor CBC and transfuse if hemoglobin less than 7.0

## 2024-04-23 LAB
BACTERIA BLD CULT: NORMAL
BACTERIA BLD CULT: NORMAL

## 2024-04-23 PROCEDURE — 97535 SELF CARE MNGMENT TRAINING: CPT

## 2024-04-23 PROCEDURE — 99232 SBSQ HOSP IP/OBS MODERATE 35: CPT | Performed by: INTERNAL MEDICINE

## 2024-04-23 PROCEDURE — 99232 SBSQ HOSP IP/OBS MODERATE 35: CPT | Performed by: FAMILY MEDICINE

## 2024-04-23 RX ADMIN — SENNOSIDES 17.2 MG: 8.6 TABLET, FILM COATED ORAL at 09:34

## 2024-04-23 RX ADMIN — Medication 3 MG: at 21:42

## 2024-04-23 RX ADMIN — DICLOFENAC SODIUM 2 G: 10 GEL TOPICAL at 09:34

## 2024-04-23 RX ADMIN — Medication 1 TABLET: at 09:35

## 2024-04-23 RX ADMIN — ACETAMINOPHEN 650 MG: 325 TABLET, FILM COATED ORAL at 09:34

## 2024-04-23 RX ADMIN — POLYETHYLENE GLYCOL 3350 17 G: 17 POWDER, FOR SOLUTION ORAL at 09:34

## 2024-04-23 RX ADMIN — ACETAMINOPHEN 650 MG: 325 TABLET, FILM COATED ORAL at 15:34

## 2024-04-23 RX ADMIN — ENOXAPARIN SODIUM 30 MG: 30 INJECTION SUBCUTANEOUS at 09:34

## 2024-04-23 RX ADMIN — GABAPENTIN 100 MG: 100 CAPSULE ORAL at 09:34

## 2024-04-23 RX ADMIN — SENNOSIDES 17.2 MG: 8.6 TABLET, FILM COATED ORAL at 17:13

## 2024-04-23 RX ADMIN — Medication 1000 UNITS: at 09:34

## 2024-04-23 RX ADMIN — GABAPENTIN 100 MG: 100 CAPSULE ORAL at 17:13

## 2024-04-23 NOTE — PROGRESS NOTES
[facility]  Progress Note  Name: Ida Vuong I  MRN: [id number]  Unit/Bed#: S -01 I Date of Admission: 4/18/2024   Date of Service: 4/23/2024 I Hospital Day: 4    Assessment/Plan   * Loss of consciousness (HCC)  Assessment & Plan  Assessment:  Uncertain etiology and patient's has unreliable history.  Per patient's neighbor, she was noted [place] in [place].  Has had at least 2 falls with head strike while on aspirin.  Admission CT head imaging, spine, chest, pelvis, lower extremities only revealed nondisplaced healing R distal fibula fracture. EKG shows mildly prolonged Qtc  TTE shows EF 65 with abnormal diastolic function redemonstrated from prior  Urine drug screen is positive for opiates.  Patient did not receive any narcotics in the ED or during this admission  Suspect the patient may have underlying dementia.  Neuropsych evaluation states the patient does not have capacity make her own decisions    Plan:  Fall precautions and placement  Patient has improved mentation but will obtain Neuropsych consult per family request to determine if patient still lacks capacity  Plan to discharge to rehab tomorrow following neuropsych tomorrow.     (HFpEF) heart failure with preserved ejection fraction (HCC)  Assessment & Plan  Wt Readings from Last 3 Encounters:   04/23/24 60 kg (132 lb 4.4 oz)   04/09/24 62.6 kg (138 lb)   04/02/24 62 kg (136 lb 9.6 oz)     Echo from 8/2023 shows EF 55%.  Mildly abnormal diastolic function  No home diuretic.  Takes metoprolol 25 mg at home. Will decrease to 12.5 mg due to bradycardia.  BNP on admission 1558  Status post Lasix 40 mg x 2 on 4/19    Plan:  Daily I's and O's, 2 g sodium diet, elevate HOB  Will hold off on further diuretics as patient had mild increase in creatinine on 4/20    Fall  Assessment & Plan  Rehab per PT/OT    Fracture of distal end of fibula with routine healing  Assessment &  Plan  Assessment:  Patient reporting lower extremity pain and came in after fall  4/20 x-ray showed likely healing nondisplaced distal fibular fracture on the right    Plan:  Continue supportive care and physical therapy.  No indication for surgical intervention at this time    Cognitive impairment  Assessment & Plan  Conditions per geriatrics  Delirium precautions    Bilateral lower extremity edema  Assessment & Plan  Assessment:  Unsure about etiology however past records show that she had a questionable infection for which she had a PICC line placed by her hematologist Dr. Polo.  Lower extremity ultrasound negative for DVT    Plan:  Place compression stockings after.  Elevate legs.  Voltaren gel for pain      CKD (chronic kidney disease) stage 3, GFR 30-59 ml/min ([facility])  Assessment & Plan  Lab Results   Component Value Date    EGFR 41 04/21/2024    EGFR 35 04/20/2024    EGFR 39 04/19/2024    CREATININE 1.14 04/21/2024    CREATININE 1.31 (H) 04/20/2024    CREATININE 1.18 04/19/2024   Baseline creatinine 1.1.  Avoid nephrotoxins    Essential hypertension  Assessment & Plan  Continue home amlodipine 5 mg and metoprolol 25 mg    IBS (irritable bowel syndrome)  Assessment & Plan  Continue home Linzess           VTE Pharmacologic Prophylaxis: VTE Score: 4 Moderate Risk (Score 3-4) - Pharmacological DVT Prophylaxis Ordered: enoxaparin (Lovenox).    Mobility:   Basic Mobility Inpatient Raw Score: 12  JH-HLM Goal: 4: Move to chair/commode  JH-HLM Achieved: 5: Stand (1 or more minutes)  JH-HLM Goal achieved. Continue to encourage appropriate mobility.    Patient Centered Rounds: I performed bedside rounds with nursing staff today.  Discussions with Specialists or Other Care Team Provider: None    Education and Discussions with Family / Patient: Attempted to update  (son) via phone. Left voicemail.     Current Length of Stay: 4 day(s)  Current Patient Status: Inpatient   Discharge Plan: Anticipate discharge  tomorrow to rehab facility.    Code Status: Level 3 - DNAR and DNI    Subjective:   Patient today is reporting continued leg pain and swelling but it is improved compared to days prior. She denies any other symptoms at this time.     Objective:     Vitals:   Temp (24hrs), Av.9 °F (36.6 °C), Min:97.5 °F (36.4 °C), Max:98.3 °F (36.8 °C)    Temp:  [97.5 °F (36.4 °C)-98.3 °F (36.8 °C)] 98.1 °F (36.7 °C)  HR:  [42-56] 43  Resp:  [18-19] 18  BP: (123-148)/(54-56) 123/55  SpO2:  [94 %-96 %] 94 %  Body mass index is 25.83 kg/m².     Input and Output Summary (last 24 hours):     Intake/Output Summary (Last 24 hours) at 2024 1404  Last data filed at 2024 1238  Gross per 24 hour   Intake 720 ml   Output 751 ml   Net -31 ml       Physical Exam:   Physical Exam  Constitutional:       Appearance: Normal appearance.   Cardiovascular:      Rate and Rhythm: Normal rate and regular rhythm.      Pulses: Normal pulses.      Heart sounds: Normal heart sounds.   Pulmonary:      Effort: Pulmonary effort is normal.      Breath sounds: Normal breath sounds.   Abdominal:      Palpations: Abdomen is soft.      Tenderness: There is no abdominal tenderness.   Neurological:      Mental Status: She is alert and oriented to person, place, and time.          Additional Data:     Labs:  Results from last 7 days   Lab Units 24  0513   WBC Thousand/uL 4.09*   HEMOGLOBIN g/dL 9.7*   HEMATOCRIT % 29.9*   PLATELETS Thousands/uL 193   SEGS PCT % 52   LYMPHO PCT % 22   MONO PCT % 11   EOS PCT % 13*     Results from last 7 days   Lab Units 24  0513 24  0456 24  1338   SODIUM mmol/L 139   < > 133*   POTASSIUM mmol/L 4.0   < > 4.3   CHLORIDE mmol/L 104   < > 105   CO2 mmol/L 27   < > 20*   BUN mg/dL 36*   < > 32*   CREATININE mg/dL 1.14   < > 1.19   ANION GAP mmol/L 8   < > 8   CALCIUM mg/dL 8.1*   < > 9.1   ALBUMIN g/dL  --   --  3.8   TOTAL BILIRUBIN mg/dL  --   --  0.60   ALK PHOS U/L  --   --  82   ALT U/L  --   --   13   AST U/L  --   --  34   GLUCOSE RANDOM mg/dL 77   < > 114    < > = values in this interval not displayed.     Results from last 7 days   Lab Units 04/18/24  1338   INR  1.03     Results from last 7 days   Lab Units 04/19/24  2048 04/19/24  1140 04/19/24  1106 04/19/24  0721 04/19/24  0407 04/19/24  0007 04/18/24  1831   POC GLUCOSE mg/dl 122 80 81 83 84 116 115         Results from last 7 days   Lab Units 04/18/24  1718 04/18/24  1338   LACTIC ACID mmol/L 1.1 3.6*   PROCALCITONIN ng/ml  --  0.11       Lines/Drains:  Invasive Devices       None                         Imaging: Reviewed radiology reports from this admission including: xray(s)    Recent Cultures (last 7 days):   Results from last 7 days   Lab Units 04/18/24  1441 04/18/24  1338   BLOOD CULTURE  No Growth After 4 Days. No Growth After 4 Days.       Last 24 Hours Medication List:   Current Facility-Administered Medications   Medication Dose Route Frequency Provider Last Rate    acetaminophen  975 mg Oral TID Aida Pérez MD      bisacodyl  10 mg Rectal Daily PRN Aida Pérez MD      calcium carbonate  1 tablet Oral Daily With Breakfast Kyle Brunner, MD      Cholecalciferol  1,000 Units Oral Daily Kyle Brunner, MD      Diclofenac Sodium  2 g Topical 4x Daily Kyle Brunner, MD      enoxaparin  30 mg Subcutaneous Q24H KATHARINA Kyle Brunner, MD      gabapentin  100 mg Oral BID Aida Pérez MD      melatonin  3 mg Oral HS Aida Pérez MD      polyethylene glycol  17 g Oral Daily Kyle Brunner, MD      senna  2 tablet Oral BID Aida Pérez MD      trimethobenzamide  200 mg Intramuscular Q6H PRN Kyle Brunner, MD          Today, Patient Was Seen By: Dave Ricketts DO    **Please Note: This note may have been constructed using a voice recognition system.**

## 2024-04-23 NOTE — OCCUPATIONAL THERAPY NOTE
"  Occupational Therapy Progress Note     Patient Name: Ida Vuong  Today's Date: 4/23/2024  Problem List  Principal Problem:    Loss of consciousness (HCC)  Active Problems:    IBS (irritable bowel syndrome)    Essential hypertension    CKD (chronic kidney disease) stage 3, GFR 30-59 ml/min (HCC)    Bradycardia    Anemia    Bilateral lower extremity edema    Fall    (HFpEF) heart failure with preserved ejection fraction (HCC)    Cognitive impairment    At risk for delirium    Fracture of distal end of fibula with routine healing        04/23/24 1335   OT Last Visit   OT Visit Date 04/23/24   Note Type   Note Type Treatment   Pain Assessment   Pain Assessment Tool 0-10   Pain Score 5   Pain Location/Orientation Orientation: Left;Location: Leg   Pain Onset/Description Onset: Ongoing   Effect of Pain on Daily Activities Limits comfort and ambulation   Patient's Stated Pain Goal No pain   Hospital Pain Intervention(s) Repositioned;Ambulation/increased activity;Elevated;Emotional support   Multiple Pain Sites No   Restrictions/Precautions   Weight Bearing Precautions Per Order Yes   RLE Weight Bearing Per Order (S)  WBAT  (in CAM boot)   Braces or Orthoses CAM Boot  (RLE, High, Medium)   Other Precautions Cognitive;Chair Alarm;Bed Alarm;Telemetry;Fall Risk;Pain;Hard of hearing   Lifestyle   Autonomy Pt lives in one level house ALONE. Pt reports PLOF (I) for ADLs, (A) for IADLs, mod(I) w/ RW for functional mobility. (+) for falls. Information collected from EMR.   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 Pt was observed to lean on sink w/ BUE for support, 2 minutes into task. Functional reach to brush all aspects of hair w/ RUE. Sequenced steps for hand washing (I).   LB Dressing Assistance 2  Maximal Assistance   LB Dressing Deficit Don/doff L sock;Don/doff L shoe   LB Dressing Comments siting in recliner. Impaired functional reach forward preventing ability to complete donning/doffing shoes/socks. Pt w/ difficulty understanding benefit of wearing L shoe for balance w/ R CAM boot   Toileting Comments Spoke to RN David prior to session reporting pt expressed need for toileting however pt denies when questioned.   Functional Standing Tolerance   Time 5-8 minutes   Activity grooming at sink   Comments BUE support on sinktop w/ fatigue, CGA in stance   Bed Mobility   Additional Comments Pt was recieved OOB in recliner chair. Pt returned to recliner chair at end of session.   Transfers   Sit to Stand 4  Minimal assistance   Additional items Assist x 1;Armrests;Increased time required;Verbal cues  (From recliner, Increased time needed to gain balance 2* Cam boot)   Stand to Sit 4  Minimal assistance   Additional items Assist x 1;Verbal cues   Additional Comments Pt retropulsive w/ intial stance. Shoe was placed on LLE to help w/ balance due to cam boot on RLE.   Functional Mobility   Functional Mobility 4  Minimal assistance   Additional Comments Short household distance to bathroom and back w/ RW and min A x 1. Increased time required due to pt taking small, shuffling steps w/ CAM boot.   Additional items Rolling walker   Subjective   Subjective \"What's your name? That's your real name? That's a man's name.\"   Cognition   Overall Cognitive Status Impaired   Arousal/Participation Alert;Responsive;Cooperative   Attention Attends with cues to redirect   Orientation Level Oriented to person;Oriented to place;Oriented to time  (Intermittenly oriented to situation, when prompted about admission " "she knew she fell. Later on in the session she denied falling.)   Memory Decreased recall of precautions;Decreased recall of recent events;Decreased short term memory  (long term memory intact, recalls lawn mowing accident 10-15 years ago where a wood chip injured her R ankle (pt believes her current fx is from this incident))   Following Commands Follows one step commands with increased time or repetition   Comments Pleasantly confused. Tangential at times t/o session. Was observed to sequence multi-step tasks w/o any v/c from therapist. Continues to intermittenly use scripted phrases for responses to questions. Pt resistive to new learning (refuses to accept that imaging shows a fx in her ankle stating \"It cant be broken\"). Does not understand need for CAM boot, highly perseverative on accident 10-15 years ago.   Activity Tolerance   Activity Tolerance Patient tolerated treatment well;Patient limited by pain   Medical Staff Made Aware Yes, updated PT Olga   Assessment   Assessment Pt seen for OT treatment on 4/23/2024 s/p admit to [facility] s/p fall w/ Loss of consciousness (HCC). Pt agreeable to OT treatment session, upon arrival patient was found OOB in bedside recliner. Patient participated in skilled OT interventions to address ADL tasks, standing tolerance, functional transfers, overall activity tolerance and participation and continued cognitive assessment. In comparison to previous session, pt demonstrated improvements in cognitive orientation, standing tolerance, functional ADL transfers, attention, and dynamic standing balance, but is continuing to perform below baseline due to pain, new bracing requirements (CAM boot) impacting balance, decreased performance in ADLs/functional mobility, functional reach, cognition, and safety awareness. Personal factors that continue to impact DC include: limited social support, inaccessible home environment, inaccessible bathroom environment, and difficulty completing ADLs. " Patient to benefit from continued IPOT treatment to address deficits as defined above and maximize level of functional independence with ADLs and functional mobility. Goals remain appropriate. Continue per POC. Pt ended session OOB in bedside recliner w/ all needs in reach.   Plan   Treatment Interventions ADL retraining;Functional transfer training;Cognitive reorientation;Patient/family training;Equipment evaluation/education;Compensatory technique education;Continued evaluation;Activityengagement   Goal Expiration Date 04/29/24   OT Treatment Day 1   OT Frequency 3-5x/wk   Discharge Recommendation   Rehab Resource Intensity Level, OT II (Moderate Resource Intensity)   Additional Comments 2 The patient's raw score on the -PAC Daily Activity Inpatient Short Form is 16. A raw score of less than 19 suggests the patient may benefit from discharge to post-acute rehabilitation services. Please refer to the recommendation of the Occupational Therapist for safe discharge planning.   AM-PAC Daily Activity Inpatient   Lower Body Dressing 2   Bathing 2   Toileting 2   Upper Body Dressing 3   Grooming 3   Eating 4   Daily Activity Raw Score 16   Daily Activity Standardized Score (Calc for Raw Score >=11) 35.96   AM-PAC Applied Cognition Inpatient   Following a Speech/Presentation 1   Understanding Ordinary Conversation 3   Taking Medications 1   Remembering Where Things Are Placed or Put Away 2   Remembering List of 4-5 Errands 1   Taking Care of Complicated Tasks 1   Applied Cognition Raw Score 9   Applied Cognition Standardized Score 22.48   End of Consult   Education Provided Yes  (Cognitive re-orientation was provided t/o session)   Patient Position at End of Consult Bedside chair;Bed/Chair alarm activated;All needs within reach   Nurse Communication Nurse aware of consult     Goals:  Pt will complete oral hygiene routine while standing at sink (S) to increase safety/independence w/ dynamic standing tasks. (MET: UPGRADE  TO MOD (I))     Pt will complete LB dressing (w/ or w/o AE) mod(A) to increase independence in ADL completion. (PROGRESSING)     Pt will complete LB bathing (w/ or w/o AE) w/ mod(A) to increase independence in ADL completion      Pt will complete toileting activity w/ mod(A) to increase independence in ADL completion      Pt will complete ADL transfers w/ min(A) for increased safety and independence in ADLs. (GOAL MET- Upgrade to (S)).     Pt will complete bed mobility transfers w/ mod(A) x1 for increased safety and independence w/ initiating OOB tasks.     Pt will increase activity tolerance to 10 minutes to increase ability to complete ADL tasks w/o evidence of exertion. (MET: UPGRADE TO 15 minutes)     Pt will increase standing tolerance to 3-5 minutes to improve performance in hygiene routine. (MET: UPGRADE TO 7-10 minutes)     Pt will be able to ambulate short household distances min(A) w/ RW for increased independence w/ toileting routine. (MET: UPGRADE TO S)     Pt will increase dynamic standing balance to fair- to promote greater safety/performance for standing hygiene tasks. (PROGRESSING)     Patient will engage in continued cognitive assessment to assist in safe d/c planning.       Patient will consistently be oriented x2 4/5 times to improve general awareness. (MET: UPGRADE TO x4)     Patient will be able to attend for 5 minutes w/o no more than minimal VC for redirection to improve active participation in purposeful tasks. (PROGRESSING)    MARY Berumen

## 2024-04-23 NOTE — PLAN OF CARE
Problem: OCCUPATIONAL THERAPY ADULT  Goal: Performs self-care activities at highest level of function for planned discharge setting.  See evaluation for individualized goals.  Description: Treatment Interventions: ADL retraining, Functional transfer training, Cognitive reorientation, Patient/family training, Equipment evaluation/education, Compensatory technique education, Continued evaluation, Activityengagement          See flowsheet documentation for full assessment, interventions and recommendations.   Outcome: Progressing  Note: Limitation: Decreased ADL status, Decreased Safe judgement during ADL, Decreased cognition, Decreased self-care trans, Decreased high-level ADLs  Prognosis: Good  Assessment: Pt seen for OT treatment on 4/23/2024 s/p admit to [facility] s/p fall w/ Loss of consciousness (HCC). Pt agreeable to OT treatment session, upon arrival patient was found OOB in bedside recliner. Patient participated in skilled OT interventions to address ADL tasks, standing tolerance, functional transfers, overall activity tolerance and participation and continued cognitive assessment. In comparison to previous session, pt demonstrated improvements in cognitive orientation, standing tolerance, functional ADL transfers, attention, and dynamic standing balance, but is continuing to perform below baseline due to pain, new bracing requirements (CAM boot) impacting balance, decreased performance in ADLs/functional mobility, functional reach, cognition, and safety awareness. Personal factors that continue to impact DC include: limited social support, inaccessible home environment, inaccessible bathroom environment, and difficulty completing ADLs. Patient to benefit from continued IPOT treatment to address deficits as defined above and maximize level of functional independence with ADLs and functional mobility. Goals remain appropriate. Continue per POC. Pt ended session OOB in bedside recliner w/ all needs in  reach.  Recommendation: (S) Geriatric Consult, Psych Consult (questionable hallucinations?)  Rehab Resource Intensity Level, OT: II (Moderate Resource Intensity)     MARY Berumen

## 2024-04-23 NOTE — PLAN OF CARE
Problem: PAIN - ADULT  Goal: Verbalizes/displays adequate comfort level or baseline comfort level  Description: Interventions:  - Encourage patient to monitor pain and request assistance  - Assess pain using appropriate pain scale  - Administer analgesics based on type and severity of pain and evaluate response  - Implement non-pharmacological measures as appropriate and evaluate response  - Consider cultural and social influences on pain and pain management  - Notify physician/advanced practitioner if interventions unsuccessful or patient reports new pain  Outcome: Progressing     Problem: INFECTION - ADULT  Goal: Absence or prevention of progression during hospitalization  Description: INTERVENTIONS:  - Assess and monitor for signs and symptoms of infection  - Monitor lab/diagnostic results  - Monitor all insertion sites, i.e. indwelling lines, tubes, and drains  - Monitor endotracheal if appropriate and nasal secretions for changes in amount and color  - Sawyer appropriate cooling/warming therapies per order  - Administer medications as ordered  - Instruct and encourage patient and family to use good hand hygiene technique  - Identify and instruct in appropriate isolation precautions for identified infection/condition  Outcome: Progressing  Goal: Absence of fever/infection during neutropenic period  Description: INTERVENTIONS:  - Monitor WBC    Outcome: Progressing     Problem: SAFETY ADULT  Goal: Patient will remain free of falls  Description: INTERVENTIONS:  - Educate patient/family on patient safety including physical limitations  - Instruct patient to call for assistance with activity   - Consult OT/PT to assist with strengthening/mobility   - Keep Call bell within reach  - Keep bed low and locked with side rails adjusted as appropriate  - Keep care items and personal belongings within reach  - Initiate and maintain comfort rounds  - Make Fall Risk Sign visible to staff  - Apply yellow socks and bracelet  for high fall risk patients  - Consider moving patient to room near nurses station  Outcome: Progressing  Goal: Maintain or return to baseline ADL function  Description: INTERVENTIONS:  -  Assess patient's ability to carry out ADLs; assess patient's baseline for ADL function and identify physical deficits which impact ability to perform ADLs (bathing, care of mouth/teeth, toileting, grooming, dressing, etc.)  - Assess/evaluate cause of self-care deficits   - Assess range of motion  - Assess patient's mobility; develop plan if impaired  - Assess patient's need for assistive devices and provide as appropriate  - Encourage maximum independence but intervene and supervise when necessary  - Involve family in performance of ADLs  - Assess for home care needs following discharge   - Consider OT consult to assist with ADL evaluation and planning for discharge  - Provide patient education as appropriate  Outcome: Progressing  Goal: Maintains/Returns to pre admission functional level  Description: INTERVENTIONS:  - Perform AM-PAC 6 Click Basic Mobility/ Daily Activity assessment daily.  - Set and communicate daily mobility goal to care team and patient/family/caregiver.   - Collaborate with rehabilitation services on mobility goals if consulted  - Out of bed for toileting  - Record patient progress and toleration of activity level   Outcome: Progressing     Problem: DISCHARGE PLANNING  Goal: Discharge to home or other facility with appropriate resources  Description: INTERVENTIONS:  - Identify barriers to discharge w/patient and caregiver  - Arrange for needed discharge resources and transportation as appropriate  - Identify discharge learning needs (meds, wound care, etc.)  - Arrange for interpretive services to assist at discharge as needed  - Refer to Case Management Department for coordinating discharge planning if the patient needs post-hospital services based on physician/advanced practitioner order or complex needs  related to functional status, cognitive ability, or social support system  Outcome: Progressing     Problem: Knowledge Deficit  Goal: Patient/family/caregiver demonstrates understanding of disease process, treatment plan, medications, and discharge instructions  Description: Complete learning assessment and assess knowledge base.  Interventions:  - Provide teaching at level of understanding  - Provide teaching via preferred learning methods  Outcome: Progressing     Problem: Prexisting or High Potential for Compromised Skin Integrity  Goal: Skin integrity is maintained or improved  Description: INTERVENTIONS:  - Identify patients at risk for skin breakdown  - Assess and monitor skin integrity  - Assess and monitor nutrition and hydration status  - Monitor labs   - Assess for incontinence   - Turn and reposition patient  - Assist with mobility/ambulation  - Relieve pressure over bony prominences  - Avoid friction and shearing  - Provide appropriate hygiene as needed including keeping skin clean and dry  - Evaluate need for skin moisturizer/barrier cream  - Collaborate with interdisciplinary team   - Patient/family teaching  - Consider wound care consult   Outcome: Progressing     Problem: Nutrition/Hydration-ADULT  Goal: Nutrient/Hydration intake appropriate for improving, restoring or maintaining nutritional needs  Description: Monitor and assess patient's nutrition/hydration status for malnutrition. Collaborate with interdisciplinary team and initiate plan and interventions as ordered.  Monitor patient's weight and dietary intake as ordered or per policy. Utilize nutrition screening tool and intervene as necessary. Determine patient's food preferences and provide high-protein, high-caloric foods as appropriate.     INTERVENTIONS:  - Monitor oral intake, urinary output, labs, and treatment plans  - Assess nutrition and hydration status and recommend course of action  - Evaluate amount of meals eaten  - Assist  patient with eating if necessary   - Allow adequate time for meals  - Recommend/ encourage appropriate diets, oral nutritional supplements, and vitamin/mineral supplements  - Order, calculate, and assess calorie counts as needed  - Recommend, monitor, and adjust tube feedings and TPN/PPN based on assessed needs  - Assess need for intravenous fluids  - Provide specific nutrition/hydration education as appropriate  - Include patient/family/caregiver in decisions related to nutrition  Outcome: Progressing

## 2024-04-23 NOTE — PROGRESS NOTES
Progress Note - Geriatric Medicine   Ida Lee Yevgeniy 89 y.o. female MRN: [id number]  Unit/Bed#: S -01 Encounter: [id number]      Assessment/Plan:  At risk for delirium  Assessment & Plan  - mental Status improved today patient was alert oriented x 3  -She reported intermittent visual hallucinations  -Patient is high risk of delirium due to cognitive impairment at baseline, hospitalization  -continue delirium precautions  -maintain normal sleep/wake cycle  -minimize overnight interruptions, group overnight vitals/labs/nursing checks as possible  -dim lights, close blinds and turn off tv to minimize stimulation and encourage sleep environment in evenings  -ensure that pain is well controlled start Tylenol 975mg Q8H scheduled   -monitor for fecal and urinary retention which may precipitate delirium  -encourage early mobilization and ambulation  -provide frequent reorientation and redirection  -encourage family and friends at the bedside to help calm patient if anxious  -avoid medications which may precipitate or worsen delirium such as tramadol, benzodiazepine, anticholinergics, and antihistaminics  -encourage hydration and nutrition , assist with feeding if needed  -redirect unwanted behaviors as first line, avoid physical restraints.   -continue  gabapentin to 100 mg p.o. twice daily due to impaired kidney function  -continue senna for constipation, use MiraLAX and Dulcolax suppository as needed  -Avoid use of antipsychotics due to elevated Qtc, to be 540 on admission  -Melatonin 3 mg nightly    Cognitive impairment  Assessment & Plan  Patient was alert oriented x 1 on admission to the hospital, today she was alert oriented x 3  Needs assistance with IADLs at baseline  Scored 2/5 on mini cog  CT showed microangiopathic changes  Will continue to provide supportive care, reorient as needed.  Patient is at high risk for delirium, will monitor closely and place on delirium precautions.  Maintain sleep/wake  cycle.  Optimize pain regimen.  Monitor for constipation and urinary retention and manage as needed.  TSH and B12 level within normal limits  Encourage family to visit.  Encourage to wear glasses and hearing aids while awake.  Encourage po intake, assist with feeding if needed.     (HFpEF) heart failure with preserved ejection fraction ([facility])  Assessment & Plan  Wt Readings from Last 3 Encounters:   04/19/24 59.6 kg (131 lb 6.3 oz)   04/09/24 62.6 kg (138 lb)   04/02/24 62 kg (136 lb 9.6 oz)     Received Lasix on admission  continue to monitor ins and outs and daily weights  monitor electrolytes  Continue low-sodium diet and fluid restriction          Fall  Assessment & Plan  Patient uses a cane for ambulation at baseline  she reports recent fall  Associated symptoms -dizziness.    Monitor orthostatic vital signs  Encourage p.o. hydration  Avoid hypotension and hypoglycemia   Continue PT/OT    Anemia  Assessment & Plan  Hemoglobin trending down, last checked on 4/21 was 9.7  Continue to monitor CBC and transfuse if hemoglobin less than 7.0    Bradycardia  Assessment & Plan  Noted to be bradycardic  Metoprolol decreased to 12.5 mg BID  Consider repeat EKG and cardiology consult    CKD (chronic kidney disease) stage 3, GFR 30-59 ml/min ([facility])  Assessment & Plan  Lab Results   Component Value Date    EGFR 41 04/21/2024    EGFR 35 04/20/2024    EGFR 39 04/19/2024    CREATININE 1.14 04/21/2024    CREATININE 1.31 (H) 04/20/2024    CREATININE 1.18 04/19/2024     Creatinine stable.  Will avoid nephrotoxic medication.  Encourage po hydration.  Will monitor BMP.     Essential hypertension  Assessment & Plan  Blood pressure in the lower side  Hold amlodipine for now  continue metoprolol with holding parameters, dose decreased due to bradycardia  Monitor orthostatic vital signs and avoid hypotension       Subjective:   Patient seen and examined at bedside for geriatric follow-up.  He reports constipation no abdominal pain nausea  vomiting.  Her appetite is preserved.  Denies insomnia.  No chest pain or palpitation.  Reports intermittent dizziness    Review of Systems   Constitutional:  Positive for fatigue. Negative for chills and fever.   HENT:  Positive for hearing loss. Negative for congestion and rhinorrhea.    Respiratory:  Negative for cough, shortness of breath and wheezing.    Cardiovascular:  Positive for leg swelling. Negative for chest pain and palpitations.   Gastrointestinal:  Positive for constipation. Negative for abdominal pain.   Endocrine: Negative for cold intolerance.   Genitourinary:  Negative for difficulty urinating, dysuria and hematuria.   Musculoskeletal:  Positive for gait problem.   Skin:  Negative for wound.   Allergic/Immunologic: Negative for environmental allergies.   Neurological:  Positive for dizziness. Negative for seizures.   Hematological:  Does not bruise/bleed easily.   Psychiatric/Behavioral:  Negative for behavioral problems and sleep disturbance.          Objective:     Vitals: Blood pressure 123/55, pulse (!) 43, temperature 98.1 °F (36.7 °C), resp. rate 18, height 5' (1.524 m), weight 60 kg (132 lb 4.4 oz), SpO2 94%.,Body mass index is 25.83 kg/m².      Intake/Output Summary (Last 24 hours) at 4/23/2024 1217  Last data filed at 4/23/2024 0900  Gross per 24 hour   Intake 720 ml   Output 700 ml   Net 20 ml       Current Medications: Reviewed    Physical Exam:   Physical Exam  Vitals and nursing note reviewed.   Constitutional:       General: She is not in acute distress.     Appearance: She is well-developed.   HENT:      Head: Normocephalic and atraumatic.      Ears:      Comments: [place]  Eyes:      Conjunctiva/sclera: Conjunctivae normal.   Cardiovascular:      Rate and Rhythm: Normal rate and regular rhythm.      Heart sounds: No murmur heard.  Pulmonary:      Effort: Pulmonary effort is normal. No respiratory distress.      Breath sounds: Normal breath sounds.   Abdominal:      Palpations: Abdomen  is soft.      Tenderness: There is no abdominal tenderness.   Musculoskeletal:         General: No swelling.      Cervical back: Neck supple.      Right lower leg: Edema present.      Left lower leg: Edema present.   Skin:     General: Skin is warm and dry.      Capillary Refill: Capillary refill takes less than 2 seconds.   Neurological:      Mental Status: She is alert.   Psychiatric:         Mood and Affect: Mood normal.          Invasive Devices       Peripheral Intravenous Line  Duration             Peripheral IV 04/18/24 Left Antecubital 4 days                    Lab, Imaging and other studies: no new labs today

## 2024-04-23 NOTE — ASSESSMENT & PLAN NOTE
Wt Readings from Last 3 Encounters:   04/23/24 60 kg (132 lb 4.4 oz)   04/09/24 62.6 kg (138 lb)   04/02/24 62 kg (136 lb 9.6 oz)     Echo from 8/2023 shows EF 55%.  Mildly abnormal diastolic function  No home diuretic.  Takes metoprolol 25 mg at home. Will decrease to 12.5 mg due to bradycardia.  BNP on admission 1558  Status post Lasix 40 mg x 2 on 4/19    Plan:  Daily I's and O's, 2 g sodium diet, elevate HOB  Will hold off on further diuretics as patient had mild increase in creatinine on 4/20

## 2024-04-23 NOTE — PROGRESS NOTES
Progress Note - Orthopedics   Ida Mando 89 y.o. female MRN: [id number]  Unit/Bed#: S -01 Encounter: [id number]    Assessment:  -Healing nondisplaced distal fibular fracture right ankle    Plan:  -The patient is seen sitting up in the chair at bedside resting in NAD.  Cam boot is on the right lower extremity.  She denies any pain or discomfort to the right lower extremity.  -X-ray results were reviewed with the patient.  They were consistent with a healing nondisplaced distal fibula fracture.  We discussed that the need for the cam boot to protect the fracture site as it continues to heal.  She may weight-bear as tolerated with a cam boot and a walker.  -Recommend repeat x-rays of the right ankle in 3 weeks to assess healing.    Weight bearing: Weightbearing as tolerated to the right lower extremity with a cam boot and walker        Subjective: The patient is seen at bedside sitting in a chair resting in NAD.  The cam boot is on her right lower extremity and she denies any pain or discomfort.  Some friends are visiting and are sitting with her.     Vitals: Blood pressure 131/60, pulse 64, temperature 98.1 °F (36.7 °C), resp. rate 17, height 5' (1.524 m), weight 60 kg (132 lb 4.4 oz), SpO2 97%.,Body mass index is 25.83 kg/m².      Intake/Output Summary (Last 24 hours) at 4/23/2024 1533  Last data filed at 4/23/2024 1238  Gross per 24 hour   Intake 920 ml   Output 751 ml   Net 169 ml       Invasive Devices       None                   Physical Exam: The patient is seen sitting up in the chair at bedside resting in NAD.  Cam boot is on the right lower extremity.  She denies any pain or discomfort to the right lower extremity.    Lab, Imaging and other studies: I have personally reviewed pertinent lab results.

## 2024-04-23 NOTE — ASSESSMENT & PLAN NOTE
Assessment:  Unsure about etiology however past records show that she had a questionable infection for which she had a PICC line placed by her hematologist Dr. Ploo.  Lower extremity ultrasound negative for DVT    Plan:  Place compression stockings after.  Elevate legs.  Voltaren gel for pain

## 2024-04-24 VITALS
TEMPERATURE: 97.6 F | HEIGHT: 60 IN | HEART RATE: 70 BPM | OXYGEN SATURATION: 92 % | BODY MASS INDEX: 26.32 KG/M2 | SYSTOLIC BLOOD PRESSURE: 136 MMHG | RESPIRATION RATE: 22 BRPM | DIASTOLIC BLOOD PRESSURE: 56 MMHG | WEIGHT: 134.04 LBS

## 2024-04-24 PROCEDURE — 99232 SBSQ HOSP IP/OBS MODERATE 35: CPT | Performed by: FAMILY MEDICINE

## 2024-04-24 PROCEDURE — 99239 HOSP IP/OBS DSCHRG MGMT >30: CPT | Performed by: INTERNAL MEDICINE

## 2024-04-24 RX ADMIN — Medication 1 TABLET: at 10:08

## 2024-04-24 RX ADMIN — DICLOFENAC SODIUM 2 G: 10 GEL TOPICAL at 10:10

## 2024-04-24 RX ADMIN — ENOXAPARIN SODIUM 30 MG: 30 INJECTION SUBCUTANEOUS at 10:08

## 2024-04-24 RX ADMIN — SENNOSIDES 17.2 MG: 8.6 TABLET, FILM COATED ORAL at 10:08

## 2024-04-24 RX ADMIN — GABAPENTIN 100 MG: 100 CAPSULE ORAL at 10:08

## 2024-04-24 RX ADMIN — Medication 1000 UNITS: at 10:08

## 2024-04-24 RX ADMIN — POLYETHYLENE GLYCOL 3350 17 G: 17 POWDER, FOR SOLUTION ORAL at 10:08

## 2024-04-24 RX ADMIN — ACETAMINOPHEN 325 MG: 325 TABLET, FILM COATED ORAL at 10:09

## 2024-04-24 NOTE — CASE MANAGEMENT
Case Management Discharge Planning Note    Patient name Ida Vuong  Location S /S -01 MRN [id number]  : 1930 Date 2024       Current Admission Date: 2024  Current Admission Diagnosis:Loss of consciousness (HCC)   Patient Active Problem List    Diagnosis Date Noted    Fracture of distal end of fibula with routine healing 2024    Cognitive impairment 2024    At risk for delirium 2024    Fall 2024    Loss of consciousness (HCC) 2024    (HFpEF) heart failure with preserved ejection fraction (HCC) 2024    Hx of right hemicolectomy 2023    History of compression fracture of spine 2023    Malnutrition due to renal disease  ([facility]) 2023    Arthritis of left hip 10/25/2022    Hx of colonic polyps 2022    Diverticulosis 2022    Bradycardia 2022    Anemia 2022    Bilateral lower extremity edema 2022    Megaloblastic anemia 2021    Ambulatory dysfunction 2021    Dizziness 2021    Anxiety 2021    Iron deficiency anemia 2021    CKD (chronic kidney disease) stage 3, GFR 30-59 ml/min ([facility]) 2020    Lumbosacral spondylosis without myelopathy 2018    Essential hypertension 2018    Atherosclerosis of native artery of extremity ([facility]) 2018    Idiopathic peripheral neuropathy 2018    Benign positional vertigo 2017    Allergic rhinitis 2016    Insomnia 2016    Gastroparesis 2016    IBS (irritable bowel syndrome) 10/20/2015    Osteoporosis 10/20/2015    Low back pain 10/08/2015      LOS (days): 5  Geometric Mean LOS (GMLOS) (days): 2.4  Days to GMLOS:-2.8     OBJECTIVE:  Risk of Unplanned Readmission Score: 11.88         Current admission status: Inpatient   Preferred Pharmacy:   [id number] - [place], [place] - [address]  [facility] [place] [address]  Phone: [phone] Fax: [phone]    Primary Care  Provider: Ron Wadsworth MD    Primary Insurance: MEDICARE  Secondary Insurance: BLUE CROSS    DISCHARGE DETAILS:    Discharge planning discussed with:: patient at bedside; VM left for sonAndrzej; spoke with daughter-in-Misa montanez  Freedom of Choice: Yes (re: rehab)  Comments - Freedom of Choice: confirmed plan for transfer to [facility] for today - aware that capacity evaluation will be followed-up at [facility] if unable to be seen prior to discharge.  CM contacted family/caregiver?: Yes  Were Treatment Team discharge recommendations reviewed with patient/caregiver?: Yes  Did patient/caregiver verbalize understanding of patient care needs?: N/A- going to facility  Were patient/caregiver advised of the risks associated with not following Treatment Team discharge recommendations?: Yes    Contacts  Patient Contacts: valeria Donnelly  Relationship to Patient:: Family  Contact Method: Phone  Phone Number: [phone] - VM left for son; call also made to daughter-in-Misa montanez  Reason/Outcome: Continuity of Care, Emergency Contact, Discharge Planning, Referral    Requested Home Health Care         Is the patient interested in HHC at discharge?: No    DME Referral Provided  Referral made for DME?: No    Other Referral/Resources/Interventions Provided:  Interventions: SNF, Short Term Rehab  Referral Comments: Per MD, patient to d/c today with/without capacity eval, as same can be done out-patient. Message sent to [facility] to relay same - [facility] confirmed ability to accept today and states they can evaluate mental status at their facility. Call made to patient's son, Andrzej, and VM left requesting return call. Call then made to patient's daughter-in-law, Misa, to discuss above. Misa aware of plan to d/c to rehab today and that if capacity eval not completed prior to discharge, can be followed-up at [facility]. Misa aware that transport will be requested for 2:30pm and will follow-up once confirmed. Transport requested in  [employer] for 2:30pm; awaiting confirmation of same. Met with patient at bedside and also reviewed plan above. Patient aware of plan for STR with likely transfer to assisted living once rehab completed. In agreement with same.    Would you like to participate in our [facility] service program?  : No - Declined    Treatment Team Recommendation: Short Term Rehab, SNF  Discharge Destination Plan:: SNF, Short Term Rehab  Transport at Discharge : Wheelchair van  Dispatcher Contacted: Yes  Number/Name of Dispatcher: [employer]  Transported by (Company and Unit #): [facility]  ETA of Transport (Date): 04/24/24  ETA of Transport (Time): 1430 (requested; awaiting confirmation)              IMM Given (Date):: 04/24/24             Accepting Facility Name, City & State : [facility]  Receiving Facility/Agency Phone Number: [phone]  Facility/Agency Fax Number: [phone]

## 2024-04-24 NOTE — DISCHARGE SUMMARY
[facility]  Discharge- Ida Mando 12/11/1930, 89 y.o. female MRN: [id number]  Unit/Bed#: S [facility] Encounter: [id number]  Primary Care Provider: Ron Wadsworth MD   Date and time admitted to hospital: 4/18/2024  1:10 PM    * Loss of consciousness (HCC)  Assessment & Plan  Assessment:  Uncertain etiology and patient's has unreliable history.  Per patient's neighbor, she was noted [place] in [place].  Has had at least 2 falls with head strike while on aspirin.  Admission CT head imaging, spine, chest, pelvis, lower extremities only revealed nondisplaced healing R distal fibula fracture. EKG shows mildly prolonged Qtc  TTE shows EF 65 with abnormal diastolic function redemonstrated from prior  Urine drug screen is positive for opiates.  Patient did not receive any narcotics in the ED or during this admission  Suspect the patient may have underlying dementia.  Neuropsych evaluation states the patient does not have capacity make her own decisions    Plan:  Fall precautions and placement  Patient has improved mentation but will obtain Neuropsych consult per family request to determine if patient still lacks capacity  Plan to discharge to rehab tomorrow following neuropsych tomorrow.     (HFpEF) heart failure with preserved ejection fraction (HCC)  Assessment & Plan  Wt Readings from Last 3 Encounters:   04/24/24 60.8 kg (134 lb 0.6 oz)   04/09/24 62.6 kg (138 lb)   04/02/24 62 kg (136 lb 9.6 oz)     Echo from 8/2023 shows EF 55%.  Mildly abnormal diastolic function  No home diuretic.  Takes metoprolol 25 mg at home. Will decrease to 12.5 mg due to bradycardia.  BNP on admission 1558  Status post Lasix 40 mg x 2 on 4/19    Plan:  Daily I's and O's, 2 g sodium diet, elevate HOB  Will hold off on further diuretics as patient had mild increase in creatinine on 4/20    Fall  Assessment & Plan  Rehab per PT/OT    Fracture of distal end of fibula with routine  healing  Assessment & Plan  Assessment:  Patient reporting lower extremity pain and came in after fall  4/20 x-ray showed likely healing nondisplaced distal fibular fracture on the right    Plan:  Continue supportive care and physical therapy.  No indication for surgical intervention at this time    Cognitive impairment  Assessment & Plan  Conditions per geriatrics  Delirium precautions    Bilateral lower extremity edema  Assessment & Plan  Assessment:  Unsure about etiology however past records show that she had a questionable infection for which she had a PICC line placed by her hematologist Dr. Polo.  Lower extremity ultrasound negative for DVT    Plan:  Place compression stockings after.  Elevate legs.  Voltaren gel for pain      CKD (chronic kidney disease) stage 3, GFR 30-59 ml/min ([facility])  Assessment & Plan  Lab Results   Component Value Date    EGFR 41 04/21/2024    EGFR 35 04/20/2024    EGFR 39 04/19/2024    CREATININE 1.14 04/21/2024    CREATININE 1.31 (H) 04/20/2024    CREATININE 1.18 04/19/2024   Baseline creatinine 1.1.  Avoid nephrotoxins    Essential hypertension  Assessment & Plan  Continue home amlodipine 5 mg and metoprolol 25 mg    IBS (irritable bowel syndrome)  Assessment & Plan  Continue home Linzess      Medical Problems       Resolved Problems  Date Reviewed: 4/9/2024            Resolved    Elevated CK 4/20/2024     Resolved by  Kyle Brunner, MD    Hypothermia 4/20/2024     Resolved by  Kyle Brunner, MD        Discharging Resident: Dave Ricketts DO  Discharging Attending: Caleb Rodriguez MD  PCP: Ron Wadsworth MD  Admission Date:   Admission Orders (From admission, onward)       Ordered        04/19/24 1113  INPATIENT ADMISSION  Once            04/18/24 1551  Place in Observation  Once                          Discharge Date: 04/24/24    Consultations During Hospital Stay:  Behavioral Health  Geriatrics  Podiatry    Procedures Performed:   None    Significant Findings / Test Results:    None    Incidental Findings:   None    Test Results Pending at Discharge (will require follow up):  None     Outpatient Tests Requested:  None    Complications:  None    Reason for Admission: None    Hospital Course:   Ida Vuong is a 89 y.o. female with a PMH of CKD stage III, malnutrition, history of spinal compression fracture, lichen sclerosus, hypertension, p A-fib, recent COVID-19 infection patient who originally presented to the hospital on 4/18/2024 due to 2 falls with head strike while on aspirin. Patient was a poor historian when she presented and was complaining of poor balance and hip pain. She could not recall why she was in the hospital and doesn't recall how she fell. She did believe she was down for 4 hours. Her work up in the ED was remarkable for elevated lactate and CK. Trauma imaging was negative. She presented with a  PICC line for lower extremity infection that was meant to be removed on day of presentation. PICC line was removed in hospital. Patient was determined to lack capacity but her mentation improved through hospital stay. PT determined patient required acute rehab. Family expressed concern over patient's decision making and wanted to pursue a repeat neuropsych evaluation. This can be pursued at the rehab facility. Family is aware.    Please see above list of diagnoses and related plan for additional information.     Condition at Discharge: stable    Discharge Day Visit / Exam:   Subjective:  She has no complaints on day of discharge and is still in agreement to go to rehab.   Vitals: Blood Pressure: 136/56 (04/24/24 0751)  Pulse: 70 (04/24/24 0751)  Temperature: 97.6 °F (36.4 °C) (04/24/24 0751)  Temp Source: Oral (04/22/24 2036)  Respirations: 22 (04/24/24 0751)  Height: 5' (152.4 cm) (04/19/24 1600)  Weight - Scale: 60.8 kg (134 lb 0.6 oz) (04/24/24 0500)  SpO2: 92 % (04/24/24 0751)  Exam:   Physical Exam  Constitutional:       Appearance: Normal appearance.    Cardiovascular:      Rate and Rhythm: Normal rate and regular rhythm.      Pulses: Normal pulses.      Heart sounds: Normal heart sounds.   Pulmonary:      Effort: Pulmonary effort is normal.      Breath sounds: Normal breath sounds.   Abdominal:      General: Abdomen is flat. There is no distension.      Palpations: Abdomen is soft.      Tenderness: There is no abdominal tenderness.   Neurological:      Mental Status: She is alert and oriented to person, place, and time.          Discussion with Family: Updated  (daughter in law) via phone.    Discharge instructions/Information to patient and family:   See after visit summary for information provided to patient and family.      Provisions for Follow-Up Care:  See after visit summary for information related to follow-up care and any pertinent home health orders.      Mobility at time of Discharge:   Basic Mobility Inpatient Raw Score: 16  JH-HLM Goal: 5: Stand one or more mins  JH-HLM Achieved: 7: Walk 25 feet or more  HLM Goal achieved. Continue to encourage appropriate mobility.     Disposition:   Acute Rehab at [facility]    Planned Readmission: No    Discharge Medications:  See after visit summary for reconciled discharge medications provided to patient and/or family.      **Please Note: This note may have been constructed using a voice recognition system**

## 2024-04-24 NOTE — PROGRESS NOTES
Progress Note - Geriatric Medicine   Ida Lee Yevgeniy 89 y.o. female MRN: [id number]  Unit/Bed#: S -01 Encounter: [id number]      Assessment/Plan:  At risk for delirium  Assessment & Plan  - mental Status improved today patient was alert oriented x 3  -She reported intermittent visual hallucinations  -Patient is high risk of delirium due to cognitive impairment at baseline, hospitalization  -continue delirium precautions  -maintain normal sleep/wake cycle  -minimize overnight interruptions, group overnight vitals/labs/nursing checks as possible  -dim lights, close blinds and turn off tv to minimize stimulation and encourage sleep environment in evenings  -ensure that pain is well controlled start Tylenol 975mg Q8H scheduled   -monitor for fecal and urinary retention which may precipitate delirium  -encourage early mobilization and ambulation  -provide frequent reorientation and redirection  -encourage family and friends at the bedside to help calm patient if anxious  -avoid medications which may precipitate or worsen delirium such as tramadol, benzodiazepine, anticholinergics, and antihistaminics  -encourage hydration and nutrition , assist with feeding if needed  -redirect unwanted behaviors as first line, avoid physical restraints.   -continue  gabapentin to 100 mg p.o. twice daily due to impaired kidney function  -continue senna for constipation, use MiraLAX and Dulcolax suppository as needed  -Avoid use of antipsychotics due to elevated Qtc, to be 540 on admission  -Melatonin 3 mg nightly    Cognitive impairment  Assessment & Plan  Patient was alert oriented x 1 on admission to the hospital, today she was alert oriented x 3  Needs assistance with IADLs at baseline  Scored 2/5 on mini cog  CT showed microangiopathic changes  Will continue to provide supportive care, reorient as needed.  Patient is at high risk for delirium, will monitor closely and place on delirium precautions.  Maintain sleep/wake  cycle.  Optimize pain regimen.  Monitor for constipation and urinary retention and manage as needed.  TSH and B12 level within normal limits  Encourage family to visit.  Encourage to wear glasses and hearing aids while awake.  Encourage po intake, assist with feeding if needed.     (HFpEF) heart failure with preserved ejection fraction (HCC)  Assessment & Plan  Wt Readings from Last 3 Encounters:   04/19/24 59.6 kg (131 lb 6.3 oz)   04/09/24 62.6 kg (138 lb)   04/02/24 62 kg (136 lb 9.6 oz)     Received Lasix on admission  continue to monitor ins and outs and daily weights  monitor electrolytes  Continue low-sodium diet and fluid restriction          Fall  Assessment & Plan  Patient uses a cane for ambulation at baseline  she reports recent fall  Associated symptoms -dizziness.    Monitor orthostatic vital signs  Encourage p.o. hydration  Avoid hypotension and hypoglycemia   Continue PT/OT    Anemia  Assessment & Plan  Hemoglobin trending down, last checked on 4/21 was 9.7  Continue to monitor CBC and transfuse if hemoglobin less than 7.0    Bradycardia  Assessment & Plan  improved  Metoprolol discontinued  Continue to monitor    CKD (chronic kidney disease) stage 3, GFR 30-59 ml/min ([facility])  Assessment & Plan  Lab Results   Component Value Date    EGFR 41 04/21/2024    EGFR 35 04/20/2024    EGFR 39 04/19/2024    CREATININE 1.14 04/21/2024    CREATININE 1.31 (H) 04/20/2024    CREATININE 1.18 04/19/2024     Creatinine stable.  Will avoid nephrotoxic medication.  Encourage po hydration.  Will monitor BMP.     Essential hypertension  Assessment & Plan  Blood pressure in the lower side  Hold amlodipine for now  continue metoprolol with holding parameters, dose decreased due to bradycardia  Monitor orthostatic vital signs and avoid hypotension       Subjective:   Patient seen and examined at bedside for geriatric follow-up.  States she feels well denies any acute complaints at the time of encounter..  Had small bowel  movement yesterday.  No dizziness or lightheadedness reported today    Review of Systems   Constitutional:  Negative for chills and fever.   HENT:  Positive for hearing loss. Negative for congestion and rhinorrhea.    Respiratory:  Negative for cough, shortness of breath and wheezing.    Cardiovascular:  Positive for leg swelling. Negative for chest pain and palpitations.   Gastrointestinal:  Positive for constipation. Negative for abdominal pain.   Endocrine: Negative for cold intolerance.   Genitourinary:  Negative for difficulty urinating, dysuria and hematuria.   Musculoskeletal:  Positive for gait problem.   Skin:  Negative for wound.   Allergic/Immunologic: Negative for environmental allergies.   Neurological:  Negative for dizziness and seizures.   Hematological:  Does not bruise/bleed easily.   Psychiatric/Behavioral:  Negative for behavioral problems and sleep disturbance.          Objective:     Vitals: Blood pressure 136/56, pulse 70, temperature 97.6 °F (36.4 °C), resp. rate 22, height 5' (1.524 m), weight 60.8 kg (134 lb 0.6 oz), SpO2 92%.,Body mass index is 26.18 kg/m².      Intake/Output Summary (Last 24 hours) at 4/24/2024 1428  Last data filed at 4/24/2024 1001  Gross per 24 hour   Intake 590 ml   Output 1039 ml   Net -449 ml       Current Medications: Reviewed    Physical Exam:   Physical Exam  Vitals and nursing note reviewed.   Constitutional:       General: She is not in acute distress.     Appearance: She is well-developed.   HENT:      Head: Normocephalic and atraumatic.      Ears:      Comments: [place]  Eyes:      Conjunctiva/sclera: Conjunctivae normal.   Cardiovascular:      Rate and Rhythm: Normal rate and regular rhythm.      Heart sounds: Murmur heard.   Pulmonary:      Effort: Pulmonary effort is normal. No respiratory distress.      Breath sounds: Normal breath sounds.   Abdominal:      Palpations: Abdomen is soft.      Tenderness: There is no abdominal tenderness.   Musculoskeletal:          General: No swelling.      Cervical back: Neck supple.      Right lower leg: Edema present.      Left lower leg: Edema present.   Skin:     General: Skin is warm and dry.      Capillary Refill: Capillary refill takes less than 2 seconds.   Neurological:      Mental Status: She is alert.   Psychiatric:         Mood and Affect: Mood normal.          Invasive Devices       None                   Lab, Imaging and other studies: no new labs today

## 2024-04-24 NOTE — ED ATTENDING ATTESTATION
4/18/2024  I, Octavio King DO, saw and evaluated the patient. I have discussed the patient with the resident/non-physician practitioner and agree with the resident's/non-physician practitioner's findings, Plan of Care, and MDM as documented in the resident's/non-physician practitioner's note, except where noted. All available labs and Radiology studies were reviewed.  I was present for key portions of any procedure(s) performed by the resident/non-physician practitioner and I was immediately available to provide assistance.       At this point I agree with the current assessment done in the Emergency Department.  I have conducted an independent evaluation of this patient a history and physical is as follows:    ED Course         Critical Care Time  Procedures

## 2024-04-24 NOTE — CASE MANAGEMENT
Case Management Discharge Planning Note    Patient name Ida Vuong  Location S /S -01 MRN [id number]  : 1930 Date 2024       Current Admission Date: 2024  Current Admission Diagnosis:Loss of consciousness (HCC)   Patient Active Problem List    Diagnosis Date Noted    Fracture of distal end of fibula with routine healing 2024    Cognitive impairment 2024    At risk for delirium 2024    Fall 2024    Loss of consciousness (HCC) 2024    (HFpEF) heart failure with preserved ejection fraction (HCC) 2024    Hx of right hemicolectomy 2023    History of compression fracture of spine 2023    Malnutrition due to renal disease  ([facility]) 2023    Arthritis of left hip 10/25/2022    Hx of colonic polyps 2022    Diverticulosis 2022    Bradycardia 2022    Anemia 2022    Bilateral lower extremity edema 2022    Megaloblastic anemia 2021    Ambulatory dysfunction 2021    Dizziness 2021    Anxiety 2021    Iron deficiency anemia 2021    CKD (chronic kidney disease) stage 3, GFR 30-59 ml/min ([facility]) 2020    Lumbosacral spondylosis without myelopathy 2018    Essential hypertension 2018    Atherosclerosis of native artery of extremity ([facility]) 2018    Idiopathic peripheral neuropathy 2018    Benign positional vertigo 2017    Allergic rhinitis 2016    Insomnia 2016    Gastroparesis 2016    IBS (irritable bowel syndrome) 10/20/2015    Osteoporosis 10/20/2015    Low back pain 10/08/2015      LOS (days): 5  Geometric Mean LOS (GMLOS) (days): 2.4  Days to GMLOS:-2.8     OBJECTIVE:  Risk of Unplanned Readmission Score: 11.88         Current admission status: Inpatient   Preferred Pharmacy:   [id number] - [place], [place] - [address]  [facility] [place] [address]  Phone: [phone] Fax: [phone]    Primary Care  "Provider: Ron Wadsworth MD    Primary Insurance: MEDICARE  Secondary Insurance: BLUE CROSS    DISCHARGE DETAILS:    Discharge planning discussed with:: return call recieved from patient's son, Andrzej; also spoke with daughter-in-lawMisa  Freedom of Choice: Yes (re: rehab)  Comments - [place] of Choice: [facility] contacted family/caregiver?: Yes  Were Treatment Team discharge recommendations reviewed with patient/caregiver?: Yes  Did patient/caregiver verbalize understanding of patient care needs?: N/A- going to facility  Were patient/caregiver advised of the risks associated with not following Treatment Team discharge recommendations?: Yes    Contacts  Patient Contacts: valeria Donnelly  Relationship to Patient:: Family  Contact Method: Phone  Phone Number: [phone]  Reason/Outcome: Continuity of Care, Emergency Contact, Discharge Planning, Referral    Requested Home Health Care         Is the patient interested in C at discharge?: No    DME Referral Provided  Referral made for DME?: No    Other Referral/Resources/Interventions Provided:  Interventions: Short Term Rehab  Referral Comments: Confirmation received that [facility] can pick-up at 3:15pm. Same relayed to [facility] and they confirmed ability to accept at that time. AVS forwarded to them via fax and AIDIN. Met with patient at bedside and reviewed plan for transfer to [facility] at 3:15pm - in agreement with same. Return call received from patient's son, Andrzej, and reviewed plan for d/c to [facility] today. Son in agreement with same and relayed that he's been in touch with Roman at [facility] as a possible \"next step.\" Son aware that neuropsych eval unable to be complete at this time. If not done prior to discharge, [facility] to follow-up re: same. Son in agreement with plan and denies any concern for d/c today. Call then made to daughter-in-law, Misa, and also reviewed above. Discharge plan is transfer to [facility].    Would you like to " participate in our [facility] service program?  : No - Declined    Treatment Team Recommendation: Short Term Rehab, SNF  Discharge Destination Plan:: SNF, Short Term Rehab ([facility]  Transport at Discharge : Wheelchair van  Dispatcher Contacted: Yes  Number/Name of Dispatcher: [employer]  Transported by (Company and Unit #): [facility]  ETA of Transport (Date): 04/24/24  ETA of Transport (Time): 1515 (confirmed)              IMM Given (Date):: 04/24/24             Accepting Facility Name, City & [place] [facility] [place]  Receiving Facility/Agency Phone Number: [phone]  Facility/Agency Fax Number: [phone]

## 2024-04-24 NOTE — PLAN OF CARE
Problem: PAIN - ADULT  Goal: Verbalizes/displays adequate comfort level or baseline comfort level  Description: Interventions:  - Encourage patient to monitor pain and request assistance  - Assess pain using appropriate pain scale  - Administer analgesics based on type and severity of pain and evaluate response  - Implement non-pharmacological measures as appropriate and evaluate response  - Consider cultural and social influences on pain and pain management  - Notify physician/advanced practitioner if interventions unsuccessful or patient reports new pain  4/24/2024 1336 by Sharlene Choudhury RN  Outcome: Adequate for Discharge  4/24/2024 1044 by Sharlene Choudhury RN  Outcome: Progressing     Problem: INFECTION - ADULT  Goal: Absence or prevention of progression during hospitalization  Description: INTERVENTIONS:  - Assess and monitor for signs and symptoms of infection  - Monitor lab/diagnostic results  - Monitor all insertion sites, i.e. indwelling lines, tubes, and drains  - Monitor endotracheal if appropriate and nasal secretions for changes in amount and color  - [place] appropriate cooling/warming therapies per order  - Administer medications as ordered  - Instruct and encourage patient and family to use good hand hygiene technique  - Identify and instruct in appropriate isolation precautions for identified infection/condition  4/24/2024 1336 by Sharlene Choudhury RN  Outcome: Adequate for Discharge  4/24/2024 1044 by Sharlene Choudhury RN  Outcome: Progressing  Goal: Absence of fever/infection during neutropenic period  Description: INTERVENTIONS:  - Monitor WBC    4/24/2024 1336 by Sharlene Choudhury RN  Outcome: Adequate for Discharge  4/24/2024 1044 by Sharlene Choudhury RN  Outcome: Progressing     Problem: SAFETY ADULT  Goal: Patient will remain free of falls  Description: INTERVENTIONS:  - Educate patient/family on patient safety including physical limitations  - Instruct patient to call for  assistance with activity   - Consult OT/PT to assist with strengthening/mobility   - Keep Call bell within reach  - Keep bed low and locked with side rails adjusted as appropriate  - Keep care items and personal belongings within reach  - Initiate and maintain comfort rounds  - Make Fall Risk Sign visible to staff  - Apply yellow socks and bracelet for high fall risk patients  - Consider moving patient to room near nurses station  4/24/2024 1336 by Sharlene Choudhury RN  Outcome: Adequate for Discharge  4/24/2024 1044 by Sharlene Choudhury RN  Outcome: Progressing  Goal: Maintain or return to baseline ADL function  Description: INTERVENTIONS:  -  Assess patient's ability to carry out ADLs; assess patient's baseline for ADL function and identify physical deficits which impact ability to perform ADLs (bathing, care of mouth/teeth, toileting, grooming, dressing, etc.)  - Assess/evaluate cause of self-care deficits   - Assess range of motion  - Assess patient's mobility; develop plan if impaired  - Assess patient's need for assistive devices and provide as appropriate  - Encourage maximum independence but intervene and supervise when necessary  - Involve family in performance of ADLs  - Assess for home care needs following discharge   - Consider OT consult to assist with ADL evaluation and planning for discharge  - Provide patient education as appropriate  4/24/2024 1336 by Sharlene Choudhury RN  Outcome: Adequate for Discharge  4/24/2024 1044 by Sharlene Choudhury RN  Outcome: Progressing  Goal: Maintains/Returns to pre admission functional level  Description: INTERVENTIONS:  - Perform AM-PAC 6 Click Basic Mobility/ Daily Activity assessment daily.  - Set and communicate daily mobility goal to care team and patient/family/caregiver.   - Collaborate with rehabilitation services on mobility goals if consulted  - Out of bed for toileting  - Record patient progress and toleration of activity level   4/24/2024 1336 by  Sharlene Choudhury RN  Outcome: Adequate for Discharge  4/24/2024 1044 by Sharlene Choudhury RN  Outcome: Progressing     Problem: DISCHARGE PLANNING  Goal: Discharge to home or other facility with appropriate resources  Description: INTERVENTIONS:  - Identify barriers to discharge w/patient and caregiver  - Arrange for needed discharge resources and transportation as appropriate  - Identify discharge learning needs (meds, wound care, etc.)  - Arrange for interpretive services to assist at discharge as needed  - Refer to Case Management Department for coordinating discharge planning if the patient needs post-hospital services based on physician/advanced practitioner order or complex needs related to functional status, cognitive ability, or social support system  4/24/2024 1336 by Sharlene Choudhury RN  Outcome: Adequate for Discharge  4/24/2024 1044 by Sharlene Choudhury RN  Outcome: Progressing     Problem: Knowledge Deficit  Goal: Patient/family/caregiver demonstrates understanding of disease process, treatment plan, medications, and discharge instructions  Description: Complete learning assessment and assess knowledge base.  Interventions:  - Provide teaching at level of understanding  - Provide teaching via preferred learning methods  4/24/2024 1336 by Sharlene Choudhury RN  Outcome: Adequate for Discharge  4/24/2024 1044 by Sharlene Choudhury RN  Outcome: Progressing     Problem: Prexisting or High Potential for Compromised Skin Integrity  Goal: Skin integrity is maintained or improved  Description: INTERVENTIONS:  - Identify patients at risk for skin breakdown  - Assess and monitor skin integrity  - Assess and monitor nutrition and hydration status  - Monitor labs   - Assess for incontinence   - Turn and reposition patient  - Assist with mobility/ambulation  - Relieve pressure over bony prominences  - Avoid friction and shearing  - Provide appropriate hygiene as needed including keeping skin clean and  dry  - Evaluate need for skin moisturizer/barrier cream  - Collaborate with interdisciplinary team   - Patient/family teaching  - Consider wound care consult   4/24/2024 1336 by Sharlene Choudhury RN  Outcome: Adequate for Discharge  4/24/2024 1044 by Sharlene Choudhury RN  Outcome: Progressing     Problem: Nutrition/Hydration-ADULT  Goal: Nutrient/Hydration intake appropriate for improving, restoring or maintaining nutritional needs  Description: Monitor and assess patient's nutrition/hydration status for malnutrition. Collaborate with interdisciplinary team and initiate plan and interventions as ordered.  Monitor patient's weight and dietary intake as ordered or per policy. Utilize nutrition screening tool and intervene as necessary. Determine patient's food preferences and provide high-protein, high-caloric foods as appropriate.     INTERVENTIONS:  - Monitor oral intake, urinary output, labs, and treatment plans  - Assess nutrition and hydration status and recommend course of action  - Evaluate amount of meals eaten  - Assist patient with eating if necessary   - Allow adequate time for meals  - Recommend/ encourage appropriate diets, oral nutritional supplements, and vitamin/mineral supplements  - Order, calculate, and assess calorie counts as needed  - Recommend, monitor, and adjust tube feedings and TPN/PPN based on assessed needs  - Assess need for intravenous fluids  - Provide specific nutrition/hydration education as appropriate  - Include patient/family/caregiver in decisions related to nutrition  4/24/2024 1336 by Sharlene Choudhury RN  Outcome: Adequate for Discharge  4/24/2024 1044 by Sharlene Choudhury RN  Outcome: Progressing

## 2024-04-24 NOTE — PLAN OF CARE
Problem: PAIN - ADULT  Goal: Verbalizes/displays adequate comfort level or baseline comfort level  Description: Interventions:  - Encourage patient to monitor pain and request assistance  - Assess pain using appropriate pain scale  - Administer analgesics based on type and severity of pain and evaluate response  - Implement non-pharmacological measures as appropriate and evaluate response  - Consider cultural and social influences on pain and pain management  - Notify physician/advanced practitioner if interventions unsuccessful or patient reports new pain  Outcome: Progressing     Problem: INFECTION - ADULT  Goal: Absence or prevention of progression during hospitalization  Description: INTERVENTIONS:  - Assess and monitor for signs and symptoms of infection  - Monitor lab/diagnostic results  - Monitor all insertion sites, i.e. indwelling lines, tubes, and drains  - Monitor endotracheal if appropriate and nasal secretions for changes in amount and color  - [place] appropriate cooling/warming therapies per order  - Administer medications as ordered  - Instruct and encourage patient and family to use good hand hygiene technique  - Identify and instruct in appropriate isolation precautions for identified infection/condition  Outcome: Progressing  Goal: Absence of fever/infection during neutropenic period  Description: INTERVENTIONS:  - Monitor WBC    Outcome: Progressing     Problem: SAFETY ADULT  Goal: Patient will remain free of falls  Description: INTERVENTIONS:  - Educate patient/family on patient safety including physical limitations  - Instruct patient to call for assistance with activity   - Consult OT/PT to assist with strengthening/mobility   - Keep Call bell within reach  - Keep bed low and locked with side rails adjusted as appropriate  - Keep care items and personal belongings within reach  - Initiate and maintain comfort rounds  - Make Fall Risk Sign visible to staff  - Apply yellow socks and bracelet  for high fall risk patients  - Consider moving patient to room near nurses station  Outcome: Progressing  Goal: Maintain or return to baseline ADL function  Description: INTERVENTIONS:  -  Assess patient's ability to carry out ADLs; assess patient's baseline for ADL function and identify physical deficits which impact ability to perform ADLs (bathing, care of mouth/teeth, toileting, grooming, dressing, etc.)  - Assess/evaluate cause of self-care deficits   - Assess range of motion  - Assess patient's mobility; develop plan if impaired  - Assess patient's need for assistive devices and provide as appropriate  - Encourage maximum independence but intervene and supervise when necessary  - Involve family in performance of ADLs  - Assess for home care needs following discharge   - Consider OT consult to assist with ADL evaluation and planning for discharge  - Provide patient education as appropriate  Outcome: Progressing  Goal: Maintains/Returns to pre admission functional level  Description: INTERVENTIONS:  - Perform AM-PAC 6 Click Basic Mobility/ Daily Activity assessment daily.  - Set and communicate daily mobility goal to care team and patient/family/caregiver.   - Collaborate with rehabilitation services on mobility goals if consulted  - Out of bed for toileting  - Record patient progress and toleration of activity level   Outcome: Progressing     Problem: DISCHARGE PLANNING  Goal: Discharge to home or other facility with appropriate resources  Description: INTERVENTIONS:  - Identify barriers to discharge w/patient and caregiver  - Arrange for needed discharge resources and transportation as appropriate  - Identify discharge learning needs (meds, wound care, etc.)  - Arrange for interpretive services to assist at discharge as needed  - Refer to Case Management Department for coordinating discharge planning if the patient needs post-hospital services based on physician/advanced practitioner order or complex needs  related to functional status, cognitive ability, or social support system  Outcome: Progressing     Problem: Knowledge Deficit  Goal: Patient/family/caregiver demonstrates understanding of disease process, treatment plan, medications, and discharge instructions  Description: Complete learning assessment and assess knowledge base.  Interventions:  - Provide teaching at level of understanding  - Provide teaching via preferred learning methods  Outcome: Progressing     Problem: Prexisting or High Potential for Compromised Skin Integrity  Goal: Skin integrity is maintained or improved  Description: INTERVENTIONS:  - Identify patients at risk for skin breakdown  - Assess and monitor skin integrity  - Assess and monitor nutrition and hydration status  - Monitor labs   - Assess for incontinence   - Turn and reposition patient  - Assist with mobility/ambulation  - Relieve pressure over bony prominences  - Avoid friction and shearing  - Provide appropriate hygiene as needed including keeping skin clean and dry  - Evaluate need for skin moisturizer/barrier cream  - Collaborate with interdisciplinary team   - Patient/family teaching  - Consider wound care consult   Outcome: Progressing     Problem: Nutrition/Hydration-ADULT  Goal: Nutrient/Hydration intake appropriate for improving, restoring or maintaining nutritional needs  Description: Monitor and assess patient's nutrition/hydration status for malnutrition. Collaborate with interdisciplinary team and initiate plan and interventions as ordered.  Monitor patient's weight and dietary intake as ordered or per policy. Utilize nutrition screening tool and intervene as necessary. Determine patient's food preferences and provide high-protein, high-caloric foods as appropriate.     INTERVENTIONS:  - Monitor oral intake, urinary output, labs, and treatment plans  - Assess nutrition and hydration status and recommend course of action  - Evaluate amount of meals eaten  - Assist  patient with eating if necessary   - Allow adequate time for meals  - Recommend/ encourage appropriate diets, oral nutritional supplements, and vitamin/mineral supplements  - Order, calculate, and assess calorie counts as needed  - Recommend, monitor, and adjust tube feedings and TPN/PPN based on assessed needs  - Assess need for intravenous fluids  - Provide specific nutrition/hydration education as appropriate  - Include patient/family/caregiver in decisions related to nutrition  Outcome: Progressing

## 2024-04-24 NOTE — DISCHARGE INSTR - AVS FIRST PAGE
Dear Ida Vuong,     It was our pleasure to care for you here at [facility].  It is our hope that we were always able to exceed the expected standards for your care during your stay.  You were hospitalized due to ***.  You were cared for on the *** floor by Dave Ricketts DO under the service of Caleb Rodriguez MD with the [facility] who covers for your primary care physician (PCP), Ron Wadsworth MD, while you were hospitalized.  If you have any questions or concerns related to this hospitalization, you may contact us at .  For follow up as well as any medication refills, we recommend that you follow up with your primary care physician.  A registered nurse will reach out to you by phone within a few days after your discharge to answer any additional questions that you may have after going home.  However, at this time we provide for you here, the most important instructions / recommendations at discharge:     Notable Medication Adjustments -   You will no longer be taking your Toprol-XL due to having a low heart rate while in the hospital   Testing Required after Discharge -   Neuropsychology evaluation  ** Please contact your PCP to request testing orders for any of the testing recommended here **  Important follow up information -   Please follow up with your primary care physician after discharge from rehab  Other Instructions -   Return to the hospital if your symptoms return   Please review this entire after visit summary as additional general instructions including medication list, appointments, activity, diet, any pertinent wound care, and other additional recommendations from your care team that may be provided for you.      Sincerely,     Dave Ricketts DO

## 2024-04-24 NOTE — ASSESSMENT & PLAN NOTE
Wt Readings from Last 3 Encounters:   04/24/24 60.8 kg (134 lb 0.6 oz)   04/09/24 62.6 kg (138 lb)   04/02/24 62 kg (136 lb 9.6 oz)     Echo from 8/2023 shows EF 55%.  Mildly abnormal diastolic function  No home diuretic.  Takes metoprolol 25 mg at home. Will decrease to 12.5 mg due to bradycardia.  BNP on admission 1558  Status post Lasix 40 mg x 2 on 4/19    Plan:  Daily I's and O's, 2 g sodium diet, elevate HOB  Will hold off on further diuretics as patient had mild increase in creatinine on 4/20

## 2024-04-24 NOTE — CONSULTS
Consultation - Neuropsychology/Psychology Department  Ida Vuong 89 y.o. female MRN: [id number]  Unit/Bed#: S -01 Encounter: [id number]        Reason for Consultation:  Ida Vuong is a 89 y.o. year old female who was referred for a Neuropsychological Exam to assess cognitive functioning and comment on capacity to make informed medical decisions.      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)    Benign positional vertigo    Gastroparesis    IBS (irritable bowel syndrome)    Idiopathic peripheral neuropathy    Insomnia    Lumbosacral spondylosis without myelopathy    Osteoporosis    Essential hypertension    Low back pain    CKD (chronic kidney disease) stage 3, GFR 30-59 ml/min (HCC)    Iron deficiency anemia    Anxiety    Ambulatory dysfunction    Dizziness    Megaloblastic anemia    Bradycardia    Anemia    Bilateral lower extremity edema    Hx of colonic polyps    Diverticulosis    Arthritis of left hip    Malnutrition due to renal disease  (HCC)    History of compression fracture of spine    Hx of right hemicolectomy    Fall    Loss of consciousness (HCC)    (HFpEF) heart failure with preserved ejection fraction (HCC)    Cognitive impairment    At risk for delirium    Fracture of distal end of fibula with routine healing         Historical Information   Past Medical History:   Diagnosis Date    Allergic rhinitis     Disease of thyroid gland     Dizziness     Hyponatremia 8/30/2021    Hypothyroidism 10/20/2015    Malignant neoplasm of colon (HCC)     last assessed: 10/20/2015    Neuropathy     Tinnitus      Past Surgical History:   Procedure Laterality Date    ADENOIDECTOMY      APPENDECTOMY      last assessed: 10/20/2015    CATARACT EXTRACTION,  BILATERAL      CHOLECYSTECTOMY      last assessed: 10/20/2015    COLECTOMY      last assessed: 10/20/2015; partial     IR PICC PLACEMENT SINGLE LUMEN  4/5/2024    TONSILLECTOMY      last assessed: 10/20/2015     Social History   Social History     Substance and Sexual Activity   Alcohol Use No     Social History     Substance and Sexual Activity   Drug Use Never     Social History     Tobacco Use   Smoking Status Former    Current packs/day: 0.25    Types: Cigarettes   Smokeless Tobacco Never   Tobacco Comments    quit 50 yrs ago     Family History:   Family History   Problem Relation Age of Onset    Heart disease Mother         cardiac disorder    Alzheimer's disease Sister     No Known Problems Father     Leukemia Brother        Meds/Allergies   current meds:   Current Facility-Administered Medications   Medication Dose Route Frequency    acetaminophen (TYLENOL) tablet 975 mg  975 mg Oral TID    bisacodyl (DULCOLAX) rectal suppository 10 mg  10 mg Rectal Daily PRN    calcium carbonate (OYSTER SHELL,OSCAL) 500 mg tablet 1 tablet  1 tablet Oral Daily With Breakfast    Cholecalciferol (VITAMIN D3) tablet 1,000 Units  1,000 Units Oral Daily    Diclofenac Sodium (VOLTAREN) 1 % topical gel 2 g  2 g Topical 4x Daily    enoxaparin (LOVENOX) subcutaneous injection 30 mg  30 mg Subcutaneous Q24H KATHARINA    gabapentin (NEURONTIN) capsule 100 mg  100 mg Oral BID    melatonin tablet 3 mg  3 mg Oral HS    polyethylene glycol (MIRALAX) packet 17 g  17 g Oral Daily    senna (SENOKOT) tablet 17.2 mg  2 tablet Oral BID    trimethobenzamide (TIGAN) IM injection 200 mg  200 mg Intramuscular Q6H PRN       Allergies   Allergen Reactions    Penicillins Hives     Other reaction(s): Other (See Comments)  hives         Family and Social Support:   No data recorded    Behavioral Observations: Patient was alert, oriented x 3 and cooperative; affect appeared pleasant and patient admitted to mildly depressed mood and denied anxiety; no overt  evidence of psychotic process or confused thought process; patient was aware of reason for hospitalization    Cognitive Examination    General Cognitive Functioning MMSE = Mildly Neekfnpb17/28;     Attention/Concentration Auditory Selective Attention = Average; Auditory Vigilance = Within Normal Limits; Information Processing Speed = Within Normal Limits    Frontal Systems/Executive Functioning Mental Flexibility/Cognitive Control = Within Normal Limits; Working Memory = Within Normal Limits Abstract Reasoning = Within Normal Limits; Generative Ability = Within Normal Limits,     Language Functioning Confrontation naming = Within Normal Limits, Phonemic Fluency = Within Normal Limits; Semantic Retrieval = Impaired; Comprehension of Complex Ideational Material = Within Normal Limits; Vocabulary Knowledge = Within Normal Limits; Praxis = Within Normal Limits; Repetition = Impaired; Basic Reading = Within Normal Limits; Following Commands = Within Normal Limits    Memory Functioning Narrative Recall - Short Delay = Impaired; Long Delay Narrative Recall = Impaired; Three word recall = Impaired    Visuo-Spatial Abilities Not Assessed    Functional Knowledge  Health & Safety Knowledge = Within Normal Limits;     Summary/Impression:  Results of Neuropsychological Exam revealed cognitive deficits in recall, semantic retrieval with all other tested areas within normal limits. On a measure assessing awareness of personal health status and ability to evaluate health problems, handle medical emergencies and take safety precautions, patient performed within normal limits. During this encounter, patient appears to have capacity to make informed medical decisions.

## 2024-04-25 ENCOUNTER — PATIENT OUTREACH (OUTPATIENT)
Dept: CASE MANAGEMENT | Facility: OTHER | Age: 89
End: 2024-04-25

## 2024-04-25 NOTE — PROGRESS NOTES
Outpatient Care Management STEVEN/SNF Pathway. Discharged 4/24/24 to [facility]. Email sent to facility to inform them the patient is on the STEVEN Pathway and I will be following them during their skilled stay.  This Admin Coordinator will continue to monitor via chart review.

## 2024-04-30 ENCOUNTER — TELEPHONE (OUTPATIENT)
Dept: OBGYN CLINIC | Facility: HOSPITAL | Age: 89
End: 2024-04-30

## 2024-04-30 NOTE — TELEPHONE ENCOUNTER
Claudy Pavon PT called to clarify reason for cam boot. Advised her that this was ER visit not ortho visit. Stated she needs orders for the boot- how long pt would be in it etc.     Advised her to schedule an appt for pt. She will have  call back to set appt for R ankle.

## 2024-05-03 ENCOUNTER — PATIENT OUTREACH (OUTPATIENT)
Dept: CASE MANAGEMENT | Facility: OTHER | Age: 89
End: 2024-05-03

## 2024-05-10 ENCOUNTER — PATIENT OUTREACH (OUTPATIENT)
Dept: CASE MANAGEMENT | Facility: OTHER | Age: 89
End: 2024-05-10

## 2024-05-14 ENCOUNTER — PATIENT OUTREACH (OUTPATIENT)
Dept: CASE MANAGEMENT | Facility: OTHER | Age: 89
End: 2024-05-14

## 2024-05-14 NOTE — PROGRESS NOTES
Update received from SNF coordinator the patient discharged 5/8/24 to [facility]. I have removed myself from the care team, updated the Care Coordination note, and closed the episode.

## 2024-07-15 ENCOUNTER — ESTABLISHED COMPREHENSIVE EXAM (OUTPATIENT)
Dept: URBAN - METROPOLITAN AREA CLINIC 6 | Facility: CLINIC | Age: 89
End: 2024-07-15

## 2024-07-15 DIAGNOSIS — H53.2: ICD-10-CM

## 2024-07-15 PROCEDURE — 92012 INTRM OPH EXAM EST PATIENT: CPT

## 2024-07-15 ASSESSMENT — TONOMETRY
OS_IOP_MMHG: 13
OD_IOP_MMHG: 16

## 2024-07-15 ASSESSMENT — VISUAL ACUITY
OS_CC: 20/40
OD_CC: 20/40+1
OU_CC: J2

## 2024-08-22 ENCOUNTER — HOSPITAL ENCOUNTER (EMERGENCY)
Facility: HOSPITAL | Age: 89
Discharge: HOME | End: 2024-08-22
Attending: EMERGENCY MEDICINE
Payer: MEDICARE

## 2024-08-22 VITALS
DIASTOLIC BLOOD PRESSURE: 74 MMHG | HEART RATE: 74 BPM | RESPIRATION RATE: 16 BRPM | OXYGEN SATURATION: 98 % | TEMPERATURE: 97.9 F | SYSTOLIC BLOOD PRESSURE: 148 MMHG

## 2024-08-22 DIAGNOSIS — Z00.00 EVALUATION BY MEDICAL SERVICE REQUIRED: Primary | ICD-10-CM

## 2024-08-22 PROCEDURE — 99284 EMERGENCY DEPT VISIT MOD MDM: CPT | Performed by: EMERGENCY MEDICINE

## 2024-08-22 PROCEDURE — 93005 ELECTROCARDIOGRAM TRACING: CPT

## 2024-08-22 PROCEDURE — 99285 EMERGENCY DEPT VISIT HI MDM: CPT

## 2024-08-22 NOTE — DISCHARGE INSTRUCTIONS
You were evaluated for changes to your vital signs that were noted at your facility. Per your report, you were asymptomatic then and are asymptomatic now.     Your vital signs for EMS and in ER are normal.

## 2024-08-22 NOTE — ED PROVIDER NOTES
History  Chief Complaint   Patient presents with    Evaluation of Abnormal Diagnostic Test     PT checked vs prior to exercise and thought HR and bp were low. Pt had no complaint and has normal VS for EMS       Evaluation of Abnormal Diagnostic Test      89-year-old female, past medical history below, presenting to the emergency department at the request of her physical therapist given that she was found to be bradycardiac while they were taking regular vitals.  Patient states that she was asymptomatic while with the physical therapist, is asymptomatic now, and has not had any recent complaints otherwise.    Prior to Admission Medications   Prescriptions Last Dose Informant Patient Reported? Taking?   amLODIPine (NORVASC) 5 mg tablet   Yes No   Sig: Take 5 mg by mouth daily   aspirin 81 MG tablet  Self Yes No   Sig: Take 81 mg by mouth   calcium carbonate-vitamin D (OSCAL-D) 500 mg-200 units per tablet  Self No No   Sig: Take 1 tablet by mouth daily with breakfast   cholecalciferol (VITAMIN D3) 1,000 units tablet  Self Yes No   clobetasol (TEMOVATE) 0.05 % ointment   No No   Sig: Apply topically 2 (two) times a day   fexofenadine (ALLEGRA) 180 MG tablet  Self Yes No   Sig: Take 180 mg by mouth daily   gabapentin (NEURONTIN) 100 mg capsule   No No   Sig: TAKE 1 CAPSULE BY MOUTH THREE TIMES DAILY   linaCLOtide (Linzess) 290 MCG CAPS  Self No No   Sig: Take 1 capsule by mouth once daily   meclizine (ANTIVERT) 25 mg tablet   No No   Sig: TAKE 1 TABLET BY MOUTH EVERY 8 HOURS   neomycin-polymyxin-dexamethasone (MAXITROL) 0.35%-10,000 units/g-0.1%   Yes No   Sig: APPLY A SMALL AMOUNT INTO BOTH EYES TWICE DAILY      Facility-Administered Medications: None       Past Medical History:   Diagnosis Date    Allergic rhinitis     Disease of thyroid gland     Dizziness     Hyponatremia 8/30/2021    Hypothyroidism 10/20/2015    Malignant neoplasm of colon (HCC)     last assessed: 10/20/2015    Neuropathy     Tinnitus        Past  Surgical History:   Procedure Laterality Date    ADENOIDECTOMY      APPENDECTOMY      last assessed: 10/20/2015    CATARACT EXTRACTION, BILATERAL      CHOLECYSTECTOMY      last assessed: 10/20/2015    COLECTOMY      last assessed: 10/20/2015; partial     IR PICC PLACEMENT SINGLE LUMEN  4/5/2024    TONSILLECTOMY      last assessed: 10/20/2015       Family History   Problem Relation Age of Onset    Heart disease Mother         cardiac disorder    Alzheimer's disease Sister     No Known Problems Father     Leukemia Brother      I have reviewed and agree with the history as documented.    E-Cigarette/Vaping    E-Cigarette Use Never User      E-Cigarette/Vaping Substances    Nicotine No     THC No     CBD No     Flavoring No     Other No     Unknown No      Social History     Tobacco Use    Smoking status: Former     Current packs/day: 0.25     Types: Cigarettes    Smokeless tobacco: Never    Tobacco comments:     quit 50 yrs ago   Vaping Use    Vaping status: Never Used   Substance Use Topics    Alcohol use: No    Drug use: Never       Review of Systems   Constitutional:  Negative for chills and fever.   HENT:  Negative for ear pain and sore throat.    Eyes:  Negative for pain and visual disturbance.   Respiratory:  Negative for cough and shortness of breath.    Cardiovascular:  Negative for chest pain and palpitations.   Gastrointestinal:  Negative for abdominal pain and vomiting.   Genitourinary:  Negative for dysuria and hematuria.   Musculoskeletal:  Negative for arthralgias and back pain.   Skin:  Negative for color change and rash.   Neurological:  Negative for seizures and syncope.   All other systems reviewed and are negative.      Physical Exam  Physical Exam  Vitals and nursing note reviewed.   Constitutional:       General: She is not in acute distress.     Appearance: She is well-developed.   HENT:      Head: Normocephalic and atraumatic.   Eyes:      Conjunctiva/sclera: Conjunctivae normal.    Cardiovascular:      Rate and Rhythm: Normal rate and regular rhythm.      Heart sounds: No murmur heard.  Pulmonary:      Effort: Pulmonary effort is normal. No respiratory distress.      Breath sounds: Normal breath sounds.   Abdominal:      Palpations: Abdomen is soft.      Tenderness: There is no abdominal tenderness.   Musculoskeletal:         General: No swelling.      Cervical back: Neck supple.   Skin:     General: Skin is warm and dry.      Capillary Refill: Capillary refill takes less than 2 seconds.   Neurological:      Mental Status: She is alert.   Psychiatric:         Mood and Affect: Mood normal.         Vital Signs  ED Triage Vitals [08/22/24 1441]   Temperature Pulse Respirations Blood Pressure SpO2   97.9 °F (36.6 °C) 74 16 148/74 98 %      Temp Source Heart Rate Source Patient Position - Orthostatic VS BP Location FiO2 (%)   Oral -- Sitting Right arm --      Pain Score       No Pain           Vitals:    08/22/24 1441   BP: 148/74   Pulse: 74   Patient Position - Orthostatic VS: Sitting         Visual Acuity      ED Medications  Medications - No data to display    Diagnostic Studies  Results Reviewed       None                   No orders to display              Procedures  ECG 12 Lead Documentation Only    Date/Time: 8/22/2024 6:58 PM    Performed by: Ty Dunham DO  Authorized by: Ty Dunham DO    Indications / Diagnosis:  Bradycardia per report  ECG reviewed by me, the ED Provider: yes    Patient location:  ED  Previous ECG:     Previous ECG:  Compared to current  Comments:      Sinus with PACs and left anterior fascicular block similar to prior           ED Course                                               Medical Decision Making  89-year-old female presenting to the emergency department asymptomatic and without complaint but at the request for evaluation via physical therapist.  Vital signs stable here.  EKG without acute change. Reviewed all findings both relevant and incidental with  the patient at bedside. Pt verbalized understanding of findings, neccesary follow up, return to ED precautions. Pt agreed to review today's findings with their primary care provider. Pt non-toxic appearing upon discharge. '      Amount and/or Complexity of Data Reviewed  Independent Historian: EMS  External Data Reviewed: notes.  ECG/medicine tests: ordered and independent interpretation performed.                 Disposition  Final diagnoses:   Evaluation by medical service required     Time reflects when diagnosis was documented in both MDM as applicable and the Disposition within this note       Time User Action Codes Description Comment    8/22/2024  3:04 PM Ty Dunham Add [Z00.00] Evaluation by medical service required           ED Disposition       ED Disposition   Discharge    Condition   Stable    Date/Time   Thu Aug 22, 2024  3:03 PM    Comment   Ida Lee Yevgeniy discharge to home/self care.                   Follow-up Information       Follow up With Specialties Details Why Contact Info    Ron Wadsworth MD Family Medicine Call  As needed [address] [address]  [phone]              Discharge Medication List as of 8/22/2024  3:05 PM        CONTINUE these medications which have NOT CHANGED    Details   amLODIPine (NORVASC) 5 mg tablet Take 5 mg by mouth daily, Starting Mon 3/25/2024, Historical Med      aspirin 81 MG tablet Take 81 mg by mouth, Historical Med      calcium carbonate-vitamin D (OSCAL-D) 500 mg-200 units per tablet Take 1 tablet by mouth daily with breakfast, Starting Fri 9/3/2021, Normal      cholecalciferol (VITAMIN D3) 1,000 units tablet Starting Tue 3/15/2022, Historical Med      clobetasol (TEMOVATE) 0.05 % ointment Apply topically 2 (two) times a day, Starting Tue 4/2/2024, Normal      fexofenadine (ALLEGRA) 180 MG tablet Take 180 mg by mouth daily, Historical Med      gabapentin (NEURONTIN) 100 mg capsule TAKE 1 CAPSULE BY MOUTH THREE TIMES DAILY,  Starting Mon 4/15/2024, Normal      linaCLOtide (Linzess) 290 MCG CAPS Take 1 capsule by mouth once daily, Starting Mon 12/11/2023, Normal      meclizine (ANTIVERT) 25 mg tablet TAKE 1 TABLET BY MOUTH EVERY 8 HOURS, Normal      neomycin-polymyxin-dexamethasone (MAXITROL) 0.35%-10,000 units/g-0.1% APPLY A SMALL AMOUNT INTO BOTH EYES TWICE DAILY, Historical Med             No discharge procedures on file.    PDMP Review         Value Time User    PDMP Reviewed  Yes 1/30/2023  2:08 PM Ron Wadsworth MD            ED Provider  Electronically Signed by             Ty Dunham DO  08/22/24 8979

## 2024-08-24 LAB
ATRIAL RATE: 69 BPM
P AXIS: 63 DEGREES
PR INTERVAL: 186 MS
QRS AXIS: -54 DEGREES
QRSD INTERVAL: 86 MS
QT INTERVAL: 404 MS
QTC INTERVAL: 432 MS
T WAVE AXIS: 77 DEGREES
VENTRICULAR RATE: 69 BPM

## 2024-08-24 PROCEDURE — 93010 ELECTROCARDIOGRAM REPORT: CPT | Performed by: INTERNAL MEDICINE

## 2024-09-29 ENCOUNTER — HOSPITAL ENCOUNTER (EMERGENCY)
Facility: HOSPITAL | Age: 89
Discharge: HOME | End: 2024-09-29
Attending: EMERGENCY MEDICINE
Payer: MEDICARE

## 2024-09-29 ENCOUNTER — APPOINTMENT (EMERGENCY)
Dept: RADIOLOGY | Facility: HOSPITAL | Age: 89
End: 2024-09-29
Payer: MEDICARE

## 2024-09-29 VITALS
RESPIRATION RATE: 16 BRPM | TEMPERATURE: 97.9 F | HEART RATE: 67 BPM | OXYGEN SATURATION: 97 % | SYSTOLIC BLOOD PRESSURE: 139 MMHG | BODY MASS INDEX: 27.21 KG/M2 | WEIGHT: 139.33 LBS | DIASTOLIC BLOOD PRESSURE: 69 MMHG

## 2024-09-29 DIAGNOSIS — I49.1 PAC (PREMATURE ATRIAL CONTRACTION): Primary | ICD-10-CM

## 2024-09-29 LAB
ALBUMIN SERPL BCG-MCNC: 4 G/DL (ref 3.5–5)
ALP SERPL-CCNC: 62 U/L (ref 34–104)
ALT SERPL W P-5'-P-CCNC: 10 U/L (ref 7–52)
ANION GAP SERPL CALCULATED.3IONS-SCNC: 6 MMOL/L (ref 4–13)
AST SERPL W P-5'-P-CCNC: 18 U/L (ref 13–39)
ATRIAL RATE: 101 BPM
BASOPHILS # BLD AUTO: 0.06 THOUSANDS/ÂΜL (ref 0–0.1)
BASOPHILS NFR BLD AUTO: 1 % (ref 0–1)
BILIRUB SERPL-MCNC: 0.55 MG/DL (ref 0.2–1)
BUN SERPL-MCNC: 30 MG/DL (ref 5–25)
CALCIUM SERPL-MCNC: 9.8 MG/DL (ref 8.4–10.2)
CARDIAC TROPONIN I PNL SERPL HS: 3 NG/L
CHLORIDE SERPL-SCNC: 100 MMOL/L (ref 96–108)
CO2 SERPL-SCNC: 29 MMOL/L (ref 21–32)
CREAT SERPL-MCNC: 1.26 MG/DL (ref 0.6–1.3)
EOSINOPHIL # BLD AUTO: 0.19 THOUSAND/ÂΜL (ref 0–0.61)
EOSINOPHIL NFR BLD AUTO: 4 % (ref 0–6)
ERYTHROCYTE [DISTWIDTH] IN BLOOD BY AUTOMATED COUNT: 13.2 % (ref 11.6–15.1)
GFR SERPL CREATININE-BSD FRML MDRD: 36 ML/MIN/1.73SQ M
GLUCOSE SERPL-MCNC: 78 MG/DL (ref 65–140)
HCT VFR BLD AUTO: 35.9 % (ref 34.8–46.1)
HGB BLD-MCNC: 11.8 G/DL (ref 11.5–15.4)
IMM GRANULOCYTES # BLD AUTO: 0.02 THOUSAND/UL (ref 0–0.2)
IMM GRANULOCYTES NFR BLD AUTO: 0 % (ref 0–2)
LYMPHOCYTES # BLD AUTO: 1.23 THOUSANDS/ÂΜL (ref 0.6–4.47)
LYMPHOCYTES NFR BLD AUTO: 24 % (ref 14–44)
MCH RBC QN AUTO: 33.3 PG (ref 26.8–34.3)
MCHC RBC AUTO-ENTMCNC: 32.9 G/DL (ref 31.4–37.4)
MCV RBC AUTO: 101 FL (ref 82–98)
MONOCYTES # BLD AUTO: 0.42 THOUSAND/ÂΜL (ref 0.17–1.22)
MONOCYTES NFR BLD AUTO: 8 % (ref 4–12)
NEUTROPHILS # BLD AUTO: 3.29 THOUSANDS/ÂΜL (ref 1.85–7.62)
NEUTS SEG NFR BLD AUTO: 63 % (ref 43–75)
NRBC BLD AUTO-RTO: 0 /100 WBCS
PLATELET # BLD AUTO: 247 THOUSANDS/UL (ref 149–390)
PMV BLD AUTO: 10 FL (ref 8.9–12.7)
POTASSIUM SERPL-SCNC: 4.9 MMOL/L (ref 3.5–5.3)
PR INTERVAL: 168 MS
PROT SERPL-MCNC: 7.3 G/DL (ref 6.4–8.4)
QRS AXIS: -29 DEGREES
QRSD INTERVAL: 88 MS
QT INTERVAL: 414 MS
QTC INTERVAL: 449 MS
RBC # BLD AUTO: 3.54 MILLION/UL (ref 3.81–5.12)
SODIUM SERPL-SCNC: 135 MMOL/L (ref 135–147)
T WAVE AXIS: 59 DEGREES
VENTRICULAR RATE: 71 BPM
WBC # BLD AUTO: 5.21 THOUSAND/UL (ref 4.31–10.16)

## 2024-09-29 PROCEDURE — 93005 ELECTROCARDIOGRAM TRACING: CPT

## 2024-09-29 PROCEDURE — 80053 COMPREHEN METABOLIC PANEL: CPT | Performed by: EMERGENCY MEDICINE

## 2024-09-29 PROCEDURE — 93010 ELECTROCARDIOGRAM REPORT: CPT | Performed by: INTERNAL MEDICINE

## 2024-09-29 PROCEDURE — 84484 ASSAY OF TROPONIN QUANT: CPT | Performed by: EMERGENCY MEDICINE

## 2024-09-29 PROCEDURE — 71045 X-RAY EXAM CHEST 1 VIEW: CPT

## 2024-09-29 PROCEDURE — 36415 COLL VENOUS BLD VENIPUNCTURE: CPT

## 2024-09-29 PROCEDURE — 99284 EMERGENCY DEPT VISIT MOD MDM: CPT

## 2024-09-29 PROCEDURE — 99284 EMERGENCY DEPT VISIT MOD MDM: CPT | Performed by: EMERGENCY MEDICINE

## 2024-09-29 PROCEDURE — 85025 COMPLETE CBC W/AUTO DIFF WBC: CPT | Performed by: EMERGENCY MEDICINE

## 2024-09-29 NOTE — ED PROVIDER NOTES
Final diagnoses:   PAC (premature atrial contraction)     ED Disposition       ED Disposition   Discharge    Condition   Stable    Date/Time   Sun Sep 29, 2024  1:48 PM    Comment   Ida Vuong discharge to home/self care.                   Assessment & Plan       Medical Decision Making  89-year-old female presents to the emergency department with chief complaint of bradycardia.  Patient seen and examined noted to have normal rate but irregular heart rate.  EKG as interpreted by myself as above.  Intermittently noted to have return to sinus rhythm on continuous cardiac monitoring.  Patient has conducted pulse with every beat.  Advised patient on premature atrial contractions and significance.  Obtained CBC, CMP, troponin to evaluate for differential diagnosis that may pertain to patient's current symptoms including but not limited to anemia, electrolyte derangement, less likely ACS.  Laboratory analysis showed no acute findings.  EKG as interpreted by myself as below.  Chest x-ray as interpreted by myself as below.  Ambulatory referral as well as information to clinic for cardiology placed. Patient appears well, nontoxic, agrees with plan of care at this time.  Answered all questions.  In light of this, patient would benefit from outpatient follow-up.    Amount and/or Complexity of Data Reviewed  Labs: ordered. Decision-making details documented in ED Course.  Radiology: ordered and independent interpretation performed.        ED Course as of 09/29/24 1408   Sun Sep 29, 2024   1322 hs TnI 0hr: 3       Medications - No data to display    ED Risk Strat Scores                                               History of Present Illness       Chief Complaint   Patient presents with    Slow Heart Rate     Pt arrives ems from [facility] with complaints of bradycardia and dysrhythmia.  Pt offers no complaints.        Past Medical History:   Diagnosis Date    Allergic rhinitis     Disease of thyroid gland      "Dizziness     Hyponatremia 8/30/2021    Hypothyroidism 10/20/2015    Malignant neoplasm of colon (HCC)     last assessed: 10/20/2015    Neuropathy     Tinnitus       Past Surgical History:   Procedure Laterality Date    ADENOIDECTOMY      APPENDECTOMY      last assessed: 10/20/2015    CATARACT EXTRACTION, BILATERAL      CHOLECYSTECTOMY      last assessed: 10/20/2015    COLECTOMY      last assessed: 10/20/2015; partial     IR PICC PLACEMENT SINGLE LUMEN  4/5/2024    TONSILLECTOMY      last assessed: 10/20/2015      Family History   Problem Relation Age of Onset    Heart disease Mother         cardiac disorder    Alzheimer's disease Sister     No Known Problems Father     Leukemia Brother       Social History     Tobacco Use    Smoking status: Former     Current packs/day: 0.25     Types: Cigarettes    Smokeless tobacco: Never    Tobacco comments:     quit 50 yrs ago   Vaping Use    Vaping status: Never Used   Substance Use Topics    Alcohol use: No    Drug use: Never      E-Cigarette/Vaping    E-Cigarette Use Never User       E-Cigarette/Vaping Substances    Nicotine No     THC No     CBD No     Flavoring No     Other No     Unknown No       I have reviewed and agree with the history as documented.     (Ida Vuong) Ida Vuong is a 89 y.o. female     They presented to the emergency department on September 29, 2024. Patient presents with:  Slow Heart Rate: Pt arrives ems from [facility] with complaints of bradycardia and dysrhythmia.  Pt offers no complaints.   .    The patient states that she lives at [facility], was doing well this morning and \"the girls were getting their things done\" the patient had her heart rate taken and was told that it was low and was recommended come to the emergency department.  Patient states that she has been completely asymptomatic during this time noting that she has been \"dopey\" however states that she has been \"dopey\" for a long time.  Patient denies " lightheadedness, dizziness, visual changes, numbness, tingling, weakness, chest pain, difficulty breathing, abdominal pain, nausea, vomiting, change in bowel habits, change in urination, or any other complaint at this time.              Review of Systems   Constitutional:  Negative for chills and fever.   HENT:  Negative for ear pain and sore throat.    Eyes:  Negative for pain and visual disturbance.   Respiratory:  Negative for cough and shortness of breath.    Cardiovascular:  Negative for chest pain and palpitations.        Bradycardia   Gastrointestinal:  Negative for abdominal pain and vomiting.   Genitourinary:  Negative for dysuria and hematuria.   Musculoskeletal:  Negative for arthralgias and back pain.   Skin:  Negative for color change and rash.   Neurological:  Negative for seizures and syncope.   All other systems reviewed and are negative.          Objective       ED Triage Vitals [09/29/24 1246]   Temperature Pulse Blood Pressure Respirations SpO2 Patient Position - Orthostatic VS   97.9 °F (36.6 °C) 67 139/69 16 98 % Lying      Temp Source Heart Rate Source BP Location FiO2 (%) Pain Score    Oral Monitor Right arm -- No Pain      Vitals      Date and Time Temp Pulse SpO2 Resp BP Pain Score FACES Pain Rating User   09/29/24 1249 -- -- 97 % -- -- -- -- RJK   09/29/24 1246 97.9 °F (36.6 °C) 67 98 % 16 139/69 No Pain -- RJK            Physical Exam  Vitals and nursing note reviewed.   Constitutional:       General: She is not in acute distress.     Appearance: Normal appearance.   HENT:      Head: Normocephalic and atraumatic.      Right Ear: External ear normal.      Left Ear: External ear normal.      Nose: Nose normal.      Mouth/Throat:      Mouth: Mucous membranes are moist.   Eyes:      Conjunctiva/sclera: Conjunctivae normal.   Cardiovascular:      Rate and Rhythm: Normal rate. Rhythm irregular.   Pulmonary:      Effort: Pulmonary effort is normal. No respiratory distress.      Breath sounds:  Normal breath sounds.   Abdominal:      General: Abdomen is flat. Bowel sounds are normal.      Tenderness: There is no abdominal tenderness. There is no guarding or rebound.   Musculoskeletal:         General: Normal range of motion.      Cervical back: Normal range of motion.   Skin:     General: Skin is warm and dry.      Capillary Refill: Capillary refill takes less than 2 seconds.   Neurological:      Mental Status: She is alert. Mental status is at baseline.   Psychiatric:         Mood and Affect: Mood normal.         Results Reviewed       Procedure Component Value Units Date/Time    HS Troponin 0hr (reflex protocol) [[id number]]  (Normal) Collected: 09/29/24 1251    Lab Status: Final result Specimen: Blood from Arm, Right Updated: 09/29/24 1322     hs TnI 0hr 3 ng/L     Comprehensive metabolic panel [[id number]]  (Abnormal) Collected: 09/29/24 1251    Lab Status: Final result Specimen: Blood from Arm, Right Updated: 09/29/24 1315     Sodium 135 mmol/L      Potassium 4.9 mmol/L      Chloride 100 mmol/L      CO2 29 mmol/L      ANION GAP 6 mmol/L      BUN 30 mg/dL      Creatinine 1.26 mg/dL      Glucose 78 mg/dL      Calcium 9.8 mg/dL      AST 18 U/L      ALT 10 U/L      Alkaline Phosphatase 62 U/L      Total Protein 7.3 g/dL      Albumin 4.0 g/dL      Total Bilirubin 0.55 mg/dL      eGFR 36 ml/min/1.73sq m     Narrative:      National Kidney Disease Foundation guidelines for Chronic Kidney Disease (CKD):     Stage 1 with normal or high GFR (GFR > 90 mL/min/1.73 square meters)    Stage 2 Mild CKD (GFR = 60-89 mL/min/1.73 square meters)    Stage 3A Moderate CKD (GFR = 45-59 mL/min/1.73 square meters)    Stage 3B Moderate CKD (GFR = 30-44 mL/min/1.73 square meters)    Stage 4 Severe CKD (GFR = 15-29 mL/min/1.73 square meters)    Stage 5 End Stage CKD (GFR <15 mL/min/1.73 square meters)  Note: GFR calculation is accurate only with a steady state creatinine    CBC and differential [[id number]]  (Abnormal) Collected:  09/29/24 1251    Lab Status: Final result Specimen: Blood from Arm, Right Updated: 09/29/24 1259     WBC 5.21 Thousand/uL      RBC 3.54 Million/uL      Hemoglobin 11.8 g/dL      Hematocrit 35.9 %       fL      MCH 33.3 pg      MCHC 32.9 g/dL      RDW 13.2 %      MPV 10.0 fL      Platelets 247 Thousands/uL      nRBC 0 /100 WBCs      Segmented % 63 %      Immature Grans % 0 %      Lymphocytes % 24 %      Monocytes % 8 %      Eosinophils Relative 4 %      Basophils Relative 1 %      Absolute Neutrophils 3.29 Thousands/µL      Absolute Immature Grans 0.02 Thousand/uL      Absolute Lymphocytes 1.23 Thousands/µL      Absolute Monocytes 0.42 Thousand/µL      Eosinophils Absolute 0.19 Thousand/µL      Basophils Absolute 0.06 Thousands/µL             XR chest 1 view portable   ED Interpretation by Ze Eason MD (09/29 1321)   No acute cardio-pulmonary disease.  Independently interpreted by myself.            ECG 12 Lead Documentation Only    Date/Time: 9/29/2024 1:19 PM    Performed by: Ze Eason MD  Authorized by: Ze Eason MD    ECG reviewed by me, the ED Provider: yes    Patient location:  ED  Previous ECG:     Previous ECG:  Compared to current    Similarity:  No change  Interpretation:     Interpretation: abnormal    Rate:     ECG rate:  71    ECG rate assessment: normal    Rhythm:     Rhythm: sinus rhythm    Ectopy:     Ectopy: PAC    QRS:     QRS axis:  Normal    QRS intervals:  Normal  Conduction:     Conduction: normal    ST segments:     ST segments:  Normal  T waves:     T waves: normal    Comments:      Normal sinus rhythm at 71 bpm with frequent PAC, no PVC, and no acute ST elevation or depression.      ED Medication and Procedure Management   Prior to Admission Medications   Prescriptions Last Dose Informant Patient Reported? Taking?   amLODIPine (NORVASC) 5 mg tablet   Yes No   Sig: Take 5 mg by mouth daily   aspirin 81 MG tablet  Self Yes No   Sig: Take 81 mg by mouth    calcium carbonate-vitamin D (OSCAL-D) 500 mg-200 units per tablet  Self No No   Sig: Take 1 tablet by mouth daily with breakfast   cholecalciferol (VITAMIN D3) 1,000 units tablet  Self Yes No   clobetasol (TEMOVATE) 0.05 % ointment   No No   Sig: Apply topically 2 (two) times a day   fexofenadine (ALLEGRA) 180 MG tablet  Self Yes No   Sig: Take 180 mg by mouth daily   gabapentin (NEURONTIN) 100 mg capsule   No No   Sig: TAKE 1 CAPSULE BY MOUTH THREE TIMES DAILY   linaCLOtide (Linzess) 290 MCG CAPS  Self No No   Sig: Take 1 capsule by mouth once daily   meclizine (ANTIVERT) 25 mg tablet   No No   Sig: TAKE 1 TABLET BY MOUTH EVERY 8 HOURS   neomycin-polymyxin-dexamethasone (MAXITROL) 0.35%-10,000 units/g-0.1%   Yes No   Sig: APPLY A SMALL AMOUNT INTO BOTH EYES TWICE DAILY      Facility-Administered Medications: None     Patient's Medications   Discharge Prescriptions    No medications on file       ED SEPSIS DOCUMENTATION   Time reflects when diagnosis was documented in both MDM as applicable and the Disposition within this note       Time User Action Codes Description Comment    9/29/2024  1:23 PM Ze Eason Add [I49.1] PAC (premature atrial contraction)                  Ze Eason MD  09/29/24 6927

## 2024-09-29 NOTE — DISCHARGE INSTRUCTIONS
Please follow up with your primary care provider concerning your visit today.  Please return to the Emergency Department for any other concerns.     An ambulatory referral to cardiology has been placed for you, you should be contacted in 1 to 2 days in order to set up an appointment, if you are not contacted in that time please contact the provided clinic number.

## 2024-09-29 NOTE — ED ATTENDING ATTESTATION
"9/29/2024  I, Mel Cortez, DO, saw and evaluated the patient. I have discussed the patient with the resident/non-physician practitioner and agree with the resident's/non-physician practitioner's findings, Plan of Care, and MDM as documented in the resident's/non-physician practitioner's note, except where noted. All available labs and Radiology studies were reviewed.  I was present for key portions of any procedure(s) performed by the resident/non-physician practitioner and I was immediately available to provide assistance.       At this point I agree with the current assessment done in the Emergency Department.  I have conducted an independent evaluation of this patient a history and physical is as follows:    ED Course   89-year-old female presents from [facility] for evaluation of low heart rate.  Patient states that she had difficulty sleeping last night due to having bilateral hip pain.  Patient states that the leg pain often keeps her up at night.  She reports having a night of poor sleep however was feeling otherwise well.  Patient states that intermittently she will feel \"dopey\" and attributes this to poor sleep.  Patient was having her vital signs taken by the staff at [facility] and was told that she was bradycardic.  It was recommended that she come to the emergency department for evaluation.  Patient states that she has been ambulating without difficulty with her walker.  She denies headache or lightheadedness.  No palpitations or chest pain.  No difficulty breathing.  Upon arrival patient offers no complaints.  She has a normal heart rate and blood pressure.    Past medical history: Hypertension, peripheral neuropathy, chronic kidney disease, chronic constipation, anemia    Physical exam: Patient is awake and alert, resting in no acute distress.  Pupils are equal and reactive.  Mucous membranes are moist.  Neck is supple.  Heart is regular rate with frequent ectopy.  Lungs are clear to " auscultation.  Abdomen is soft, nontender, nondistended with normal active bowel sounds.  No focal motor or sensory deficits.  Speech is clear and appropriate.  2+ pitting edema bilateral lower extremities.    Assessment/plan: 89-year-old female presents for evaluation of slow heart rate.  Patient has not been bradycardic on her monitor.  Differential diagnosis includes was not limited to transient bradycardia, heart block, PACs or PVCs, electrolyte abnormality, atrial fibrillation    Will check EKG, electrolytes, will keep on cardiac monitor to evaluate for possible transient arrhythmias.    Update: Patient feeling well.  Heart rate has been consistently above 60.  She does have frequent ectopy but has otherwise normal vital signs.  Electrolytes are unremarkable.  Will plan to discharge back to nursing facility to follow-up with PCP in next 1 to 2 days.          Critical Care Time  Procedures

## 2025-01-24 ENCOUNTER — HOSPITAL ENCOUNTER (EMERGENCY)
Facility: HOSPITAL | Age: OVER 89
Discharge: HOME | End: 2025-01-24
Attending: EMERGENCY MEDICINE
Payer: MEDICARE

## 2025-01-24 VITALS
SYSTOLIC BLOOD PRESSURE: 113 MMHG | HEART RATE: 94 BPM | RESPIRATION RATE: 16 BRPM | DIASTOLIC BLOOD PRESSURE: 56 MMHG | TEMPERATURE: 98.2 F | OXYGEN SATURATION: 98 %

## 2025-01-24 DIAGNOSIS — K59.00 CONSTIPATION, UNSPECIFIED CONSTIPATION TYPE: Primary | ICD-10-CM

## 2025-01-24 DIAGNOSIS — M81.0 OSTEOPOROSIS: ICD-10-CM

## 2025-01-24 PROCEDURE — 99283 EMERGENCY DEPT VISIT LOW MDM: CPT

## 2025-01-24 PROCEDURE — 99284 EMERGENCY DEPT VISIT MOD MDM: CPT | Performed by: EMERGENCY MEDICINE

## 2025-01-24 RX ORDER — LIDOCAINE HYDROCHLORIDE 20 MG/ML
1 JELLY TOPICAL ONCE
Status: COMPLETED | OUTPATIENT
Start: 2025-01-24 | End: 2025-01-24

## 2025-01-24 RX ADMIN — LIDOCAINE HYDROCHLORIDE 1 APPLICATION: 20 JELLY TOPICAL at 13:36

## 2025-01-24 NOTE — ED PROVIDER NOTES
ED Disposition       None          Assessment & Plan       Medical Decision Making  PT WITH HX OF CONSTIPATION - PRESENTS AS SUCH- MILD  LOWER ABD DISTENTION- TENDERNESS- NO PERITONEAL SIGNS-  MODERATE OF PROX STOOL IN RECTUM-- WILL GIVE ENEMA AND RE-EVAL-- ABD-- AFTER DM -     Problems Addressed:  Constipation, unspecified constipation type: chronic illness or injury     Details: SEE CHART AND ABOVE     Amount and/or Complexity of Data Reviewed  External Data Reviewed: labs.  Discussion of management or test interpretation with external provider(s): MILD AMOUNT OF ER MD THOUGHT COMPLEXITY     Risk  Prescription drug management.  Decision regarding hospitalization.        ED Course as of 01/24/25 1617   Fri Jan 24, 2025   1340 Er connor note-  9/24- labs reviewed by er md    1340 Er md not-e ano-rectal exam- multiple non thrombosed external anal hemorrhoids- skin tags- pt did move small amount of stool in er-  on rectal exam- moderate amount of heard  brown proximal to length of er md finger - - heme eng stool -- no fecal impaction    1342 Er md note-  urojet placed  in rectum and around anus-- will give enema and place pt on commode-- pt has mildly distented lower abd--   if pt has  increased bm's and lower abd distention resolves will d/c--  and place pt back on linzess-- -    1504 ER MD NOTE- PT RE-EVAL- PT HAD LARGE  BROWN BM ON COMMODE WITH  RELIEF OF SYMPTOMS- AFTER BM- ABD IS SOFT NT/ND- NO PERITONEAL SIGNS- NO BILATERAL INGUINAL FEMORAL HERNIA'S- LOWER ABD DISTENTION MILD PAIN UPON PALPATION HAS RESOLVED        Medications   lidocaine (URO-JET) 2 % urethral/mucosal gel 1 Application (1 Application Urethral Given by Other 1/24/25 1336)       ED Risk Strat Scores                                              History of Present Illness       Chief Complaint   Patient presents with    Constipation     Pt arrives via EMS from [facility]. Pt has hx chronic constipation. States Miralax typically works, but has  not for the last few days. Last full BM was about a week ago, states very small ones since then. Denies abd pain. Denies other complaints. Hx fecal impaction       Past Medical History:   Diagnosis Date    Allergic rhinitis     Disease of thyroid gland     Dizziness     Hyponatremia 8/30/2021    Hypothyroidism 10/20/2015    Malignant neoplasm of colon (HCC)     last assessed: 10/20/2015    Neuropathy     Tinnitus       Past Surgical History:   Procedure Laterality Date    ADENOIDECTOMY      APPENDECTOMY      last assessed: 10/20/2015    CATARACT EXTRACTION, BILATERAL      CHOLECYSTECTOMY      last assessed: 10/20/2015    COLECTOMY      last assessed: 10/20/2015; partial     IR PICC PLACEMENT SINGLE LUMEN  4/5/2024    TONSILLECTOMY      last assessed: 10/20/2015      Family History   Problem Relation Age of Onset    Heart disease Mother         cardiac disorder    Alzheimer's disease Sister     No Known Problems Father     Leukemia Brother       Social History     Tobacco Use    Smoking status: Former     Current packs/day: 0.25     Types: Cigarettes    Smokeless tobacco: Never    Tobacco comments:     quit 50 yrs ago   Vaping Use    Vaping status: Never Used   Substance Use Topics    Alcohol use: No    Drug use: Never      E-Cigarette/Vaping    E-Cigarette Use Never User       E-Cigarette/Vaping Substances    Nicotine No     THC No     CBD No     Flavoring No     Other No     Unknown No       I have reviewed and agree with the history as documented.     89 yr female c/o approx 4-5 days of no bm--  no new meds/ diet changes- in past was on linzess which helped-- pt denies any fevers- abd pain vomiting- gu comps-       History provided by:  Patient  Constipation  Severity:  Moderate  Time since last bowel movement:  4 days  Timing:  Constant  Chronicity:  Recurrent  Associated symptoms: abdominal pain    Associated symptoms: no diarrhea, no nausea and no vomiting        Review of Systems   Constitutional: Negative.     HENT: Negative.     Eyes: Negative.    Respiratory: Negative.     Cardiovascular: Negative.    Gastrointestinal:  Positive for abdominal pain and constipation. Negative for abdominal distention, anal bleeding, blood in stool, diarrhea, nausea, rectal pain and vomiting.   Endocrine: Negative.    Genitourinary: Negative.    Musculoskeletal: Negative.    Skin: Negative.    Allergic/Immunologic: Negative.    Neurological: Negative.    Hematological: Negative.    Psychiatric/Behavioral: Negative.             Objective       ED Triage Vitals [01/24/25 1122]   Temperature Pulse Blood Pressure Respirations SpO2 Patient Position - Orthostatic VS   98.2 °F (36.8 °C) 94 113/56 16 98 % --      Temp Source Heart Rate Source BP Location FiO2 (%) Pain Score    Oral Monitor -- -- --      Vitals      Date and Time Temp Pulse SpO2 Resp BP Pain Score FACES Pain Rating User   01/24/25 1122 98.2 °F (36.8 °C) 94 98 % 16 113/56 -- -- [place]            Physical Exam  Vitals and nursing note reviewed.   Constitutional:       General: She is not in acute distress.     Appearance: Normal appearance. She is not ill-appearing, toxic-appearing or diaphoretic.      Comments:  Pulse ox  98 % on ra- interpretation is normal- no intervention - in nad    HENT:      Head: Normocephalic and atraumatic.      Nose: Nose normal.      Mouth/Throat:      Mouth: Mucous membranes are moist.   Eyes:      General: No scleral icterus.        Right eye: No discharge.         Left eye: No discharge.      Extraocular Movements: Extraocular movements intact.      Conjunctiva/sclera: Conjunctivae normal.      Pupils: Pupils are equal, round, and reactive to light.      Comments: Mm pink   Neck:      Vascular: No carotid bruit.      Comments: No pmt c/t/l/s spine   Cardiovascular:      Rate and Rhythm: Normal rate and regular rhythm.      Heart sounds: Murmur heard.      No friction rub. No gallop.   Pulmonary:      Effort: Pulmonary effort is normal. No respiratory  distress.      Breath sounds: Normal breath sounds. No stridor. No wheezing, rhonchi or rales.   Chest:      Chest wall: No tenderness.   Abdominal:      General: There is distension.      Palpations: Abdomen is soft. There is no mass.      Tenderness: There is abdominal tenderness. There is no right CVA tenderness, left CVA tenderness, guarding or rebound.      Hernia: No hernia is present.      Comments: Lower abd distention - mild tenderness- no peritoneal signs   Musculoskeletal:         General: No swelling, tenderness, deformity or signs of injury. Normal range of motion.      Cervical back: Normal range of motion and neck supple. No rigidity or tenderness.      Right lower leg: Edema present.      Left lower leg: Edema present.      Comments: Equal bilateral radial/dp pulses- 1 plus ble pretibial edema- pt wearing compression stockings- nt- no asym/ erythema   Lymphadenopathy:      Cervical: No cervical adenopathy.   Skin:     General: Skin is warm.      Capillary Refill: Capillary refill takes less than 2 seconds.      Coloration: Skin is pale. Skin is not jaundiced.      Findings: No bruising, erythema, lesion or rash.   Neurological:      General: No focal deficit present.      Mental Status: She is alert and oriented to person, place, and time. Mental status is at baseline.      Cranial Nerves: No cranial nerve deficit.      Sensory: No sensory deficit.      Motor: No weakness.      Coordination: Coordination normal.      Comments: Normal non focal neuro exam- normal c n exaM - NORMAL BUE/BLE STRNGHT-SENSATION     Psychiatric:         Mood and Affect: Mood normal.         Behavior: Behavior normal.         Thought Content: Thought content normal.         Judgment: Judgment normal.         Results Reviewed       None            No orders to display       Procedures    ED Medication and Procedure Management   Prior to Admission Medications   Prescriptions Last Dose Informant Patient Reported? Taking?    amLODIPine (NORVASC) 5 mg tablet   Yes No   Sig: Take 5 mg by mouth daily   aspirin 81 MG tablet  Self Yes No   Sig: Take 81 mg by mouth   calcium carbonate-vitamin D (OSCAL-D) 500 mg-200 units per tablet  Self No No   Sig: Take 1 tablet by mouth daily with breakfast   cholecalciferol (VITAMIN D3) 1,000 units tablet  Self Yes No   clobetasol (TEMOVATE) 0.05 % ointment   No No   Sig: Apply topically 2 (two) times a day   fexofenadine (ALLEGRA) 180 MG tablet  Self Yes No   Sig: Take 180 mg by mouth daily   gabapentin (NEURONTIN) 100 mg capsule   No No   Sig: TAKE 1 CAPSULE BY MOUTH THREE TIMES DAILY   linaCLOtide (Linzess) 290 MCG CAPS  Self No No   Sig: Take 1 capsule by mouth once daily   meclizine (ANTIVERT) 25 mg tablet   No No   Sig: TAKE 1 TABLET BY MOUTH EVERY 8 HOURS   neomycin-polymyxin-dexamethasone (MAXITROL) 0.35%-10,000 units/g-0.1%   Yes No   Sig: APPLY A SMALL AMOUNT INTO BOTH EYES TWICE DAILY      Facility-Administered Medications: None     Patient's Medications   Discharge Prescriptions    No medications on file     No discharge procedures on file.  ED SEPSIS DOCUMENTATION            Chris Ayala MD  01/24/25 7922

## 2025-01-24 NOTE — DISCHARGE INSTRUCTIONS
DIAGNOSIS;CONSTIPATION     - RESOLVED IN ER WITH ENEMA     -  LINZESS  1 CAPSULE ONCE A DAY     - EXPECT SOME RED BLOOD WHEN WIPING AND IN STOOL OVER NEXT SEVERAL DAYS     - PLEASE RETURN TO  THE ER FOR ANY FEVERS- TEMP > 100.4- ANY PERSISTENT VOMITING  OR ANY INCREASING LOWER ABDOMINAL PAIN/ DISTENTION OR ANY INCREASING AMOUNTS OF BLOOD IN STOOL- WHOLE TOILET BOWEL STAINED RED

## 2025-02-07 ENCOUNTER — HOSPITAL ENCOUNTER (EMERGENCY)
Facility: HOSPITAL | Age: OVER 89
Discharge: NURSING, REHAB OR LONG-TERM CARE | End: 2025-02-07
Attending: STUDENT IN AN ORGANIZED HEALTH CARE EDUCATION/TRAINING PROGRAM
Payer: MEDICARE

## 2025-02-07 ENCOUNTER — APPOINTMENT (EMERGENCY)
Dept: CT IMAGING | Facility: HOSPITAL | Age: OVER 89
End: 2025-02-07
Payer: MEDICARE

## 2025-02-07 VITALS
RESPIRATION RATE: 18 BRPM | TEMPERATURE: 98.5 F | DIASTOLIC BLOOD PRESSURE: 83 MMHG | HEART RATE: 76 BPM | OXYGEN SATURATION: 97 % | SYSTOLIC BLOOD PRESSURE: 174 MMHG

## 2025-02-07 DIAGNOSIS — K56.41 FECAL IMPACTION IN RECTUM (HCC): ICD-10-CM

## 2025-02-07 DIAGNOSIS — K59.00 CONSTIPATION, UNSPECIFIED CONSTIPATION TYPE: Primary | ICD-10-CM

## 2025-02-07 LAB
ALBUMIN SERPL BCG-MCNC: 4 G/DL (ref 3.5–5)
ALP SERPL-CCNC: 61 U/L (ref 34–104)
ALT SERPL W P-5'-P-CCNC: 9 U/L (ref 7–52)
ANION GAP SERPL CALCULATED.3IONS-SCNC: 6 MMOL/L (ref 4–13)
AST SERPL W P-5'-P-CCNC: 14 U/L (ref 13–39)
BASOPHILS # BLD AUTO: 0.05 THOUSANDS/ΜL (ref 0–0.1)
BASOPHILS NFR BLD AUTO: 1 % (ref 0–1)
BILIRUB SERPL-MCNC: 0.39 MG/DL (ref 0.2–1)
BUN SERPL-MCNC: 24 MG/DL (ref 5–25)
CALCIUM SERPL-MCNC: 9.1 MG/DL (ref 8.4–10.2)
CHLORIDE SERPL-SCNC: 99 MMOL/L (ref 96–108)
CO2 SERPL-SCNC: 26 MMOL/L (ref 21–32)
CREAT SERPL-MCNC: 1.03 MG/DL (ref 0.6–1.3)
EOSINOPHIL # BLD AUTO: 0.06 THOUSAND/ΜL (ref 0–0.61)
EOSINOPHIL NFR BLD AUTO: 2 % (ref 0–6)
ERYTHROCYTE [DISTWIDTH] IN BLOOD BY AUTOMATED COUNT: 12.4 % (ref 11.6–15.1)
FLUAV AG UPPER RESP QL IA.RAPID: NEGATIVE
FLUBV AG UPPER RESP QL IA.RAPID: NEGATIVE
GFR SERPL CREATININE-BSD FRML MDRD: 46 ML/MIN/1.73SQ M
GLUCOSE SERPL-MCNC: 91 MG/DL (ref 65–140)
HCT VFR BLD AUTO: 28.9 % (ref 34.8–46.1)
HGB BLD-MCNC: 9.6 G/DL (ref 11.5–15.4)
IMM GRANULOCYTES # BLD AUTO: 0.02 THOUSAND/UL (ref 0–0.2)
IMM GRANULOCYTES NFR BLD AUTO: 1 % (ref 0–2)
LYMPHOCYTES # BLD AUTO: 0.47 THOUSANDS/ΜL (ref 0.6–4.47)
LYMPHOCYTES NFR BLD AUTO: 12 % (ref 14–44)
MCH RBC QN AUTO: 33.9 PG (ref 26.8–34.3)
MCHC RBC AUTO-ENTMCNC: 33.2 G/DL (ref 31.4–37.4)
MCV RBC AUTO: 102 FL (ref 82–98)
MONOCYTES # BLD AUTO: 0.28 THOUSAND/ΜL (ref 0.17–1.22)
MONOCYTES NFR BLD AUTO: 7 % (ref 4–12)
NEUTROPHILS # BLD AUTO: 3.21 THOUSANDS/ΜL (ref 1.85–7.62)
NEUTS SEG NFR BLD AUTO: 77 % (ref 43–75)
NRBC BLD AUTO-RTO: 0 /100 WBCS
PLATELET # BLD AUTO: 228 THOUSANDS/UL (ref 149–390)
PMV BLD AUTO: 9.9 FL (ref 8.9–12.7)
POTASSIUM SERPL-SCNC: 4.7 MMOL/L (ref 3.5–5.3)
PROT SERPL-MCNC: 6.6 G/DL (ref 6.4–8.4)
RBC # BLD AUTO: 2.83 MILLION/UL (ref 3.81–5.12)
SARS-COV+SARS-COV-2 AG RESP QL IA.RAPID: NEGATIVE
SODIUM SERPL-SCNC: 131 MMOL/L (ref 135–147)
WBC # BLD AUTO: 4.09 THOUSAND/UL (ref 4.31–10.16)

## 2025-02-07 PROCEDURE — 99284 EMERGENCY DEPT VISIT MOD MDM: CPT | Performed by: STUDENT IN AN ORGANIZED HEALTH CARE EDUCATION/TRAINING PROGRAM

## 2025-02-07 PROCEDURE — 85025 COMPLETE CBC W/AUTO DIFF WBC: CPT | Performed by: STUDENT IN AN ORGANIZED HEALTH CARE EDUCATION/TRAINING PROGRAM

## 2025-02-07 PROCEDURE — 80053 COMPREHEN METABOLIC PANEL: CPT | Performed by: STUDENT IN AN ORGANIZED HEALTH CARE EDUCATION/TRAINING PROGRAM

## 2025-02-07 PROCEDURE — 87804 INFLUENZA ASSAY W/OPTIC: CPT | Performed by: STUDENT IN AN ORGANIZED HEALTH CARE EDUCATION/TRAINING PROGRAM

## 2025-02-07 PROCEDURE — 36415 COLL VENOUS BLD VENIPUNCTURE: CPT | Performed by: STUDENT IN AN ORGANIZED HEALTH CARE EDUCATION/TRAINING PROGRAM

## 2025-02-07 PROCEDURE — 99285 EMERGENCY DEPT VISIT HI MDM: CPT

## 2025-02-07 PROCEDURE — 87811 SARS-COV-2 COVID19 W/OPTIC: CPT | Performed by: STUDENT IN AN ORGANIZED HEALTH CARE EDUCATION/TRAINING PROGRAM

## 2025-02-07 PROCEDURE — 74176 CT ABD & PELVIS W/O CONTRAST: CPT

## 2025-02-07 RX ORDER — LIDOCAINE HYDROCHLORIDE 20 MG/ML
1 JELLY TOPICAL ONCE
Status: COMPLETED | OUTPATIENT
Start: 2025-02-07 | End: 2025-02-07

## 2025-02-07 RX ORDER — SENNOSIDES 8.6 MG
8.6 TABLET ORAL
Qty: 30 TABLET | Refills: 0 | Status: SHIPPED | OUTPATIENT
Start: 2025-02-07 | End: 2025-03-09

## 2025-02-07 RX ORDER — DOCUSATE SODIUM 100 MG/1
100 CAPSULE, LIQUID FILLED ORAL 2 TIMES DAILY
Qty: 60 CAPSULE | Refills: 0 | Status: SHIPPED | OUTPATIENT
Start: 2025-02-07

## 2025-02-07 RX ORDER — POLYETHYLENE GLYCOL 3350 17 G/17G
17 POWDER, FOR SOLUTION ORAL ONCE
Status: COMPLETED | OUTPATIENT
Start: 2025-02-07 | End: 2025-02-07

## 2025-02-07 RX ADMIN — LIDOCAINE HYDROCHLORIDE 1 APPLICATION: 20 JELLY TOPICAL at 18:09

## 2025-02-07 RX ADMIN — POLYETHYLENE GLYCOL 3350 17 G: 17 POWDER, FOR SOLUTION ORAL at 21:00

## 2025-02-07 NOTE — ED PROVIDER NOTES
"Time reflects when diagnosis was documented in both MDM as applicable and the Disposition within this note       Time User Action Codes Description Comment    2/7/2025  7:18 PM Ashly Jackson Add [K59.00] Constipation, unspecified constipation type     2/7/2025  8:03 PM Ashly Jackson Add [K56.41] Fecal impaction in rectum (HCC)           ED Disposition       ED Disposition   Discharge    Condition   Stable    Date/Time   Fri Feb 7, 2025  7:58 PM    Comment   Ida Vuong discharge to home/self care.                   Assessment & Plan       Medical Decision Making  89-year-old female with history of CRC status post resection status post cholecystectomy and appendectomy who presents with constipation with last bowel movement yesterday.  She states that she has been able to pass only \"sausage sized\" stools x 1 month.  She presented to the emergency department at the end of January for this issue and received some manual disimpaction with improvement in symptoms but subsequently redeveloped constipation without obstipation.  She further denies nausea/vomiting/abdominal pain, CP, SOB, saddle anesthesia, urinary retention/incontinence, or any other complaints.    VS with hypertension but otherwise stable and afebrile. Abdomen distended without TTP or mass. Rectal exam with external hemorrhoids and brown stool in rectal vault.      Differential diagnosis includes but is not limited to: Constipation, bowel obstruction, ileus    Workup and treatment as below:    Imaging: See ED course  EKG: N/A  Labs: See ED course  Meds: Enema, MiraLAX  Consults: [N/A]  Reassessment: [N/A]    Dispo: Patient was discharged to home with strict return precautions. Patient acknowledged understanding of plan and diagnostic results, and all their questions were answered to their satisfaction.        Amount and/or Complexity of Data Reviewed  Labs: ordered. Decision-making details documented in ED Course.  Radiology: ordered. Decision-making " details documented in ED Course.    Risk  OTC drugs.  Prescription drug management.        ED Course as of 02/09/25 1544   Fri Feb 07, 2025   1722 CT abdomen pelvis wo contrast  IMPRESSION:     Large rectal diffuse colonic stool burden without associated inflammation.     1722 WBC(!): 4.09  Nonspecific leukopenia   1722 Hemoglobin(!): 9.6  stable   1732 Sodium(!): 131  Likely hypovolemic   2120 FLU/COVID Rapid Antigen (30 min. TAT) - Preferred screening test in ED  negative       Medications   lidocaine (URO-JET) 2 % urethral/mucosal gel 1 Application (1 Application Urethral Given 2/7/25 1809)   polyethylene glycol (MIRALAX) packet 17 g (17 g Oral Given 2/7/25 2100)       ED Risk Strat Scores                          SBIRT 20yo+      Flowsheet Row Most Recent Value   Initial Alcohol Screen: US AUDIT-C     1. How often do you have a drink containing alcohol? 0 Filed at: 02/07/2025 1913   2. How many drinks containing alcohol do you have on a typical day you are drinking?  0 Filed at: 02/07/2025 1913   3b. FEMALE Any Age, or MALE 65+: How often do you have 4 or more drinks on one occassion? 0 Filed at: 02/07/2025 1913   Audit-C Score 0 Filed at: 02/07/2025 1913   RODERICK: How many times in the past year have you...    Used an illegal drug or used a prescription medication for non-medical reasons? Never Filed at: 02/07/2025 1913                            History of Present Illness       Chief Complaint   Patient presents with    Constipation     Pt arrives via EMS from assisted living c/o constipation. Denies abd pain       Past Medical History:   Diagnosis Date    Allergic rhinitis     Disease of thyroid gland     Dizziness     Hyponatremia 8/30/2021    Hypothyroidism 10/20/2015    Malignant neoplasm of colon (HCC)     last assessed: 10/20/2015    Neuropathy     Tinnitus       Past Surgical History:   Procedure Laterality Date    ADENOIDECTOMY      APPENDECTOMY      last assessed: 10/20/2015    CATARACT EXTRACTION,  BILATERAL      CHOLECYSTECTOMY      last assessed: 10/20/2015    COLECTOMY      last assessed: 10/20/2015; partial     IR PICC PLACEMENT SINGLE LUMEN  4/5/2024    TONSILLECTOMY      last assessed: 10/20/2015      Family History   Problem Relation Age of Onset    Heart disease Mother         cardiac disorder    Alzheimer's disease Sister     No Known Problems Father     Leukemia Brother       Social History     Tobacco Use    Smoking status: Former     Current packs/day: 0.25     Types: Cigarettes    Smokeless tobacco: Never    Tobacco comments:     quit 50 yrs ago   Vaping Use    Vaping status: Never Used   Substance Use Topics    Alcohol use: No    Drug use: Never      E-Cigarette/Vaping    E-Cigarette Use Never User       E-Cigarette/Vaping Substances    Nicotine No     THC No     CBD No     Flavoring No     Other No     Unknown No       I have reviewed and agree with the history as documented.       Constipation      Review of Systems   Gastrointestinal:  Positive for constipation.           Objective       ED Triage Vitals   Temperature Pulse Blood Pressure Respirations SpO2 Patient Position - Orthostatic VS   02/07/25 1550 02/07/25 1548 02/07/25 1548 02/07/25 1548 02/07/25 1548 02/07/25 1809   98.5 °F (36.9 °C) 76 166/74 18 96 % Lying      Temp Source Heart Rate Source BP Location FiO2 (%) Pain Score    02/07/25 1550 02/07/25 1548 02/07/25 1548 -- --    Oral Monitor Right arm        Vitals      Date and Time Temp Pulse SpO2 Resp BP Pain Score FACES Pain Rating User   02/07/25 2059 -- 76 97 % 18 174/83 -- -- CG   02/07/25 1809 -- 61 97 % 18 156/71 -- -- LR   02/07/25 1550 98.5 °F (36.9 °C) -- -- -- -- -- -- ENOCH   02/07/25 1548 -- 76 96 % 18 166/74 -- -- ENOCH            Physical Exam  Vitals and nursing note reviewed. Exam conducted with a chaperone present.   Constitutional:       General: She is not in acute distress.     Appearance: Normal appearance. She is normal weight. She is not ill-appearing or  toxic-appearing.   HENT:      Head: Normocephalic.      Nose: Nose normal.      Mouth/Throat:      Mouth: Mucous membranes are moist.      Pharynx: Oropharynx is clear.   Eyes:      Conjunctiva/sclera: Conjunctivae normal.   Cardiovascular:      Rate and Rhythm: Normal rate and regular rhythm.      Heart sounds: Normal heart sounds.   Pulmonary:      Effort: Pulmonary effort is normal.      Breath sounds: Normal breath sounds.   Abdominal:      General: Abdomen is flat. There is distension.      Palpations: Abdomen is soft. There is no mass.      Tenderness: There is no abdominal tenderness. There is no guarding or rebound.      Hernia: No hernia is present.   Genitourinary:     Comments: Rectal exam with external hemorrhoids and brown stool in rectal vault.  Skin:     General: Skin is warm and dry.      Capillary Refill: Capillary refill takes less than 2 seconds.   Neurological:      Mental Status: She is alert and oriented to person, place, and time.   Psychiatric:         Mood and Affect: Mood normal.         Results Reviewed       Procedure Component Value Units Date/Time    FLU/COVID Rapid Antigen (30 min. TAT) - Preferred screening test in ED [[id number]]  (Normal) Collected: 02/07/25 2046    Lab Status: Final result Specimen: Nares from Nose Updated: 02/07/25 2115     SARS COV Rapid Antigen Negative     Influenza A Rapid Antigen Negative     Influenza B Rapid Antigen Negative    Narrative:      This test has been performed using the Quidel Marcela 2 FLU+SARS Antigen test under the Emergency Use Authorization (EUA). This test has been validated by the  and verified by the performing laboratory. The Marcela uses lateral flow immunofluorescent sandwich assay to detect SARS-COV, Influenza A and Influenza B Antigen.     The Quidel Marcela 2 SARS Antigen test does not differentiate between SARS-CoV and SARS-CoV-2.     Negative results are presumptive and may be confirmed with a molecular assay, if necessary,  for patient management. Negative results do not rule out SARS-CoV-2 or influenza infection and should not be used as the sole basis for treatment or patient management decisions. A negative test result may occur if the level of antigen in a sample is below the limit of detection of this test.     Positive results are indicative of the presence of viral antigens, but do not rule out bacterial infection or co-infection with other viruses.     All test results should be used as an adjunct to clinical observations and other information available to the provider.    FOR PEDIATRIC PATIENTS - copy/paste COVID Guidelines URL to browser: [email or web address]    Comprehensive metabolic panel [[id number]]  (Abnormal) Collected: 02/07/25 1707    Lab Status: Final result Specimen: Blood from Arm, Left Updated: 02/07/25 1729     Sodium 131 mmol/L      Potassium 4.7 mmol/L      Chloride 99 mmol/L      CO2 26 mmol/L      ANION GAP 6 mmol/L      BUN 24 mg/dL      Creatinine 1.03 mg/dL      Glucose 91 mg/dL      Calcium 9.1 mg/dL      AST 14 U/L      ALT 9 U/L      Alkaline Phosphatase 61 U/L      Total Protein 6.6 g/dL      Albumin 4.0 g/dL      Total Bilirubin 0.39 mg/dL      eGFR 46 ml/min/1.73sq m     Narrative:      National Kidney Disease Foundation guidelines for Chronic Kidney Disease (CKD):     Stage 1 with normal or high GFR (GFR > 90 mL/min/1.73 square meters)    Stage 2 Mild CKD (GFR = 60-89 mL/min/1.73 square meters)    Stage 3A Moderate CKD (GFR = 45-59 mL/min/1.73 square meters)    Stage 3B Moderate CKD (GFR = 30-44 mL/min/1.73 square meters)    Stage 4 Severe CKD (GFR = 15-29 mL/min/1.73 square meters)    Stage 5 End Stage CKD (GFR <15 mL/min/1.73 square meters)  Note: GFR calculation is accurate only with a steady state creatinine    CBC and differential [[id number]]  (Abnormal) Collected: 02/07/25 1707    Lab Status: Final result Specimen: Blood from Arm, Left  Updated: 02/07/25 1717     WBC 4.09 Thousand/uL      RBC 2.83 Million/uL      Hemoglobin 9.6 g/dL      Hematocrit 28.9 %       fL      MCH 33.9 pg      MCHC 33.2 g/dL      RDW 12.4 %      MPV 9.9 fL      Platelets 228 Thousands/uL      nRBC 0 /100 WBCs      Segmented % 77 %      Immature Grans % 1 %      Lymphocytes % 12 %      Monocytes % 7 %      Eosinophils Relative 2 %      Basophils Relative 1 %      Absolute Neutrophils 3.21 Thousands/µL      Absolute Immature Grans 0.02 Thousand/uL      Absolute Lymphocytes 0.47 Thousands/µL      Absolute Monocytes 0.28 Thousand/µL      Eosinophils Absolute 0.06 Thousand/µL      Basophils Absolute 0.05 Thousands/µL             CT abdomen pelvis wo contrast   Final Interpretation by James Zheng MD (02/07 1720)      Large rectal diffuse colonic stool burden without associated inflammation.      Workstation performed: [id number]             Foreign Body - Orifice    Date/Time: 2/7/2025 9:48 PM    Performed by: Ashly Jackson MD  Authorized by: Ashly Jackson MD    Patient location:  ED and bedside  Other Assisting Provider: No    Consent:     Consent obtained:  Verbal    Consent given by:  Patient    Risks discussed:  Incomplete removal and pain    Alternatives discussed:  Alternative treatment and delayed treatment  Universal protocol:     Patient identity confirmed:  Verbally with patient and arm band  Location:     Location:  Rectum  Pre-procedure details:     Imaging:  CT  Anesthesia (see MAR for exact dosages):     Topical anesthetic:  Lidocaine gel  Procedure details:     Localization method:  Probed    Removal mechanism:  Manual extraction    Procedure complexity:  Simple    Foreign bodies recovered: large amount of brown stool.  Post-procedure details:     Patient tolerance of procedure:  Tolerated well, no immediate complications  Comments:      RN chaperone present for procedure.      ED Medication and Procedure Management   Prior to Admission Medications    Prescriptions Last Dose Informant Patient Reported? Taking?   amLODIPine (NORVASC) 5 mg tablet   Yes No   Sig: Take 5 mg by mouth daily   aspirin 81 MG tablet  Self Yes No   Sig: Take 81 mg by mouth   calcium carbonate-vitamin D (OSCAL-D) 500 mg-200 units per tablet  Self No No   Sig: Take 1 tablet by mouth daily with breakfast   cholecalciferol (VITAMIN D3) 1,000 units tablet  Self Yes No   clobetasol (TEMOVATE) 0.05 % ointment   No No   Sig: Apply topically 2 (two) times a day   fexofenadine (ALLEGRA) 180 MG tablet  Self Yes No   Sig: Take 180 mg by mouth daily   gabapentin (NEURONTIN) 100 mg capsule   No No   Sig: TAKE 1 CAPSULE BY MOUTH THREE TIMES DAILY   linaCLOtide (Linzess) 290 MCG CAPS  Self No No   Sig: Take 1 capsule by mouth once daily   linaCLOtide 145 MCG CAPS   No No   Sig: Take 1 capsule (145 mcg total) by mouth 1 (one) time for 1 dose   meclizine (ANTIVERT) 25 mg tablet   No No   Sig: TAKE 1 TABLET BY MOUTH EVERY 8 HOURS   neomycin-polymyxin-dexamethasone (MAXITROL) 0.35%-10,000 units/g-0.1%   Yes No   Sig: APPLY A SMALL AMOUNT INTO BOTH EYES TWICE DAILY      Facility-Administered Medications: None     Discharge Medication List as of 2/7/2025  8:07 PM        START taking these medications    Details   docusate sodium (COLACE) 100 mg capsule Take 1 capsule (100 mg total) by mouth 2 (two) times a day, Starting Fri 2/7/2025, Normal      senna (SENOKOT) 8.6 mg Take 1 tablet (8.6 mg total) by mouth daily at bedtime, Starting Fri 2/7/2025, Until Sun 3/9/2025, Normal           CONTINUE these medications which have NOT CHANGED    Details   amLODIPine (NORVASC) 5 mg tablet Take 5 mg by mouth daily, Starting Mon 3/25/2024, Historical Med      aspirin 81 MG tablet Take 81 mg by mouth, Historical Med      calcium carbonate-vitamin D (OSCAL-D) 500 mg-200 units per tablet Take 1 tablet by mouth daily with breakfast, Starting Fri 9/3/2021, Normal      cholecalciferol (VITAMIN D3) 1,000 units tablet Starting Tue  3/15/2022, Historical Med      clobetasol (TEMOVATE) 0.05 % ointment Apply topically 2 (two) times a day, Starting Tue 4/2/2024, Normal      fexofenadine (ALLEGRA) 180 MG tablet Take 180 mg by mouth daily, Historical Med      gabapentin (NEURONTIN) 100 mg capsule TAKE 1 CAPSULE BY MOUTH THREE TIMES DAILY, Starting Mon 4/15/2024, Normal      linaCLOtide (Linzess) 290 MCG CAPS Take 1 capsule by mouth once daily, Starting Mon 12/11/2023, Normal      meclizine (ANTIVERT) 25 mg tablet TAKE 1 TABLET BY MOUTH EVERY 8 HOURS, Normal      neomycin-polymyxin-dexamethasone (MAXITROL) 0.35%-10,000 units/g-0.1% APPLY A SMALL AMOUNT INTO BOTH EYES TWICE DAILY, Historical Med           No discharge procedures on file.  ED SEPSIS DOCUMENTATION   Time reflects when diagnosis was documented in both MDM as applicable and the Disposition within this note       Time User Action Codes Description Comment    2/7/2025  7:18 PM Ashly Jackson [K59.00] Constipation, unspecified constipation type     2/7/2025  8:03 PM Ashly Jackson [K56.41] Fecal impaction in rectum (HCC)                  Ashly Jackson MD  02/09/25 4072

## 2025-02-07 NOTE — ED NOTES
Attempted to complete soap suds enema. Site assessed prior to insertion and noted stool near anus. Was able to instill approx 300 mL of solution prior to it flowing back out. Informed Dr. Jackson of [facility].      Maura Rogers RN  02/07/25 0674

## 2025-02-08 NOTE — ED NOTES
Wheelchair van transport arranged for pt to be brought back to facility, [facility], at this time.      Susan Deleon RN  02/07/25 2058    via [employer] for 2130     Susan Deleon RN  02/07/25 2126

## 2025-02-08 NOTE — ED NOTES
Report called to [facility] to inform them that pt is on her way home. No answer, voicemail left.      Susan Deleon RN  02/07/25 9003

## 2025-03-02 ENCOUNTER — HOSPITAL ENCOUNTER (EMERGENCY)
Facility: HOSPITAL | Age: OVER 89
Discharge: HOME | End: 2025-03-02
Attending: EMERGENCY MEDICINE | Admitting: EMERGENCY MEDICINE
Payer: MEDICARE

## 2025-03-02 VITALS
HEART RATE: 68 BPM | OXYGEN SATURATION: 98 % | RESPIRATION RATE: 18 BRPM | DIASTOLIC BLOOD PRESSURE: 76 MMHG | SYSTOLIC BLOOD PRESSURE: 152 MMHG | TEMPERATURE: 97.6 F

## 2025-03-02 DIAGNOSIS — K59.00 CONSTIPATION: Primary | ICD-10-CM

## 2025-03-02 PROCEDURE — 99284 EMERGENCY DEPT VISIT MOD MDM: CPT | Performed by: EMERGENCY MEDICINE

## 2025-03-02 PROCEDURE — 99283 EMERGENCY DEPT VISIT LOW MDM: CPT

## 2025-03-02 RX ORDER — POLYETHYLENE GLYCOL 3350 17 G/17G
17 POWDER, FOR SOLUTION ORAL DAILY
Qty: 510 G | Refills: 0 | Status: SHIPPED | OUTPATIENT
Start: 2025-03-02 | End: 2025-04-01

## 2025-03-02 RX ORDER — POLYETHYLENE GLYCOL 3350 17 G/17G
17 POWDER, FOR SOLUTION ORAL DAILY
Qty: 510 G | Refills: 0 | Status: SHIPPED | OUTPATIENT
Start: 2025-03-02 | End: 2025-03-02 | Stop reason: DRUGHIGH

## 2025-03-02 NOTE — DISCHARGE INSTRUCTIONS
You were evaluated in the emergency department for constipation.  You had a manual disimpaction and 2 soapsuds enemas.  We are adding MiraLAX to your laxative regimen.  This is a powder that you can mix in with water or juice and drink daily to soften stools.  Return to the emergency department if you develop fever, vomiting, diarrhea, abdominal pain, or generally feel worse.  It is important that you follow-up with your primary care physician.

## 2025-03-02 NOTE — ED ATTENDING ATTESTATION
3/2/2025  I, Joao Maradiaga MD, saw and evaluated the patient. I have discussed the patient with the resident/non-physician practitioner and agree with the resident's/non-physician practitioner's findings, Plan of Care, and MDM as documented in the resident's/non-physician practitioner's note, except where noted. All available labs and Radiology studies were reviewed.  I was present for key portions of any procedure(s) performed by the resident/non-physician practitioner and I was immediately available to provide assistance.    At this point I agree with the current assessment done in the Emergency Department. I have conducted an independent evaluation of this patient a history and physical is as follows:    This is a 89 y.o. old female who presents to the ED for evaluation of constipation. Hx of same. Multiple ed visits for this. No pain, nasuea or vomiting. Urge to defecate, but unable to.     VS and nursing notes reviewed  General: Appears in NAD, awake, alert, speaking normally in full sentences.   Well-nourished, well-developed. Appears stated age.   Head: Normocephalic, atraumatic.  Eyes: EOMI. Vision grossly normal. No subconjunctival hemorrhages or occular discharge noted. Symmetrical lids.   ENT: Atraumatic external nose and ears. No stridor. Normal phonation. No drooling. Normal swallowing.   Neck: No JVD. FROM. No goiter  CV: No pallor. Normal rate.  Lungs: No tachypnea. No respiratory distress.  ABD: soft, nontender. Stool in rectal vault.  MSK: Moving all extremities equally, no peripheral edema  Skin: Dry, intact. No cyanosis  Neuro: Awake, alert, GCS15. CN II-XII grossly intact. Grossly normal gait.  Psychiatric/Behavioral: Appropriate mood and affect.     A/P: This is a 89 y.o. female who presents to the ED for evaluation of constipation. Normal exam, doubt utility of CT scan. We will try enema, if not successful, will have to manually disimpact. Reeval and dispo accordingly.      ED Course          Critical Care Time  Procedures

## 2025-03-02 NOTE — ED PROVIDER NOTES
Time reflects when diagnosis was documented in both MDM as applicable and the Disposition within this note       Time User Action Codes Description Comment    3/2/2025  3:17 PM Ze Angelo Add [K59.00] Constipation           ED Disposition       ED Disposition   Discharge    Condition   Stable    Date/Time   Sun Mar 2, 2025  3:17 PM    Comment   Ida Lee Yevgeniy discharge to home/self care.                   Assessment & Plan       Medical Decision Making  89-year-old female with past medical history chronic constipation presents to ED for evaluation of constipation.  Patient states that she only passes a small bowel movement each day.  Denies associated symptoms or abdominal pain.  On exam, vital signs unremarkable.  Patient has a nontender nondistended abdomen which is soft.  Differential diagnosis: Given patient's history of chronic constipation, recent evaluation for constipation and [place] the emergency department with recent CAT scan showing large stool burden, I suspect this is an acute exacerbation of her chronic constipation.  Do not suspect intra-abdominal catastrophe, bowel obstruction, based on lack of abdominal tenderness and general well appearance, normal vital signs.  Plan: Will give the patient soapsuds enema.  Please see ED course for additional information.        ED Course as of 03/02/25 1944   [place] Mar 02, 2025   1409 After soapsuds enema, patient was not able to have a bowel movement.  I manually disimpacted a large stool ball.  See procedure note.  Will order another soapsuds enema.   1517 After second soapsuds enema, patient had a very large bowel movement.  Patient stable for discharge.  Strict return precautions discussed with patient.  I discussed that she will need to drink more water, and take her laxatives as prescribed.  I instructed her to follow-up with her primary care physician       Medications - No data to display    ED Risk Strat Scores                                                 History of Present Illness       Chief Complaint   Patient presents with    Constipation     Pt unable to pass more than small amounts of bowel. She complains of minor abdominal cramping. Took senna and a can of prune juice today with little relief. Hx of colon cancer. Recently seen at [facility] for fecal impaction and constipation         Past Medical History:   Diagnosis Date    Allergic rhinitis     Disease of thyroid gland     Dizziness     Hyponatremia 8/30/2021    Hypothyroidism 10/20/2015    Malignant neoplasm of colon (HCC)     last assessed: 10/20/2015    Neuropathy     Tinnitus       Past Surgical History:   Procedure Laterality Date    ADENOIDECTOMY      APPENDECTOMY      last assessed: 10/20/2015    CATARACT EXTRACTION, BILATERAL      CHOLECYSTECTOMY      last assessed: 10/20/2015    COLECTOMY      last assessed: 10/20/2015; partial     IR PICC PLACEMENT SINGLE LUMEN  4/5/2024    TONSILLECTOMY      last assessed: 10/20/2015      Family History   Problem Relation Age of Onset    Heart disease Mother         cardiac disorder    Alzheimer's disease Sister     No Known Problems Father     Leukemia Brother       Social History     Tobacco Use    Smoking status: Former     Current packs/day: 0.25     Types: Cigarettes    Smokeless tobacco: Never    Tobacco comments:     quit 50 yrs ago   Vaping Use    Vaping status: Never Used   Substance Use Topics    Alcohol use: No    Drug use: Never      E-Cigarette/Vaping    E-Cigarette Use Never User       E-Cigarette/Vaping Substances    Nicotine No     THC No     CBD No     Flavoring No     Other No     Unknown No       I have reviewed and agree with the history as documented.     89-year-old female with past medical history colon cancer status post bowel resection presents ED for evaluation of constipation.  Patient states that she has a history of chronic constipation.  She states that she only has a small bowel movement every  day.  She feels like she is straining, but cannot pass anything.  She states that she is passing gas, although only a small amount.  Denies abdominal pain, vomiting, fever, urinary complaints.  Patient was evaluated on 2/7/2025 for constipation at [facility].  CT abdomen pelvis at that time showed large rectal diffuse colonic stool burden without associated inflammation.         Review of Systems   Constitutional:  Negative for chills and fever.   Respiratory:  Negative for cough and shortness of breath.    Cardiovascular:  Negative for chest pain.   Gastrointestinal:  Positive for constipation. Negative for abdominal pain, diarrhea, nausea and vomiting.   Genitourinary:  Negative for difficulty urinating and dysuria.   Skin:  Negative for color change.   Neurological:  Negative for weakness and numbness.           Objective       ED Triage Vitals [03/02/25 1135]   Temperature Pulse Blood Pressure Respirations SpO2 Patient Position - Orthostatic VS   97.6 °F (36.4 °C) 68 152/76 18 98 % Lying      Temp Source Heart Rate Source BP Location FiO2 (%) Pain Score    Oral -- Right arm -- 2      Vitals      Date and Time Temp Pulse SpO2 Resp BP Pain Score FACES Pain Rating User   03/02/25 1135 97.6 °F (36.4 °C) 68 98 % 18 152/76 2 -- MD            Physical Exam  Vitals and nursing note reviewed.   Constitutional:       General: She is not in acute distress.     Appearance: Normal appearance. She is normal weight. She is not ill-appearing, toxic-appearing or diaphoretic.   HENT:      Head: Normocephalic and atraumatic.      Nose: No congestion.      Mouth/Throat:      Mouth: Mucous membranes are moist.   Eyes:      Extraocular Movements: Extraocular movements intact.      Conjunctiva/sclera: Conjunctivae normal.   Cardiovascular:      Rate and Rhythm: Normal rate and regular rhythm.      Pulses: Normal pulses.      Heart sounds: Normal heart sounds.   Pulmonary:      Effort: Pulmonary effort is normal.      Breath  sounds: Normal breath sounds.   Abdominal:      General: Abdomen is flat. There is no distension.      Palpations: Abdomen is soft.      Tenderness: There is no abdominal tenderness.   Musculoskeletal:      Cervical back: Neck supple.   Skin:     General: Skin is warm and dry.      Capillary Refill: Capillary refill takes less than 2 seconds.   Neurological:      Mental Status: She is alert and oriented to person, place, and time.         Results Reviewed       None            No orders to display       General Procedure    Date/Time: 3/2/2025 2:07 PM    Performed by: Ze Angelo DO  Authorized by: Ze Angelo DO    Patient location:  ED  Consent:     Consent obtained:  Verbal    Consent given by:  Patient  Indications:     Indications:  Constipation  Pre-procedure details:     Preparation: Patient was prepped and draped in the usual sterile fashion    Anesthesia (see MAR for exact dosages):     Anesthesia method:  None  Procedure Detail:     Procedure note (site, laterality, method, findings):  Digital disimpaction was performed due to constipation.  Initial soapsuds enema did not provide relief.  I removed approximately 200 to 300 cc of hard stool.  There were no complications.  Post-procedure details:     Patient tolerance of procedure:  Tolerated well, no immediate complications      ED Medication and Procedure Management   Prior to Admission Medications   Prescriptions Last Dose Informant Patient Reported? Taking?   amLODIPine (NORVASC) 5 mg tablet   Yes No   Sig: Take 5 mg by mouth daily   aspirin 81 MG tablet  Self Yes No   Sig: Take 81 mg by mouth   calcium carbonate-vitamin D (OSCAL-D) 500 mg-200 units per tablet  Self No No   Sig: Take 1 tablet by mouth daily with breakfast   cholecalciferol (VITAMIN D3) 1,000 units tablet  Self Yes No   clobetasol (TEMOVATE) 0.05 % ointment   No No   Sig: Apply topically 2 (two) times a day   docusate sodium (COLACE) 100 mg capsule   No No   Sig: Take 1 capsule (100  mg total) by mouth 2 (two) times a day   fexofenadine (ALLEGRA) 180 MG tablet  Self Yes No   Sig: Take 180 mg by mouth daily   gabapentin (NEURONTIN) 100 mg capsule   No No   Sig: TAKE 1 CAPSULE BY MOUTH THREE TIMES DAILY   linaCLOtide (Linzess) 290 MCG CAPS  Self No No   Sig: Take 1 capsule by mouth once daily   linaCLOtide 145 MCG CAPS   No No   Sig: Take 1 capsule (145 mcg total) by mouth 1 (one) time for 1 dose   meclizine (ANTIVERT) 25 mg tablet   No No   Sig: TAKE 1 TABLET BY MOUTH EVERY 8 HOURS   neomycin-polymyxin-dexamethasone (MAXITROL) 0.35%-10,000 units/g-0.1%   Yes No   Sig: APPLY A SMALL AMOUNT INTO BOTH EYES TWICE DAILY   senna (SENOKOT) 8.6 mg   No No   Sig: Take 1 tablet (8.6 mg total) by mouth daily at bedtime      Facility-Administered Medications: None     Discharge Medication List as of 3/2/2025  3:21 PM        START taking these medications    Details   polyethylene glycol (GLYCOLAX) 17 GM/SCOOP powder Take 17 g by mouth daily, Starting Sun 3/2/2025, Until Tue 4/1/2025, Normal           CONTINUE these medications which have NOT CHANGED    Details   amLODIPine (NORVASC) 5 mg tablet Take 5 mg by mouth daily, Starting Mon 3/25/2024, Historical Med      aspirin 81 MG tablet Take 81 mg by mouth, Historical Med      calcium carbonate-vitamin D (OSCAL-D) 500 mg-200 units per tablet Take 1 tablet by mouth daily with breakfast, Starting Fri 9/3/2021, Normal      cholecalciferol (VITAMIN D3) 1,000 units tablet Starting Tue 3/15/2022, Historical Med      clobetasol (TEMOVATE) 0.05 % ointment Apply topically 2 (two) times a day, Starting Tue 4/2/2024, Normal      docusate sodium (COLACE) 100 mg capsule Take 1 capsule (100 mg total) by mouth 2 (two) times a day, Starting Fri 2/7/2025, Normal      fexofenadine (ALLEGRA) 180 MG tablet Take 180 mg by mouth daily, Historical Med      gabapentin (NEURONTIN) 100 mg capsule TAKE 1 CAPSULE BY MOUTH THREE TIMES DAILY, Starting Mon 4/15/2024, Normal      linaCLOtide  (Linzess) 290 MCG CAPS Take 1 capsule by mouth once daily, Starting Mon 12/11/2023, Normal      meclizine (ANTIVERT) 25 mg tablet TAKE 1 TABLET BY MOUTH EVERY 8 HOURS, Normal      neomycin-polymyxin-dexamethasone (MAXITROL) 0.35%-10,000 units/g-0.1% APPLY A SMALL AMOUNT INTO BOTH EYES TWICE DAILY, Historical Med      senna (SENOKOT) 8.6 mg Take 1 tablet (8.6 mg total) by mouth daily at bedtime, Starting Fri 2/7/2025, Until Sun 3/9/2025, Normal           No discharge procedures on file.  ED SEPSIS DOCUMENTATION   Time reflects when diagnosis was documented in both MDM as applicable and the Disposition within this note       Time User Action Codes Description Comment    3/2/2025  3:17 PM Ze Angelo Add [K59.00] Constipation                  Ze Angelo DO  03/02/25 1944

## 2025-04-22 ENCOUNTER — TELEPHONE (OUTPATIENT)
Dept: RADIOLOGY | Facility: MEDICAL CENTER | Age: OVER 89
End: 2025-04-22

## 2025-08-01 ENCOUNTER — HOSPITAL ENCOUNTER (EMERGENCY)
Facility: HOSPITAL | Age: OVER 89
Discharge: HOME | End: 2025-08-02
Attending: EMERGENCY MEDICINE
Payer: MEDICARE

## 2025-08-01 VITALS
OXYGEN SATURATION: 96 % | RESPIRATION RATE: 18 BRPM | DIASTOLIC BLOOD PRESSURE: 84 MMHG | SYSTOLIC BLOOD PRESSURE: 177 MMHG | HEART RATE: 105 BPM | TEMPERATURE: 98.4 F

## 2025-08-01 DIAGNOSIS — M19.042 ARTHRITIS OF BOTH HANDS: ICD-10-CM

## 2025-08-01 DIAGNOSIS — G89.29 CHRONIC HAND PAIN: Primary | ICD-10-CM

## 2025-08-01 DIAGNOSIS — M79.643 CHRONIC HAND PAIN: Primary | ICD-10-CM

## 2025-08-01 DIAGNOSIS — M19.041 ARTHRITIS OF BOTH HANDS: ICD-10-CM

## 2025-08-01 PROCEDURE — 99283 EMERGENCY DEPT VISIT LOW MDM: CPT

## 2025-08-01 PROCEDURE — 99284 EMERGENCY DEPT VISIT MOD MDM: CPT | Performed by: EMERGENCY MEDICINE

## 2025-08-01 RX ORDER — ACETAMINOPHEN 325 MG/1
650 TABLET ORAL ONCE
Status: COMPLETED | OUTPATIENT
Start: 2025-08-01 | End: 2025-08-01

## 2025-08-01 RX ORDER — TRAMADOL HYDROCHLORIDE 50 MG/1
25 TABLET ORAL ONCE
Status: COMPLETED | OUTPATIENT
Start: 2025-08-01 | End: 2025-08-01

## 2025-08-01 RX ORDER — PREDNISONE 20 MG/1
20 TABLET ORAL ONCE
Status: COMPLETED | OUTPATIENT
Start: 2025-08-01 | End: 2025-08-01

## 2025-08-01 RX ORDER — PREDNISONE 20 MG/1
20 TABLET ORAL DAILY
Qty: 5 TABLET | Refills: 0 | Status: SHIPPED | OUTPATIENT
Start: 2025-08-01 | End: 2025-08-06

## 2025-08-01 RX ADMIN — TRAMADOL HYDROCHLORIDE 25 MG: 50 TABLET, COATED ORAL at 23:35

## 2025-08-01 RX ADMIN — ACETAMINOPHEN 650 MG: 325 TABLET ORAL at 23:04

## 2025-08-01 RX ADMIN — PREDNISONE 20 MG: 20 TABLET ORAL at 23:35

## (undated) RX ORDER — NEOMYCIN SULFATE, POLYMYXIN B SULFATE AND DEXAMETHASONE 3.5; 10000; 1 MG/G; [USP'U]/G; MG/G
1/4 OINTMENT OPHTHALMIC TWICE A DAY
Start: 2023-12-21